# Patient Record
Sex: FEMALE | Race: WHITE | HISPANIC OR LATINO | Employment: FULL TIME | URBAN - METROPOLITAN AREA
[De-identification: names, ages, dates, MRNs, and addresses within clinical notes are randomized per-mention and may not be internally consistent; named-entity substitution may affect disease eponyms.]

---

## 2017-01-02 DIAGNOSIS — R10.13 EPIGASTRIC PAIN: ICD-10-CM

## 2017-01-11 ENCOUNTER — APPOINTMENT (OUTPATIENT)
Dept: NEPHROLOGY | Facility: MEDICAL CENTER | Age: 42
End: 2017-01-11
Payer: COMMERCIAL

## 2017-01-11 ENCOUNTER — OFFICE VISIT (OUTPATIENT)
Dept: NEPHROLOGY | Facility: MEDICAL CENTER | Age: 42
End: 2017-01-11
Payer: COMMERCIAL

## 2017-01-11 VITALS
HEIGHT: 63 IN | HEART RATE: 82 BPM | WEIGHT: 144 LBS | RESPIRATION RATE: 12 BRPM | DIASTOLIC BLOOD PRESSURE: 74 MMHG | TEMPERATURE: 97.6 F | SYSTOLIC BLOOD PRESSURE: 116 MMHG | BODY MASS INDEX: 25.52 KG/M2

## 2017-01-11 DIAGNOSIS — R31.9 HEMATURIA: ICD-10-CM

## 2017-01-11 PROCEDURE — 99204 OFFICE O/P NEW MOD 45 MIN: CPT | Performed by: INTERNAL MEDICINE

## 2017-01-11 ASSESSMENT — ENCOUNTER SYMPTOMS
CHILLS: 0
FEVER: 0
ABDOMINAL PAIN: 1
COUGH: 0
NAUSEA: 0
VOMITING: 0

## 2017-01-11 NOTE — MR AVS SNAPSHOT
"        Joycelyn Byers   2017 9:45 AM   Office Visit   MRN: [id number]    Department:  [facility]   Dept Phone:  [phone]    Description:  Female : 1975   Provider:  Fadi Najjar, M.D.           Reason for Visit     New Patient           Allergies as of 2017     Allergen Noted Reactions    Savella [Kdc:Milnacipran+Ci Pigment Blue 63] 2015   Myalgia    Ciprofloxacin 10/10/2014       Reglan [Kdc:Yellow Dye+Ci Pigment Blue 63+Metoclopramide] 10/10/2014       Sulfa Drugs 04/15/2014       Tetanus Antitoxin 10/04/2016       Mild spasms and pain      Tramadol 10/10/2014         You were diagnosed with     Hematuria   [[id number]]         Vital Signs     Blood Pressure Pulse Temperature Respirations Height Weight    116/74 mmHg 82 36.4 °C (97.6 °F) (Temporal) 12 1.6 m (5' 3\") 65.318 kg (144 lb)    Body Mass Index Smoking Status                25.51 kg/m2 Never Smoker           Basic Information     Date Of Birth Sex Race Ethnicity Preferred Language    1975 Female  or   Origin (Syriac,Venezuelan,Australian,Jeremie, etc) English      Your appointments     2017  9:30 AM   H Pylori Breath Test with [facility]   [facility] (--)    [address] [facility] [address]   [phone]            2017  1:20 PM   MA [id number] with [facility] MG 3   [facility] ([address])    [address] [facility] [address]  [place] [id number] [phone]           No deodorant, powder, perfume or lotion under the arm or breast area.            2017 10:00 AM   Follow Up Visit with Fadi Najjar, M.D.   [facility] ([address] [facility])    [address]  [place] [id number] [phone]           You will be receiving a confirmation call a few days before your appointment from our automated call confirmation system.              Problem List              ICD-10-CM Priority Class Noted - Resolved    Asthma in " adult J45.909   1/14/2015 - Present    Overactive bladder N32.81   4/17/2015 - Present    Ankylosing spondylitis of lumbosacral region (HCC) M45.7   7/6/2015 - Present    Anxiety F41.9   11/24/2015 - Present    Intractable migraine without aura and without status migrainosus G43.019   11/24/2015 - Present    Multiple allergies Z88.9   12/21/2015 - Present    Autonomic nervous system disorder G90.9   4/19/2016 - Present    Vocal cord dysfunction J38.3   4/19/2016 - Present    Reactive hypoglycemia E16.1   9/28/2016 - Present    Prediabetes R73.03   12/8/2016 - Present    Chronic constipation K59.09   12/15/2016 - Present      Health Maintenance        Date Due Completion Dates    IMM DTaP/Tdap/Td Vaccine (1 - Tdap) 5/6/1995 ---    IMM PNEUMOCOCCAL 19-64 (ADULT) MEDIUM RISK SERIES (1 of 1 - PPSV23) 5/6/1995 ---    MAMMOGRAM 5/6/2016 ---            Current Immunizations     Influenza Vaccine Quad Inj (Pf) 10/23/2014    Influenza Vaccine Quad Inj (Preserved) 11/8/2016      Below and/or attached are the medications your provider expects you to take. Review all of your home medications and newly ordered medications with your provider and/or pharmacist. Follow medication instructions as directed by your provider and/or pharmacist. Please keep your medication list with you and share with your provider. Update the information when medications are discontinued, doses are changed, or new medications (including over-the-counter products) are added; and carry medication information at all times in the event of emergency situations     Allergies:  SAVELLA - Myalgia     CIPROFLOXACIN - (reactions not documented)     REGLAN - (reactions not documented)     SULFA DRUGS - (reactions not documented)     TETANUS ANTITOXIN - (reactions not documented)     TRAMADOL - (reactions not documented)               Medications  Valid as of: January 11, 2017 - 10:49 AM    Generic Name Brand Name Tablet Size Instructions for use    Adalimumab  (Kit) HUMIRA 40 MG/0.8ML Inject  as instructed.        Albuterol Sulfate (Aero Soln) albuterol 108 (90 BASE) MCG/ACT Inhale 2 Puffs by mouth every 6 hours as needed for Shortness of Breath.        Beclomethasone Dipropionate (Aero Soln) QVAR 80 MCG/ACT Inhale 1 Puff by mouth 2 times a day.        Blood Glucose Monitoring Suppl (Misc) Blood Glucose Monitoring Suppl SUPPLIES Test strips for One TOUCH Ultra blue test strips Mini Meter Sig: Use 3x a day        Diclofenac Sodium (Gel) SOLARAZE 3 % Apply  to affected area(s) 2 times a day.        Digestive Enzymes   Take  by mouth.        EPINEPHrine (Solution Auto-injector) EPIPEN 2-NOLAN 0.3 MG/0.3ML         Fexofenadine HCl (Tab) ALLEGRA 180 MG Take 180 mg by mouth every day.        Fluticasone Propionate (Suspension) FLONASE 50 MCG/ACT Spray 1 Spray in nose every day.        Lancets (Misc) Lancets  Lancets order: Lancets for One Touch UltaMini Sig: Use 3x a day        MetroNIDAZOLE (Tab) FLAGYL 500 MG 1 TAB TWICE A DAY X 7 DAYS. NO ALCOHOL USE WITH THIS.        Multiple Vitamins-Minerals   Take  by mouth.        Multiple Vitamins-Minerals (Chew Tab) AIRBORNE  Take  by mouth.        Nabumetone (Tab) RELAFEN 500 MG Take 500 mg by mouth 2 times a day.        Ondansetron (TABLET DISPERSIBLE) ZOFRAN ODT 4 MG Take 1 Tab by mouth every 8 hours as needed for Nausea/Vomiting.        Oxybutynin Chloride (TABLET SR 24 HR) DITROPAN-XL 10 MG Take 1 Tab by mouth every day.        Pantoprazole Sodium (Tablet Delayed Response) PROTONIX 40 MG Take 40 mg by mouth every day.        Rizatriptan Benzoate (Tab) MAXALT 10 MG Take 1 Tab by mouth Once PRN. Indications: Migraine Headache        .                 Medicines prescribed today were sent to:     [id number] - [place] - [address] [facility] [address]    [address] [facility] [place] [address]    Phone: [phone] Fax: [phone]    Open 24 Hours?: No    [facility] - [facility] [address]  [facility]    [address] [facility] [address]    Phone: [phone] Fax: [phone]    Open 24 Hours?: No      Medication refill instructions:       If your prescription bottle indicates you have medication refills left, it is not necessary to call your provider’s office. Please contact your pharmacy and they will refill your medication.    If your prescription bottle indicates you do not have any refills left, you may request refills at any time through one of the following ways: The online [employer] system (except Urgent Care), by calling your provider’s office, or by asking your pharmacy to contact your provider’s office with a refill request. Medication refills are processed only during regular business hours and may not be available until the next business day. Your provider may request additional information or to have a follow-up visit with you prior to refilling your medication.   *Please Note: Medication refills are assigned a new Rx number when refilled electronically. Your pharmacy may indicate that no refills were authorized even though a new prescription for the same medication is available at the pharmacy. Please request the medicine by name with the pharmacy before contacting your provider for a refill.        Your To Do List     Future Labs/Procedures Complete By Expires    BASIC METABOLIC PANEL  As directed 1/11/2018    URINALYSIS,CULTURE IF INDICATED  As directed 1/11/2018         [employer] Access Code: Activation code not generated  Current [employer] Status: Active

## 2017-01-11 NOTE — PROGRESS NOTES
"Subjective:      Joycelyn Byers is a 41 y.o. female who presents with Chronic Kidney Disease   the patient was referred by her primary care provider Archana Miller for microscopic hematuria     The patient has a history of ankylosing spondylitis.  Also has a history of regular bowel movement/chronic constipation    Recently his been complaining of left-sided abdominal pain, had a urinalysis was positive for microscopic hematuria, her kidney function were normal        Chronic Kidney Disease  This is a new problem. The current episode started more than 1 month ago. The problem occurs intermittently. The problem has been unchanged. Associated symptoms include abdominal pain. Pertinent negatives include no chest pain, chills, coughing, fever, nausea, urinary symptoms or vomiting.       Review of Systems   Constitutional: Negative for fever and chills.   Respiratory: Negative for cough.    Cardiovascular: Negative for chest pain and leg swelling.   Gastrointestinal: Positive for abdominal pain. Negative for nausea and vomiting.   Genitourinary: Negative for dysuria, urgency and frequency.          Objective:     /74 mmHg  Pulse 82  Temp(Src) 36.4 °C (97.6 °F) (Temporal)  Resp 12  Ht 1.6 m (5' 3\")  Wt 65.318 kg (144 lb)  BMI 25.51 kg/m2     Physical Exam   Constitutional: She is oriented to person, place, and time. She appears well-developed and well-nourished. No distress.   HENT:   Nose: Nose normal.   Cardiovascular: Normal rate and regular rhythm.    Pulmonary/Chest: Effort normal.   Musculoskeletal: She exhibits no edema.   Neurological: She is alert and oriented to person, place, and time.   Skin: Skin is warm. She is not diaphoretic.   Psychiatric: She has a normal mood and affect. Her behavior is normal.   Nursing note and vitals reviewed.    PMH,SH,FH,Medication list and medication allergy were reviewed and updated today  I have reviewed the abdominal CT scan images myself with the patient "  , there was no hydronephrosis or nephrolithiasis.          Assessment/Plan:     1. Hematuria  No clear etiology, possible cystitis  Her kidney function has been normal  I doubt any nephrolithiasis  Abdominal pain can be related to her GI problem  Recommend to repeat urinalysis, basic metabolic panel, and urine protein to creatinine ratio  If the hematuria persists we might consider repeating the abdominal CT scan ordered referring her to urology for possible cystoscopy  Avoid nephrotoxins like NSAIDs      Over 50% of this 45 minute visit was spent on counseling and coordination of care regarding diet, side effects, and complication.

## 2017-01-25 ENCOUNTER — HOSPITAL ENCOUNTER (OUTPATIENT)
Dept: LAB | Facility: MEDICAL CENTER | Age: 42
End: 2017-01-25
Attending: FAMILY MEDICINE
Payer: COMMERCIAL

## 2017-01-25 DIAGNOSIS — R10.13 EPIGASTRIC PAIN: ICD-10-CM

## 2017-01-25 PROCEDURE — 83013 H PYLORI (C-13) BREATH: CPT

## 2017-01-27 LAB — UREA BREATH TEST QL: NEGATIVE

## 2017-01-30 ENCOUNTER — HOSPITAL ENCOUNTER (OUTPATIENT)
Dept: RADIOLOGY | Facility: MEDICAL CENTER | Age: 42
End: 2017-01-30
Attending: NURSE PRACTITIONER
Payer: COMMERCIAL

## 2017-01-30 DIAGNOSIS — Z12.39 SCREENING FOR BREAST CANCER: ICD-10-CM

## 2017-01-30 DIAGNOSIS — G89.29 FLANK PAIN, CHRONIC: ICD-10-CM

## 2017-01-30 DIAGNOSIS — R10.9 FLANK PAIN, CHRONIC: ICD-10-CM

## 2017-01-30 PROCEDURE — 77063 BREAST TOMOSYNTHESIS BI: CPT

## 2017-03-27 ENCOUNTER — OFFICE VISIT (OUTPATIENT)
Dept: URGENT CARE | Facility: PHYSICIAN GROUP | Age: 42
End: 2017-03-27
Payer: COMMERCIAL

## 2017-03-27 VITALS
WEIGHT: 137 LBS | TEMPERATURE: 98.7 F | OXYGEN SATURATION: 99 % | HEART RATE: 80 BPM | HEIGHT: 63 IN | RESPIRATION RATE: 14 BRPM | SYSTOLIC BLOOD PRESSURE: 118 MMHG | BODY MASS INDEX: 24.27 KG/M2 | DIASTOLIC BLOOD PRESSURE: 76 MMHG

## 2017-03-27 DIAGNOSIS — G43.809 OTHER MIGRAINE WITHOUT STATUS MIGRAINOSUS, NOT INTRACTABLE: ICD-10-CM

## 2017-03-27 DIAGNOSIS — J01.00 ACUTE NON-RECURRENT MAXILLARY SINUSITIS: ICD-10-CM

## 2017-03-27 PROCEDURE — 99214 OFFICE O/P EST MOD 30 MIN: CPT | Performed by: PHYSICIAN ASSISTANT

## 2017-03-27 PROCEDURE — 1036F TOBACCO NON-USER: CPT | Performed by: PHYSICIAN ASSISTANT

## 2017-03-27 PROCEDURE — G8432 DEP SCR NOT DOC, RNG: HCPCS | Performed by: PHYSICIAN ASSISTANT

## 2017-03-27 PROCEDURE — G8482 FLU IMMUNIZE ORDER/ADMIN: HCPCS | Performed by: PHYSICIAN ASSISTANT

## 2017-03-27 PROCEDURE — G8420 CALC BMI NORM PARAMETERS: HCPCS | Performed by: PHYSICIAN ASSISTANT

## 2017-03-27 RX ORDER — HYDROXYCHLOROQUINE SULFATE 200 MG/1
200 TABLET, FILM COATED ORAL EVERY MORNING
COMMUNITY
Start: 2017-01-07

## 2017-03-27 RX ORDER — KETOROLAC TROMETHAMINE 30 MG/ML
60 INJECTION, SOLUTION INTRAMUSCULAR; INTRAVENOUS ONCE
Status: COMPLETED | OUTPATIENT
Start: 2017-03-27 | End: 2017-03-27

## 2017-03-27 RX ORDER — AMOXICILLIN AND CLAVULANATE POTASSIUM 875; 125 MG/1; MG/1
1 TABLET, FILM COATED ORAL 2 TIMES DAILY
Qty: 14 TAB | Refills: 0 | Status: SHIPPED | OUTPATIENT
Start: 2017-03-27 | End: 2017-04-03

## 2017-03-27 RX ADMIN — KETOROLAC TROMETHAMINE 60 MG: 30 INJECTION, SOLUTION INTRAMUSCULAR; INTRAVENOUS at 15:45

## 2017-03-27 ASSESSMENT — ENCOUNTER SYMPTOMS
VOMITING: 0
MIGRAINE HEADACHES: 1
NAUSEA: 0
ABDOMINAL PAIN: 0
SINUS PRESSURE: 1
CHILLS: 0
SEIZURES: 0
SHORTNESS OF BREATH: 0
FEVER: 0
HEADACHES: 1
DIARRHEA: 0
BLURRED VISION: 0
DIZZINESS: 0
MUSCULOSKELETAL NEGATIVE: 1
PHOTOPHOBIA: 0

## 2017-03-27 NOTE — MR AVS SNAPSHOT
"        Joycelyn Kwan Pebbles   3/27/2017 2:30 PM   Office Visit   MRN: [id number]    Department:  [facility]   Dept Phone:  [phone]    Description:  Female : 1975   Provider:  Teodora Corrigan PA-C           Reason for Visit     Headache headache & nausea, mostly on the left side, onset yesterday      Allergies as of 3/27/2017     Allergen Noted Reactions    Savella [Kdc:Milnacipran+Ci Pigment Blue 63] 2015   Myalgia    Ciprofloxacin 10/10/2014       Reglan [Kdc:Yellow Dye+Ci Pigment Blue 63+Metoclopramide] 10/10/2014       Sulfa Drugs 04/15/2014       Tetanus Antitoxin 10/04/2016       Mild spasms and pain      Tramadol 10/10/2014         You were diagnosed with     Other migraine without status migrainosus, not intractable   [[id number]]       Acute non-recurrent maxillary sinusitis   [[id number]]         Vital Signs     Blood Pressure Pulse Temperature Respirations Height Weight    118/76 mmHg 80 37.1 °C (98.7 °F) 14 1.6 m (5' 3\") 62.143 kg (137 lb)    Body Mass Index Oxygen Saturation Smoking Status             24.27 kg/m2 99% Never Smoker          Basic Information     Date Of Birth Sex Race Ethnicity Preferred Language    1975 Female  or   Origin (Botswanan,Beninese,Costa Rican,Jeremie, etc) English      Your appointments     Mar 29, 2017  8:40 AM   Established Patient with [facility] Kaye   [address] [id number] [phone]           You will be receiving a confirmation call a few days before your appointment from our automated call confirmation system.   Please bring to your appointment; a photo ID, copies of your insurance card, all medication bottles and any co-pay that you are responsible for.  Please allow 45-60 minutes to complete your scheduled appointment.            2017 10:00 AM   Follow Up Visit with Fadi Najjar, M.D.   [facility] ([facility])    [address] [place] [address] [address]  [place] [address]   [phone]           You will be receiving a confirmation call a few days before your appointment from our automated call confirmation system.              Problem List              ICD-10-CM Priority Class Noted - Resolved    Asthma in adult J45.909   1/14/2015 - Present    Overactive bladder N32.81   4/17/2015 - Present    Ankylosing spondylitis of lumbosacral region (CMS-HCC) M45.7   7/6/2015 - Present    Anxiety F41.9   11/24/2015 - Present    Intractable migraine without aura and without status migrainosus G43.019   11/24/2015 - Present    Multiple allergies Z88.9   12/21/2015 - Present    Autonomic nervous system disorder G90.9   4/19/2016 - Present    Vocal cord dysfunction J38.3   4/19/2016 - Present    Reactive hypoglycemia E16.1   9/28/2016 - Present    Prediabetes R73.03   12/8/2016 - Present    Chronic constipation K59.09   12/15/2016 - Present      Health Maintenance        Date Due Completion Dates    IMM DTaP/Tdap/Td Vaccine (1 - Tdap) 5/6/1995 ---    IMM PNEUMOCOCCAL 19-64 (ADULT) MEDIUM RISK SERIES (1 of 1 - PPSV23) 5/6/1995 ---    MAMMOGRAM 1/30/2018 1/30/2017, 1/30/2017 (Done)    Override on 1/30/2017: Done            Current Immunizations     Influenza Vaccine Quad Inj (Pf) 10/23/2014    Influenza Vaccine Quad Inj (Preserved) 11/8/2016      Below and/or attached are the medications your provider expects you to take. Review all of your home medications and newly ordered medications with your provider and/or pharmacist. Follow medication instructions as directed by your provider and/or pharmacist. Please keep your medication list with you and share with your provider. Update the information when medications are discontinued, doses are changed, or new medications (including over-the-counter products) are added; and carry medication information at all times in the event of emergency situations     Allergies:  SAVELLA - Myalgia      CIPROFLOXACIN - (reactions not documented)     REGLAN - (reactions not documented)     SULFA DRUGS - (reactions not documented)     TETANUS ANTITOXIN - (reactions not documented)     TRAMADOL - (reactions not documented)               Medications  Valid as of: March 27, 2017 -  4:08 PM    Generic Name Brand Name Tablet Size Instructions for use    Adalimumab (Kit) HUMIRA 40 MG/0.8ML Inject  as instructed.        Albuterol Sulfate (Aero Soln) albuterol 108 (90 BASE) MCG/ACT Inhale 2 Puffs by mouth every 6 hours as needed for Shortness of Breath.        Amoxicillin-Pot Clavulanate (Tab) AUGMENTIN 875-125 MG Take 1 Tab by mouth 2 times a day for 7 days.        Beclomethasone Dipropionate (Aero Soln) QVAR 80 MCG/ACT Inhale 1 Puff by mouth 2 times a day.        Blood Glucose Monitoring Suppl (Misc) Blood Glucose Monitoring Suppl SUPPLIES Test strips for One TOUCH Ultra blue test strips Mini Meter Sig: Use 3x a day        Diclofenac Sodium (Gel) SOLARAZE 3 % Apply  to affected area(s) 2 times a day.        Digestive Enzymes   Take  by mouth.        EPINEPHrine (Solution Auto-injector) EPIPEN 2-NOLAN 0.3 MG/0.3ML         Fexofenadine HCl (Tab) ALLEGRA 180 MG Take 180 mg by mouth every day.        Fluticasone Propionate (Suspension) FLONASE 50 MCG/ACT Spray 1 Spray in nose every day.        Hydroxychloroquine Sulfate (Tab) PLAQUENIL 200 MG         Lancets (Misc) Lancets  Lancets order: Lancets for One Touch UltaMini Sig: Use 3x a day        Multiple Vitamins-Minerals   Take  by mouth.        Multiple Vitamins-Minerals (Chew Tab) AIRBORNE  Take  by mouth.        Nabumetone (Tab) RELAFEN 500 MG Take 500 mg by mouth 2 times a day.        Ondansetron (TABLET DISPERSIBLE) ZOFRAN ODT 4 MG Take 1 Tab by mouth every 8 hours as needed for Nausea/Vomiting.        Oxybutynin Chloride (TABLET SR 24 HR) DITROPAN-XL 10 MG Take 1 Tab by mouth every day.        Pantoprazole Sodium (Tablet Delayed Response) PROTONIX 40 MG Take 40 mg by mouth  every day.        Rizatriptan Benzoate (Tab) MAXALT 10 MG Take 1 Tab by mouth Once PRN. Indications: Migraine Headache        .                 Medicines prescribed today were sent to:     [id number] - [place] - [address] [facility] [address]    [address] [place] [address]    Phone: [phone] Fax: [phone]    Open 24 Hours?: No    [facility] - [address] [address]    Phone: [phone] Fax: [phone]    Open 24 Hours?: No      Medication refill instructions:       If your prescription bottle indicates you have medication refills left, it is not necessary to call your provider’s office. Please contact your pharmacy and they will refill your medication.    If your prescription bottle indicates you do not have any refills left, you may request refills at any time through one of the following ways: The online [employer] system (except Urgent Care), by calling your provider’s office, or by asking your pharmacy to contact your provider’s office with a refill request. Medication refills are processed only during regular business hours and may not be available until the next business day. Your provider may request additional information or to have a follow-up visit with you prior to refilling your medication.   *Please Note: Medication refills are assigned a new Rx number when refilled electronically. Your pharmacy may indicate that no refills were authorized even though a new prescription for the same medication is available at the pharmacy. Please request the medicine by name with the pharmacy before contacting your provider for a refill.           [employer] Access Code: Activation code not generated  Current [employer] Status: Active

## 2017-03-28 NOTE — PROGRESS NOTES
Subjective:      Joycelyn Byers is a 41 y.o. female who presents with Headache            Headache   This is a new problem. The current episode started today. The problem occurs constantly. The problem has been waxing and waning. The pain is located in the left unilateral region. The pain does not radiate. The pain quality is similar to prior headaches. The quality of the pain is described as throbbing and aching. The pain is at a severity of 6/10. The pain is moderate. Associated symptoms include sinus pressure. Pertinent negatives include no abdominal pain, blurred vision, dizziness, fever, nausea, phonophobia, photophobia, seizures or vomiting. She has tried NSAIDs for the symptoms. The treatment provided mild relief. Her past medical history is significant for migraine headaches and sinus disease.   Patient presents to urgent care with a migraine headache that started this morning. She states she has a history of migraines. This headache started woke her up this morning around 2 am. She took some ibuprofen, which has helped, but not completely resolved her symptoms. She does have a prescription at home for Imitrex, which she states usually does work well, but she has not taken it today. She also believes she may have a sinus infection. She has had worsening clear nasal drainage for a couple weeks.  Denies fevers, cough, or SOB.     Review of Systems   Constitutional: Negative for fever and chills.   HENT: Positive for congestion and sinus pressure.    Eyes: Negative for blurred vision and photophobia.   Respiratory: Negative for shortness of breath.    Cardiovascular: Negative for chest pain.   Gastrointestinal: Negative for nausea, vomiting, abdominal pain and diarrhea.   Genitourinary: Negative.    Musculoskeletal: Negative.    Neurological: Positive for headaches. Negative for dizziness and seizures.          Objective:     /76 mmHg  Pulse 80  Temp(Src) 37.1 °C (98.7 °F)  Resp 14  Ht 1.6 m  "(5' 3\")  Wt 62.143 kg (137 lb)  BMI 24.27 kg/m2  SpO2 99%     Physical Exam   Constitutional: She is oriented to person, place, and time. She appears well-developed and well-nourished. No distress.   HENT:   Head: Normocephalic and atraumatic.   Right Ear: Hearing, tympanic membrane, external ear and ear canal normal.   Left Ear: Hearing, tympanic membrane, external ear and ear canal normal.   Nose: Mucosal edema and rhinorrhea present. Right sinus exhibits maxillary sinus tenderness and frontal sinus tenderness. Left sinus exhibits maxillary sinus tenderness and frontal sinus tenderness.   Mouth/Throat: Oropharynx is clear and moist. No oropharyngeal exudate.   Eyes: Conjunctivae are normal. Pupils are equal, round, and reactive to light.   Neck: Normal range of motion.   Cardiovascular: Normal rate, regular rhythm and normal heart sounds.    No murmur heard.  Pulmonary/Chest: Effort normal and breath sounds normal. No respiratory distress. She has no wheezes.   Musculoskeletal: Normal range of motion.   Neurological: She is alert and oriented to person, place, and time.   Skin: Skin is warm and dry. She is not diaphoretic.   Psychiatric: She has a normal mood and affect. Her behavior is normal.   Nursing note and vitals reviewed.         PMH:  has a past medical history of Lupus (CMS-HCC); Headache, classical migraine; Allergy, unspecified not elsewhere classified; Ankylosing spondylitis of lumbosacral region (CMS-HCC) (7/6/2015); Asthma exacerbation (11/24/2015); Anxiety (11/24/2015); Intractable migraine without aura and without status migrainosus (11/24/2015); Overactive bladder; GERD (gastroesophageal reflux disease); Vocal cord dysfunction; Hiatal hernia; Peptic ulcer; Oropharyngeal dysphagia; and Prediabetes (12/8/2016).  MEDS:   Current outpatient prescriptions:   •  amoxicillin-clavulanate (AUGMENTIN) 875-125 MG Tab, Take 1 Tab by mouth 2 times a day for 7 days., Disp: 14 Tab, Rfl: 0  •  " hydroxychloroquine (PLAQUENIL) 200 MG Tab, , Disp: , Rfl:   •  pantoprazole (PROTONIX) 40 MG Tablet Delayed Response, Take 40 mg by mouth every day., Disp: , Rfl:   •  Blood Glucose Monitoring Suppl SUPPLIES Misc, Test strips for One TOUCH Ultra blue test strips Mini Meter Sig: Use 3x a day, Disp: 300 Each, Rfl: 3  •  Lancets Misc, Lancets order: Lancets for One Touch UltaMini Sig: Use 3x a day, Disp: 300 Each, Rfl: 3  •  beclomethasone (QVAR) 80 MCG/ACT inhaler, Inhale 1 Puff by mouth 2 times a day., Disp: , Rfl:   •  fluticasone (FLONASE) 50 MCG/ACT nasal spray, Spray 1 Spray in nose every day., Disp: , Rfl:   •  fexofenadine (ALLEGRA ALLERGY) 180 MG tablet, Take 180 mg by mouth every day., Disp: , Rfl:   •  Multiple Vitamins-Minerals (CENTRUM SILVER ULTRA WOMENS PO), Take  by mouth., Disp: , Rfl:   •  diclofenac (SOLARAZE) 3 % gel, Apply  to affected area(s) 2 times a day., Disp: , Rfl:   •  nabumetone (RELAFEN) 500 MG Tab, Take 500 mg by mouth 2 times a day., Disp: , Rfl:   •  Multiple Vitamins-Minerals (AIRBORNE) Chew Tab, Take  by mouth., Disp: , Rfl:   •  Digestive Enzymes (PAPAYA ENZYME PO), Take  by mouth., Disp: , Rfl:   •  EPIPEN 2-NOLAN 0.3 MG/0.3ML Solution Auto-injector solution for injection, , Disp: , Rfl:   •  rizatriptan (MAXALT) 10 MG tablet, Take 1 Tab by mouth Once PRN. Indications: Migraine Headache, Disp: 10 Tab, Rfl: 1  •  ondansetron (ZOFRAN ODT) 4 MG TABLET DISPERSIBLE, Take 1 Tab by mouth every 8 hours as needed for Nausea/Vomiting., Disp: 15 Tab, Rfl: 0  •  oxybutynin SR (DITROPAN-XL) 10 MG CR tablet, Take 1 Tab by mouth every day., Disp: 90 Tab, Rfl: 3  •  adalimumab (HUMIRA) 40 MG/0.8ML injection, Inject  as instructed., Disp: , Rfl:   •  albuterol (VENTOLIN HFA) 108 (90 BASE) MCG/ACT AERS inhalation aerosol, Inhale 2 Puffs by mouth every 6 hours as needed for Shortness of Breath., Disp: 8.5 g, Rfl: 3  ALLERGIES:   Allergies   Allergen Reactions   • Savella [Kdc:Milnacipran+Ci Pigment  Blue 63] Myalgia   • Ciprofloxacin    • Reglan [Kdc:Yellow Dye+Ci Pigment Blue 63+Metoclopramide]    • Sulfa Drugs    • Tetanus Antitoxin      Mild spasms and pain     • Tramadol      SURGHX:   Past Surgical History   Procedure Laterality Date   • Hysterectomy laparoscopy  2013     utrine and right ovary removed   • Hernia repair  2007     umblical hernia   • Cholecystectomy  2007   • Salpingo-oophorectomy, unilateral Right      SOCHX:  reports that she has never smoked. She has never used smokeless tobacco. She reports that she does not drink alcohol or use illicit drugs.  FH: family history includes Alcohol/Drug in her paternal grandfather; Arthritis in her brother and maternal grandmother; Cancer in her paternal aunt; Diabetes in her father, paternal grandmother, and paternal uncle; GI in her daughter, daughter, daughter, and son; Heart Disease in her father; Hypertension in her father; Psychiatry in her father and mother; Stroke in her maternal grandfather.       Assessment/Plan:     1. Other migraine without status migrainosus, not intractable  - ketorolac (TORADOL) injection 60 mg; 2 mL by Intramuscular route Once.   - Given in clinic with moderate relief of symptoms after 10 minutes, she reports pain went down to 3/10.   - Advised to go home, drink plenty of fluids, and take a nap  - She has imitrex at home if she still has residual headache that she will take    2. Acute non-recurrent maxillary sinusitis  - Advised patient her symptoms are most likely viral in etiology, recommend supportive care  - Increased fluids and rest  - Discussed use of nedi-pot, humidifier, and Flonase nasal spray for symptomatic relief  - Contingent rx for antibiotics given if symptoms do not resolve in 3-4 days  - amoxicillin-clavulanate (AUGMENTIN) 875-125 MG Tab; Take 1 Tab by mouth 2 times a day for 7 days.  Dispense: 14 Tab; Refill: 0   - Complete full course of antibiotics as prescribed     Call or return to office if symptoms  persist or worsen

## 2017-03-29 ENCOUNTER — PATIENT MESSAGE (OUTPATIENT)
Dept: MEDICAL GROUP | Facility: PHYSICIAN GROUP | Age: 42
End: 2017-03-29

## 2017-03-29 ENCOUNTER — HOSPITAL ENCOUNTER (OUTPATIENT)
Dept: LAB | Facility: MEDICAL CENTER | Age: 42
End: 2017-03-29
Attending: INTERNAL MEDICINE
Payer: COMMERCIAL

## 2017-03-29 ENCOUNTER — OFFICE VISIT (OUTPATIENT)
Dept: MEDICAL GROUP | Facility: PHYSICIAN GROUP | Age: 42
End: 2017-03-29
Payer: COMMERCIAL

## 2017-03-29 ENCOUNTER — HOSPITAL ENCOUNTER (OUTPATIENT)
Dept: LAB | Facility: MEDICAL CENTER | Age: 42
End: 2017-03-29
Attending: NURSE PRACTITIONER
Payer: COMMERCIAL

## 2017-03-29 VITALS
DIASTOLIC BLOOD PRESSURE: 78 MMHG | OXYGEN SATURATION: 98 % | HEIGHT: 63 IN | WEIGHT: 137 LBS | TEMPERATURE: 99 F | RESPIRATION RATE: 12 BRPM | BODY MASS INDEX: 24.27 KG/M2 | SYSTOLIC BLOOD PRESSURE: 118 MMHG | HEART RATE: 95 BPM

## 2017-03-29 DIAGNOSIS — R53.83 FATIGUE, UNSPECIFIED TYPE: ICD-10-CM

## 2017-03-29 DIAGNOSIS — R73.03 PREDIABETES: ICD-10-CM

## 2017-03-29 DIAGNOSIS — E55.9 VITAMIN D DEFICIENCY: ICD-10-CM

## 2017-03-29 DIAGNOSIS — R53.83 FATIGUE, UNSPECIFIED TYPE: Primary | ICD-10-CM

## 2017-03-29 LAB
25(OH)D3 SERPL-MCNC: 14 NG/ML (ref 30–100)
ALBUMIN SERPL BCP-MCNC: 4.1 G/DL (ref 3.2–4.9)
ALBUMIN/GLOB SERPL: 1.1 G/DL
ALP SERPL-CCNC: 49 U/L (ref 30–99)
ALT SERPL-CCNC: 35 U/L (ref 2–50)
ANION GAP SERPL CALC-SCNC: 7 MMOL/L (ref 0–11.9)
AST SERPL-CCNC: 24 U/L (ref 12–45)
BASOPHILS # BLD AUTO: 0.1 K/UL (ref 0–0.12)
BASOPHILS NFR BLD AUTO: 1.6 % (ref 0–1.8)
BILIRUB SERPL-MCNC: 0.6 MG/DL (ref 0.1–1.5)
BUN SERPL-MCNC: 10 MG/DL (ref 8–22)
CALCIUM SERPL-MCNC: 9.7 MG/DL (ref 8.5–10.5)
CHLORIDE SERPL-SCNC: 103 MMOL/L (ref 96–112)
CO2 SERPL-SCNC: 27 MMOL/L (ref 20–33)
CREAT SERPL-MCNC: 0.65 MG/DL (ref 0.5–1.4)
CREAT UR-MCNC: 174.3 MG/DL
EOSINOPHIL # BLD: 0.11 K/UL (ref 0–0.51)
EOSINOPHIL NFR BLD AUTO: 1.8 % (ref 0–6.9)
ERYTHROCYTE [DISTWIDTH] IN BLOOD BY AUTOMATED COUNT: 39.9 FL (ref 35.9–50)
EST. AVERAGE GLUCOSE BLD GHB EST-MCNC: 111 MG/DL
GLOBULIN SER CALC-MCNC: 3.6 G/DL (ref 1.9–3.5)
GLUCOSE SERPL-MCNC: 98 MG/DL (ref 65–99)
HBA1C MFR BLD: 5.5 % (ref 0–5.6)
HCT VFR BLD AUTO: 43.3 % (ref 37–47)
HGB BLD-MCNC: 13.6 G/DL (ref 12–16)
IMM GRANULOCYTES # BLD AUTO: 0.01 K/UL (ref 0–0.11)
IMM GRANULOCYTES NFR BLD AUTO: 0.2 % (ref 0–0.9)
LYMPHOCYTES # BLD: 1.58 K/UL (ref 1–4.8)
LYMPHOCYTES NFR BLD AUTO: 25.5 % (ref 22–41)
MCH RBC QN AUTO: 25.8 PG (ref 27–33)
MCHC RBC AUTO-ENTMCNC: 31.4 G/DL (ref 33.6–35)
MCV RBC AUTO: 82.2 FL (ref 81.4–97.8)
MICROALBUMIN UR-MCNC: <0.7 MG/DL
MICROALBUMIN/CREAT UR: NORMAL MG/G (ref 0–30)
MONOCYTES # BLD: 0.49 K/UL (ref 0–0.85)
MONOCYTES NFR BLD AUTO: 7.9 % (ref 0–13.4)
NEUTROPHILS # BLD: 3.9 K/UL (ref 2–7.15)
NEUTROPHILS NFR BLD AUTO: 63 % (ref 44–72)
NRBC # BLD AUTO: 0 K/UL
NRBC BLD-RTO: 0 /100 WBC
PLATELET # BLD AUTO: 307 K/UL (ref 164–446)
PMV BLD AUTO: 10.6 FL (ref 9–12.9)
POTASSIUM SERPL-SCNC: 4.1 MMOL/L (ref 3.6–5.5)
PROT SERPL-MCNC: 7.7 G/DL (ref 6–8.2)
RBC # BLD AUTO: 5.27 M/UL (ref 4.2–5.4)
SODIUM SERPL-SCNC: 137 MMOL/L (ref 135–145)
TSH SERPL DL<=0.005 MIU/L-ACNC: 1.56 UIU/ML (ref 0.3–3.7)
VIT B12 SERPL-MCNC: 437 PG/ML (ref 211–911)
WBC # BLD AUTO: 6.2 K/UL (ref 4.8–10.8)

## 2017-03-29 PROCEDURE — 1036F TOBACCO NON-USER: CPT | Performed by: NURSE PRACTITIONER

## 2017-03-29 PROCEDURE — G8482 FLU IMMUNIZE ORDER/ADMIN: HCPCS | Performed by: NURSE PRACTITIONER

## 2017-03-29 PROCEDURE — G8432 DEP SCR NOT DOC, RNG: HCPCS | Performed by: NURSE PRACTITIONER

## 2017-03-29 PROCEDURE — 85025 COMPLETE CBC W/AUTO DIFF WBC: CPT

## 2017-03-29 PROCEDURE — 84443 ASSAY THYROID STIM HORMONE: CPT

## 2017-03-29 PROCEDURE — 83036 HEMOGLOBIN GLYCOSYLATED A1C: CPT | Mod: GA

## 2017-03-29 PROCEDURE — 80053 COMPREHEN METABOLIC PANEL: CPT

## 2017-03-29 PROCEDURE — 99213 OFFICE O/P EST LOW 20 MIN: CPT | Performed by: NURSE PRACTITIONER

## 2017-03-29 PROCEDURE — G8420 CALC BMI NORM PARAMETERS: HCPCS | Performed by: NURSE PRACTITIONER

## 2017-03-29 PROCEDURE — 82306 VITAMIN D 25 HYDROXY: CPT | Mod: GA

## 2017-03-29 PROCEDURE — 82607 VITAMIN B-12: CPT

## 2017-03-29 PROCEDURE — 36415 COLL VENOUS BLD VENIPUNCTURE: CPT

## 2017-03-29 RX ORDER — ERGOCALCIFEROL 1.25 MG/1
50000 CAPSULE ORAL
Qty: 12 CAP | Refills: 3 | Status: SHIPPED | OUTPATIENT
Start: 2017-03-29 | End: 2017-10-10

## 2017-03-29 NOTE — PROGRESS NOTES
Chief Complaint   Patient presents with   • Fatigue     increased fatigue x 1 month       HISTORY OF PRESENT ILLNESS: Patient is a 41 y.o. female established patient who presents today to discuss the followin. Fatigue, unspecified type  Patient states that over the past month or so she has not been feeling well.  She has not been able to pinpoint any specific ailments.  Denies any changes in her medications although she is followed closely by her rheumatologist, Dr. Lynch.  She continues to take Plaquenil and Humira.  She is scheduled for follow-up with both the nephrologist and rheumatologist.  She continues to exercise on a regular basis, but has not seen any significant changes in her energy levels.  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  She denies any changes in her sleep patterns.    2. Prediabetes    Allergies:Savella; Ciprofloxacin; Reglan; Sulfa drugs; Tetanus antitoxin; and Tramadol    Current Outpatient Prescriptions Ordered in [facility]   Medication Sig Dispense Refill   • hydroxychloroquine (PLAQUENIL) 200 MG Tab      • amoxicillin-clavulanate (AUGMENTIN) 875-125 MG Tab Take 1 Tab by mouth 2 times a day for 7 days. 14 Tab 0   • pantoprazole (PROTONIX) 40 MG Tablet Delayed Response Take 40 mg by mouth every day.     • Blood Glucose Monitoring Suppl SUPPLIES Misc Test strips for One TOUCH Ultra blue test strips Mini Meter Sig: Use 3x a day 300 Each 3   • Lancets Misc Lancets order: Lancets for One Touch UltaMini Sig: Use 3x a day 300 Each 3   • beclomethasone (QVAR) 80 MCG/ACT inhaler Inhale 1 Puff by mouth 2 times a day.     • fluticasone (FLONASE) 50 MCG/ACT nasal spray Spray 1 Spray in nose every day.     • fexofenadine (ALLEGRA ALLERGY) 180 MG tablet Take 180 mg by mouth every day.     • Multiple Vitamins-Minerals (CENTRUM SILVER ULTRA WOMENS PO) Take  by mouth.     • diclofenac (SOLARAZE) 3 % gel Apply  to  affected area(s) 2 times a day.     • nabumetone (RELAFEN) 500 MG Tab Take 500 mg by mouth 2 times a day.     • Multiple Vitamins-Minerals (AIRBORNE) Chew Tab Take  by mouth.     • Digestive Enzymes (PAPAYA ENZYME PO) Take  by mouth.     • EPIPEN 2-NOLAN 0.3 MG/0.3ML Solution Auto-injector solution for injection      • rizatriptan (MAXALT) 10 MG tablet Take 1 Tab by mouth Once PRN. Indications: Migraine Headache 10 Tab 1   • ondansetron (ZOFRAN ODT) 4 MG TABLET DISPERSIBLE Take 1 Tab by mouth every 8 hours as needed for Nausea/Vomiting. 15 Tab 0   • oxybutynin SR (DITROPAN-XL) 10 MG CR tablet Take 1 Tab by mouth every day. 90 Tab 3   • adalimumab (HUMIRA) 40 MG/0.8ML injection Inject  as instructed.     • albuterol (VENTOLIN HFA) 108 (90 BASE) MCG/ACT AERS inhalation aerosol Inhale 2 Puffs by mouth every 6 hours as needed for Shortness of Breath. 8.5 g 3     No current [facility]-ordered facility-administered medications on file.       Past Medical History   Diagnosis Date   • Lupus (CMS-HCC)    • Headache, classical migraine    • Allergy, unspecified not elsewhere classified    • Ankylosing spondylitis of lumbosacral region (CMS-HCC) 2015   • Asthma exacerbation 2015   • Anxiety 2015   • Intractable migraine without aura and without status migrainosus 2015   • Overactive bladder    • GERD (gastroesophageal reflux disease)    • Vocal cord dysfunction    • Hiatal hernia    • Peptic ulcer    • Oropharyngeal dysphagia    • Prediabetes 2016       Social History   Substance Use Topics   • Smoking status: Never Smoker    • Smokeless tobacco: Never Used   • Alcohol Use: No       Family Status   Relation Status Death Age   • Brother Alive    • Maternal Grandmother     • Mother Alive      brain anurysym    • Father Alive    • Paternal Uncle Alive    • Maternal Grandfather     • Paternal Grandmother     • Paternal Grandfather     • Paternal Aunt Alive      Family History  "  Problem Relation Age of Onset   • Arthritis Brother    • Arthritis Maternal Grandmother    • Psychiatry Mother    • Psychiatry Father    • Heart Disease Father    • Hypertension Father    • Diabetes Father    • Diabetes Paternal Uncle    • Stroke Maternal Grandfather    • Diabetes Paternal Grandmother    • Alcohol/Drug Paternal Grandfather    • GI Son    • GI Daughter    • GI Daughter    • GI Daughter    • Cancer Paternal Aunt      gyn cancer       ROS:  Review of Systems   Constitutional: Negative for fever, chills, weight loss and malaise/fatigue.   HENT: Negative for ear pain, nosebleeds, congestion, sore throat and neck pain.    Eyes: Negative for blurred vision.   Respiratory: Negative for cough, sputum production, shortness of breath and wheezing.    Cardiovascular: Negative for chest pain, palpitations, orthopnea and leg swelling.   Gastrointestinal: Negative for heartburn, nausea, vomiting and abdominal pain.   Genitourinary: Negative for dysuria, urgency and frequency.   Musculoskeletal: Negative for myalgias, back pain and joint pain.   Skin: Negative for rash and itching.   Neurological: Negative for dizziness, tingling, tremors, sensory change, focal weakness and headaches.   Endo/Heme/Allergies: Does not bruise/bleed easily.   Psychiatric/Behavioral: Negative for depression, suicidal ideas and memory loss.  The patient is not nervous/anxious and does not have insomnia.        Exam:  Blood pressure 118/78, pulse 95, temperature 37.2 °C (99 °F), resp. rate 12, height 1.6 m (5' 2.99\"), weight 62.143 kg (137 lb), last menstrual period 03/29/2016, SpO2 98 %, not currently breastfeeding.  General:  Well nourished, well developed female in NAD.  Head is grossly normal.  Neck: Thyroid is not enlarged.  Pulmonary: Normal effort. No rales, rhonci or crackles.  Mild tenderness with palpation of the left flank/CVA.  Cardiovascular: Regular rate and rhythm without murmur.   Extremities: no clubbing, cyanosis, or " edema.  Psych:  Mood and affect are normal.  Answering questions appropriately with good eye contact.      Please note that this dictation was created using voice recognition software. I have made every reasonable attempt to correct obvious errors, but I expect that there are errors of grammar and possibly content that I did not discover before finalizing the note.    Assessment/Plan:    1. Fatigue, unspecified type  CBC WITH DIFFERENTIAL    COMP METABOLIC PANEL    TSH WITH REFLEX TO FT4    VITAMIN D,25 HYDROXY    VITAMIN B12   2. Prediabetes  HEMOGLOBIN A1C        1.  Labs today, fasting  2.  Encouraged patient to f/u with Dr. Lynch if pain continues or labs fail to provide direction for treatment.

## 2017-03-29 NOTE — MR AVS SNAPSHOT
"        Joycelyn Garnettles   3/29/2017 8:40 AM   Office Visit   MRN: [id number]    Department:  [facility]   Dept Phone:  [phone]    Description:  Female : 1975   Provider:  JABARI Kaye           Reason for Visit     Fatigue increased fatigue x 1 month      Allergies as of 3/29/2017     Allergen Noted Reactions    Savella [Kdc:Milnacipran+Ci Pigment Blue 63] 2015   Myalgia    Ciprofloxacin 10/10/2014       Reglan [Kdc:Yellow Dye+Ci Pigment Blue 63+Metoclopramide] 10/10/2014       Sulfa Drugs 04/15/2014       Tetanus Antitoxin 10/04/2016       Mild spasms and pain      Tramadol 10/10/2014         You were diagnosed with     Fatigue, unspecified type   [7155706]  -  Primary     Prediabetes   [233060]       Ankylosing spondylitis of lumbosacral region (CMS-[facility])   [431448]         Vital Signs     Blood Pressure Pulse Temperature Respirations Height Weight    118/78 mmHg 95 37.2 °C (99 °F) 12 1.6 m (5' 2.99\") 62.143 kg (137 lb)    Body Mass Index Oxygen Saturation Last Menstrual Period Breastfeeding? Smoking Status       24.27 kg/m2 98% 2016 No Never Smoker        Basic Information     Date Of Birth Sex Race Ethnicity Preferred Language    1975 Female  or   Origin (Zambian,Niuean,Nauruan,Jeremie, etc) English      Your appointments     2017 10:00 AM   Follow Up Visit with Fadi Najjar, M.D.   [facility] ([address] [place] [address])    [address] [place] [address], [address]  [facility] [id number] [phone]           You will be receiving a confirmation call a few days before your appointment from our automated call confirmation system.              Problem List              ICD-10-CM Priority Class Noted - Resolved    Asthma in adult J45.909   2015 - Present    Overactive bladder N32.81   2015 - Present    Ankylosing spondylitis of lumbosacral region (CMS-HCC) M45.7   2015 - Present    Anxiety F41.9   " 11/24/2015 - Present    Intractable migraine without aura and without status migrainosus G43.019   11/24/2015 - Present    Multiple allergies Z88.9   12/21/2015 - Present    Autonomic nervous system disorder G90.9   4/19/2016 - Present    Vocal cord dysfunction J38.3   4/19/2016 - Present    Reactive hypoglycemia E16.1   9/28/2016 - Present    Prediabetes R73.03   12/8/2016 - Present    Chronic constipation K59.09   12/15/2016 - Present      Health Maintenance        Date Due Completion Dates    IMM DTaP/Tdap/Td Vaccine (1 - Tdap) 5/6/1995 ---    IMM PNEUMOCOCCAL 19-64 (ADULT) MEDIUM RISK SERIES (1 of 1 - PPSV23) 5/6/1995 ---    MAMMOGRAM 1/30/2018 1/30/2017, 1/30/2017 (Done)    Override on 1/30/2017: Done            Current Immunizations     Influenza Vaccine Quad Inj (Pf) 10/23/2014    Influenza Vaccine Quad Inj (Preserved) 11/8/2016      Below and/or attached are the medications your provider expects you to take. Review all of your home medications and newly ordered medications with your provider and/or pharmacist. Follow medication instructions as directed by your provider and/or pharmacist. Please keep your medication list with you and share with your provider. Update the information when medications are discontinued, doses are changed, or new medications (including over-the-counter products) are added; and carry medication information at all times in the event of emergency situations     Allergies:  SAVELLA - Myalgia     CIPROFLOXACIN - (reactions not documented)     REGLAN - (reactions not documented)     SULFA DRUGS - (reactions not documented)     TETANUS ANTITOXIN - (reactions not documented)     TRAMADOL - (reactions not documented)               Medications  Valid as of: March 29, 2017 -  9:05 AM    Generic Name Brand Name Tablet Size Instructions for use    Adalimumab (Kit) HUMIRA 40 MG/0.8ML Inject  as instructed.        Albuterol Sulfate (Aero Soln) albuterol 108 (90 BASE) MCG/ACT Inhale 2 Puffs by  mouth every 6 hours as needed for Shortness of Breath.        Amoxicillin-Pot Clavulanate (Tab) AUGMENTIN 875-125 MG Take 1 Tab by mouth 2 times a day for 7 days.        Beclomethasone Dipropionate (Aero Soln) QVAR 80 MCG/ACT Inhale 1 Puff by mouth 2 times a day.        Blood Glucose Monitoring Suppl (Misc) Blood Glucose Monitoring Suppl SUPPLIES Test strips for One TOUCH Ultra blue test strips Mini Meter Sig: Use 3x a day        Diclofenac Sodium (Gel) SOLARAZE 3 % Apply  to affected area(s) 2 times a day.        Digestive Enzymes   Take  by mouth.        EPINEPHrine (Solution Auto-injector) EPIPEN 2-NOALN 0.3 MG/0.3ML         Fexofenadine HCl (Tab) ALLEGRA 180 MG Take 180 mg by mouth every day.        Fluticasone Propionate (Suspension) FLONASE 50 MCG/ACT Spray 1 Spray in nose every day.        Hydroxychloroquine Sulfate (Tab) PLAQUENIL 200 MG         Lancets (Misc) Lancets  Lancets order: Lancets for One Touch UltaMini Sig: Use 3x a day        Multiple Vitamins-Minerals   Take  by mouth.        Multiple Vitamins-Minerals (Chew Tab) AIRBORNE  Take  by mouth.        Nabumetone (Tab) RELAFEN 500 MG Take 500 mg by mouth 2 times a day.        Ondansetron (TABLET DISPERSIBLE) ZOFRAN ODT 4 MG Take 1 Tab by mouth every 8 hours as needed for Nausea/Vomiting.        Oxybutynin Chloride (TABLET SR 24 HR) DITROPAN-XL 10 MG Take 1 Tab by mouth every day.        Pantoprazole Sodium (Tablet Delayed Response) PROTONIX 40 MG Take 40 mg by mouth every day.        Rizatriptan Benzoate (Tab) MAXALT 10 MG Take 1 Tab by mouth Once PRN. Indications: Migraine Headache        .                 Medicines prescribed today were sent to:     [id number] - [place] - [address] [facility] [address]    [address] [place] [address]    Phone: [phone] Fax: [phone]    Open 24 Hours?: No    [facility] - [place], [place] - [address]    [address] [facility] [address]     Phone: [phone] Fax: [phone]    Open 24 Hours?: No      Medication refill instructions:       If your prescription bottle indicates you have medication refills left, it is not necessary to call your provider’s office. Please contact your pharmacy and they will refill your medication.    If your prescription bottle indicates you do not have any refills left, you may request refills at any time through one of the following ways: The online [employer] system (except Urgent Care), by calling your provider’s office, or by asking your pharmacy to contact your provider’s office with a refill request. Medication refills are processed only during regular business hours and may not be available until the next business day. Your provider may request additional information or to have a follow-up visit with you prior to refilling your medication.   *Please Note: Medication refills are assigned a new Rx number when refilled electronically. Your pharmacy may indicate that no refills were authorized even though a new prescription for the same medication is available at the pharmacy. Please request the medicine by name with the pharmacy before contacting your provider for a refill.        Your To Do List     Future Labs/Procedures Complete By Expires    CBC WITH DIFFERENTIAL  As directed 3/29/2018    COMP METABOLIC PANEL  As directed 3/29/2018    HEMOGLOBIN A1C  As directed 3/29/2018    TSH WITH REFLEX TO FT4  As directed 3/29/2018    VITAMIN B12  As directed 3/29/2018    VITAMIN D,25 HYDROXY  As directed 3/29/2018         [employer] Access Code: Activation code not generated  Current [employer] Status: Active

## 2017-04-12 ENCOUNTER — OFFICE VISIT (OUTPATIENT)
Dept: NEPHROLOGY | Facility: MEDICAL CENTER | Age: 42
End: 2017-04-12
Payer: COMMERCIAL

## 2017-04-12 VITALS
DIASTOLIC BLOOD PRESSURE: 60 MMHG | HEART RATE: 69 BPM | TEMPERATURE: 99 F | HEIGHT: 63 IN | WEIGHT: 145 LBS | BODY MASS INDEX: 25.69 KG/M2 | RESPIRATION RATE: 16 BRPM | OXYGEN SATURATION: 98 % | SYSTOLIC BLOOD PRESSURE: 104 MMHG

## 2017-04-12 DIAGNOSIS — R31.9 HEMATURIA: ICD-10-CM

## 2017-04-12 PROCEDURE — 1036F TOBACCO NON-USER: CPT | Performed by: INTERNAL MEDICINE

## 2017-04-12 PROCEDURE — 99214 OFFICE O/P EST MOD 30 MIN: CPT | Performed by: INTERNAL MEDICINE

## 2017-04-12 PROCEDURE — G8432 DEP SCR NOT DOC, RNG: HCPCS | Performed by: INTERNAL MEDICINE

## 2017-04-12 PROCEDURE — G8419 CALC BMI OUT NRM PARAM NOF/U: HCPCS | Performed by: INTERNAL MEDICINE

## 2017-04-12 RX ORDER — LUBIPROSTONE 24 UG/1
24 CAPSULE ORAL 2 TIMES DAILY WITH MEALS
COMMUNITY
End: 2018-10-06

## 2017-04-12 ASSESSMENT — ENCOUNTER SYMPTOMS
COUGH: 0
FEVER: 0
VOMITING: 0
CHILLS: 0
NUMBNESS: 0
NAUSEA: 0

## 2017-04-12 NOTE — PROGRESS NOTES
"Subjective:      Joycelyn Byers is a 41 y.o. female who presents with Chronic Kidney Disease    Patient has history of microscopic hematuria, no recent use of NSAIDs        Chronic Kidney Disease  This is a chronic problem. The current episode started more than 1 year ago. The problem occurs constantly. The problem has been unchanged. Pertinent negatives include no chest pain, chills, coughing, fever, nausea, numbness, urinary symptoms or vomiting. Nothing aggravates the symptoms. She has tried nothing for the symptoms.       Review of Systems   Constitutional: Negative for fever and chills.   Respiratory: Negative for cough.    Cardiovascular: Negative for chest pain and leg swelling.   Gastrointestinal: Negative for nausea and vomiting.   Genitourinary: Negative for dysuria, urgency, frequency and hematuria.   Neurological: Negative for numbness.          Objective:     /60 mmHg  Pulse 69  Temp(Src) 37.2 °C (99 °F)  Resp 16  Ht 1.6 m (5' 3\")  Wt 65.772 kg (145 lb)  BMI 25.69 kg/m2  SpO2 98%  LMP 03/29/2016     Physical Exam   Constitutional: She is oriented to person, place, and time. She appears well-developed and well-nourished. No distress.   HENT:   Head: Normocephalic and atraumatic.   Nose: Nose normal.   Eyes: Right eye exhibits no discharge. Left eye exhibits no discharge. No scleral icterus.   Cardiovascular: Normal rate and regular rhythm.    Pulmonary/Chest: Effort normal and breath sounds normal. No respiratory distress. She has no wheezes. She has no rales.   Musculoskeletal: She exhibits no edema.   Neurological: She is alert and oriented to person, place, and time.   Skin: Skin is warm and dry. She is not diaphoretic.   Psychiatric: She has a normal mood and affect. Her behavior is normal.   Nursing note and vitals reviewed.              Assessment/Plan:     1. Hematuria  No clear etiology, her kidney function has been normal, patient has no significant proteinuria.  Consider " urology evaluation, however we might want to just watch and repeat labs once a year  Renal dose all medication  Avoid nephrotoxins

## 2017-04-12 NOTE — MR AVS SNAPSHOT
"        Joycelyn Kwan Pebbles   2017 10:00 AM   Office Visit   MRN: [id number]    Department:  [facility]   Dept Phone:  [phone]    Description:  Female : 1975   Provider:  Fadi Najjar, M.D.           Reason for Visit     Follow-Up           Allergies as of 2017     Allergen Noted Reactions    Savella [Kdc:Milnacipran+Ci Pigment Blue 63] 2015   Myalgia    Ciprofloxacin 10/10/2014       Reglan [Kdc:Yellow Dye+Ci Pigment Blue 63+Metoclopramide] 10/10/2014       Sulfa Drugs 04/15/2014       Tetanus Antitoxin 10/04/2016       Mild spasms and pain      Tramadol 10/10/2014         You were diagnosed with     Hematuria   [[id number]]         Vital Signs     Blood Pressure Pulse Temperature Respirations Height Weight    104/60 mmHg 69 37.2 °C (99 °F) 16 1.6 m (5' 3\") 65.772 kg (145 lb)    Body Mass Index Oxygen Saturation Last Menstrual Period Smoking Status          25.69 kg/m2 98% 2016 Never Smoker         Basic Information     Date Of Birth Sex Race Ethnicity Preferred Language    1975 Female  or   Origin (Tristanian,Puerto Rican,Kosovan,Taiwanese, etc) English      Problem List              ICD-10-CM Priority Class Noted - Resolved    Asthma in adult J45.909   2015 - Present    Overactive bladder N32.81   2015 - Present    Ankylosing spondylitis of lumbosacral region (CMS-[facility]) M45.7   2015 - Present    Anxiety F41.9   2015 - Present    Intractable migraine without aura and without status migrainosus G43.019   2015 - Present    Multiple allergies Z88.9   2015 - Present    Autonomic nervous system disorder G90.9   2016 - Present    Vocal cord dysfunction J38.3   2016 - Present    Reactive hypoglycemia E16.1   2016 - Present    Prediabetes R73.03   2016 - Present    Chronic constipation K59.09   12/15/2016 - Present      Health Maintenance        Date Due Completion Dates    IMM DTaP/Tdap/Td Vaccine (1 - Tdap) " 5/6/1995 ---    IMM PNEUMOCOCCAL 19-64 (ADULT) MEDIUM RISK SERIES (1 of 1 - PPSV23) 5/6/1995 ---    MAMMOGRAM 1/30/2018 1/30/2017, 1/30/2017 (Done)    Override on 1/30/2017: Done            Current Immunizations     Influenza Vaccine Quad Inj (Pf) 10/23/2014    Influenza Vaccine Quad Inj (Preserved) 11/8/2016      Below and/or attached are the medications your provider expects you to take. Review all of your home medications and newly ordered medications with your provider and/or pharmacist. Follow medication instructions as directed by your provider and/or pharmacist. Please keep your medication list with you and share with your provider. Update the information when medications are discontinued, doses are changed, or new medications (including over-the-counter products) are added; and carry medication information at all times in the event of emergency situations     Allergies:  SAVELLA - Myalgia     CIPROFLOXACIN - (reactions not documented)     REGLAN - (reactions not documented)     SULFA DRUGS - (reactions not documented)     TETANUS ANTITOXIN - (reactions not documented)     TRAMADOL - (reactions not documented)               Medications  Valid as of: April 12, 2017 - 10:35 AM    Generic Name Brand Name Tablet Size Instructions for use    Adalimumab (Kit) HUMIRA 40 MG/0.8ML Inject  as instructed.        Albuterol Sulfate (Aero Soln) albuterol 108 (90 BASE) MCG/ACT Inhale 2 Puffs by mouth every 6 hours as needed for Shortness of Breath.        Beclomethasone Dipropionate (Aero Soln) QVAR 80 MCG/ACT Inhale 1 Puff by mouth 2 times a day.        Blood Glucose Monitoring Suppl (Misc) Blood Glucose Monitoring Suppl SUPPLIES Test strips for One TOUCH Ultra blue test strips Mini Meter Sig: Use 3x a day        Diclofenac Sodium (Gel) SOLARAZE 3 % Apply  to affected area(s) 2 times a day.        Digestive Enzymes   Take  by mouth.        EPINEPHrine (Solution Auto-injector) EPIPEN 2-NOLAN 0.3 MG/0.3ML          Ergocalciferol (Cap) DRISDOL 79063 UNIT Take 1 Cap by mouth every 7 days.        Fexofenadine HCl (Tab) ALLEGRA 180 MG Take 180 mg by mouth every day.        Fluticasone Propionate (Suspension) FLONASE 50 MCG/ACT Spray 1 Spray in nose every day.        Hydroxychloroquine Sulfate (Tab) PLAQUENIL 200 MG         Lancets (Misc) Lancets  Lancets order: Lancets for One Touch UltaMini Sig: Use 3x a day        Lubiprostone (Cap) AMITIZA 24 MCG Take 24 mcg by mouth 2 times a day, with meals.        Multiple Vitamins-Minerals   Take  by mouth.        Multiple Vitamins-Minerals (Chew Tab) AIRBORNE  Take  by mouth.        Nabumetone (Tab) RELAFEN 500 MG Take 500 mg by mouth 2 times a day.        Ondansetron (TABLET DISPERSIBLE) ZOFRAN ODT 4 MG Take 1 Tab by mouth every 8 hours as needed for Nausea/Vomiting.        Oxybutynin Chloride (TABLET SR 24 HR) DITROPAN-XL 10 MG Take 1 Tab by mouth every day.        Pantoprazole Sodium (Tablet Delayed Response) PROTONIX 40 MG Take 40 mg by mouth every day.        Rizatriptan Benzoate (Tab) MAXALT 10 MG Take 1 Tab by mouth Once PRN. Indications: Migraine Headache        .                 Medicines prescribed today were sent to:     [id number] - [address] AT [facility] [address]    [address] [facility] [address]    Phone: [phone] Fax: [phone]    Open 24 Hours?: No    [facility] - [address] [address]    Phone: [phone] Fax: [phone]    Open 24 Hours?: No      Medication refill instructions:       If your prescription bottle indicates you have medication refills left, it is not necessary to call your provider’s office. Please contact your pharmacy and they will refill your medication.    If your prescription bottle indicates you do not have any refills left, you may request refills at any time through one of the following ways: The online [employer]  system (except Urgent Care), by calling your provider’s office, or by asking your pharmacy to contact your provider’s office with a refill request. Medication refills are processed only during regular business hours and may not be available until the next business day. Your provider may request additional information or to have a follow-up visit with you prior to refilling your medication.   *Please Note: Medication refills are assigned a new Rx number when refilled electronically. Your pharmacy may indicate that no refills were authorized even though a new prescription for the same medication is available at the pharmacy. Please request the medicine by name with the pharmacy before contacting your provider for a refill.        Your To Do List     Future Labs/Procedures Complete By Expires    BASIC METABOLIC PANEL  As directed 4/12/2018         DriveHQ Access Code: Activation code not generated  Current DriveHQ Status: Active

## 2017-04-21 ENCOUNTER — PATIENT MESSAGE (OUTPATIENT)
Dept: MEDICAL GROUP | Facility: PHYSICIAN GROUP | Age: 42
End: 2017-04-21

## 2017-04-26 ENCOUNTER — OFFICE VISIT (OUTPATIENT)
Dept: MEDICAL GROUP | Facility: PHYSICIAN GROUP | Age: 42
End: 2017-04-26
Payer: COMMERCIAL

## 2017-04-26 VITALS
OXYGEN SATURATION: 100 % | HEART RATE: 90 BPM | SYSTOLIC BLOOD PRESSURE: 118 MMHG | BODY MASS INDEX: 25.69 KG/M2 | RESPIRATION RATE: 12 BRPM | HEIGHT: 63 IN | TEMPERATURE: 98.4 F | DIASTOLIC BLOOD PRESSURE: 62 MMHG | WEIGHT: 145 LBS

## 2017-04-26 DIAGNOSIS — M32.9 LUPUS (HCC): ICD-10-CM

## 2017-04-26 DIAGNOSIS — M45.7 ANKYLOSING SPONDYLITIS OF LUMBOSACRAL REGION (HCC): ICD-10-CM

## 2017-04-26 DIAGNOSIS — R92.30 DENSE BREAST TISSUE ON MAMMOGRAM: ICD-10-CM

## 2017-04-26 DIAGNOSIS — Z00.00 WELL ADULT HEALTH CHECK: Primary | ICD-10-CM

## 2017-04-26 PROCEDURE — G8419 CALC BMI OUT NRM PARAM NOF/U: HCPCS | Performed by: NURSE PRACTITIONER

## 2017-04-26 PROCEDURE — 99396 PREV VISIT EST AGE 40-64: CPT | Performed by: NURSE PRACTITIONER

## 2017-04-26 NOTE — MR AVS SNAPSHOT
"        Joycelyn Kwan Pebbles   2017 11:20 AM   Office Visit   MRN: [id number]    Department:  [facility]   Dept Phone:  [phone]    Description:  Female : 1975   Provider:  JABARI Kaye           Reason for Visit     Annual Exam           Allergies as of 2017     Allergen Noted Reactions    Savella [Kdc:Milnacipran+Ci Pigment Blue 63] 2015   Myalgia    Ciprofloxacin 10/10/2014       Reglan [Kdc:Yellow Dye+Ci Pigment Blue 63+Metoclopramide] 10/10/2014       Sulfa Drugs 04/15/2014       Tetanus Antitoxin 10/04/2016       Mild spasms and pain      Tramadol 10/10/2014         You were diagnosed with     Well adult health check   [[id number]]  -  Primary     Lupus (CMS-HCC)   [[id number]]       Ankylosing spondylitis of lumbosacral region (CMS-HCC)   [348724]       Dense breast tissue on mammogram   [[id number]]         Vital Signs     Blood Pressure Pulse Temperature Respirations Height Weight    118/62 mmHg 90 36.9 °C (98.4 °F) 12 1.6 m (5' 3\") 65.772 kg (145 lb)    Body Mass Index Oxygen Saturation Last Menstrual Period Smoking Status          25.69 kg/m2 100% 2016 Never Smoker         Basic Information     Date Of Birth Sex Race Ethnicity Preferred Language    1975 Female  or   Origin (Namibian,Finnish,Montenegrin,Jeremie, etc) English      Problem List              ICD-10-CM Priority Class Noted - Resolved    Asthma in adult J45.909   2015 - Present    Overactive bladder N32.81   2015 - Present    Ankylosing spondylitis of lumbosacral region (CMS-HCC) M45.7   2015 - Present    Anxiety F41.9   2015 - Present    Intractable migraine without aura and without status migrainosus G43.019   2015 - Present    Multiple allergies Z88.9   2015 - Present    Autonomic nervous system disorder G90.9   2016 - Present    Vocal cord dysfunction J38.3   2016 - Present    Reactive hypoglycemia E16.1   2016 - Present   " Chronic constipation K59.09   12/15/2016 - Present      Health Maintenance        Date Due Completion Dates    IMM DTaP/Tdap/Td Vaccine (1 - Tdap) 5/6/1995 ---    IMM PNEUMOCOCCAL 19-64 (ADULT) MEDIUM RISK SERIES (1 of 1 - PPSV23) 5/6/1995 ---    MAMMOGRAM 1/30/2018 1/30/2017, 1/30/2017 (Done)    Override on 1/30/2017: Done            Current Immunizations     Influenza Vaccine Quad Inj (Pf) 10/23/2014    Influenza Vaccine Quad Inj (Preserved) 11/8/2016      Below and/or attached are the medications your provider expects you to take. Review all of your home medications and newly ordered medications with your provider and/or pharmacist. Follow medication instructions as directed by your provider and/or pharmacist. Please keep your medication list with you and share with your provider. Update the information when medications are discontinued, doses are changed, or new medications (including over-the-counter products) are added; and carry medication information at all times in the event of emergency situations     Allergies:  SAVELLA - Myalgia     CIPROFLOXACIN - (reactions not documented)     REGLAN - (reactions not documented)     SULFA DRUGS - (reactions not documented)     TETANUS ANTITOXIN - (reactions not documented)     TRAMADOL - (reactions not documented)               Medications  Valid as of: April 26, 2017 -  1:19 PM    Generic Name Brand Name Tablet Size Instructions for use    Adalimumab (Kit) HUMIRA 40 MG/0.8ML Inject  as instructed.        Albuterol Sulfate (Aero Soln) albuterol 108 (90 BASE) MCG/ACT Inhale 2 Puffs by mouth every 6 hours as needed for Shortness of Breath.        Beclomethasone Dipropionate (Aero Soln) QVAR 80 MCG/ACT Inhale 1 Puff by mouth 2 times a day.        Blood Glucose Monitoring Suppl (Misc) Blood Glucose Monitoring Suppl SUPPLIES Test strips for One TOUCH Ultra blue test strips Mini Meter Sig: Use 3x a day        Diclofenac Sodium (Gel) SOLARAZE 3 % Apply  to affected area(s) 2  times a day.        Digestive Enzymes   Take  by mouth.        EPINEPHrine (Solution Auto-injector) EPIPEN 2-NOLAN 0.3 MG/0.3ML         Ergocalciferol (Cap) DRISDOL 77385 UNIT Take 1 Cap by mouth every 7 days.        Fexofenadine HCl (Tab) ALLEGRA 180 MG Take 180 mg by mouth every day.        Fluticasone Propionate (Suspension) FLONASE 50 MCG/ACT Spray 1 Spray in nose every day.        Hydroxychloroquine Sulfate (Tab) PLAQUENIL 200 MG         Lancets (Misc) Lancets  Lancets order: Lancets for One Touch UltaMini Sig: Use 3x a day        Lubiprostone (Cap) AMITIZA 24 MCG Take 24 mcg by mouth 2 times a day, with meals.        Multiple Vitamins-Minerals   Take  by mouth.        Multiple Vitamins-Minerals (Chew Tab) AIRBORNE  Take  by mouth.        Nabumetone (Tab) RELAFEN 500 MG Take 500 mg by mouth 2 times a day.        Ondansetron (TABLET DISPERSIBLE) ZOFRAN ODT 4 MG Take 1 Tab by mouth every 8 hours as needed for Nausea/Vomiting.        Oxybutynin Chloride (TABLET SR 24 HR) DITROPAN-XL 10 MG Take 1 Tab by mouth every day.        Pantoprazole Sodium (Tablet Delayed Response) PROTONIX 40 MG Take 40 mg by mouth every day.        Rizatriptan Benzoate (Tab) MAXALT 10 MG Take 1 Tab by mouth Once PRN. Indications: Migraine Headache        .                 Medicines prescribed today were sent to:     [id number] - [place], [place] - [facility] [address]    [address] [facility] [address]    Phone: [phone] Fax: [phone]    Open 24 Hours?: No    [facility] - [place], [place] - [address] [facility]    [address] [facility] [address]    Phone: [phone] Fax: [phone]    Open 24 Hours?: No      Medication refill instructions:       If your prescription bottle indicates you have medication refills left, it is not necessary to call your provider’s office. Please contact your pharmacy and they will refill your medication.    If your prescription bottle  indicates you do not have any refills left, you may request refills at any time through one of the following ways: The online [employer] system (except Urgent Care), by calling your provider’s office, or by asking your pharmacy to contact your provider’s office with a refill request. Medication refills are processed only during regular business hours and may not be available until the next business day. Your provider may request additional information or to have a follow-up visit with you prior to refilling your medication.   *Please Note: Medication refills are assigned a new Rx number when refilled electronically. Your pharmacy may indicate that no refills were authorized even though a new prescription for the same medication is available at the pharmacy. Please request the medicine by name with the pharmacy before contacting your provider for a refill.        Your To Do List     Future Labs/Procedures Complete By [facility]-SCREENING BREAST (SONAMEEE)  As directed 4/26/2018         [employer]Al[employer] Access Code: Activation code not generated  Current [employer]ley Status: Active

## 2017-04-26 NOTE — Clinical Note
April 26, 2017       Patient: Joycelyn Byers   YOB: 1975   Date of Visit: 4/26/2017         To Whom It May Concern:    It is my medical opinion that Joycelyn Byers is fit for duty.  Her chronic medical conditions are under control and should not interfere with her ability to perform.    If you have any questions or concerns, please don't hesitate to call [phone]          Sincerely,          FRANSISCA Kaye.  Electronically Signed

## 2017-04-26 NOTE — PROGRESS NOTES
Chief Complaint   Patient presents with   • Annual Exam       HISTORY OF PRESENT ILLNESS: Patient is a 41 y.o. female established patient who presents today to discuss the followin. Well adult health check  Patient denies any significant change in her interval history.  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    2. Lupus (CMS-HCC)  Denies any changes to treatment plan.  Her symptoms are stable.  She remains under the care of rheumatology.    3. Ankylosing spondylitis of lumbosacral region (CMS-HCC)  Patient denies any new or changing back symptoms.  Feels as though her symptoms are stable at this time.    4. Dense breast tissue on mammogram  Patient would like to proceed with additional imaging for dense breasts.  Denies any personal history of abnormal breast imaging, but reports an aunt with breast cancer.    Allergies:Savella; Ciprofloxacin; Reglan; Sulfa drugs; Tetanus antitoxin; and Tramadol    Current Outpatient Prescriptions Ordered in [facility]   Medication Sig Dispense Refill   • lubiprostone (AMITIZA) 24 MCG capsule Take 24 mcg by mouth 2 times a day, with meals.     • ergocalciferol (DRISDOL) 24180 UNIT capsule Take 1 Cap by mouth every 7 days. 12 Cap 3   • hydroxychloroquine (PLAQUENIL) 200 MG Tab      • pantoprazole (PROTONIX) 40 MG Tablet Delayed Response Take 40 mg by mouth every day.     • beclomethasone (QVAR) 80 MCG/ACT inhaler Inhale 1 Puff by mouth 2 times a day.     • diclofenac (SOLARAZE) 3 % gel Apply  to affected area(s) 2 times a day.     • adalimumab (HUMIRA) 40 MG/0.8ML injection Inject  as instructed.     • Blood Glucose Monitoring Suppl SUPPLIES Misc Test strips for One TOUCH Ultra blue test strips Mini Meter Sig: Use 3x a day 300 Each 3   • Lancets Misc Lancets order: Lancets for One Touch UltaMini Sig: Use 3x a day 300 Each 3   • fluticasone (FLONASE) 50 MCG/ACT  nasal spray Spray 1 Spray in nose every day.     • fexofenadine (ALLEGRA ALLERGY) 180 MG tablet Take 180 mg by mouth every day.     • Multiple Vitamins-Minerals (CENTRUM SILVER ULTRA WOMENS PO) Take  by mouth.     • nabumetone (RELAFEN) 500 MG Tab Take 500 mg by mouth 2 times a day.     • Multiple Vitamins-Minerals (AIRBORNE) Chew Tab Take  by mouth.     • Digestive Enzymes (PAPAYA ENZYME PO) Take  by mouth.     • EPIPEN 2-NOLAN 0.3 MG/0.3ML Solution Auto-injector solution for injection      • rizatriptan (MAXALT) 10 MG tablet Take 1 Tab by mouth Once PRN. Indications: Migraine Headache 10 Tab 1   • ondansetron (ZOFRAN ODT) 4 MG TABLET DISPERSIBLE Take 1 Tab by mouth every 8 hours as needed for Nausea/Vomiting. 15 Tab 0   • oxybutynin SR (DITROPAN-XL) 10 MG CR tablet Take 1 Tab by mouth every day. 90 Tab 3   • albuterol (VENTOLIN HFA) 108 (90 BASE) MCG/ACT AERS inhalation aerosol Inhale 2 Puffs by mouth every 6 hours as needed for Shortness of Breath. 8.5 g 3     No current [facility]-ordered facility-administered medications on file.       Past Medical History   Diagnosis Date   • Lupus (CMS-HCC)    • Headache, classical migraine    • Allergy, unspecified not elsewhere classified    • Ankylosing spondylitis of lumbosacral region (CMS-HCC) 2015   • Anxiety 2015   • Intractable migraine without aura and without status migrainosus 2015   • Overactive bladder    • GERD (gastroesophageal reflux disease)    • Vocal cord dysfunction    • Hiatal hernia    • Peptic ulcer    • Oropharyngeal dysphagia    • Prediabetes 2016       Social History   Substance Use Topics   • Smoking status: Never Smoker    • Smokeless tobacco: Never Used   • Alcohol Use: No       Family Status   Relation Status Death Age   • Brother Alive    • Maternal Grandmother     • Mother Alive      brain anurysym    • Father Alive    • Paternal Uncle Alive    • Maternal Grandfather     • Paternal Grandmother     •  "Paternal Grandfather     • Paternal Aunt Alive      Family History   Problem Relation Age of Onset   • Arthritis Brother    • Arthritis Maternal Grandmother    • Psychiatry Mother    • Psychiatry Father    • Heart Disease Father    • Hypertension Father    • Diabetes Father    • Diabetes Paternal Uncle    • Stroke Maternal Grandfather    • Diabetes Paternal Grandmother    • Alcohol/Drug Paternal Grandfather    • GI Son    • GI Daughter    • GI Daughter    • GI Daughter    • Cancer Paternal Aunt      gyn cancer       ROS: see above    Review of Systems   Constitutional: Negative for fever, chills, weight loss and malaise/fatigue.   HENT: Negative for ear pain, nosebleeds, congestion, sore throat and neck pain.    Eyes: Negative for blurred vision.   Respiratory: Negative for cough, sputum production, shortness of breath and wheezing.    Cardiovascular: Negative for chest pain, palpitations, orthopnea and leg swelling.   Gastrointestinal: Negative for heartburn, nausea, vomiting and abdominal pain.   Genitourinary: Negative for dysuria, urgency and frequency.   Musculoskeletal: Negative for myalgias, back pain and joint pain.   Skin: Negative for rash and itching.   Neurological: Negative for dizziness, tingling, tremors, sensory change, focal weakness and headaches.   Endo/Heme/Allergies: Does not bruise/bleed easily.   Psychiatric/Behavioral: Negative for depression, suicidal ideas and memory loss.  The patient is not nervous/anxious and does not have insomnia.        Exam:  Blood pressure 118/62, pulse 90, temperature 36.9 °C (98.4 °F), resp. rate 12, height 1.6 m (5' 3\"), weight 65.772 kg (145 lb), last menstrual period 2016, SpO2 100 %.  General:  Well nourished, well developed female in NAD  Head is grossly normal.  Neck: Thyroid is not enlarged.  Pulmonary: Normal effort. No rales, ronchi, or wheezing.  Cardiovascular: Regular rate and rhythm without murmur.   Extremities: no clubbing, cyanosis, " or edema.  Psych:  Mood and affect are normal.  Answering questions appropriately with good eye contact.      Please note that this dictation was created using voice recognition software. I have made every reasonable attempt to correct obvious errors, but I expect that there are errors of grammar and possibly content that I did not discover before finalizing the note.    Assessment/Plan:    1. Well adult health check     2. Lupus (CMS-HCC)     3. Ankylosing spondylitis of lumbosacral region (CMS-[facility])     4. Dense breast tissue on mammogram  US-SCREENING BREAST (SONOCINE)        1.  No changes to plan.  2.  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  Sonocine order given  4.  RTC in one year and prn.

## 2017-05-13 ENCOUNTER — PATIENT OUTREACH (OUTPATIENT)
Dept: HEALTH INFORMATION MANAGEMENT | Facility: OTHER | Age: 42
End: 2017-05-13

## 2017-05-13 NOTE — PROGRESS NOTES
Attempt #:1    WebIZ Checked & Epic Updated: yes  HealthConnect Verified: no  Verify PCP: yes    Communication Preference Obtained: yes     Review Care Team: yes    Annual Wellness Visit Scheduling  1. Scheduling Status:Scheduled    Care Gap Scheduling      Health Maintenance Due   Topic Date Due   • IMM DTaP/Tdap/Td Vaccine (1 - Tdap) PATIENT WILL BRING IN SHOT RECORD   • IMM PNEUMOCOCCAL 19-64 (ADULT) MEDIUM RISK SERIES (1 of 1 - PPSV23) SCHEDULED         MyChart Activation: already active  [employer] Oscar: yes  Virtual Visits: yes  Opt In to Text Messages: yes

## 2017-08-30 ENCOUNTER — OFFICE VISIT (OUTPATIENT)
Dept: MEDICAL GROUP | Facility: PHYSICIAN GROUP | Age: 42
End: 2017-08-30
Payer: COMMERCIAL

## 2017-08-30 ENCOUNTER — HOSPITAL ENCOUNTER (OUTPATIENT)
Dept: LAB | Facility: MEDICAL CENTER | Age: 42
End: 2017-08-30
Attending: FAMILY MEDICINE
Payer: COMMERCIAL

## 2017-08-30 VITALS
DIASTOLIC BLOOD PRESSURE: 60 MMHG | SYSTOLIC BLOOD PRESSURE: 110 MMHG | TEMPERATURE: 97.9 F | BODY MASS INDEX: 25.34 KG/M2 | OXYGEN SATURATION: 96 % | HEART RATE: 88 BPM | RESPIRATION RATE: 14 BRPM | HEIGHT: 63 IN | WEIGHT: 143 LBS

## 2017-08-30 DIAGNOSIS — Z87.11 HISTORY OF GASTRIC ULCER: ICD-10-CM

## 2017-08-30 DIAGNOSIS — F41.9 ANXIETY: ICD-10-CM

## 2017-08-30 DIAGNOSIS — R10.13 EPIGASTRIC PAIN: ICD-10-CM

## 2017-08-30 LAB
BASOPHILS # BLD AUTO: 1.2 % (ref 0–1.8)
BASOPHILS # BLD: 0.08 K/UL (ref 0–0.12)
EOSINOPHIL # BLD AUTO: 0.18 K/UL (ref 0–0.51)
EOSINOPHIL NFR BLD: 2.7 % (ref 0–6.9)
ERYTHROCYTE [DISTWIDTH] IN BLOOD BY AUTOMATED COUNT: 40.4 FL (ref 35.9–50)
HCT VFR BLD AUTO: 39.3 % (ref 37–47)
HGB BLD-MCNC: 12.6 G/DL (ref 12–16)
IMM GRANULOCYTES # BLD AUTO: 0.02 K/UL (ref 0–0.11)
IMM GRANULOCYTES NFR BLD AUTO: 0.3 % (ref 0–0.9)
LIPASE SERPL-CCNC: 30 U/L (ref 11–82)
LYMPHOCYTES # BLD AUTO: 2.28 K/UL (ref 1–4.8)
LYMPHOCYTES NFR BLD: 34 % (ref 22–41)
MCH RBC QN AUTO: 26.4 PG (ref 27–33)
MCHC RBC AUTO-ENTMCNC: 32.1 G/DL (ref 33.6–35)
MCV RBC AUTO: 82.2 FL (ref 81.4–97.8)
MONOCYTES # BLD AUTO: 0.57 K/UL (ref 0–0.85)
MONOCYTES NFR BLD AUTO: 8.5 % (ref 0–13.4)
NEUTROPHILS # BLD AUTO: 3.57 K/UL (ref 2–7.15)
NEUTROPHILS NFR BLD: 53.3 % (ref 44–72)
NRBC # BLD AUTO: 0 K/UL
NRBC BLD AUTO-RTO: 0 /100 WBC
PLATELET # BLD AUTO: 320 K/UL (ref 164–446)
PMV BLD AUTO: 11.1 FL (ref 9–12.9)
RBC # BLD AUTO: 4.78 M/UL (ref 4.2–5.4)
WBC # BLD AUTO: 6.7 K/UL (ref 4.8–10.8)

## 2017-08-30 PROCEDURE — 83690 ASSAY OF LIPASE: CPT

## 2017-08-30 PROCEDURE — 36415 COLL VENOUS BLD VENIPUNCTURE: CPT

## 2017-08-30 PROCEDURE — 99213 OFFICE O/P EST LOW 20 MIN: CPT | Performed by: FAMILY MEDICINE

## 2017-08-30 PROCEDURE — 85025 COMPLETE CBC W/AUTO DIFF WBC: CPT

## 2017-08-30 RX ORDER — PANTOPRAZOLE SODIUM 40 MG/1
40 TABLET, DELAYED RELEASE ORAL 2 TIMES DAILY
Qty: 60 TAB | Refills: 3 | Status: SHIPPED | OUTPATIENT
Start: 2017-08-30 | End: 2017-10-10

## 2017-08-30 NOTE — PATIENT INSTRUCTIONS
Patient given written instructions regarding labs,  medications, referrals, dietary and lifestyle management, and return visit.    Favio Wade MD

## 2017-09-05 ENCOUNTER — HOSPITAL ENCOUNTER (OUTPATIENT)
Dept: LAB | Facility: MEDICAL CENTER | Age: 42
End: 2017-09-05
Attending: INTERNAL MEDICINE
Payer: COMMERCIAL

## 2017-09-05 LAB
FASTING STATUS PATIENT QL REPORTED: NORMAL
FERRITIN SERPL-MCNC: 38.1 NG/ML (ref 10–291)

## 2017-09-05 PROCEDURE — 82728 ASSAY OF FERRITIN: CPT | Mod: GZ

## 2017-09-05 PROCEDURE — 36415 COLL VENOUS BLD VENIPUNCTURE: CPT | Mod: GZ

## 2017-10-06 ENCOUNTER — OFFICE VISIT (OUTPATIENT)
Dept: URGENT CARE | Facility: PHYSICIAN GROUP | Age: 42
End: 2017-10-06
Payer: COMMERCIAL

## 2017-10-06 ENCOUNTER — HOSPITAL ENCOUNTER (OUTPATIENT)
Dept: RADIOLOGY | Facility: MEDICAL CENTER | Age: 42
End: 2017-10-06
Attending: NURSE PRACTITIONER
Payer: COMMERCIAL

## 2017-10-06 VITALS
OXYGEN SATURATION: 97 % | WEIGHT: 143 LBS | HEART RATE: 93 BPM | HEIGHT: 63 IN | RESPIRATION RATE: 16 BRPM | DIASTOLIC BLOOD PRESSURE: 80 MMHG | BODY MASS INDEX: 25.34 KG/M2 | SYSTOLIC BLOOD PRESSURE: 116 MMHG | TEMPERATURE: 99.1 F

## 2017-10-06 DIAGNOSIS — R68.89 FLU-LIKE SYMPTOMS: ICD-10-CM

## 2017-10-06 DIAGNOSIS — D84.9 IMMUNOSUPPRESSED STATUS (HCC): ICD-10-CM

## 2017-10-06 DIAGNOSIS — J01.41 ACUTE RECURRENT PANSINUSITIS: ICD-10-CM

## 2017-10-06 DIAGNOSIS — R68.89 FLU-LIKE SYMPTOMS: Primary | ICD-10-CM

## 2017-10-06 LAB
FLUAV+FLUBV AG SPEC QL IA: NEGATIVE
INT CON NEG: NEGATIVE
INT CON POS: POSITIVE

## 2017-10-06 PROCEDURE — 87804 INFLUENZA ASSAY W/OPTIC: CPT | Performed by: NURSE PRACTITIONER

## 2017-10-06 PROCEDURE — 71020 DX-CHEST-2 VIEWS: CPT

## 2017-10-06 PROCEDURE — 99214 OFFICE O/P EST MOD 30 MIN: CPT | Performed by: NURSE PRACTITIONER

## 2017-10-06 RX ORDER — DOXYCYCLINE HYCLATE 100 MG/1
100 CAPSULE ORAL 2 TIMES DAILY
Qty: 14 CAP | Refills: 0 | Status: SHIPPED | OUTPATIENT
Start: 2017-10-06 | End: 2017-12-08

## 2017-10-06 RX ORDER — BUDESONIDE AND FORMOTEROL FUMARATE DIHYDRATE 160; 4.5 UG/1; UG/1
AEROSOL RESPIRATORY (INHALATION)
COMMUNITY
Start: 2017-09-06 | End: 2018-10-06

## 2017-10-06 RX ORDER — ESOMEPRAZOLE MAGNESIUM 40 MG/1
CAPSULE, DELAYED RELEASE ORAL
COMMUNITY
Start: 2017-09-29 | End: 2017-10-10

## 2017-10-06 ASSESSMENT — ENCOUNTER SYMPTOMS
SINUS PRESSURE: 1
HEADACHES: 1
NAUSEA: 1
MYALGIAS: 1
VOMITING: 0
CHILLS: 1
SORE THROAT: 1
SPUTUM PRODUCTION: 1
COUGH: 1
WEAKNESS: 1
DIARRHEA: 0
SHORTNESS OF BREATH: 0
FEVER: 1

## 2017-10-06 NOTE — LETTER
October 6, 2017       Patient: Joycelyn Byers   YOB: 1975   Date of Visit: 10/6/2017         To Whom It May Concern:    It is my medical opinion that Joycelyn Byers should be off work for 2-3 days due to illness.     +  If you have any questions or concerns, please don't hesitate to call [phone]          Sincerely,          SG Ro.P.N.  Electronically Signed

## 2017-10-06 NOTE — PROGRESS NOTES
"Subjective:      Joycelyn Byers is a 42 y.o. female who presents with Sinus Problem (C/o nasal congestion, headache, nausea, post nasal, body aches, chills, fatigue, poss fever x3 days)            Medications, Allergies and Prior Medical Hx reviewed and updated in [facility].with patient/family today       Pt is on immunosuppressent medications. Pt states she has sinus sx for 2 wks then 2 days ago she began to have fever, chills, bodyaches, cough and sore throat.       Sinus Problem   This is a new problem. The current episode started in the past 7 days (2 days). The problem has been rapidly worsening since onset. Maximum temperature: subjective fever. The pain is moderate. Associated symptoms include chills, congestion, coughing, headaches, sinus pressure and a sore throat. Pertinent negatives include no ear pain or shortness of breath. Past treatments include nothing. The treatment provided no relief.       Review of Systems   Constitutional: Positive for chills, fever and malaise/fatigue.   HENT: Positive for congestion, sinus pressure and sore throat. Negative for ear discharge and ear pain.    Respiratory: Positive for cough and sputum production. Negative for shortness of breath.    Gastrointestinal: Positive for nausea. Negative for diarrhea and vomiting.   Musculoskeletal: Positive for myalgias.   Neurological: Positive for weakness and headaches.          Objective:     /80   Pulse 93   Temp 37.3 °C (99.1 °F)   Resp 16   Ht 1.6 m (5' 3\")   Wt 64.9 kg (143 lb)   SpO2 97%   BMI 25.33 kg/m²      Physical Exam   Constitutional: She appears well-developed and well-nourished. No distress.   HENT:   Head: Normocephalic and atraumatic.   Right Ear: Tympanic membrane and ear canal normal.   Left Ear: Tympanic membrane and ear canal normal.   Nose: Rhinorrhea present.   Mouth/Throat: Uvula is midline and mucous membranes are normal. Posterior oropharyngeal edema and posterior oropharyngeal erythema " present. No oropharyngeal exudate.   Eyes: Conjunctivae are normal. Pupils are equal, round, and reactive to light.   Neck: Neck supple.   Cardiovascular: Normal rate, regular rhythm and normal heart sounds.    Pulmonary/Chest: Effort normal and breath sounds normal. No respiratory distress. She has no wheezes.   Lymphadenopathy:     She has cervical adenopathy.   Neurological: She is alert.   Skin: Skin is warm and dry.   Psychiatric: She has a normal mood and affect. Her behavior is normal.   Vitals reviewed.              Assessment/Plan:     1. Flu-like symptoms  DX-CHEST-2 VIEWS    POCT Influenza A/B    doxycycline (VIBRAMYCIN) 100 MG Cap   2. Acute recurrent pansinusitis  doxycycline (VIBRAMYCIN) 100 MG Cap   3. Immunosuppressed status (CMS-HCC)           poct flu - neg    Xray of chest -  Reviewed film and radiology interpretation:   Negative two views of the chest.    Letter written for 2-3 days off of school/work    Rest, Fluids, tylenol, ibuprofen, sinus irrigation,  humidified air, and otc decongestants  Pt will go to the ER for worsening or changing symptoms as discussed,   Follow-up with your primary care provider or return here if not improving in 5 - 7 days   Discharge instructions discussed with pt/family who verbalize understanding and agreement with poc

## 2017-10-10 ENCOUNTER — HOSPITAL ENCOUNTER (OUTPATIENT)
Dept: RADIOLOGY | Facility: REHABILITATION | Age: 42
End: 2017-10-10
Attending: SPECIALIST
Payer: COMMERCIAL

## 2017-10-10 ENCOUNTER — HOSPITAL ENCOUNTER (OUTPATIENT)
Dept: PAIN MANAGEMENT | Facility: REHABILITATION | Age: 42
End: 2017-10-10
Attending: SPECIALIST
Payer: COMMERCIAL

## 2017-10-10 VITALS
SYSTOLIC BLOOD PRESSURE: 109 MMHG | WEIGHT: 145.94 LBS | BODY MASS INDEX: 25.86 KG/M2 | HEIGHT: 63 IN | DIASTOLIC BLOOD PRESSURE: 78 MMHG | HEART RATE: 72 BPM | TEMPERATURE: 98.8 F | RESPIRATION RATE: 17 BRPM | OXYGEN SATURATION: 99 %

## 2017-10-10 PROCEDURE — 700117 HCHG RX CONTRAST REV CODE 255

## 2017-10-10 PROCEDURE — 62323 NJX INTERLAMINAR LMBR/SAC: CPT

## 2017-10-10 PROCEDURE — 700111 HCHG RX REV CODE 636 W/ 250 OVERRIDE (IP)

## 2017-10-10 RX ORDER — ESOMEPRAZOLE MAGNESIUM 40 MG/1
40 GRANULE, DELAYED RELEASE ORAL
COMMUNITY
End: 2017-12-08

## 2017-10-10 RX ORDER — METHYLPREDNISOLONE ACETATE 80 MG/ML
INJECTION, SUSPENSION INTRA-ARTICULAR; INTRALESIONAL; INTRAMUSCULAR; SOFT TISSUE
Status: COMPLETED
Start: 2017-10-10 | End: 2017-10-10

## 2017-10-10 RX ORDER — MIDAZOLAM HYDROCHLORIDE 1 MG/ML
INJECTION INTRAMUSCULAR; INTRAVENOUS
Status: COMPLETED
Start: 2017-10-10 | End: 2017-10-10

## 2017-10-10 RX ADMIN — METHYLPREDNISOLONE ACETATE 80 MG: 80 INJECTION, SUSPENSION INTRA-ARTICULAR; INTRALESIONAL; INTRAMUSCULAR; SOFT TISSUE at 10:02

## 2017-10-10 RX ADMIN — FENTANYL CITRATE 50 MCG: 50 INJECTION, SOLUTION INTRAMUSCULAR; INTRAVENOUS at 09:58

## 2017-10-10 RX ADMIN — IOHEXOL 1 ML: 240 INJECTION, SOLUTION INTRATHECAL; INTRAVASCULAR; INTRAVENOUS; ORAL at 10:02

## 2017-10-10 RX ADMIN — MIDAZOLAM HYDROCHLORIDE 1 MG: 1 INJECTION, SOLUTION INTRAMUSCULAR; INTRAVENOUS at 09:58

## 2017-10-10 ASSESSMENT — PAIN SCALES - GENERAL
PAINLEVEL_OUTOF10: 0
PAINLEVEL_OUTOF10: 7
PAINLEVEL_OUTOF10: 0

## 2017-10-10 NOTE — PROGRESS NOTES
Medication reconciliation reviewed with patient denied taking any blood thinners  and any anti- inflammatories medication . Discharge instruction given to patient and verbalized understanding. Patient  has ride home ( [place]/  /  ). Patient on Doxycycline for sinus infection, she started last 10/06/2017 Dr. Jaramillo notified.

## 2017-10-11 ENCOUNTER — OFFICE VISIT (OUTPATIENT)
Dept: MEDICAL GROUP | Facility: CLINIC | Age: 42
End: 2017-10-11
Payer: COMMERCIAL

## 2017-10-11 ENCOUNTER — HOSPITAL ENCOUNTER (OUTPATIENT)
Dept: LAB | Facility: MEDICAL CENTER | Age: 42
End: 2017-10-11
Attending: NURSE PRACTITIONER
Payer: COMMERCIAL

## 2017-10-11 VITALS
TEMPERATURE: 98 F | DIASTOLIC BLOOD PRESSURE: 60 MMHG | BODY MASS INDEX: 25.16 KG/M2 | HEIGHT: 63 IN | HEART RATE: 105 BPM | WEIGHT: 142 LBS | RESPIRATION RATE: 16 BRPM | SYSTOLIC BLOOD PRESSURE: 114 MMHG | OXYGEN SATURATION: 98 %

## 2017-10-11 DIAGNOSIS — I47.9 PAROXYSMAL TACHYCARDIA (HCC): ICD-10-CM

## 2017-10-11 LAB
CRP SERPL HS-MCNC: 0.03 MG/DL (ref 0–0.75)
ERYTHROCYTE [SEDIMENTATION RATE] IN BLOOD BY WESTERGREN METHOD: 9 MM/HOUR (ref 0–20)
FOLATE SERPL-MCNC: 10.8 NG/ML
HCYS SERPL-SCNC: 8.52 UMOL/L
RHEUMATOID FACT SER IA-ACNC: <10 IU/ML (ref 0–14)
VIT B12 SERPL-MCNC: 297 PG/ML (ref 211–911)

## 2017-10-11 PROCEDURE — 99214 OFFICE O/P EST MOD 30 MIN: CPT | Performed by: FAMILY MEDICINE

## 2017-10-11 PROCEDURE — 82595 ASSAY OF CRYOGLOBULIN: CPT

## 2017-10-11 PROCEDURE — 36415 COLL VENOUS BLD VENIPUNCTURE: CPT

## 2017-10-11 PROCEDURE — 85652 RBC SED RATE AUTOMATED: CPT

## 2017-10-11 PROCEDURE — 86431 RHEUMATOID FACTOR QUANT: CPT

## 2017-10-11 PROCEDURE — 86225 DNA ANTIBODY NATIVE: CPT

## 2017-10-11 PROCEDURE — 82746 ASSAY OF FOLIC ACID SERUM: CPT

## 2017-10-11 PROCEDURE — 86038 ANTINUCLEAR ANTIBODIES: CPT

## 2017-10-11 PROCEDURE — 83516 IMMUNOASSAY NONANTIBODY: CPT | Mod: 91

## 2017-10-11 PROCEDURE — 86334 IMMUNOFIX E-PHORESIS SERUM: CPT

## 2017-10-11 PROCEDURE — 86140 C-REACTIVE PROTEIN: CPT

## 2017-10-11 PROCEDURE — 82784 ASSAY IGA/IGD/IGG/IGM EACH: CPT

## 2017-10-11 PROCEDURE — 86255 FLUORESCENT ANTIBODY SCREEN: CPT

## 2017-10-11 PROCEDURE — 82607 VITAMIN B-12: CPT

## 2017-10-11 PROCEDURE — 84160 ASSAY OF PROTEIN ANY SOURCE: CPT | Mod: 91

## 2017-10-11 PROCEDURE — 83090 ASSAY OF HOMOCYSTEINE: CPT | Mod: GA

## 2017-10-11 PROCEDURE — 86235 NUCLEAR ANTIGEN ANTIBODY: CPT | Mod: 91

## 2017-10-11 PROCEDURE — 86256 FLUORESCENT ANTIBODY TITER: CPT

## 2017-10-11 PROCEDURE — 84165 PROTEIN E-PHORESIS SERUM: CPT | Mod: 91

## 2017-10-11 NOTE — PROGRESS NOTES
CC: Increased heart rate with palpitations    HPI:    The patient is a 42-year-old female with multiple medical problems including asthma, Sjogren's syndrome, ankylosing spondylitis, and asthma. 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 She complains of approximately 6 day history of episodes of tachycardia mainly when she is ambulating and especially when she walks up stairs. She feels strong palpitations and the symptoms resolve when she rests. She has checked her heart rate and it gets up to between 135 and 140. She reports that this is not accompanyied by wheezing or shortness of breath. She denies dizziness. She denies chest pain. She denies lower extremity edema. She was seen in urgent care a few days ago and started on doxycycline for a sinus infection. She says the symptoms started prior to starting the doxycycline. She is on multiple medications but none of them are brand-new. She denies chest pain, dizziness, lower extremity edema. Denies change in bowel or bladder habits.      Patient Active Problem List    Diagnosis Date Noted   • Chronic constipation 12/15/2016   • Reactive hypoglycemia 09/28/2016   • Autonomic nervous system disorder 04/19/2016   • Vocal cord dysfunction 04/19/2016   • Multiple allergies 12/21/2015   • Anxiety 11/24/2015   • Intractable migraine without aura and without status migrainosus 11/24/2015   • Ankylosing spondylitis of lumbosacral region (CMS-HCC) 07/06/2015   • Overactive bladder 04/17/2015   • Asthma in adult 01/14/2015         Current Outpatient Prescriptions:   •  esomeprazole magnesium (NEXIUM) 40 MG Pack, Take 40 mg by mouth every morning before breakfast., Disp: , Rfl:   •  SYMBICORT 160-4.5 MCG/ACT Aerosol, , Disp: , Rfl:   •  doxycycline (VIBRAMYCIN) 100 MG Cap, Take 1 Cap by mouth 2 times a day., Disp: 14 Cap, Rfl: 0  •  lubiprostone (AMITIZA) 24 MCG capsule, Take 24 mcg by mouth 2 times a day, with meals., Disp: , Rfl:   •  hydroxychloroquine  (PLAQUENIL) 200 MG Tab, , Disp: , Rfl:   •  fluticasone (FLONASE) 50 MCG/ACT nasal spray, Spray 1 Spray in nose every day., Disp: , Rfl:   •  fexofenadine (ALLEGRA ALLERGY) 180 MG tablet, Take 180 mg by mouth every day., Disp: , Rfl:   •  Multiple Vitamins-Minerals (CENTRUM SILVER ULTRA WOMENS PO), Take  by mouth., Disp: , Rfl:   •  diclofenac (SOLARAZE) 3 % gel, Apply  to affected area(s) 2 times a day., Disp: , Rfl:   •  albuterol (VENTOLIN HFA) 108 (90 BASE) MCG/ACT AERS inhalation aerosol, Inhale 2 Puffs by mouth every 6 hours as needed for Shortness of Breath., Disp: 8.5 g, Rfl: 3  •  Blood Glucose Monitoring Suppl SUPPLIES Misc, Test strips for One TOUCH Ultra blue test strips Mini Meter Sig: Use 3x a day, Disp: 300 Each, Rfl: 3  •  Lancets Misc, Lancets order: Lancets for One Touch UltaMini Sig: Use 3x a day, Disp: 300 Each, Rfl: 3  •  EPIPEN 2-NOLAN 0.3 MG/0.3ML Solution Auto-injector solution for injection, , Disp: , Rfl:   •  rizatriptan (MAXALT) 10 MG tablet, Take 1 Tab by mouth Once PRN. Indications: Migraine Headache, Disp: 10 Tab, Rfl: 1  •  ondansetron (ZOFRAN ODT) 4 MG TABLET DISPERSIBLE, Take 1 Tab by mouth every 8 hours as needed for Nausea/Vomiting., Disp: 15 Tab, Rfl: 0  •  oxybutynin SR (DITROPAN-XL) 10 MG CR tablet, Take 1 Tab by mouth every day., Disp: 90 Tab, Rfl: 3  •  adalimumab (HUMIRA) 40 MG/0.8ML injection, Inject  as instructed., Disp: , Rfl:     Allergies as of 10/11/2017 - Reviewed 10/11/2017   Allergen Reaction Noted   • Savella [kdc:milnacipran+ci pigment blue 63] Myalgia 12/21/2015   • Ciprofloxacin  10/10/2014   • Reglan [kdc:yellow dye+ci pigment blue 63+metoclopramide]  10/10/2014   • Sulfa drugs  04/15/2014   • Tetanus antitoxin  10/04/2016   • Tramadol  10/10/2014       Social History     Social History   • Marital status:      Spouse name: N/A   • Number of children: N/A   • Years of education: N/A     Occupational History   • Not on file.     Social History Main Topics  "  • Smoking status: Never Smoker   • Smokeless tobacco: Never Used   • Alcohol use No   • Drug use: No   • Sexual activity: Yes     Partners: Male     Birth control/ protection: Surgical     Other Topics Concern   • Not on file     Social History Narrative    Patient is currently disabled, 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 She is  with four children.        Family History   Problem Relation Age of Onset   • Arthritis Brother    • Arthritis Maternal Grandmother    • Psychiatry Mother    • Psychiatry Father    • Heart Disease Father    • Hypertension Father    • Diabetes Father    • Diabetes Paternal Uncle    • Stroke Maternal Grandfather    • Diabetes Paternal Grandmother    • Alcohol/Drug Paternal Grandfather    • Cancer Paternal Aunt      gyn cancer   • GI Son    • GI Daughter    • GI Daughter    • GI Daughter        Past Medical History:   Diagnosis Date   • Prediabetes 12/8/2016   • Anxiety 11/24/2015   • Intractable migraine without aura and without status migrainosus 11/24/2015   • Ankylosing spondylitis of lumbosacral region (CMS-[facility]) 7/6/2015   • Allergy, unspecified not elsewhere classified    • GERD (gastroesophageal reflux disease)    • Headache, classical migraine    • Hiatal hernia    • Lupus    • Oropharyngeal dysphagia    • Overactive bladder    • Peptic ulcer    • Vocal cord dysfunction        Past Surgical History:   Procedure Laterality Date   • HYSTERECTOMY LAPAROSCOPY  2013    utrine and right ovary removed   • HERNIA REPAIR  2007    umblical hernia   • CHOLECYSTECTOMY  2007   • SALPINGO-OOPHORECTOMY, UNILATERAL Right        ROS:  As documented above in the history of present illness.    /60   Pulse (!) 105   Temp 36.7 °C (98 °F)   Resp 16   Ht 1.6 m (5' 3\")   Wt 64.4 kg (142 lb)   SpO2 98%   Breastfeeding? No   BMI 25.15 kg/m²     Physical Exam:      GEN:  Alert, oriented, in no distress  HEENT;  PERRLA, EOMI  NECK:  Supple without adenopathy or thyromegaly.  No JVD or " bruits.  LUNGS:  Clear to auscultation  CV:  Heart RRR without murmurs or S3 or S4; peripheral pulses intact.  EXT:  Without cyanosis, clubbing, or edema.  NEURO:  Cranial nerves II through XII intact.  Motor function and sensation intact.    EKG is done which shows normal sinus rhythm with ventricular weight of 94. There is a lot of artifact but I see no concerning changes, per my interpretation.      Assessment and Plan:     1. Paroxysmal tachycardia (CMS-HCC)  I discussed with her the option of going to the ER to facilitate a much quicker workup to rule out a serious cause. At this point she is pretty adamant that she doesn't want to go to the ER because she has had so many visits in the past and says that she hates the ER. She is medically knowledgeable and assures me that she will go if her symptoms worsen.    - ECHOCARDIOGRAM-COMP W/ CONT; Future  - HOLTER MONITOR / EVENT RECORDER; Future  - CBC WITH DIFFERENTIAL; Future  - COMP METABOLIC PANEL; Future  - TSH; Future      Although she has an assigned PCP, she has rarely seen her and would like to establish care with an M.D. Appointment is scheduled for her to see Dr. Monique Bower.        Please note that this dictation was created using voice recognition software. I have worked with consultants from the vendor as well as technical experts from [facility] [employer] to optimize the interface. I have made every reasonable attempt to correct obvious errors, but I expect there are errors of zuleyma and possibly content that I did not discover before finalizing the note.

## 2017-10-11 NOTE — PROCEDURES
DATE OF SERVICE:  10/10/2017    PROCEDURES:  1.  Lumbar epidural steroid injection.  2.  Fluoroscopy for needle guidance, confirmation of placement, epidurogram.  3.  IV sedation per patient request, due to painful experience with previous   procedure.    DIAGNOSES:  1.  Chronic recurrent lumbar radiculopathy.  2.  Anxiety over painful procedure.    DESCRIPTION:  After informed consent, the patient was placed prone, monitored,   and sedated with Versed and fentanyl.  Fluoroscopy was utilized to identify   the L5-S1 interspace.  The back is prepped with Betadine, sterile draped and   anesthetized.  Under intermittent fluoroscopic guidance and local anesthesia,   a 20-gauge epidural needle was passed just left of midline to the epidural   space using loss of resistance technique with saline.  Contrast was injected   confirming bilateral epidurogram.  I slowly injected 80 mg Depo-Medrol with 3   mL of 1% lidocaine and 4 mL normal saline, needle was removed.  She tolerated   this very well with minimal discomfort.    PLAN:  Follow up for ongoing pain management needs.       ____________________________________     MD KATIE MEDINA / URBANO    DD:  10/10/2017 10:06:16  DT:  10/11/2017 00:02:56    D#:  [id number]  Job#:  [id number]    cc: . . ., . [facility] .

## 2017-10-13 LAB
ALBUMIN SERPL-MCNC: 4.41 G/DL (ref 3.75–5.01)
ALPHA1 GLOB SERPL ELPH-MCNC: 0.23 G/DL (ref 0.19–0.46)
ALPHA2 GLOB SERPL ELPH-MCNC: 0.69 G/DL (ref 0.48–1.05)
B-GLOBULIN SERPL ELPH-MCNC: 0.9 G/DL (ref 0.48–1.1)
ENDOMYSIUM IGA TITR SER IF: NORMAL {TITER}
GAMMA GLOB SERPL ELPH-MCNC: 1.57 G/DL (ref 0.62–1.51)
GLIADIN IGA SER IA-ACNC: 5 UNITS (ref 0–19)
GLIADIN IGG SER IA-ACNC: 2 UNITS (ref 0–19)
IGA SERPL-MCNC: 363 MG/DL (ref 68–408)
IGG SERPL-MCNC: 1590 MG/DL (ref 768–1632)
IGM SERPL-MCNC: 210 MG/DL (ref 35–263)
INTERPRETATION SERPL IFE-IMP: ABNORMAL
INTERPRETATION SERPL IFE-IMP: NORMAL
NUCLEAR IGG SER QL IA: NORMAL
PATHOLOGY STUDY: NORMAL
PROT SERPL-MCNC: 7.8 G/DL (ref 6–8.3)

## 2017-10-16 LAB
AQP4 H2O CHANNEL AB SERPL IA-ACNC: 0 U/ML
CRYOGLOB SER QL 3D COLD INC: NORMAL

## 2017-10-17 ENCOUNTER — HOSPITAL ENCOUNTER (OUTPATIENT)
Dept: LAB | Facility: MEDICAL CENTER | Age: 42
End: 2017-10-17
Attending: FAMILY MEDICINE
Payer: COMMERCIAL

## 2017-10-17 DIAGNOSIS — I47.9 PAROXYSMAL TACHYCARDIA (HCC): ICD-10-CM

## 2017-10-17 LAB
ALBUMIN SERPL BCP-MCNC: 4.3 G/DL (ref 3.2–4.9)
ALBUMIN/GLOB SERPL: 1.2 G/DL
ALP SERPL-CCNC: 47 U/L (ref 30–99)
ALT SERPL-CCNC: 23 U/L (ref 2–50)
ANION GAP SERPL CALC-SCNC: 9 MMOL/L (ref 0–11.9)
AST SERPL-CCNC: 21 U/L (ref 12–45)
BASOPHILS # BLD AUTO: 0.9 % (ref 0–1.8)
BASOPHILS # BLD: 0.07 K/UL (ref 0–0.12)
BILIRUB SERPL-MCNC: 0.4 MG/DL (ref 0.1–1.5)
BUN SERPL-MCNC: 16 MG/DL (ref 8–22)
CALCIUM SERPL-MCNC: 9.8 MG/DL (ref 8.5–10.5)
CHLORIDE SERPL-SCNC: 101 MMOL/L (ref 96–112)
CO2 SERPL-SCNC: 24 MMOL/L (ref 20–33)
CREAT SERPL-MCNC: 0.71 MG/DL (ref 0.5–1.4)
EOSINOPHIL # BLD AUTO: 0.11 K/UL (ref 0–0.51)
EOSINOPHIL NFR BLD: 1.4 % (ref 0–6.9)
ERYTHROCYTE [DISTWIDTH] IN BLOOD BY AUTOMATED COUNT: 39.8 FL (ref 35.9–50)
GFR SERPL CREATININE-BSD FRML MDRD: >60 ML/MIN/1.73 M 2
GLOBULIN SER CALC-MCNC: 3.7 G/DL (ref 1.9–3.5)
GLUCOSE SERPL-MCNC: 82 MG/DL (ref 65–99)
HCT VFR BLD AUTO: 46 % (ref 37–47)
HGB BLD-MCNC: 14.5 G/DL (ref 12–16)
IMM GRANULOCYTES # BLD AUTO: 0.03 K/UL (ref 0–0.11)
IMM GRANULOCYTES NFR BLD AUTO: 0.4 % (ref 0–0.9)
LYMPHOCYTES # BLD AUTO: 2.23 K/UL (ref 1–4.8)
LYMPHOCYTES NFR BLD: 28.1 % (ref 22–41)
MCH RBC QN AUTO: 26.3 PG (ref 27–33)
MCHC RBC AUTO-ENTMCNC: 31.5 G/DL (ref 33.6–35)
MCV RBC AUTO: 83.5 FL (ref 81.4–97.8)
MONOCYTES # BLD AUTO: 0.54 K/UL (ref 0–0.85)
MONOCYTES NFR BLD AUTO: 6.8 % (ref 0–13.4)
NEUTROPHILS # BLD AUTO: 4.96 K/UL (ref 2–7.15)
NEUTROPHILS NFR BLD: 62.4 % (ref 44–72)
NRBC # BLD AUTO: 0 K/UL
NRBC BLD AUTO-RTO: 0 /100 WBC
PLATELET # BLD AUTO: 360 K/UL (ref 164–446)
PMV BLD AUTO: 10.7 FL (ref 9–12.9)
POTASSIUM SERPL-SCNC: 3.8 MMOL/L (ref 3.6–5.5)
PROT SERPL-MCNC: 8 G/DL (ref 6–8.2)
RBC # BLD AUTO: 5.51 M/UL (ref 4.2–5.4)
SODIUM SERPL-SCNC: 134 MMOL/L (ref 135–145)
TSH SERPL DL<=0.005 MIU/L-ACNC: 2.05 UIU/ML (ref 0.3–3.7)
WBC # BLD AUTO: 7.9 K/UL (ref 4.8–10.8)

## 2017-10-17 PROCEDURE — 85025 COMPLETE CBC W/AUTO DIFF WBC: CPT

## 2017-10-17 PROCEDURE — 84443 ASSAY THYROID STIM HORMONE: CPT

## 2017-10-17 PROCEDURE — 80053 COMPREHEN METABOLIC PANEL: CPT

## 2017-10-17 PROCEDURE — 36415 COLL VENOUS BLD VENIPUNCTURE: CPT

## 2017-10-18 LAB — PARANEOPLASTIC AB SER QL IF: NORMAL

## 2017-10-20 ENCOUNTER — OFFICE VISIT (OUTPATIENT)
Dept: MEDICAL GROUP | Facility: MEDICAL CENTER | Age: 42
End: 2017-10-20
Payer: COMMERCIAL

## 2017-10-20 VITALS
HEART RATE: 76 BPM | TEMPERATURE: 98.5 F | BODY MASS INDEX: 26.21 KG/M2 | DIASTOLIC BLOOD PRESSURE: 62 MMHG | RESPIRATION RATE: 16 BRPM | WEIGHT: 147.93 LBS | SYSTOLIC BLOOD PRESSURE: 110 MMHG | HEIGHT: 63 IN | OXYGEN SATURATION: 98 %

## 2017-10-20 DIAGNOSIS — M45.7 ANKYLOSING SPONDYLITIS OF LUMBOSACRAL REGION (HCC): ICD-10-CM

## 2017-10-20 DIAGNOSIS — F41.9 ANXIETY: ICD-10-CM

## 2017-10-20 DIAGNOSIS — E16.1 REACTIVE HYPOGLYCEMIA: ICD-10-CM

## 2017-10-20 DIAGNOSIS — J45.40 MODERATE PERSISTENT ASTHMA IN ADULT WITHOUT COMPLICATION: ICD-10-CM

## 2017-10-20 DIAGNOSIS — M32.8 OTHER FORMS OF SYSTEMIC LUPUS ERYTHEMATOSUS, UNSPECIFIED ORGAN INVOLVEMENT STATUS (HCC): ICD-10-CM

## 2017-10-20 PROCEDURE — 99214 OFFICE O/P EST MOD 30 MIN: CPT | Performed by: FAMILY MEDICINE

## 2017-10-20 NOTE — ASSESSMENT & PLAN NOTE
This is a chronic problem that has now spread to her cervical region as well. She sees Dr. Hardwick to help with pain. He typically does injections to try and help with her pain. She has not had any interventions yet for the cervical spine. She is going to get a repeat evaluation.

## 2017-10-20 NOTE — ASSESSMENT & PLAN NOTE
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 Patient sees Dr. Favio Lynch to help her with treatment of this. She's had this for 21 years.

## 2017-10-20 NOTE — ASSESSMENT & PLAN NOTE
This is a chronic health problem for this patient for the most part is well controlled. She does have a dog that she is allergic to and she tries to be very careful about her contact with his dander. She is doing fairly well. She is very knowledgeable about her asthmato check her peak flow and determine whether or not she needs additional medications. She does take a steroid inhaler daily.

## 2017-10-21 NOTE — PROGRESS NOTES
CC:establish care and discuss chronic health problems    HISTORY OF PRESENT ILLNESS: Patient is a 42 y.o. female established patient who presents today to establish care and discuss her recent visit with Dr. Teresa for supraventricular tachycardia. 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 She had noted over the last 3-4 weeks that she was having problems with a very rapid heart rate sometimes as high as 135 with just turning over in bed. She was seen by Dr. Teresa who has ordered appropriate follow-up testing. Unfortunately patient has not had time to get that testing done. We will see her back once the testing is completed. We did go ahead and establish that I am going to be her primary care.    Health Maintenance: Completed    Reactive hypoglycemia  This is a chronic problem that appears to be adequately controlled through dietary choices.    Other forms of systemic lupus erythematosus (CMS-HCC)  This is a chronic problem for this patient that has mostly nervous system involvement and discoid lupus. Patient sees Dr. Favio Lynch to help her with treatment of this. She's had this for 21 years.    Ankylosing spondylitis of lumbosacral region (CMS-HCC)  This is a chronic problem that has now spread to her cervical region as well. She sees Dr. Hardwick to help with pain. He typically does injections to try and help with her pain. She has not had any interventions yet for the cervical spine. She is going to get a repeat evaluation.    Anxiety  This is directly related to her vocal cord dysfunction.  She doesn't typically have ARIES/    Asthma in adult  This is a chronic health problem for this patient for the most part is well controlled. She does have a dog that she is allergic to and she tries to be very careful about her contact with his dander. She is doing fairly well. She is very knowledgeable about her asthmato check her peak flow and determine whether or not she needs additional  medications. She does take a steroid inhaler daily.      Patient Active Problem List    Diagnosis Date Noted   • Other forms of systemic lupus erythematosus (CMS-HCC) 10/20/2017   • Chronic constipation 12/15/2016   • Reactive hypoglycemia 09/28/2016   • Autonomic nervous system disorder 04/19/2016   • Vocal cord dysfunction 04/19/2016   • Multiple allergies 12/21/2015   • Anxiety 11/24/2015   • Intractable migraine without aura and without status migrainosus 11/24/2015   • Ankylosing spondylitis of lumbosacral region (CMS-HCC) 07/06/2015   • Overactive bladder 04/17/2015   • Asthma in adult 01/14/2015      Allergies:Savella [kdc:milnacipran+ci pigment blue 63]; Ciprofloxacin; Reglan [kdc:yellow dye+ci pigment blue 63+metoclopramide]; Sulfa drugs; Tetanus antitoxin; and Tramadol    Current Outpatient Prescriptions   Medication Sig Dispense Refill   • esomeprazole magnesium (NEXIUM) 40 MG Pack Take 40 mg by mouth every morning before breakfast.     • SYMBICORT 160-4.5 MCG/ACT Aerosol      • doxycycline (VIBRAMYCIN) 100 MG Cap Take 1 Cap by mouth 2 times a day. 14 Cap 0   • lubiprostone (AMITIZA) 24 MCG capsule Take 24 mcg by mouth 2 times a day, with meals.     • hydroxychloroquine (PLAQUENIL) 200 MG Tab      • Blood Glucose Monitoring Suppl SUPPLIES Misc Test strips for One TOUCH Ultra blue test strips Mini Meter Sig: Use 3x a day 300 Each 3   • Lancets Misc Lancets order: Lancets for One Touch UltaMini Sig: Use 3x a day 300 Each 3   • fluticasone (FLONASE) 50 MCG/ACT nasal spray Spray 1 Spray in nose every day.     • fexofenadine (ALLEGRA ALLERGY) 180 MG tablet Take 180 mg by mouth every day.     • Multiple Vitamins-Minerals (CENTRUM SILVER ULTRA WOMENS PO) Take  by mouth.     • diclofenac (SOLARAZE) 3 % gel Apply  to affected area(s) 2 times a day.     • EPIPEN 2-NOLAN 0.3 MG/0.3ML Solution Auto-injector solution for injection      • rizatriptan (MAXALT) 10 MG tablet Take 1 Tab by mouth Once PRN. Indications:  Migraine Headache 10 Tab 1   • ondansetron (ZOFRAN ODT) 4 MG TABLET DISPERSIBLE Take 1 Tab by mouth every 8 hours as needed for Nausea/Vomiting. 15 Tab 0   • oxybutynin SR (DITROPAN-XL) 10 MG CR tablet Take 1 Tab by mouth every day. 90 Tab 3   • adalimumab (HUMIRA) 40 MG/0.8ML injection Inject  as instructed.     • albuterol (VENTOLIN HFA) 108 (90 BASE) MCG/ACT AERS inhalation aerosol Inhale 2 Puffs by mouth every 6 hours as needed for Shortness of Breath. 8.5 g 3     No current facility-administered medications for this visit.        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 She is  with four children.        Family History   Problem Relation Age of Onset   • Arthritis Brother      psoriatic arthritis   • Arthritis Maternal Grandmother    • Psychiatry Mother      Major depression   • Other Mother      discoid lupus   • Psychiatry Father      Bipolar depression   • Heart Disease Father      CHF   • Hypertension Father    • Diabetes Father    • Diabetes Paternal Uncle    • Stroke Maternal Grandfather    • Diabetes Paternal Grandmother    • Alcohol/Drug Paternal Grandfather    • Cancer Paternal Aunt      gyn cancer   • GI Son    • GI Daughter    • GI Daughter    • GI Daughter        Review of Systems:      - Constitutional: Negative for fever, chills, unexpected weight change, and fatigue/generalized weakness.     - HEENT: Negative for headaches, vision changes, hearing changes, ear pain, ear discharge, rhinorrhea, sinus congestion, sore throat, and neck pain.      - Respiratory: Negative for cough, sputum production, chest congestion, dyspnea, wheezing, and crackles.      - Cardiovascular: Rapid heart rate as in history of present illness    - Gastrointestinal: Negative for heartburn, nausea, vomiting,  "abdominal pain, hematochezia, melena, diarrhea, constipation, and greasy/foul-smelling stools.     - Genitourinary: Negative for dysuria, polyuria, hematuria, pyuria, urinary urgency, and urinary incontinence.    - Musculoskeletal: Chronic back pain from cervical spine through lumbar secondary to ankylosing spondylitis    - Skin: Negative for rash, itching, cyanotic skin color change.     - Neurological: Negative for dizziness, tingling, tremors, focal sensory deficit, focal weakness and headaches.     - Endo/Heme/Allergies: Does not bruise/bleed easily.     - Psychiatric/Behavioral: Negative for depression, suicidal/homicidal ideation and memory loss.        - NOTE: All other systems reviewed and are negative, except as in HPI.      Exam:    Blood pressure 110/62, pulse 76, temperature 36.9 °C (98.5 °F), resp. rate 16, height 1.6 m (5' 3\"), weight 67.1 kg (147 lb 14.9 oz), SpO2 98 %, not currently breastfeeding. Body mass index is 26.2 kg/m².    General:  Well nourished, well developed female in NAD  Patient was seen for 25 minutes face to face of which, 20 minutes was spent counseling regarding the above mentioned problems.      Please note that this dictation was created using voice recognition software. I have made every reasonable attempt to correct obvious errors, but I expect that there are errors of grammar and possibly content that I did not discover before finalizing the note.    Assessment/Plan:  1. Reactive hypoglycemia  This now appears to have resolved. Patient understands how she has to eat to keep this from happening.    2. Other forms of systemic lupus erythematosus, unspecified organ involvement status (CMS-HCC)  Controlled, continue with current meds and lifestyle.      3. Ankylosing spondylitis of lumbosacral region (CMS-[facility])  Uncontrolled, patient in the process of a workup with Dr. Hardwick for pain control    4. Anxiety  Stable, patient only has problems with anxiety when her vocal cord " dysfunction occurs    5. Moderate persistent asthma in adult without complication  Controlled, continue with current meds and lifestyle.

## 2017-11-14 ENCOUNTER — APPOINTMENT (OUTPATIENT)
Dept: RADIOLOGY | Facility: MEDICAL CENTER | Age: 42
End: 2017-11-14
Attending: SPECIALIST
Payer: COMMERCIAL

## 2017-11-14 ENCOUNTER — HOSPITAL ENCOUNTER (OUTPATIENT)
Dept: CARDIOLOGY | Facility: MEDICAL CENTER | Age: 42
End: 2017-11-14
Attending: FAMILY MEDICINE
Payer: COMMERCIAL

## 2017-11-14 DIAGNOSIS — I47.9 PAROXYSMAL TACHYCARDIA (HCC): ICD-10-CM

## 2017-11-14 LAB
LV EJECT FRACT  99904: 60
LV EJECT FRACT MOD 2C 99903: 66
LV EJECT FRACT MOD 4C 99902: 59.12
LV EJECT FRACT MOD BP 99901: 61.52

## 2017-11-14 PROCEDURE — 93306 TTE W/DOPPLER COMPLETE: CPT

## 2017-11-21 ENCOUNTER — HOSPITAL ENCOUNTER (OUTPATIENT)
Dept: RADIOLOGY | Facility: MEDICAL CENTER | Age: 42
End: 2017-11-21
Attending: NURSE PRACTITIONER
Payer: COMMERCIAL

## 2017-11-21 ENCOUNTER — HOSPITAL ENCOUNTER (OUTPATIENT)
Dept: RADIOLOGY | Facility: MEDICAL CENTER | Age: 42
End: 2017-11-21
Attending: SPECIALIST
Payer: COMMERCIAL

## 2017-11-21 DIAGNOSIS — I67.2 CEREBRAL ATHEROSCLEROSIS: ICD-10-CM

## 2017-11-21 DIAGNOSIS — L74.519 PRIMARY FOCAL HYPERHIDROSIS: ICD-10-CM

## 2017-11-21 DIAGNOSIS — G37.9 DEMYELINATING DISEASE OF CENTRAL NERVOUS SYSTEM (HCC): ICD-10-CM

## 2017-11-21 DIAGNOSIS — M46.1 SACROILIITIS, NOT ELSEWHERE CLASSIFIED (HCC): ICD-10-CM

## 2017-11-21 DIAGNOSIS — M45.8 ANKYLOSING SPONDYLITIS SACRAL AND SACROCOCCYGEAL REGION (HCC): ICD-10-CM

## 2017-11-21 DIAGNOSIS — G95.9 DISEASE OF SPINAL CORD (HCC): ICD-10-CM

## 2017-11-21 PROCEDURE — 72158 MRI LUMBAR SPINE W/O & W/DYE: CPT

## 2017-11-21 PROCEDURE — 72156 MRI NECK SPINE W/O & W/DYE: CPT

## 2017-11-21 PROCEDURE — A9579 GAD-BASE MR CONTRAST NOS,1ML: HCPCS | Performed by: SPECIALIST

## 2017-11-21 PROCEDURE — 700117 HCHG RX CONTRAST REV CODE 255: Performed by: SPECIALIST

## 2017-11-21 PROCEDURE — 72195 MRI PELVIS W/O DYE: CPT

## 2017-11-21 RX ADMIN — GADODIAMIDE 15 ML: 287 INJECTION INTRAVENOUS at 09:13

## 2017-12-08 ENCOUNTER — HOSPITAL ENCOUNTER (OUTPATIENT)
Dept: RADIOLOGY | Facility: REHABILITATION | Age: 42
End: 2017-12-08
Attending: PHYSICAL MEDICINE & REHABILITATION
Payer: COMMERCIAL

## 2017-12-08 ENCOUNTER — HOSPITAL ENCOUNTER (OUTPATIENT)
Dept: PAIN MANAGEMENT | Facility: REHABILITATION | Age: 42
End: 2017-12-08
Attending: PHYSICAL MEDICINE & REHABILITATION
Payer: COMMERCIAL

## 2017-12-08 VITALS
OXYGEN SATURATION: 94 % | HEART RATE: 64 BPM | SYSTOLIC BLOOD PRESSURE: 100 MMHG | WEIGHT: 147.27 LBS | DIASTOLIC BLOOD PRESSURE: 63 MMHG | RESPIRATION RATE: 18 BRPM | HEIGHT: 63 IN | BODY MASS INDEX: 26.09 KG/M2 | TEMPERATURE: 99.6 F

## 2017-12-08 PROCEDURE — 64510 N BLOCK STELLATE GANGLION: CPT

## 2017-12-08 PROCEDURE — 700101 HCHG RX REV CODE 250

## 2017-12-08 PROCEDURE — 700117 HCHG RX CONTRAST REV CODE 255

## 2017-12-08 PROCEDURE — 700111 HCHG RX REV CODE 636 W/ 250 OVERRIDE (IP)

## 2017-12-08 PROCEDURE — 99152 MOD SED SAME PHYS/QHP 5/>YRS: CPT

## 2017-12-08 RX ORDER — BUPIVACAINE HYDROCHLORIDE 5 MG/ML
INJECTION, SOLUTION EPIDURAL; INTRACAUDAL
Status: COMPLETED
Start: 2017-12-08 | End: 2017-12-08

## 2017-12-08 RX ORDER — ONDANSETRON 2 MG/ML
INJECTION INTRAMUSCULAR; INTRAVENOUS
Status: COMPLETED
Start: 2017-12-08 | End: 2017-12-08

## 2017-12-08 RX ORDER — LIDOCAINE HYDROCHLORIDE 10 MG/ML
INJECTION, SOLUTION EPIDURAL; INFILTRATION; INTRACAUDAL; PERINEURAL
Status: COMPLETED
Start: 2017-12-08 | End: 2017-12-08

## 2017-12-08 RX ORDER — DEXAMETHASONE SODIUM PHOSPHATE 10 MG/ML
INJECTION, SOLUTION INTRAMUSCULAR; INTRAVENOUS
Status: COMPLETED
Start: 2017-12-08 | End: 2017-12-08

## 2017-12-08 RX ORDER — MIDAZOLAM HYDROCHLORIDE 1 MG/ML
INJECTION INTRAMUSCULAR; INTRAVENOUS
Status: COMPLETED
Start: 2017-12-08 | End: 2017-12-08

## 2017-12-08 RX ADMIN — ONDANSETRON HYDROCHLORIDE 4 MG: 2 INJECTION, SOLUTION INTRAMUSCULAR; INTRAVENOUS at 14:35

## 2017-12-08 RX ADMIN — LIDOCAINE HYDROCHLORIDE 4 ML: 10 INJECTION, SOLUTION EPIDURAL; INFILTRATION; INTRACAUDAL; PERINEURAL at 14:13

## 2017-12-08 RX ADMIN — FENTANYL CITRATE 50 MCG: 50 INJECTION, SOLUTION INTRAMUSCULAR; INTRAVENOUS at 14:06

## 2017-12-08 RX ADMIN — IOHEXOL 0.5 ML: 240 INJECTION, SOLUTION INTRATHECAL; INTRAVASCULAR; INTRAVENOUS; ORAL at 14:13

## 2017-12-08 RX ADMIN — BUPIVACAINE HYDROCHLORIDE 4 ML: 5 INJECTION, SOLUTION EPIDURAL; INTRACAUDAL; PERINEURAL at 14:13

## 2017-12-08 RX ADMIN — DEXAMETHASONE SODIUM PHOSPHATE 10 MG: 10 INJECTION, SOLUTION INTRAMUSCULAR; INTRAVENOUS at 14:13

## 2017-12-08 RX ADMIN — MIDAZOLAM HYDROCHLORIDE 1 MG: 1 INJECTION, SOLUTION INTRAMUSCULAR; INTRAVENOUS at 14:06

## 2017-12-08 ASSESSMENT — PAIN SCALES - GENERAL
PAINLEVEL_OUTOF10: 0
PAINLEVEL_OUTOF10: 0
PAINLEVEL_OUTOF10: 4
PAINLEVEL_OUTOF10: 0

## 2017-12-08 NOTE — PROCEDURES
STELLATE GANGLION BLOCK    Procedure: Fluoroscopic Guided Left Stellate Ganglion Block  Diagnosis: CRPS of the left upper limb.    Anesthesia: 1 mg Versed, 50 mcg Fentanyl IV     Description of procedure:  After discussing the risks, benefits, and alternatives to the procedure, the patient expressed understanding and wished to proceed. Following full written informed consent, the patient was escorted to the procedural suite and placed in the supine with the head slightly extended. A preprocedural time out was performed confirming the patient's identification, procedure, site and side, as well as allergy verification.  Appropriate equipment was noted to be in place.     In the procedural suite the patient was placed in a supine position and the neck region was prepped and draped in the usual sterile fashion.  Using fluoroscopic visualization the C6 transverse process and vertebral body was identified. The skin overlying this point were anesthetized using 1% lidocaine without epinephrine. Under intermittent fluoroscopic guidance a 25-gauge 2 inch needle was atraumatically introduced and advanced to the base of the left C6 transverse process.  Following negative aspiration, 3 cc of Omnipaque was injected confirming cephalocaudad spread with no vascular or intrathecal uptake.  Next, following negative aspiration, 4 ml of 0.5% bupivicaine, 4 ml of 1% lidocaine and 10 mg of dexamethasone was slowly injected.  The skin was cleaned and a band-aid dressing was applied.  The patient tolerated the procedure well and was observed for an appropriate amount of time in the recovery room prior to being released. The patient was discharged in good condition.

## 2017-12-08 NOTE — PROGRESS NOTES
Pt given verbal and written d/c instructions and verbalizes understanding. denies nsaid use in last 3 days, denies blood thinners use in last 5 days, denies current infection or abx use. Med rec complete. Pt has post procedural ride home with son Feng.

## 2017-12-08 NOTE — PROGRESS NOTES
Hand off report received from Parviz ESPITIA at 1355.Pt position supine by CST.  Arms resting on chest.  Pillow placed under  lower legs by [id number].Hand off report given to Parviz ESPITIA at 1427

## 2017-12-15 ENCOUNTER — PATIENT MESSAGE (OUTPATIENT)
Dept: MEDICAL GROUP | Facility: MEDICAL CENTER | Age: 42
End: 2017-12-15

## 2017-12-15 NOTE — TELEPHONE ENCOUNTER
From: Joycelyn Byers  To: Monique Bower M.D.  Sent: 12/15/2017 7:46 AM PST  Subject: Test Result Question    I have a question about MR-THORACIC SPINE-WITH & W/O resulted on 12/7/17.    Can the official radiology report be released and posted to [facility] so I can review it please? My appointment with my neurologist is Tuesday and I wanted to view it before then.     They were looking for hyperintensities seen previously consistent with MS or NMO.     Thank you so much,    Joycelyn Byers

## 2017-12-20 ENCOUNTER — HOSPITAL ENCOUNTER (OUTPATIENT)
Dept: LAB | Facility: MEDICAL CENTER | Age: 42
End: 2017-12-20
Attending: NURSE PRACTITIONER
Payer: COMMERCIAL

## 2017-12-20 LAB
CORTIS SERPL-MCNC: 8.4 UG/DL (ref 0–23)
PROGEST SERPL-MCNC: 10.09 NG/ML

## 2017-12-20 PROCEDURE — 82533 TOTAL CORTISOL: CPT

## 2017-12-20 PROCEDURE — 36415 COLL VENOUS BLD VENIPUNCTURE: CPT

## 2017-12-20 PROCEDURE — 83519 RIA NONANTIBODY: CPT

## 2017-12-20 PROCEDURE — 84144 ASSAY OF PROGESTERONE: CPT

## 2017-12-20 PROCEDURE — 82671 ASSAY OF ESTROGENS: CPT

## 2017-12-20 PROCEDURE — 83516 IMMUNOASSAY NONANTIBODY: CPT | Mod: 91

## 2017-12-22 LAB
GD1A GANGL AB SER IA-ACNC: 5 IV (ref 0–50)
GD1B GANGL AB SER IA-ACNC: 15 IV (ref 0–50)
GM1 ASIALO AB SER IA-ACNC: 7 IV (ref 0–50)
GM1 GANGL IGG+IGM SER QL IA: 4 IV (ref 0–50)
GM2 GANGL IGG+IGM SER QL IA: 5 IV (ref 0–50)
GQ1B GANGL AB SER-ACNC: 3 IV (ref 0–50)

## 2017-12-25 LAB
ESTRADIOL SERPL HS-MCNC: 398 PG/ML
ESTROGEN SERPL CALC-MCNC: 574 PG/ML
ESTRONE SERPL-MCNC: 176 PG/ML

## 2017-12-26 LAB
ACHR BIND AB SER-SCNC: 0.2 NMOL/L (ref 0–0.4)
ACHR BLOCK AB/ACHR TOTAL SFR SER: 10 % (ref 0–26)

## 2018-01-03 ENCOUNTER — PATIENT MESSAGE (OUTPATIENT)
Dept: MEDICAL GROUP | Facility: MEDICAL CENTER | Age: 43
End: 2018-01-03

## 2018-01-03 DIAGNOSIS — R25.3 FASCICULATIONS OF MUSCLE: ICD-10-CM

## 2018-01-04 ENCOUNTER — HOSPITAL ENCOUNTER (OUTPATIENT)
Dept: LAB | Facility: MEDICAL CENTER | Age: 43
End: 2018-01-04
Attending: FAMILY MEDICINE
Payer: COMMERCIAL

## 2018-01-04 DIAGNOSIS — R25.3 FASCICULATIONS OF MUSCLE: ICD-10-CM

## 2018-01-04 LAB
ALBUMIN SERPL BCP-MCNC: 4 G/DL (ref 3.2–4.9)
ALBUMIN/GLOB SERPL: 1.2 G/DL
ALP SERPL-CCNC: 40 U/L (ref 30–99)
ALT SERPL-CCNC: 20 U/L (ref 2–50)
ANION GAP SERPL CALC-SCNC: 7 MMOL/L (ref 0–11.9)
AST SERPL-CCNC: 19 U/L (ref 12–45)
BILIRUB SERPL-MCNC: 0.4 MG/DL (ref 0.1–1.5)
BUN SERPL-MCNC: 16 MG/DL (ref 8–22)
CALCIUM SERPL-MCNC: 9.5 MG/DL (ref 8.5–10.5)
CHLORIDE SERPL-SCNC: 105 MMOL/L (ref 96–112)
CO2 SERPL-SCNC: 24 MMOL/L (ref 20–33)
CREAT SERPL-MCNC: 0.7 MG/DL (ref 0.5–1.4)
GFR SERPL CREATININE-BSD FRML MDRD: >60 ML/MIN/1.73 M 2
GLOBULIN SER CALC-MCNC: 3.4 G/DL (ref 1.9–3.5)
GLUCOSE SERPL-MCNC: 89 MG/DL (ref 65–99)
POTASSIUM SERPL-SCNC: 4.1 MMOL/L (ref 3.6–5.5)
PROT SERPL-MCNC: 7.4 G/DL (ref 6–8.2)
SODIUM SERPL-SCNC: 136 MMOL/L (ref 135–145)

## 2018-01-04 PROCEDURE — 36415 COLL VENOUS BLD VENIPUNCTURE: CPT

## 2018-01-04 PROCEDURE — 80053 COMPREHEN METABOLIC PANEL: CPT

## 2018-01-11 ENCOUNTER — OFFICE VISIT (OUTPATIENT)
Dept: MEDICAL GROUP | Facility: MEDICAL CENTER | Age: 43
End: 2018-01-11
Payer: COMMERCIAL

## 2018-01-11 VITALS
HEART RATE: 70 BPM | WEIGHT: 153.44 LBS | DIASTOLIC BLOOD PRESSURE: 64 MMHG | SYSTOLIC BLOOD PRESSURE: 100 MMHG | HEIGHT: 63 IN | TEMPERATURE: 98.2 F | OXYGEN SATURATION: 96 % | RESPIRATION RATE: 14 BRPM | BODY MASS INDEX: 27.19 KG/M2

## 2018-01-11 DIAGNOSIS — G43.019 INTRACTABLE MIGRAINE WITHOUT AURA AND WITHOUT STATUS MIGRAINOSUS: ICD-10-CM

## 2018-01-11 DIAGNOSIS — G37.9 CNS DEMYELINATING DISEASE (HCC): ICD-10-CM

## 2018-01-11 DIAGNOSIS — M32.8 OTHER FORMS OF SYSTEMIC LUPUS ERYTHEMATOSUS, UNSPECIFIED ORGAN INVOLVEMENT STATUS (HCC): ICD-10-CM

## 2018-01-11 DIAGNOSIS — R42 VERTIGO: ICD-10-CM

## 2018-01-11 PROCEDURE — 99214 OFFICE O/P EST MOD 30 MIN: CPT | Performed by: FAMILY MEDICINE

## 2018-01-11 RX ORDER — RIZATRIPTAN BENZOATE 10 MG/1
10 TABLET ORAL
Qty: 10 TAB | Refills: 3 | Status: SHIPPED | OUTPATIENT
Start: 2018-01-11 | End: 2020-08-25

## 2018-01-11 RX ORDER — ONDANSETRON 4 MG/1
4 TABLET, ORALLY DISINTEGRATING ORAL EVERY 8 HOURS PRN
Qty: 15 TAB | Refills: 3 | Status: SHIPPED | OUTPATIENT
Start: 2018-01-11 | End: 2018-05-11

## 2018-01-11 ASSESSMENT — PATIENT HEALTH QUESTIONNAIRE - PHQ9: CLINICAL INTERPRETATION OF PHQ2 SCORE: 0

## 2018-01-11 NOTE — PROGRESS NOTES
CC: Follow-up after visit at [facility] with MS specialist as well as current chronic health problems to be discussed below    HISTORY OF PRESENT ILLNESS: Patient is a 42 y.o. female established patient who presents today to after seeing the MS specialist at [facility] yesterday. She has new information as outlined below. They may be getting closer to actually diagnosing her demyelinating disease. Patient continues to have a great attitude about her multiple chronic illnesses and how to cope with them. 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 Patient was able to be seen by the MS at [facility] Dr. Layton. She pointed out that she would like her to get a new MRI with a more sensitive machine that may be able to  some subtle lesions that are causing her problems. She continues to have neuropathic pain in her calf on the left leg left lateral thigh and left elbow area. She states it feels as if somebody is pinching her twisting and then it gets a burning sensation at the end. She states that when it happens in her elbow she can actually see muscle fasciculations. She has several autoimmune diseases along with this neurologic component. It's difficult to know exactly what to do for her. The MS specialist recommended that she go off Humira because that actually can make MS worse.    Patient does add that she is the first born child of a [place] vet who was excessively exposed to agent orange. He is 125% disabled [facility] because of his amount of exposure with agent orange. She was born within a year of his return from [place]. Her MS specialist is also going to run multiple other tests because of now new evidence that  children of agent orange parents can have autoimmune diseases as well as muscle still low skeletal and neurologic diseases.    Intractable migraine without aura and without status migrainosus  This is a chronic health problem that is well controlled with current medications and lifestyle measures.   Patient needs a refill of her Maxalt.    Other forms of systemic lupus erythematosus (CMS-HCC)  This is a chronic health problem for this patient is followed by rheumatology. They just decided to stop her Humira because it may be making her demyelinating disease worse. She had her last treatment on 12/11/17. She does need a refill of her Zofran. That was provided. She will continue to follow with her rheumatologist and her neurologist      Patient Active Problem List    Diagnosis Date Noted   • Other forms of systemic lupus erythematosus (CMS-HCC) 10/20/2017   • Chronic constipation 12/15/2016   • Reactive hypoglycemia 09/28/2016   • CNS demyelinating disease (CMS-HCC) 04/19/2016   • Vocal cord dysfunction 04/19/2016   • Multiple allergies 12/21/2015   • Anxiety 11/24/2015   • Intractable migraine without aura and without status migrainosus 11/24/2015   • Ankylosing spondylitis of lumbosacral region (CMS-HCC) 07/06/2015   • Overactive bladder 04/17/2015   • Asthma in adult 01/14/2015      Allergies:Savella [kdc:milnacipran+ci pigment blue 63]; Ciprofloxacin; Reglan [kdc:yellow dye+ci pigment blue 63+metoclopramide]; Sulfa drugs; Tetanus antitoxin; and Tramadol    Current Outpatient Prescriptions   Medication Sig Dispense Refill   • rizatriptan (MAXALT) 10 MG tablet Take 1 Tab by mouth Once PRN. 10 Tab 3   • ondansetron (ZOFRAN ODT) 4 MG TABLET DISPERSIBLE Take 1 Tab by mouth every 8 hours as needed. 15 Tab 3   • SYMBICORT 160-4.5 MCG/ACT Aerosol      • lubiprostone (AMITIZA) 24 MCG capsule Take 24 mcg by mouth 2 times a day, with meals.     • hydroxychloroquine (PLAQUENIL) 200 MG Tab      • fluticasone (FLONASE) 50  MCG/ACT nasal spray Spray 1 Spray in nose every day.     • diclofenac (SOLARAZE) 3 % gel Apply  to affected area(s) 2 times a day.     • EPIPEN 2-NOLAN 0.3 MG/0.3ML Solution Auto-injector solution for injection      • albuterol (VENTOLIN HFA) 108 (90 BASE) MCG/ACT AERS inhalation aerosol Inhale 2 Puffs by mouth every 6 hours as needed for Shortness of Breath. 8.5 g 3     No current facility-administered medications for this visit.        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 She is  with four children.        Family History   Problem Relation Age of Onset   • Arthritis Brother      psoriatic arthritis   • Arthritis Maternal Grandmother    • Psychiatry Mother      Major depression   • Other Mother      discoid lupus   • Psychiatry Father      Bipolar depression   • Heart Disease Father      CHF   • Hypertension Father    • Diabetes Father    • Other Father      Agent orange exposure 125% disabled   • Diabetes Paternal Uncle    • Stroke Maternal Grandfather    • Diabetes Paternal Grandmother    • Alcohol/Drug Paternal Grandfather    • Cancer Paternal Aunt      gyn cancer   • GI Son    • GI Daughter    • GI Daughter    • GI Daughter        Review of Systems:      - Constitutional: Negative for fever, chills, unexpected weight change, and fatigue/generalized weakness.     - HEENT: Negative for headaches, vision changes, hearing changes, ear pain, ear discharge, rhinorrhea, sinus congestion, sore throat, and neck pain.      - Respiratory: Negative for cough, sputum production, chest congestion, dyspnea, wheezing, and crackles.      - Cardiovascular: Negative for chest pain, palpitations, orthopnea, and bilateral lower extremity edema.     - Gastrointestinal: Negative for heartburn, nausea, vomiting, abdominal  "pain, hematochezia, melena, diarrhea, constipation, and greasy/foul-smelling stools.     - Genitourinary: Negative for dysuria, polyuria, hematuria, pyuria, urinary urgency, and urinary incontinence.    - Musculoskeletal: Negative for myalgias, back pain, and joint pain.     - Skin: Negative for rash, itching, cyanotic skin color change.     - Neurological: As in history of present illness    - Endo/Heme/Allergies: Does not bruise/bleed easily.     - Psychiatric/Behavioral: Negative for depression, suicidal/homicidal ideation and memory loss.        - NOTE: All other systems reviewed and are negative, except as in HPI.    Exam:    Blood pressure 100/64, pulse 70, temperature 36.8 °C (98.2 °F), resp. rate 14, height 1.6 m (5' 3\"), weight 69.6 kg (153 lb 7 oz), SpO2 96 %, not currently breastfeeding. Body mass index is 27.18 kg/m².    General:  Well nourished, well developed female in NAD  LABS: 1/4/18: Results reviewed and discussed with the patient, questions answered.    Patient was seen for 25 minutes face to face of which, 20 minutes was spent counseling regarding the above mentioned problems.  Assessment/Plan:  1. Intractable migraine without aura and without status migrainosus  Controlled, continue with current meds and lifestyle.    - rizatriptan (MAXALT) 10 MG tablet; Take 1 Tab by mouth Once PRN.  Dispense: 10 Tab; Refill: 3    2. Vertigo  Controlled, continue with current meds and lifestyle.    - ondansetron (ZOFRAN ODT) 4 MG TABLET DISPERSIBLE; Take 1 Tab by mouth every 8 hours as needed.  Dispense: 15 Tab; Refill: 3    3. CNS demyelinating disease (CMS-HCC)  Uncontrolled, patient continues in the workup for this with a specialist at [facility]    4. Other forms of systemic lupus erythematosus, unspecified organ involvement status (CMS-HCC)  Controlled, continue with current meds and lifestyle.  The recommendation made by the MS specialist that she stopped Humira which she is planning on stopping. We will " need to follow-up with her rheumatologist for further treatment of her lupus.

## 2018-01-11 NOTE — ASSESSMENT & PLAN NOTE
This is a chronic health problem that is well controlled with current medications and lifestyle measures.   Patient needs a refill of her Maxalt.

## 2018-01-11 NOTE — ASSESSMENT & PLAN NOTE
This is a chronic health problem for this patient is followed by rheumatology. They just decided to stop her Humira because it may be making her demyelinating disease worse. She had her last treatment on 12/11/17. She does need a refill of her Zofran. That was provided. She will continue to follow with her rheumatologist and her neurologist

## 2018-02-23 ENCOUNTER — OFFICE VISIT (OUTPATIENT)
Dept: URGENT CARE | Facility: PHYSICIAN GROUP | Age: 43
End: 2018-02-23
Payer: COMMERCIAL

## 2018-02-23 VITALS
TEMPERATURE: 97.6 F | WEIGHT: 148 LBS | RESPIRATION RATE: 14 BRPM | HEIGHT: 63 IN | DIASTOLIC BLOOD PRESSURE: 64 MMHG | OXYGEN SATURATION: 99 % | HEART RATE: 96 BPM | BODY MASS INDEX: 26.22 KG/M2 | SYSTOLIC BLOOD PRESSURE: 104 MMHG

## 2018-02-23 DIAGNOSIS — J01.90 ACUTE BACTERIAL SINUSITIS: ICD-10-CM

## 2018-02-23 DIAGNOSIS — B96.89 ACUTE BACTERIAL SINUSITIS: ICD-10-CM

## 2018-02-23 PROCEDURE — 99214 OFFICE O/P EST MOD 30 MIN: CPT | Performed by: NURSE PRACTITIONER

## 2018-02-23 RX ORDER — AMOXICILLIN AND CLAVULANATE POTASSIUM 875; 125 MG/1; MG/1
1 TABLET, FILM COATED ORAL 2 TIMES DAILY
Qty: 14 TAB | Refills: 0 | Status: SHIPPED | OUTPATIENT
Start: 2018-02-23 | End: 2018-04-23

## 2018-02-23 RX ORDER — OMEPRAZOLE 20 MG/1
20 CAPSULE, DELAYED RELEASE ORAL DAILY
COMMUNITY
End: 2018-05-11

## 2018-02-23 ASSESSMENT — ENCOUNTER SYMPTOMS
COUGH: 1
CHILLS: 0
WHEEZING: 0
SPUTUM PRODUCTION: 0
ORTHOPNEA: 0
SORE THROAT: 1
MYALGIAS: 1
EYE DISCHARGE: 0
NAUSEA: 0
FEVER: 0
HEADACHES: 1
DIARRHEA: 0
SHORTNESS OF BREATH: 0

## 2018-02-23 NOTE — PROGRESS NOTES
Subjective:      Joycelyn Byers is a 42 y.o. female who presents with Cough (flems chills aches  x3 weeks )            HPI New problem. 42 year old female with cough and congestion for 3 weeks. Denies fever, chills, myalgia. She does have some beginning ear pain and sore throat. No nausea or vomiting, no diarrhea.  Savella [kdc:milnacipran+ci pigment blue 63]; Ciprofloxacin; Reglan [kdc:yellow dye+ci pigment blue 63+metoclopramide]; Sulfa drugs; Tetanus antitoxin; and Tramadol  Current Outpatient Prescriptions on File Prior to Visit   Medication Sig Dispense Refill   • lubiprostone (AMITIZA) 24 MCG capsule Take 24 mcg by mouth 2 times a day, with meals.     • rizatriptan (MAXALT) 10 MG tablet Take 1 Tab by mouth Once PRN. 10 Tab 3   • ondansetron (ZOFRAN ODT) 4 MG TABLET DISPERSIBLE Take 1 Tab by mouth every 8 hours as needed. 15 Tab 3   • SYMBICORT 160-4.5 MCG/ACT Aerosol      • hydroxychloroquine (PLAQUENIL) 200 MG Tab      • fluticasone (FLONASE) 50 MCG/ACT nasal spray Spray 1 Spray in nose every day.     • diclofenac (SOLARAZE) 3 % gel Apply  to affected area(s) 2 times a day.     • EPIPEN 2-NOLAN 0.3 MG/0.3ML Solution Auto-injector solution for injection      • albuterol (VENTOLIN HFA) 108 (90 BASE) MCG/ACT AERS inhalation aerosol Inhale 2 Puffs by mouth every 6 hours as needed for Shortness of Breath. 8.5 g 3     No current facility-administered medications on file prior to visit.      Social History     Social History   • Marital status:      Spouse name: N/A   • Number of children: N/A   • Years of education: N/A     Occupational History   • Not on file.     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 She is  with four children.      family history includes Alcohol/Drug in her paternal grandfather; Arthritis in her brother and maternal grandmother; Cancer in her paternal aunt; Diabetes in her father, paternal grandmother, and paternal uncle; GI in her daughter, daughter, daughter, and son; Heart Disease in her father; Hypertension in her father; Other in her father and mother; Psychiatry in her father and mother; Stroke in her maternal grandfather.      Review of Systems   Constitutional: Positive for malaise/fatigue. Negative for chills and fever.   HENT: Positive for congestion and sore throat.    Eyes: Negative for discharge.   Respiratory: Positive for cough. Negative for sputum production, shortness of breath and wheezing.    Cardiovascular: Negative for chest pain and orthopnea.   Gastrointestinal: Negative for diarrhea and nausea.   Musculoskeletal: Positive for myalgias.   Neurological: Positive for headaches.   Endo/Heme/Allergies: Negative for environmental allergies.          Objective:     /64   Pulse 96   Temp 36.4 °C (97.6 °F)   Resp 14   Ht 1.6 m (5' 3\")   Wt 67.1 kg (148 lb)   SpO2 99%   BMI 26.22 kg/m²      Physical Exam   Constitutional: She is oriented to person, place, and time. She appears well-developed and well-nourished. No distress.   HENT:   Head: Normocephalic and atraumatic.   Right Ear: External ear and ear canal normal. Tympanic membrane is not injected and not perforated. No middle ear effusion.   Left Ear: External ear and ear canal normal. Tympanic membrane is not injected and not perforated.  No middle ear effusion.   Nose: Mucosal edema present.   Mouth/Throat: Posterior oropharyngeal erythema present. No oropharyngeal exudate.   Eyes: Conjunctivae are normal. Right eye exhibits no discharge. Left eye exhibits no discharge.   Neck: Normal range " of motion. Neck supple.   Cardiovascular: Normal rate, regular rhythm and normal heart sounds.    No murmur heard.  Pulmonary/Chest: Effort normal and breath sounds normal. No respiratory distress.   Musculoskeletal: Normal range of motion.   Normal movement of all 4 extremities.   Lymphadenopathy:     She has no cervical adenopathy.        Right: No supraclavicular adenopathy present.        Left: No supraclavicular adenopathy present.   Neurological: She is alert and oriented to person, place, and time. Gait normal.   Skin: Skin is warm and dry.   Psychiatric: She has a normal mood and affect. Her behavior is normal. Thought content normal.   Nursing note and vitals reviewed.              Assessment/Plan:     1. Acute bacterial sinusitis  amoxicillin-clavulanate (AUGMENTIN) 875-125 MG Tab     Differential diagnosis, natural history, supportive care, and indications for immediate follow-up discussed at length.

## 2018-03-03 ENCOUNTER — OFFICE VISIT (OUTPATIENT)
Dept: URGENT CARE | Facility: PHYSICIAN GROUP | Age: 43
End: 2018-03-03
Payer: COMMERCIAL

## 2018-03-03 VITALS
DIASTOLIC BLOOD PRESSURE: 78 MMHG | SYSTOLIC BLOOD PRESSURE: 124 MMHG | HEIGHT: 63 IN | TEMPERATURE: 98.1 F | RESPIRATION RATE: 13 BRPM | HEART RATE: 84 BPM | OXYGEN SATURATION: 97 % | WEIGHT: 145 LBS | BODY MASS INDEX: 25.69 KG/M2

## 2018-03-03 DIAGNOSIS — J06.9 UPPER RESPIRATORY TRACT INFECTION, UNSPECIFIED TYPE: ICD-10-CM

## 2018-03-03 DIAGNOSIS — J02.9 SORE THROAT: ICD-10-CM

## 2018-03-03 DIAGNOSIS — Z20.818 STREPTOCOCCUS EXPOSURE: ICD-10-CM

## 2018-03-03 LAB
INT CON NEG: NEGATIVE
INT CON POS: POSITIVE
S PYO AG THROAT QL: NORMAL

## 2018-03-03 PROCEDURE — 87880 STREP A ASSAY W/OPTIC: CPT | Performed by: PHYSICIAN ASSISTANT

## 2018-03-03 PROCEDURE — 99214 OFFICE O/P EST MOD 30 MIN: CPT | Performed by: PHYSICIAN ASSISTANT

## 2018-03-03 RX ORDER — CODEINE PHOSPHATE AND GUAIFENESIN 10; 100 MG/5ML; MG/5ML
5 SOLUTION ORAL
Qty: 50 ML | Refills: 0 | Status: SHIPPED | OUTPATIENT
Start: 2018-03-03 | End: 2018-03-13

## 2018-03-03 ASSESSMENT — ENCOUNTER SYMPTOMS
SHORTNESS OF BREATH: 0
NECK PAIN: 0
CHILLS: 0
WHEEZING: 0
FEVER: 0
ABDOMINAL PAIN: 0
DIZZINESS: 0
TROUBLE SWALLOWING: 0
DIARRHEA: 1
COUGH: 1
EYE DISCHARGE: 0
HEADACHES: 0
SWOLLEN GLANDS: 1
SPUTUM PRODUCTION: 1
MYALGIAS: 0
SORE THROAT: 1
VOMITING: 0
EYE REDNESS: 0
TINGLING: 0

## 2018-03-03 NOTE — PROGRESS NOTES
Subjective:      Joycelyn Byers is a 42 y.o. female who presents with Pharyngitis (X 1 week +)            The patient is a 42-year-old female who presents with sore throat, drainage, congestion and cough for more than a week. Patient was evaluated on February 23 of which she was diagnosed with a sinus infection-she was given Augmentin which she completed yesterday. She reports that the majority of her symptoms are improved however she continues to have a sore throat and a cough that keeps her up at night. Finally of note patient reports her daughter was recently diagnosed with strep pharyngitis.      Pharyngitis    This is a new problem. The current episode started in the past 7 days. The problem has been unchanged. There has been no fever. The pain is at a severity of 4/10. The pain is moderate. Associated symptoms include congestion, coughing, diarrhea and swollen glands. Pertinent negatives include no abdominal pain, drooling, ear discharge, ear pain, headaches, neck pain, shortness of breath, trouble swallowing or vomiting. She has had exposure to strep. Treatments tried: Augmentin, Delsym. The treatment provided mild relief.       Review of Systems   Constitutional: Positive for malaise/fatigue. Negative for chills and fever.   HENT: Positive for congestion and sore throat. Negative for drooling, ear discharge, ear pain and trouble swallowing.    Eyes: Negative for discharge and redness.   Respiratory: Positive for cough and sputum production. Negative for shortness of breath and wheezing.    Cardiovascular: Negative for chest pain and leg swelling.   Gastrointestinal: Positive for diarrhea. Negative for abdominal pain and vomiting.   Genitourinary: Negative for dysuria and urgency.   Musculoskeletal: Negative for myalgias and neck pain.   Skin: Negative for itching and rash.   Neurological: Negative for dizziness, tingling and headaches.          Objective:     /78   Pulse 84   Temp 36.7 °C  "(98.1 °F)   Resp 13   Ht 1.6 m (5' 3\")   Wt 65.8 kg (145 lb)   SpO2 97%   BMI 25.69 kg/m²    PMH:  has a past medical history of Allergy, unspecified not elsewhere classified; Ankylosing spondylitis of lumbosacral region (CMS-HCC) (7/6/2015); Anxiety (11/24/2015); GERD (gastroesophageal reflux disease); Headache, classical migraine; Hiatal hernia; Intractable migraine without aura and without status migrainosus (11/24/2015); Lupus; Oropharyngeal dysphagia; Other forms of systemic lupus erythematosus (CMS-HCC) (10/20/2017); Overactive bladder; Peptic ulcer; Prediabetes (12/8/2016); and Vocal cord dysfunction.  MEDS:   Current Outpatient Prescriptions:   •  Fluticasone Furoate-Vilanterol (BREO ELLIPTA) 100-25 MCG/INH AEROSOL POWDER, BREATH ACTIVATED, Inhale 1 Puff by mouth., Disp: , Rfl:   •  omeprazole (PRILOSEC) 20 MG delayed-release capsule, Take 20 mg by mouth every day., Disp: , Rfl:   •  lubiprostone (AMITIZA) 24 MCG capsule, Take 24 mcg by mouth 2 times a day, with meals., Disp: , Rfl:   •  albuterol (VENTOLIN HFA) 108 (90 BASE) MCG/ACT AERS inhalation aerosol, Inhale 2 Puffs by mouth every 6 hours as needed for Shortness of Breath., Disp: 8.5 g, Rfl: 3  •  amoxicillin-clavulanate (AUGMENTIN) 875-125 MG Tab, Take 1 Tab by mouth 2 times a day., Disp: 14 Tab, Rfl: 0  •  rizatriptan (MAXALT) 10 MG tablet, Take 1 Tab by mouth Once PRN., Disp: 10 Tab, Rfl: 3  •  ondansetron (ZOFRAN ODT) 4 MG TABLET DISPERSIBLE, Take 1 Tab by mouth every 8 hours as needed., Disp: 15 Tab, Rfl: 3  •  SYMBICORT 160-4.5 MCG/ACT Aerosol, , Disp: , Rfl:   •  hydroxychloroquine (PLAQUENIL) 200 MG Tab, , Disp: , Rfl:   •  fluticasone (FLONASE) 50 MCG/ACT nasal spray, Spray 1 Spray in nose every day., Disp: , Rfl:   •  diclofenac (SOLARAZE) 3 % gel, Apply  to affected area(s) 2 times a day., Disp: , Rfl:   •  EPIPEN 2-NOLAN 0.3 MG/0.3ML Solution Auto-injector solution for injection, , Disp: , Rfl:   ALLERGIES:   Allergies   Allergen " Reactions   • Savella [Kdc:Milnacipran+Ci Pigment Blue 63] Myalgia   • Ciprofloxacin    • Reglan [Kdc:Yellow Dye+Ci Pigment Blue 63+Metoclopramide]    • Sulfa Drugs    • Tetanus Antitoxin      Mild spasms and pain     • Tramadol      SURGHX:   Past Surgical History:   Procedure Laterality Date   • HYSTERECTOMY LAPAROSCOPY  2013    utrine and right ovary removed   • HERNIA REPAIR  2007    umblical hernia   • CHOLECYSTECTOMY  2007   • SALPINGO-OOPHORECTOMY, UNILATERAL Right      SOCHX:  reports that she has never smoked. She has never used smokeless tobacco. She reports that she does not drink alcohol or use drugs.  FH: Family history was reviewed, no pertinent findings to report    Physical Exam   Constitutional: She is oriented to person, place, and time. She appears well-developed and well-nourished.   HENT:   Head: Normocephalic and atraumatic.   Mouth/Throat: No oropharyngeal exudate.   Ears- Canals clear- TM- with clear fluid effusions bilaterally.   Pos. PND, with slight erythema- without tonsillar edema or exudate.   Mild discharge noted bilaterally- to nares.      Eyes: EOM are normal. Pupils are equal, round, and reactive to light.   Neck: Normal range of motion. Neck supple.   Cardiovascular: Normal rate and regular rhythm.    No murmur heard.  Pulmonary/Chest: Effort normal and breath sounds normal. No respiratory distress.   Musculoskeletal: Normal range of motion. She exhibits no tenderness.   Lymphadenopathy:     She has no cervical adenopathy.   Neurological: She is alert and oriented to person, place, and time.   Skin: Skin is warm. No rash noted.   Psychiatric: She has a normal mood and affect. Her behavior is normal.   Vitals reviewed.            POC strep- negative.     Assessment/Plan:     1. Sore throat  - POCT Rapid Strep A  - guaifenesin-codeine (ROBITUSSIN AC) Solution oral solution; Take 5 mL by mouth at bedtime as needed for Cough (May cause sedation) for up to 10 days.  Dispense: 50 mL;  Refill: 0    2. Upper respiratory tract infection, unspecified type  - guaifenesin-codeine (ROBITUSSIN AC) Solution oral solution; Take 5 mL by mouth at bedtime as needed for Cough (May cause sedation) for up to 10 days.  Dispense: 50 mL; Refill: 0    3. Streptococcus exposure      It was explained to the pt. Today that due to the viral nature of the pt's illness, we will treat symptomatically today. Encouraged OTC supportive meds PRN. Humidification, increase fluids, avoid night time dairy.   NARXCHECK was reviewed by myself-  Document does not reveal any concerning patterns. Pt. was advised to avoid the operation of heavy machine along with driving while on such medications. Finally pt. was advised to use medication only as prescribed.     Patient given precautionary s/sx that mandate immediate follow up and evaluation in the ED. Advised of risks of not doing so.    DDX, Supportive care, and indications for immediate follow-up discussed with patient.    Instructed to return to clinic or nearest emergency department if we are not available for any change in condition, further concerns, or worsening of symptoms.    The patient demonstrated a good understanding and agreed with the treatment plan.

## 2018-03-20 ENCOUNTER — TELEPHONE (OUTPATIENT)
Dept: MEDICAL GROUP | Facility: CLINIC | Age: 43
End: 2018-03-20

## 2018-03-20 NOTE — TELEPHONE ENCOUNTER
This patient has a history of paroxysmal tachycardia for unknown causes. I believe she should go ahead and have a Holter monitor but should probably do it over a 30 day time period.  Thank you,  Dr. Bower

## 2018-03-20 NOTE — TELEPHONE ENCOUNTER
Fair enough, Dr Teresa. I will forward to Dr Bower. Thank you!    Dr. Bower, do you want patient to proceed with the holter monitor? And if so, what type of holter monitor?   Thank you in advance.

## 2018-03-20 NOTE — TELEPHONE ENCOUNTER
I saw this patient for a one time visit on 10/11/2017. She has since established care with Dr. Monique Bower. Since it has been 5 months since I ordered the test (and before she established with a PCP), I think at this point it is most appropriate for Dr. Bower to reevaluate and reorder the test that she deems most appropriate.

## 2018-03-22 ENCOUNTER — PATIENT MESSAGE (OUTPATIENT)
Dept: MEDICAL GROUP | Facility: MEDICAL CENTER | Age: 43
End: 2018-03-22

## 2018-03-22 ENCOUNTER — TELEPHONE (OUTPATIENT)
Dept: MEDICAL GROUP | Facility: CLINIC | Age: 43
End: 2018-03-22

## 2018-03-22 DIAGNOSIS — I47.10 SVT (SUPRAVENTRICULAR TACHYCARDIA): ICD-10-CM

## 2018-03-22 NOTE — TELEPHONE ENCOUNTER
1. Caller Name: Ciara at [facility]                                         Call Back Number: [phone]      Patient approves a detailed voicemail message: yes    A cardiac monitor was ordered for this pt back in October.  Ciara at [facility] is needing to know what type of monitor you're wanting.  There is a 24 hour, 48 hour, 14 days and 30 days holter monitors.

## 2018-03-22 NOTE — TELEPHONE ENCOUNTER
"Please see my response to this message on 3/20/18:  \"I saw this patient for a one time visit on 10/11/2017. She has since established care with Dr. Monique Bower. Because it has been 5 months since I ordered the test (and before she established with a PCP), I think at this point it is best for Dr. Bower to reevaluate and reorder the test that she deems most appropriate.\"  Please relay this message to the [facility]. Also, please notify the patient that since it has been 5 months since I saw her, I cannot give new orders for the Holter monitor test and she should be reevaluated by Dr. Bower who can determine the most appropriate type of Holter monitor to order.  "

## 2018-03-23 ENCOUNTER — PATIENT MESSAGE (OUTPATIENT)
Dept: MEDICAL GROUP | Facility: MEDICAL CENTER | Age: 43
End: 2018-03-23

## 2018-03-23 PROBLEM — I47.10 SVT (SUPRAVENTRICULAR TACHYCARDIA) (HCC): Status: ACTIVE | Noted: 2018-03-23

## 2018-03-23 NOTE — PROGRESS NOTES
This patient has a history of SVT with an average resting heart rate between 95 and 110, with any activity she will jump up to 130-140 and with exercise can go over 160. I need her to have an extended recording so that we can capture her SVT and then set her up with cardiology.

## 2018-03-23 NOTE — TELEPHONE ENCOUNTER
From: Joycelyn Byers  To: Monique Bower M.D.  Sent: 3/23/2018 9:17 AM PDT  Subject: Non-Urgent Medical Question    Thank you so very much Dr. Bower. I am usually diligent about writing down who I speak to, but I didn't write it down the name of the very nice woman yesterday. She did give me the following direct line to call once I had a new order with the holter monitor type, which was [phone]. I can call them today and she said she will put it right in.    Thank you very much again,  Joycelyn Byers  ----- Message -----  From: Monique Bower M.D.  Sent: 3/23/2018 9:06 AM PDT  To: Joycelyn Byers  Subject: RE: Non-Urgent Medical Question  Teto Hirsch   I have sent an urgent request for a Holter monitor to cardiology. If you can send me the number of who you spoke with I can then call them to try and get this all set up for you.  Thank you,  Dr. FINLEY      ----- Message -----   From: Joycelyn Byesr   Sent: 3/22/2018 4:21 PM PDT   To: Monique Bower M.D.  Subject: Non-Urgent Medical Question    I was just told that  will not clarify her previous order for a holter monitor since it was 5 months ago and since I established myself as a patient with you.     I have been having SVT with an average resting HR of , walking of 130-140, and with exertion over 160. I tried to make an appt with you and nothing was available until 4/23. Would you like me to consult my Cardiologist or can you place the holter monitor order?    Thank you  Joycelyn Byers

## 2018-04-12 ENCOUNTER — OFFICE VISIT (OUTPATIENT)
Dept: URGENT CARE | Facility: PHYSICIAN GROUP | Age: 43
End: 2018-04-12
Payer: COMMERCIAL

## 2018-04-12 VITALS
HEART RATE: 74 BPM | SYSTOLIC BLOOD PRESSURE: 122 MMHG | DIASTOLIC BLOOD PRESSURE: 70 MMHG | WEIGHT: 150 LBS | BODY MASS INDEX: 26.58 KG/M2 | OXYGEN SATURATION: 98 % | HEIGHT: 63 IN | TEMPERATURE: 97.9 F

## 2018-04-12 DIAGNOSIS — S61.411A LACERATION OF RIGHT HAND WITHOUT FOREIGN BODY, INITIAL ENCOUNTER: ICD-10-CM

## 2018-04-12 PROCEDURE — 12002 RPR S/N/AX/GEN/TRNK2.6-7.5CM: CPT | Performed by: FAMILY MEDICINE

## 2018-04-17 ENCOUNTER — PATIENT MESSAGE (OUTPATIENT)
Dept: MEDICAL GROUP | Facility: MEDICAL CENTER | Age: 43
End: 2018-04-17

## 2018-04-18 ENCOUNTER — PATIENT MESSAGE (OUTPATIENT)
Dept: MEDICAL GROUP | Facility: MEDICAL CENTER | Age: 43
End: 2018-04-18

## 2018-04-19 ENCOUNTER — PATIENT MESSAGE (OUTPATIENT)
Dept: MEDICAL GROUP | Facility: MEDICAL CENTER | Age: 43
End: 2018-04-19

## 2018-04-19 NOTE — TELEPHONE ENCOUNTER
From: Joycelyn Byers  To: Monique Bower M.D.  Sent: 4/19/2018 1:04 PM PDT  Subject: Non-Urgent Medical Question    Yes please     ----- Message -----  From: Monique Bower M.D.  Sent: 4/19/18, 12:56 PM  To: Joycelyn Byers  Subject: RE: Non-Urgent Medical Question    Teto Hirsch,  Are you saying you want to keep the appointment you have even if we do not get the Holter prior? I am good with that.  Thank you,  Dr. FINLEY      ----- Message -----   From: Joycelyn Byers   Sent: 4/18/2018 9:31 PM PDT   To: Monique Bower M.D.  Subject: Non-Urgent Medical Question    I wanted to speak to you regarding my current state if that's okay. I will follow up with them again.     ----- Message -----  From: Monique Bower M.D.  Sent: 4/18/18, 5:36 PM  To: Joycelyn Byers  Subject: RE: Non-Urgent Medical Question    Hi Joycelyn   May be we should move your follow-up appointment out 3–4 weeks so that we would have time to get this done and then see you back to go over results. Would that be okay with you?  Thank you,  Dr. FINLEY      ----- Message -----   From: Joycelyn Byers   Sent: 4/17/2018 9:05 PM PDT   To: Monique Bower M.D.  Subject: Non-Urgent Medical Question    I wanted to update you on the holter monitor order you submitted last month on my behalf. I have made many calls to the [facility] and they have tried hard to expedite the request, but mentioned that the company responsible for the 21 day monitors is backlogged. So nothing yet. Perhaps a shorter duration would help? I am set to see you next week.       ----- Message -----  From: Monique Bower M.D.  Sent: 3/23/18, 10:56 AM  To: Joycelyn Byers  Subject: RE: Non-Urgent Medical Question    Hi Joycelyn   I tried to call that number and it is a patient access number where you have to sit for some extended period of time which I do not have. I did put in the order for you this morning when I sent the my chart  message. If you could get on their and have them schedule you for the monitor then we can get this moving. Sorry this is been so confusing.  Take care, Dr. Monique rolle    ----- Message -----   From: Joycelyn Byers   Sent: 3/23/2018 9:17 AM PDT   To: Monique Bower M.D.  Subject: Non-Urgent Medical Question    Thank you so very much Dr. Bower. I am usually diligent about writing down who I speak to, but I didn't write it down the name of the very nice woman yesterday. She did give me the following direct line to call once I had a new order with the holter monitor type, which was [phone]. I can call them today and she said she will put it right in.    Thank you very much again,  Joycelyn Byers  ----- Message -----  From: Monique Bower M.D.  Sent: 3/23/2018 9:06 AM PDT  To: Joycelyn Byers  Subject: RE: Non-Urgent Medical Question  Teto Hirsch   I have sent an urgent request for a Holter monitor to cardiology. If you can send me the number of who you spoke with I can then call them to try and get this all set up for you.  Thank you,  Dr. FINLEY      ----- Message -----   From: Joycelyn Byers   Sent: 3/22/2018 4:21 PM PDT   To: Monique Bower M.D.  Subject: Non-Urgent Medical Question    I was just told that  will not clarify her previous order for a holter monitor since it was 5 months ago and since I established myself as a patient with you.     I have been having SVT with an average resting HR of , walking of 130-140, and with exertion over 160. I tried to make an appt with you and nothing was available until 4/23. Would you like me to consult my Cardiologist or can you place the holter monitor order?    Thank you  oJycelyn Byers

## 2018-04-19 NOTE — TELEPHONE ENCOUNTER
From: Joycelyn Byers  To: Monique Bower M.D.  Sent: 4/18/2018 9:31 PM PDT  Subject: Non-Urgent Medical Question    I wanted to speak to you regarding my current state if that's okay. I will follow up with them again.     ----- Message -----  From: Monique Bower M.D.  Sent: 4/18/18, 5:36 PM  To: Joycelyn Byers  Subject: RE: Non-Urgent Medical Question    Teto Hirsch   May be we should move your follow-up appointment out 3–4 weeks so that we would have time to get this done and then see you back to go over results. Would that be okay with you?  Thank you,  Dr. FINLEY      ----- Message -----   From: Joycelyn Byers   Sent: 4/17/2018 9:05 PM PDT   To: Monique Bower M.D.  Subject: Non-Urgent Medical Question    I wanted to update you on the holter monitor order you submitted last month on my behalf. I have made many calls to the heart and vascular center and they have tried hard to expedite the request, but mentioned that the company responsible for the 21 day monitors is backlogged. So nothing yet. Perhaps a shorter duration would help? I am set to see you next week.       ----- Message -----  From: Monique Bower M.D.  Sent: 3/23/18, 10:56 AM  To: Joycelyn Byers  Subject: RE: Non-Urgent Medical Question    Teto Hirsch   I tried to call that number and it is a patient access number where you have to sit for some extended period of time which I do not have. I did put in the order for you this morning when I sent the my chart message. If you could get on their and have them schedule you for the monitor then we can get this moving. Sorry this is been so confusing.  Take care, Dr. Monique rolle    ----- Message -----   From: Joycelyn Byers   Sent: 3/23/2018 9:17 AM PDT   To: Monique Bower M.D.  Subject: Non-Urgent Medical Question    Thank you so very much Dr. Bower. I am usually diligent about writing down who I speak to, but I didn't write it down the name of the very  nice woman yesterday. She did give me the following direct line to call once I had a new order with the holter monitor type, which was [phone]. I can call them today and she said she will put it right in.    Thank you very much again,  Joycelyn Byers  ----- Message -----  From: Monique Bower M.D.  Sent: 3/23/2018 9:06 AM PDT  To: Joycelyn Byers  Subject: RE: Non-Urgent Medical Question  Teto Hirsch   I have sent an urgent request for a Holter monitor to cardiology. If you can send me the number of who you spoke with I can then call them to try and get this all set up for you.  Thank you,  Dr. FINLEY      ----- Message -----   From: Joycelyn Byers   Sent: 3/22/2018 4:21 PM PDT   To: Monique Bower M.D.  Subject: Non-Urgent Medical Question    I was just told that  will not clarify her previous order for a holter monitor since it was 5 months ago and since I established myself as a patient with you.     I have been having SVT with an average resting HR of , walking of 130-140, and with exertion over 160. I tried to make an appt with you and nothing was available until 4/23. Would you like me to consult my Cardiologist or can you place the holter monitor order?    Thank you  Joycelyn Byers

## 2018-04-19 NOTE — TELEPHONE ENCOUNTER
From: Joycelyn Byers  To: Monique Bower M.D.  Sent: 4/17/2018 9:05 PM PDT  Subject: Non-Urgent Medical Question    I wanted to update you on the holter monitor order you submitted last month on my behalf. I have made many calls to the heart and vascular center and they have tried hard to expedite the request, but mentioned that the company responsible for the 21 day monitors is backlogged. So nothing yet. Perhaps a shorter duration would help? I am set to see you next week.       ----- Message -----  From: Monique Bower M.D.  Sent: 3/23/18, 10:56 AM  To: Joycelyn Byers  Subject: RE: Non-Urgent Medical Question    Teto Hirsch   I tried to call that number and it is a patient access number where you have to sit for some extended period of time which I do not have. I did put in the order for you this morning when I sent the my chart message. If you could get on their and have them schedule you for the monitor then we can get this moving. Sorry this is been so confusing.  Take care, Dr. Monique rolle    ----- Message -----   From: Joycelyn Byers   Sent: 3/23/2018 9:17 AM PDT   To: Monique Bower M.D.  Subject: Non-Urgent Medical Question    Thank you so very much Dr. Bower. I am usually diligent about writing down who I speak to, but I didn't write it down the name of the very nice woman yesterday. She did give me the following direct line to call once I had a new order with the holter monitor type, which was [phone]. I can call them today and she said she will put it right in.    Thank you very much again,  Joycelyn Byers  ----- Message -----  From: Monique Bower M.D.  Sent: 3/23/2018 9:06 AM PDT  To: Joycelyn Byers  Subject: RE: Non-Urgent Medical Question  Teto Hirsch   I have sent an urgent request for a Holter monitor to cardiology. If you can send me the number of who you spoke with I can then call them to try and get this all set up for you.  Thank you,    MALIA      ----- Message -----   From: Joycelyn Byers   Sent: 3/22/2018 4:21 PM PDT   To: Monique Bower M.D.  Subject: Non-Urgent Medical Question    I was just told that  will not clarify her previous order for a holter monitor since it was 5 months ago and since I established myself as a patient with you.     I have been having SVT with an average resting HR of , walking of 130-140, and with exertion over 160. I tried to make an appt with you and nothing was available until 4/23. Would you like me to consult my Cardiologist or can you place the holter monitor order?    Thank you  Joycelyn Byers

## 2018-04-23 ENCOUNTER — OFFICE VISIT (OUTPATIENT)
Dept: MEDICAL GROUP | Facility: MEDICAL CENTER | Age: 43
End: 2018-04-23
Payer: COMMERCIAL

## 2018-04-23 VITALS
SYSTOLIC BLOOD PRESSURE: 110 MMHG | OXYGEN SATURATION: 98 % | RESPIRATION RATE: 14 BRPM | BODY MASS INDEX: 27.3 KG/M2 | HEART RATE: 74 BPM | TEMPERATURE: 98.2 F | HEIGHT: 63 IN | WEIGHT: 154.1 LBS | DIASTOLIC BLOOD PRESSURE: 70 MMHG

## 2018-04-23 DIAGNOSIS — G37.9 CNS DEMYELINATING DISEASE (HCC): ICD-10-CM

## 2018-04-23 DIAGNOSIS — I47.10 SVT (SUPRAVENTRICULAR TACHYCARDIA): ICD-10-CM

## 2018-04-23 DIAGNOSIS — M45.7 ANKYLOSING SPONDYLITIS OF LUMBOSACRAL REGION (HCC): ICD-10-CM

## 2018-04-23 DIAGNOSIS — Z12.31 ENCOUNTER FOR SCREENING MAMMOGRAM FOR BREAST CANCER: ICD-10-CM

## 2018-04-23 PROCEDURE — 99214 OFFICE O/P EST MOD 30 MIN: CPT | Performed by: FAMILY MEDICINE

## 2018-04-23 NOTE — ASSESSMENT & PLAN NOTE
This is a chronic health issue for this patient that she continues to work on through diet and exercise. She tries to maintain physical fitness but it is difficult because of her other health issues. She is getting ready to try going back to work for 8 hour shifts. 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 They can work around her schedule if she can do the 8 hour shifts. 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

## 2018-04-23 NOTE — ASSESSMENT & PLAN NOTE
This is a chronic health problem that is well controlled with current medications and lifestyle measures. This actually was a side effects from Humira. Patient states that her symptoms have gotten much much better. She continues to follow with ROXIE Powell but only on an as-needed basis.

## 2018-04-23 NOTE — PROGRESS NOTES
"Subjective:   Joycelyn Byers is a 42 y.o. female who presents for Hand Laceration (R thumb x45 minutes ago)     Laceration    The incident occurred less than 1 hour ago. The laceration is located on the right hand. The laceration is 3 cm in size. The laceration mechanism was a clean knife. The pain is moderate. The pain has been constant since onset. She reports no foreign bodies present. Her tetanus status is UTD.     ROS   Objective:   /70   Pulse 74   Temp 36.6 °C (97.9 °F)   Ht 1.6 m (5' 3\")   Wt 68 kg (150 lb)   SpO2 98%   BMI 26.57 kg/m²   Physical Exam   Constitutional: She is oriented to person, place, and time. She appears well-developed and well-nourished. No distress.   Eyes: EOM are normal. Pupils are equal, round, and reactive to light.   Cardiovascular: Normal rate, regular rhythm, normal heart sounds and intact distal pulses.    Pulmonary/Chest: Effort normal and breath sounds normal. No respiratory distress.   Abdominal: Soft. Bowel sounds are normal. She exhibits no distension.   Musculoskeletal: Normal range of motion.   Neurological: She is alert and oriented to person, place, and time. She has normal reflexes.   Skin: Skin is warm and dry. Laceration (r thumb 3cm) noted.   Psychiatric: She has a normal mood and affect. Her behavior is normal.        Assessment/Plan:     1. Laceration of right hand without foreign body, initial encounter  Differential diagnosis, natural history, supportive care, and indications for immediate follow-up discussed.  PROCEDURE:     The wound area was irrigated with sterile saline and draped in a sterile   fashion.  The wound area was anesthetized with Lidocaine 1% with epinephrine. Once good anaesthesia is achieved, betadine is applied to area .     The wound was repaired with 4-0 Prolene   using simple interrupted stitches and a sterile dressing applied.     Patient to return for suture removal  5-7 days facial  7-10 days all other " wounds.    Patient discharged without any immediate complications.  Wound care instructions provided.  OTC analgesics prn  Keep elevated/ice as needed  Worsening/infection precautions given.  Patient states understands agrees with treatment plan and follow up.

## 2018-04-24 NOTE — PROGRESS NOTES
CC:Diagnoses of Encounter for screening mammogram for breast cancer, Ankylosing spondylitis of lumbosacral region (CMS-[facility]), SVT (supraventricular tachycardia) (CMS-HCC), and CNS demyelinating disease (CMS-HCC) were pertinent to this visit.      HISTORY OF PRESENT ILLNESS: Patient is a 42 y.o. female established patient who presents today to to follow-up on her chronic health problems as outlined below. Patient also has some paperwork that needs to be completed for her employer which is lower than [employer]. She works as a  there. Because of her health conditions there is concern as to whether or not she would be able to do 8 hour days versus 12 hour days. They're going to let her start with 8 hour days and I have completed her paperwork stating that I believe she could go back to work full days. She only needs 3 days per month.    Health Maintenance: Patient is overdue for mammogram and we will get that ordered    Ankylosing spondylitis of lumbosacral region (CMS-HCC)  This is a chronic health issue for this patient that she continues to work on through diet and exercise. She tries to maintain physical fitness but it is difficult because of her other health issues. She is getting ready to try going back to work for 8 hour shifts. She would work on the [employer] unit at [employer]. They can work around her schedule if she can do the 8 hour shifts. 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    SVT (supraventricular tachycardia) (CMS-HCC)  This is a chronic health problem for this patient that she is actually seeing improvement in. She stated that previously when she goes upstairs her heart rate would go up to the 130s now tends to go up to about the 1:15 range. She does continue to challenge herself with regular cardiovascular  activity. She will continue to work on this.    CNS demyelinating disease (CMS-HCC)  This is a chronic health problem that is well controlled with current medications and lifestyle measures. This actually was a side effects from Humira. Patient states that her symptoms have gotten much much better. She continues to follow with  Andre but only on an as-needed basis.      Patient Active Problem List    Diagnosis Date Noted   • SVT (supraventricular tachycardia) (CMS-HCC) 03/23/2018   • Other forms of systemic lupus erythematosus (CMS-HCC) 10/20/2017   • Chronic constipation 12/15/2016   • Reactive hypoglycemia 09/28/2016   • CNS demyelinating disease (CMS-HCC) 04/19/2016   • Vocal cord dysfunction 04/19/2016   • Multiple allergies 12/21/2015   • Anxiety 11/24/2015   • Intractable migraine without aura and without status migrainosus 11/24/2015   • Ankylosing spondylitis of lumbosacral region (CMS-HCC) 07/06/2015   • Overactive bladder 04/17/2015   • Asthma in adult 01/14/2015      Allergies:Savella [kdc:milnacipran+ci pigment blue 63]; Ciprofloxacin; Reglan [kdc:yellow dye+ci pigment blue 63+metoclopramide]; Sulfa drugs; Tetanus antitoxin; and Tramadol    Current Outpatient Prescriptions   Medication Sig Dispense Refill   • Secukinumab (COSENTYX) 150 MG/ML Solution Prefilled Syringe Inject  as instructed.     • omeprazole (PRILOSEC) 20 MG delayed-release capsule Take 20 mg by mouth every day.     • rizatriptan (MAXALT) 10 MG tablet Take 1 Tab by mouth Once PRN. 10 Tab 3   • ondansetron (ZOFRAN ODT) 4 MG TABLET DISPERSIBLE Take 1 Tab by mouth every 8 hours as needed. 15 Tab 3   • SYMBICORT 160-4.5 MCG/ACT Aerosol      • lubiprostone (AMITIZA) 24 MCG capsule Take 24 mcg by mouth 2 times a day, with meals.     • hydroxychloroquine (PLAQUENIL) 200 MG Tab      • fluticasone (FLONASE) 50 MCG/ACT nasal spray Spray 1 Spray in nose every day.     • diclofenac (SOLARAZE) 3 % gel Apply  to affected area(s) 2 times a day.     •  EPIPEN 2-NOLAN 0.3 MG/0.3ML Solution Auto-injector solution for injection      • albuterol (VENTOLIN HFA) 108 (90 BASE) MCG/ACT AERS inhalation aerosol Inhale 2 Puffs by mouth every 6 hours as needed for Shortness of Breath. 8.5 g 3     No current facility-administered medications for this visit.        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 She is  with four children.        Family History   Problem Relation Age of Onset   • Arthritis Brother      psoriatic arthritis   • Arthritis Maternal Grandmother    • Psychiatry Mother      Major depression   • Other Mother      discoid lupus   • Psychiatry Father      Bipolar depression   • Heart Disease Father      CHF   • Hypertension Father    • Diabetes Father    • Other Father      Agent orange exposure 125% disabled   • Diabetes Paternal Uncle    • Stroke Maternal Grandfather    • Diabetes Paternal Grandmother    • Alcohol/Drug Paternal Grandfather    • Cancer Paternal Aunt      gyn cancer   • GI Son    • GI Daughter    • GI Daughter    • GI Daughter        Review of Systems:      - Constitutional: Patient lives with chronic fatigue and malaise secondary to multiple health conditions.      - HEENT: Negative for headaches, vision changes, hearing changes, ear pain, ear discharge, rhinorrhea, sinus congestion, sore throat, and neck pain.      - Respiratory: Negative for cough, sputum production, chest congestion, dyspnea, wheezing, and crackles.      - Cardiovascular: Negative for chest pain, palpitations, orthopnea, and bilateral lower extremity edema.     - Gastrointestinal: Negative for heartburn, nausea, vomiting, abdominal pain, hematochezia, melena, diarrhea, constipation, and greasy/foul-smelling stools.     - Genitourinary: Negative for dysuria, polyuria,  "hematuria, pyuria, urinary urgency, and urinary incontinence.    - Musculoskeletal: Positive for chronic back pain from ankylosing spondylitis otherwise negative for myalgias, and joint pain.     - Skin: Negative for rash, itching, cyanotic skin color change.     - Neurological: Positive for a history of demyelinating disease that now seems to be stable secondary to allergic reaction to Humira. Denies  dizziness, tremors, focal sensory deficit, focal weakness and headaches.     - Endo/Heme/Allergies: Does not bruise/bleed easily.     - Psychiatric/Behavioral: Negative for depression, suicidal/homicidal ideation and memory loss.          - NOTE: All other systems reviewed and are negative, except as in HPI.    Exam:    Blood pressure 110/70, pulse 74, temperature 36.8 °C (98.2 °F), resp. rate 14, height 1.6 m (5' 3\"), weight 69.9 kg (154 lb 1.6 oz), SpO2 98 %, not currently breastfeeding. Body mass index is 27.3 kg/m².    General:  Well nourished, well developed female in NAD      Patient was seen for 25 minutes face to face of which, 20 minutes was spent counseling regarding the above mentioned problems.  Assessment/Plan:  1. Encounter for screening mammogram for breast cancer  Patient will get her mammogram scheduled order given to her directly  - MA-MAMMO SCREENING BILAT W/JOCELYNE W/CAD; Future    2. Ankylosing spondylitis of lumbosacral region (CMS-[facility])  Patient updated me on her recent visits to [place] and [facility]. We continued to discuss options for employment in the future. Paperwork completed    3. SVT (supraventricular tachycardia) (CMS-HCC)  Currently stable and possibly even showing improvement    4. CNS demyelinating disease (CMS-HCC)  Stable        "

## 2018-04-25 ENCOUNTER — HOSPITAL ENCOUNTER (OUTPATIENT)
Dept: LAB | Facility: MEDICAL CENTER | Age: 43
End: 2018-04-25
Attending: INTERNAL MEDICINE
Payer: COMMERCIAL

## 2018-04-25 LAB
T3 SERPL-MCNC: 103.1 NG/DL (ref 60–181)
T4 SERPL-MCNC: 7.3 UG/DL (ref 4–12)
TSH SERPL DL<=0.005 MIU/L-ACNC: 1.29 UIU/ML (ref 0.38–5.33)

## 2018-04-25 PROCEDURE — 84443 ASSAY THYROID STIM HORMONE: CPT

## 2018-04-25 PROCEDURE — 36415 COLL VENOUS BLD VENIPUNCTURE: CPT

## 2018-04-25 PROCEDURE — 84480 ASSAY TRIIODOTHYRONINE (T3): CPT

## 2018-04-25 PROCEDURE — 84436 ASSAY OF TOTAL THYROXINE: CPT

## 2018-04-27 ENCOUNTER — TELEPHONE (OUTPATIENT)
Dept: CARDIOLOGY | Facility: MEDICAL CENTER | Age: 43
End: 2018-04-27

## 2018-04-30 ENCOUNTER — PATIENT MESSAGE (OUTPATIENT)
Dept: MEDICAL GROUP | Facility: MEDICAL CENTER | Age: 43
End: 2018-04-30

## 2018-05-02 ENCOUNTER — HOSPITAL ENCOUNTER (OUTPATIENT)
Dept: RADIOLOGY | Facility: MEDICAL CENTER | Age: 43
End: 2018-05-02
Attending: FAMILY MEDICINE
Payer: COMMERCIAL

## 2018-05-02 DIAGNOSIS — Z12.31 ENCOUNTER FOR SCREENING MAMMOGRAM FOR BREAST CANCER: ICD-10-CM

## 2018-05-02 PROCEDURE — 77067 SCR MAMMO BI INCL CAD: CPT

## 2018-05-11 ENCOUNTER — OFFICE VISIT (OUTPATIENT)
Dept: URGENT CARE | Facility: PHYSICIAN GROUP | Age: 43
End: 2018-05-11
Payer: COMMERCIAL

## 2018-05-11 VITALS
OXYGEN SATURATION: 97 % | HEART RATE: 95 BPM | WEIGHT: 147 LBS | DIASTOLIC BLOOD PRESSURE: 62 MMHG | SYSTOLIC BLOOD PRESSURE: 90 MMHG | BODY MASS INDEX: 26.05 KG/M2 | HEIGHT: 63 IN | RESPIRATION RATE: 14 BRPM | TEMPERATURE: 98.5 F

## 2018-05-11 DIAGNOSIS — Z48.02 VISIT FOR SUTURE REMOVAL: ICD-10-CM

## 2018-05-11 PROCEDURE — 99212 OFFICE O/P EST SF 10 MIN: CPT | Performed by: NURSE PRACTITIONER

## 2018-05-11 RX ORDER — SECUKINUMAB 150 MG/ML
INJECTION SUBCUTANEOUS
COMMUNITY
Start: 2018-05-05 | End: 2018-10-06

## 2018-05-11 RX ORDER — CYANOCOBALAMIN 1000 UG/ML
1000 INJECTION, SOLUTION INTRAMUSCULAR; SUBCUTANEOUS
Refills: 2 | COMMUNITY
Start: 2018-02-17

## 2018-05-11 NOTE — PROGRESS NOTES
No change in med hx, surg hx, allergy hx, or current meds since previous visit    HPI: suture removal    ROS: sutured wound to right thumb. Denies fever, chills, pain, erythema, or drainage    Assessment: four sutures present. Wound clean, healing well, no s/s of infection    Office visit for suture encounter reviewed : 4/12/18    Plan: sutures removed without diff, steri strips applied, discussed wound care and sunscreen to avoid scars. F/u prn        CURTIS Jernigan.

## 2018-05-22 ENCOUNTER — OFFICE VISIT (OUTPATIENT)
Dept: ENDOCRINOLOGY | Facility: MEDICAL CENTER | Age: 43
End: 2018-05-22
Payer: COMMERCIAL

## 2018-05-22 VITALS
OXYGEN SATURATION: 97 % | WEIGHT: 156.4 LBS | HEIGHT: 63 IN | BODY MASS INDEX: 27.71 KG/M2 | DIASTOLIC BLOOD PRESSURE: 68 MMHG | SYSTOLIC BLOOD PRESSURE: 104 MMHG | HEART RATE: 100 BPM

## 2018-05-22 DIAGNOSIS — E55.9 VITAMIN D DEFICIENCY: Primary | ICD-10-CM

## 2018-05-22 DIAGNOSIS — O24.419 GESTATIONAL DIABETES MELLITUS (GDM), ANTEPARTUM, GESTATIONAL DIABETES METHOD OF CONTROL UNSPECIFIED: ICD-10-CM

## 2018-05-22 DIAGNOSIS — E16.1 REACTIVE HYPOGLYCEMIA: ICD-10-CM

## 2018-05-22 DIAGNOSIS — E78.5 HYPERLIPIDEMIA, UNSPECIFIED HYPERLIPIDEMIA TYPE: ICD-10-CM

## 2018-05-22 PROCEDURE — 99214 OFFICE O/P EST MOD 30 MIN: CPT | Performed by: INTERNAL MEDICINE

## 2018-05-22 NOTE — PROGRESS NOTES
"Chief Complaint   Patient presents with   • Gestational Diabetes     follow-up        HPI:    Prediabetes evaluation          The patient had gestational diabetes during her last pregnancy. Baby weighed 6 lbs. 12 oz. She was not actively treated for diabetes during pregnancy apart from dietary adjustments.           She has 4 children. His birth weight was the first one 8 lbs. 15 oz. The others were under 8 pounds.           Over the last 6 months her weight is gone up from 145 pounds up to 156 pounds despite her best efforts at control.             Both parents are type 2 diabetics. Her grandmother type I diabetic and one uncle type I diabetic.            She has not been treated medically for hyperlipidemia or hypertension. She takes diltiazem because of a sinus tachycardia        ROS:  Working as a  works 3  12 hour shifts a month.  Her children are still all at home.    SVT has been terminated by ablation without recurrence      Allergies:   Allergies   Allergen Reactions   • Savella [Kdc:Milnacipran+Ci Pigment Blue 63] Myalgia   • Ciprofloxacin    • Reglan [Kdc:Yellow Dye+Ci Pigment Blue 63+Metoclopramide]    • Sulfa Drugs Nausea     GI upset   • Tetanus Antitoxin      Mild spasms and pain     • Tramadol Rash and Shortness of Breath       Current medicines including changes today:  Current Outpatient Prescriptions   Medication Sig Dispense Refill   • DILTIAZem (CARDIZEM) 30 MG Tab      • COSENTYX SENSOREADY  MG/ML Solution Auto-injector      • B-D TB SYRINGE 1CC/25GX5/8\" 25G X 5/8\" 1 ML Misc USE TO INJECT B 12 SHOTS  2   • HYDROcodone-acetaminophen 2.5-108 mg/5mL (HYCET) 7.5-325 MG/15ML solution      • cyanocobalamin (VITAMIN B-12) 1000 MCG/ML Solution ADM 1 ML SC WEEKLY  2   • Secukinumab (COSENTYX) 150 MG/ML Solution Prefilled Syringe Inject  as instructed.     • rizatriptan (MAXALT) 10 MG tablet Take 1 Tab by mouth Once PRN. 10 Tab 3   • SYMBICORT 160-4.5 MCG/ACT Aerosol      • lubiprostone " "(AMITIZA) 24 MCG capsule Take 24 mcg by mouth 2 times a day, with meals.     • hydroxychloroquine (PLAQUENIL) 200 MG Tab      • fluticasone (FLONASE) 50 MCG/ACT nasal spray Spray 1 Spray in nose every day.     • EPIPEN 2-NOLNA 0.3 MG/0.3ML Solution Auto-injector solution for injection      • albuterol (VENTOLIN HFA) 108 (90 BASE) MCG/ACT AERS inhalation aerosol Inhale 2 Puffs by mouth every 6 hours as needed for Shortness of Breath. 8.5 g 3     No current facility-administered medications for this visit.         Past Medical History:   Diagnosis Date   • Allergy, unspecified not elsewhere classified    • Ankylosing spondylitis of lumbosacral region (HCC) 7/6/2015   • Anxiety 11/24/2015   • GERD (gastroesophageal reflux disease)    • Headache, classical migraine    • Hiatal hernia    • Intractable migraine without aura and without status migrainosus 11/24/2015   • Lupus    • Oropharyngeal dysphagia    • Other forms of systemic lupus erythematosus (HCC) 10/20/2017   • Overactive bladder    • Peptic ulcer    • Prediabetes 12/8/2016   • Vocal cord dysfunction        PHYSICAL EXAM:    /68   Pulse 100   Ht 1.6 m (5' 3\")   Wt 70.9 kg (156 lb 6.4 oz)   SpO2 97%   BMI 27.71 kg/m²       Gen.   appears healthy     Skin   appropriate for sex and age    HEENT  unremarkable    Neck   no adenopathy, thyroid gland is small but palpable    Heart  regular    Extremities  no edema, good peripheral pulses. Small amount of hair on her toes    Neuro  gait and station normal             DTRs are brisk and present at knees and ankles.             Light touch intact in the feet    Psych  appropriate calm, pleasant    ASSESSMENT AND RECOMMENDATIONS    1. Reactive hypoglycemia           May be a prediabetic development which is managed primarily by diet changes    - C-PEPTIDE; Future  - INSULIN FASTING; Future    2. Hyperlipidemia, unspecified hyperlipidemia type    - LIPID PROFILE; Future    3. Vitamin D deficiency    - VITAMIN D,25 " HYDROXY; Future    4. Gestational diabetes mellitus (GDM), antepartum, gestational diabetes method of control unspecified    - HEMOGLOBIN A1C; Future  - COMP METABOLIC PANEL; Future      DISPOSITION: Follow up screening test results by my chart                            If all is stable and satisfactory repeat in one year       Justin Delaney M.D.    Copies to: Monique Bower M.D. [phone]

## 2018-05-24 ENCOUNTER — HOSPITAL ENCOUNTER (OUTPATIENT)
Dept: LAB | Facility: MEDICAL CENTER | Age: 43
End: 2018-05-24
Attending: INTERNAL MEDICINE
Payer: COMMERCIAL

## 2018-05-24 DIAGNOSIS — O24.419 GESTATIONAL DIABETES MELLITUS (GDM), ANTEPARTUM, GESTATIONAL DIABETES METHOD OF CONTROL UNSPECIFIED: ICD-10-CM

## 2018-05-24 DIAGNOSIS — E16.1 REACTIVE HYPOGLYCEMIA: ICD-10-CM

## 2018-05-24 DIAGNOSIS — E78.5 HYPERLIPIDEMIA, UNSPECIFIED HYPERLIPIDEMIA TYPE: ICD-10-CM

## 2018-05-24 DIAGNOSIS — E55.9 VITAMIN D DEFICIENCY: ICD-10-CM

## 2018-05-24 LAB
25(OH)D3 SERPL-MCNC: 38 NG/ML (ref 30–100)
ALBUMIN SERPL BCP-MCNC: 4 G/DL (ref 3.2–4.9)
ALBUMIN/GLOB SERPL: 1.1 G/DL
ALP SERPL-CCNC: 51 U/L (ref 30–99)
ALT SERPL-CCNC: 15 U/L (ref 2–50)
ANION GAP SERPL CALC-SCNC: 8 MMOL/L (ref 0–11.9)
AST SERPL-CCNC: 18 U/L (ref 12–45)
BILIRUB SERPL-MCNC: 0.5 MG/DL (ref 0.1–1.5)
BUN SERPL-MCNC: 13 MG/DL (ref 8–22)
CALCIUM SERPL-MCNC: 9.1 MG/DL (ref 8.5–10.5)
CHLORIDE SERPL-SCNC: 106 MMOL/L (ref 96–112)
CHOLEST SERPL-MCNC: 186 MG/DL (ref 100–199)
CO2 SERPL-SCNC: 24 MMOL/L (ref 20–33)
CREAT SERPL-MCNC: 0.72 MG/DL (ref 0.5–1.4)
EST. AVERAGE GLUCOSE BLD GHB EST-MCNC: 117 MG/DL
GLOBULIN SER CALC-MCNC: 3.8 G/DL (ref 1.9–3.5)
GLUCOSE SERPL-MCNC: 89 MG/DL (ref 65–99)
HBA1C MFR BLD: 5.7 % (ref 0–5.6)
HDLC SERPL-MCNC: 63 MG/DL
LDLC SERPL CALC-MCNC: 109 MG/DL
POTASSIUM SERPL-SCNC: 3.8 MMOL/L (ref 3.6–5.5)
PROT SERPL-MCNC: 7.8 G/DL (ref 6–8.2)
SODIUM SERPL-SCNC: 138 MMOL/L (ref 135–145)
TRIGL SERPL-MCNC: 69 MG/DL (ref 0–149)

## 2018-05-24 PROCEDURE — 84681 ASSAY OF C-PEPTIDE: CPT

## 2018-05-24 PROCEDURE — 36415 COLL VENOUS BLD VENIPUNCTURE: CPT

## 2018-05-24 PROCEDURE — 83525 ASSAY OF INSULIN: CPT

## 2018-05-24 PROCEDURE — 83036 HEMOGLOBIN GLYCOSYLATED A1C: CPT

## 2018-05-24 PROCEDURE — 80061 LIPID PANEL: CPT

## 2018-05-24 PROCEDURE — 82306 VITAMIN D 25 HYDROXY: CPT

## 2018-05-24 PROCEDURE — 80053 COMPREHEN METABOLIC PANEL: CPT

## 2018-05-25 LAB
C PEPTIDE SERPL-MCNC: 1.6 NG/ML (ref 0.8–3.5)
INSULIN P FAST SERPL-ACNC: 10 UIU/ML (ref 3–19)

## 2018-06-06 ENCOUNTER — APPOINTMENT (OUTPATIENT)
Dept: MEDICAL GROUP | Facility: MEDICAL CENTER | Age: 43
End: 2018-06-06
Payer: COMMERCIAL

## 2018-06-18 ENCOUNTER — PATIENT MESSAGE (OUTPATIENT)
Dept: MEDICAL GROUP | Facility: MEDICAL CENTER | Age: 43
End: 2018-06-18

## 2018-06-18 DIAGNOSIS — R63.5 WEIGHT GAIN: Primary | ICD-10-CM

## 2018-06-18 DIAGNOSIS — R29.2 GENERALIZED HYPERREFLEXIA: ICD-10-CM

## 2018-06-18 DIAGNOSIS — M45.7 ANKYLOSING SPONDYLITIS OF LUMBOSACRAL REGION (HCC): ICD-10-CM

## 2018-06-21 ENCOUNTER — PHYSICAL THERAPY (OUTPATIENT)
Dept: PHYSICAL THERAPY | Facility: REHABILITATION | Age: 43
End: 2018-06-21
Payer: COMMERCIAL

## 2018-06-21 DIAGNOSIS — G89.29 CHRONIC MIDLINE LOW BACK PAIN, WITH SCIATICA PRESENCE UNSPECIFIED: ICD-10-CM

## 2018-06-21 DIAGNOSIS — L93.0 LUPUS ERYTHEMATOSUS, UNSPECIFIED FORM: ICD-10-CM

## 2018-06-21 DIAGNOSIS — M54.5 CHRONIC MIDLINE LOW BACK PAIN, WITH SCIATICA PRESENCE UNSPECIFIED: ICD-10-CM

## 2018-06-21 DIAGNOSIS — M54.2 NECK PAIN: ICD-10-CM

## 2018-06-21 PROCEDURE — 97110 THERAPEUTIC EXERCISES: CPT

## 2018-06-21 PROCEDURE — 97161 PT EVAL LOW COMPLEX 20 MIN: CPT

## 2018-06-21 PROCEDURE — G8978 MOBILITY CURRENT STATUS: HCPCS | Mod: CK

## 2018-06-21 PROCEDURE — G8979 MOBILITY GOAL STATUS: HCPCS | Mod: CJ

## 2018-06-21 SDOH — ECONOMIC STABILITY: GENERAL: QUALITY OF LIFE: FAIR

## 2018-06-21 ASSESSMENT — ENCOUNTER SYMPTOMS
PAIN SCALE AT HIGHEST: 9
PAIN SCALE: 5
PAIN SCALE AT LOWEST: 5

## 2018-06-21 NOTE — OP THERAPY EVALUATION
Outpatient Physical Therapy  INITIAL EVALUATION    [facility] [place]  [address], [address]  [place] [address]  Phone:  [phone]  Fax:  [phone]    Date of Evaluation: 2018    Patient: Joycelyn Byers  YOB: 1975  MRN: [id number]     Referring Provider: [address]  [place] [address]   Referring Diagnosis Lupus arthritis ;Lumbar strain;Lumbar radiculopathy;Mixed connective tissue disease;Myofascial pain syndrome;Chronic pain;Chronic low back pain;Sacroilitis;Sciatica of left side     Time Calculation  Start time: 0900  Stop time: 1000 Time Calculation (min): 60 minutes     Physical Therapy Occurrence Codes    Date of onset of impairment:  17   Date physical therapy care plan established or reviewed:  18   Date physical therapy treatment started:  18          Chief Complaint: Back Problem and Neck Problem    Visit Diagnoses     ICD-10-CM   1. Neck pain M54.2   2. Chronic midline low back pain, with sciatica presence unspecified M54.5    G89.29   3. Lupus erythematosus, unspecified form L93.0         Subjective:   History of Present Illness:     Date of onset:  2017  Quality of life:  Fair  Prior level of function:  Chronic bouts of increasing pain  Pain:     Current pain ratin    At best pain ratin    At worst pain ratin  Activities of Daily Living:     Patient reported ADL status: Limited with flexion  Limited sitting /standing; 30min  Limited with donning footwear    Patient Goals:     Patient goals for therapy:  Increased strength, decreased pain and increased motion    Patient is a 43 y.o. female that presents to therapy with neck and back pain. States that symptoms were  insidious in onset, autoimmune disorder. Reports the pain quality to be dull, constant and are primarily Low back: from midline down to L leg below knee lateral;  Neck: left sided pain from shoulder to base of  skull with numbness and tingling in the last two digits of L hand. Reports that symptoms now not changing. States that aggravating factors are Lower back: bending, sitting/standing; Neck: flexion, position.  States that easng factors are Back: supine, med, heat; Neck: meds, trigger points. Notes saddle numbness and tingling during romantic activities with her partner.    Past Medical History:   Diagnosis Date   • Allergy, unspecified not elsewhere classified    • Ankylosing spondylitis of lumbosacral region (HCC) 7/6/2015   • Anxiety 11/24/2015   • GERD (gastroesophageal reflux disease)    • Headache, classical migraine    • Hiatal hernia    • Intractable migraine without aura and without status migrainosus 11/24/2015   • Lupus    • Oropharyngeal dysphagia    • Other forms of systemic lupus erythematosus (HCC) 10/20/2017   • Overactive bladder    • Peptic ulcer    • Prediabetes 12/8/2016   • Vocal cord dysfunction      Past Surgical History:   Procedure Laterality Date   • HYSTERECTOMY LAPAROSCOPY  2013    utrine and right ovary removed   • HERNIA REPAIR  2007    umblical hernia   • CHOLECYSTECTOMY  2007   • SALPINGO-OOPHORECTOMY, UNILATERAL Right      Social History   Substance Use Topics   • Smoking status: Never Smoker   • Smokeless tobacco: Never Used   • Alcohol use No     Family and Occupational History     Social History   • Marital status:      Spouse name: N/A   • Number of children: N/A   • Years of education: N/A       Objective     Postural Observations  Seated posture: poor  Standing posture: poor        Neurological Testing     Reflexes   Left   Biceps (C5/C6): normal (2+)  Triceps (C7): normal (2+)  Patellar (L4): brisk (3+)  Achilles (S1): normal (2+)  Ankle clonus reflex: negative  Babinski sign: negative  Salguero's reflex: negative    Right   Biceps (C5/C6): normal (2+)  Triceps (C7): normal (2+)  Patellar (L4): brisk (3+)  Achilles (S1): normal (2+)  Ankle clonus reflex: negative  Babinski  sign: negative  Salguero's reflex: negative    Myotome testing   Cervical (left)   C4 (shoulder shrug): 5  C5 (deltoid): 5  C6 (biceps): 5  C7 (triceps): 5  C8 (thumb extension): 5  T1 (intrinsics): 5    Cervical (right)   C4 (shoulder shrug): 5  C5 (deltoid): 5  C6 (biceps): 5  C7 (triceps): 5  C8 (thumb extension): 5  T1 (intrinsics): 5  Lumbar (left)   L1 (hip flexors): 4  L2 (hip flexors): 4  L3 (knee extensors): 5  L4 (ankle dorsiflexors): 5  L5 (great toe extension): 5  S1 (ankle plantar flexors): 5    Lumbar (right)   L1 (hip flexors): 4  L2 (hip flexors): 4  L3 (knee extensors): 5  L4 (ankle dorsiflexors): 5  L5 (great toe extension): 5  S1 (ankle plantar flexors): 5    Dermatome testing   Cervical (left)   All left cervical dermatomes intact    Cervical (right)   All right cervical dermatomes intact  Lumbar (right)   All right lumbar dermatomes intact    Additional Neurological Details  L glove like decrease in sensation     Palpation   Left   Hypertonic in the cervical paraspinals, levator scapulae, lumbar paraspinals, quadratus lumborum and upper trapezius.   Hypotonic in the thoracic paraspinals.   Tenderness of the cervical paraspinals, levator scapulae, thoracic paraspinals and upper trapezius.     Right   Hypertonic in the cervical paraspinals, levator scapulae, lumbar paraspinals, quadratus lumborum, thoracic paraspinals and upper trapezius.   Tenderness of the cervical paraspinals, levator scapulae, thoracic paraspinals and upper trapezius.     Active Range of Motion     Cervical Spine   Flexion: Active cervical flexion: 40deg.  Extension: Active cervical extension: 35deg.  Left lateral flexion: Active left cervical lateral flexion: 32deg.  Right lateral flexion: Active right cervical lateral flexion: 32deg.  Left rotation: Active left cervical rotation: 56deg.  Right rotation: Active right cervical rotation: 59deg.    Lumbar   Flexion: Lumbar active flexion: 85deg.  Extension: Lumbar active  extension: 12deg.  Left lateral flexion: Left lateral lumbar spine flexion: 30deg.  Right lateral flexion: Right lateral lumbar spine flexion: 30deg.    Joint Play   Spine     Central PA [place]        C2: hypomobile       C3: hypomobile       C4: hypomobile       C5: hypomobile       C6: hypomobile       C7: hypomobile       T11: hypomobile       T12: hypomobile       L1: hypomobile       L2: hypomobile       L3: hypomobile       L4: hypomobile       L5: hypomobile        Strength:      Left Hip   Planes of Motion   Abduction: 4-  Adduction: 3+    Right Hip   Planes of Motion   Abduction: 4-  Adduction: 3+    Tests   Cervical spine   Negative cervical spine compression and cervical spine distraction.     Left Spine   Negative alar ligament integrity, Sharp-Antoine test and vertebral artery test.     Right spine   Negative alar ligament integrity, Sharp-Antoine test and vertebral artery test.   Additional testing details: Numbness  Not reproduced with testing    Left Shoulder   Negative ULTT2, ULTT3 and ULTT4.     Right Shoulder   Negative ULTT2, ULTT3 and ULTT4.     Left Hip   SLR: Negative.     Right Hip   SLR: Negative.     Additional Tests Details  Numbness was not recreated with testing    Shabana LumbarTest   Static tests   Lying prone: NC:NE    General Comments     Spine Comments   Noted anterior L pelvic rotation        Therapeutic Exercises (CPT 02811):     1. Basic TrA handout, x3min    2. Postural edu, x10min      Time-based treatments/modalities:  Therapeutic exercise minutes (CPT 03593): 10 minutes       Assessment, Response and Plan:   Assessment details:  Patient presents with signs and symptoms consistent with with lumbar spondylosis / functional instability. Patient's cervical spine presents with signs and symptoms consistent with cervical spondylosis vs derangement syndrome. Patient limitations include weakness, decreased ROM, and pain. Patient will benefit from therapy to improve the aforementioned  deficits and decrease further functional decline. Patient did note saddle paresthesias but only related them to romantic activity with her partner. Patient was educated to follow up with MD re: this and if she notes any advancing of symptoms to follow up with ER.     Prognosis: fair    Goals:   Short Term Goals:   1) Patient's hip abd strength will improve by edwardo muscle grade to facilitate improved mobility.  2) Patient's lumbar flexion will improve by 10deg to facilitate donning footwear.  Short term goal time span:  2-4 weeks      Long Term Goals:    1) Patient's symptoms will improve by to allow for standing >30min for ADL.  2) Patient's LBDI will improve by 10 to demonstrate functional improvement  Long term goal time span:  6-8 weeks    Plan:   Therapy options:  Physical therapy treatment to continue  Planned therapy interventions:  E Stim Unattended (CPT 83946), Hot or Cold Pack Therapy (CPT 41406), Manual Therapy (CPT 76580), Neuromuscular Re-education (CPT 36170), Therapeutic Exercise (CPT 82898) and Therapeutic Activities (CPT 04841)  Frequency:  2x week  Duration in weeks:  8  Discussed with:  Patient  Plan details:  Patient will progress with a spinal stability program.       Functional Limitation G-Codes and Severity Modifiers  PT Functional Assessment Tool Used: LBDI  PT Functional Assessment Score: 42   Functional Limitations: Current  Mobility     Mobility: Current ()CK At Least 40 Percent but Less Than 60 Percent Impaired, Limited or Restricted     Functional Limitations: Goal  Mobility     Mobility: Goal () CJ At Least 20 Percent but Less Than 40 Percent Impaired, Limited or Restricted       Referring provider co-signature:  I have reviewed this plan of care and my co-signature certifies the need for services.  Certification Dates:   From 6/21/18     To 8/16/18    Physician Signature: ________________________________ Date: ______________

## 2018-06-26 ENCOUNTER — PHYSICAL THERAPY (OUTPATIENT)
Dept: PHYSICAL THERAPY | Facility: REHABILITATION | Age: 43
End: 2018-06-26
Payer: COMMERCIAL

## 2018-06-26 DIAGNOSIS — G89.29 CHRONIC MIDLINE LOW BACK PAIN, WITH SCIATICA PRESENCE UNSPECIFIED: ICD-10-CM

## 2018-06-26 DIAGNOSIS — L93.0 LUPUS ERYTHEMATOSUS, UNSPECIFIED FORM: ICD-10-CM

## 2018-06-26 DIAGNOSIS — M54.2 NECK PAIN: ICD-10-CM

## 2018-06-26 DIAGNOSIS — M54.5 CHRONIC MIDLINE LOW BACK PAIN, WITH SCIATICA PRESENCE UNSPECIFIED: ICD-10-CM

## 2018-06-26 PROCEDURE — 97110 THERAPEUTIC EXERCISES: CPT

## 2018-06-26 NOTE — OP THERAPY DAILY TREATMENT
Outpatient Physical Therapy  DAILY TREATMENT     [facility] [place]  [address] [place] [address], [address]  [facility] [address]  Phone:  [phone]  Fax:  [phone]    Date: 06/26/2018    Patient: Joycelyn Byers  YOB: 1975  MRN: [id number]     Time Calculation  Start time: 0900  Stop time: 0938 Time Calculation (min): 38 minutes     Chief Complaint: Back Problem    Visit #: 2    SUBJECTIVE:  Notes that her back is hurting and states that symptoms have been worse since yoga this a.m.     OBJECTIVE:  Current objective measures: Lumbar flexion 40deg          Therapeutic Exercises (CPT 13137):     1. Basic TrA, x10min    2. Basic TrA with LE lift, x30    3. Basic TrA with BKFO, x20    4. Basic TrA with ball roll, x20    5. Seated on DD with LE lifits, x20    6. Contralateral ext on ball with edu re: progression, x20    7. Sit to stand training, x5min    8. Bike, x5min lvl 0      Time-based treatments/modalities:  Therapeutic exercise minutes (CPT 38665): 38 minutes       Pain rating before treatment: 6  Pain rating after treatment: 3    ASSESSMENT:   Response to treatment: Patient demonstrated a fair ability to control her spine with basic mobility. Patient did require visual feedback and manual/verbal cues.     PLAN/RECOMMENDATIONS:   Plan for treatment: therapy treatment to continue next visit.  Planned interventions for next visit: E-stim unattended (CPT 11197), manual therapy (CPT 88677), neuromuscular re-education (CPT 83190) and therapeutic exercise (CPT 46351).

## 2018-06-28 ENCOUNTER — APPOINTMENT (OUTPATIENT)
Dept: PHYSICAL THERAPY | Facility: REHABILITATION | Age: 43
End: 2018-06-28
Payer: COMMERCIAL

## 2018-07-03 ENCOUNTER — APPOINTMENT (OUTPATIENT)
Dept: PHYSICAL THERAPY | Facility: REHABILITATION | Age: 43
End: 2018-07-03
Payer: COMMERCIAL

## 2018-07-05 ENCOUNTER — PHYSICAL THERAPY (OUTPATIENT)
Dept: PHYSICAL THERAPY | Facility: REHABILITATION | Age: 43
End: 2018-07-05
Payer: COMMERCIAL

## 2018-07-05 ENCOUNTER — PATIENT MESSAGE (OUTPATIENT)
Dept: MEDICAL GROUP | Facility: MEDICAL CENTER | Age: 43
End: 2018-07-05

## 2018-07-05 DIAGNOSIS — G89.29 CHRONIC MIDLINE LOW BACK PAIN, WITH SCIATICA PRESENCE UNSPECIFIED: ICD-10-CM

## 2018-07-05 DIAGNOSIS — L93.0 LUPUS ERYTHEMATOSUS, UNSPECIFIED FORM: ICD-10-CM

## 2018-07-05 DIAGNOSIS — M54.5 CHRONIC MIDLINE LOW BACK PAIN, WITH SCIATICA PRESENCE UNSPECIFIED: ICD-10-CM

## 2018-07-05 DIAGNOSIS — M54.2 NECK PAIN: ICD-10-CM

## 2018-07-05 PROCEDURE — 97112 NEUROMUSCULAR REEDUCATION: CPT

## 2018-07-05 PROCEDURE — 97110 THERAPEUTIC EXERCISES: CPT

## 2018-07-05 NOTE — TELEPHONE ENCOUNTER
From: Joycelyn Byers  To: Monique Bower M.D.  Sent: 7/5/2018 9:59 AM PDT  Subject: Non-Urgent Medical Question    I just scheduled an appointment with the  on September 24th at [facility].     Thank you so much again,  Joycelyn     ----- Message -----  From: Monique Bower M.D.  Sent: 6/28/18, 4:48 PM  To: Joycelyn Byers  Subject: RE: Non-Urgent Medical Question    Let me know if you get an appointment through the referral process please.  Thank you,  Dr. FINLEY      ----- Message -----   From: Joycelyn Byers   Sent: 6/18/2018 8:16 AM PDT   To: Monique Bower M.D.  Subject: Non-Urgent Medical Question    My daughter was recently seen by a ped neurologist at [facility] for similar S/S that mimic my own and was referred for an electromyogram and genetic testing to rule out Sandra-Danlos vs. other connective tissue diseases. She also has hyperreflexia, hypermobility, fatigue, rashes, calcium deposits on elbows, decreased sensation, weakness, etc. We were told by that doctor and my rheumatologist that I should ask you for a referral to a  ([facility] if none here). I had an electromyogram in 2012 and the doctor felt I should be r/o for Sandra and scleroderma according to S/S noted then. 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 My dad was exposed to toxins at [employer]=scleroderma in offspring.

## 2018-07-05 NOTE — OP THERAPY DAILY TREATMENT
"  Outpatient Physical Therapy  DAILY TREATMENT     [facility] [place]  [address], [address]  [facility] [address]  Phone:  [phone]  Fax:  [phone]    Date: 07/05/2018    Patient: Joycelyn Byers  YOB: 1975  MRN: [id number]     Time Calculation  Start time: 1200  Stop time: 1238 Time Calculation (min): 38 minutes     Chief Complaint: Back Problem and Neck Problem    Visit #: 3    SUBJECTIVE:  Patient reports that she is doing \"ok\". She states that with exercise and yoga her lumbar motion is getting better.     OBJECTIVE:  Current objective measures:   Lumbar   Flexion: Lumbar active flexion: 110deg.  Extension: Lumbar active extension: 28deg.            Therapeutic Exercises (CPT 72594):     1. Basic TrA, x5min    2. Basic TrA with LE lift, x30    3. Supine bridge, x15, unable to fully stabilize with ball exercise decreased to no ball    4. Wall posture check, x3min    5. Seated on DD with LE lifits, x20    6. Contralateral ext on ball with edu re: progression, x20    7. Sit to stand, x10    8. Bike, x5min level 6 hill profile    9. Prone elbows tucked, x5 reps    Therapeutic Treatments and Modalities:     1. Neuromuscular Re-education (CPT 88164), balance, foam pad standing; corner balance tandem; 1/2 foam roll balance;     Time-based treatments/modalities:  Therapeutic exercise minutes (CPT 64953): 30 minutes  Neuromusc re-ed, balance, coor, post minutes (CPT 97533): 8 minutes       Pain rating before treatment: 3  Pain rating after treatment: 3    ASSESSMENT:   Response to treatment: Patient responded well with an overall increase in lumbar ROM and increase in fatigue without an increase in pain. Patient continues to note pain at end range of extension.     PLAN/RECOMMENDATIONS:   Plan for treatment: therapy treatment to continue next visit.  Planned interventions for next visit: E-stim unattended (CPT 88235), neuromuscular re-education (CPT 56649) and " therapeutic exercise (CPT 70876).

## 2018-07-09 ENCOUNTER — HOSPITAL ENCOUNTER (OUTPATIENT)
Facility: MEDICAL CENTER | Age: 43
End: 2018-07-09
Attending: PHYSICIAN ASSISTANT
Payer: COMMERCIAL

## 2018-07-09 ENCOUNTER — OFFICE VISIT (OUTPATIENT)
Dept: URGENT CARE | Facility: PHYSICIAN GROUP | Age: 43
End: 2018-07-09
Payer: COMMERCIAL

## 2018-07-09 VITALS
SYSTOLIC BLOOD PRESSURE: 110 MMHG | HEART RATE: 71 BPM | DIASTOLIC BLOOD PRESSURE: 64 MMHG | TEMPERATURE: 98.8 F | WEIGHT: 155 LBS | HEIGHT: 63 IN | RESPIRATION RATE: 14 BRPM | OXYGEN SATURATION: 100 % | BODY MASS INDEX: 27.46 KG/M2

## 2018-07-09 DIAGNOSIS — N89.8 VAGINAL IRRITATION: ICD-10-CM

## 2018-07-09 LAB
APPEARANCE UR: NORMAL
BILIRUB UR STRIP-MCNC: NORMAL MG/DL
COLOR UR AUTO: YELLOW
GLUCOSE UR STRIP.AUTO-MCNC: NORMAL MG/DL
KETONES UR STRIP.AUTO-MCNC: NORMAL MG/DL
LEUKOCYTE ESTERASE UR QL STRIP.AUTO: NORMAL
NITRITE UR QL STRIP.AUTO: NORMAL
PH UR STRIP.AUTO: 5 [PH] (ref 5–8)
PROT UR QL STRIP: NORMAL MG/DL
RBC UR QL AUTO: NORMAL
SP GR UR STRIP.AUTO: 1.02
UROBILINOGEN UR STRIP-MCNC: NORMAL MG/DL

## 2018-07-09 PROCEDURE — 87660 TRICHOMONAS VAGIN DIR PROBE: CPT

## 2018-07-09 PROCEDURE — 87480 CANDIDA DNA DIR PROBE: CPT

## 2018-07-09 PROCEDURE — 99213 OFFICE O/P EST LOW 20 MIN: CPT | Performed by: PHYSICIAN ASSISTANT

## 2018-07-09 PROCEDURE — 87510 GARDNER VAG DNA DIR PROBE: CPT

## 2018-07-09 PROCEDURE — 81002 URINALYSIS NONAUTO W/O SCOPE: CPT | Performed by: PHYSICIAN ASSISTANT

## 2018-07-09 NOTE — PROGRESS NOTES
"Chief Complaint   Patient presents with   • Dysuria     lump in genital area       HISTORY OF PRESENT ILLNESS: Patient is a 43 y.o. female who presents today for 1 week of waxing and waning urethral, possible vulvar irritation.  She felt there was some blood tinged discharge the last few days.   She states she has about a pea sized lump in her labia minora as well she noticed yesterday.   No fevers.   Some nausea.  No flank pain.   She has been doing baths with apple cider vinegar without help.     She does not have any concerns about STD at this time.  She states that she has been using new panty liners but they are \"free and clear\" and feels that if anything should not be causing irritation.     Patient Active Problem List    Diagnosis Date Noted   • Vitamin D deficiency 05/22/2018   • SVT (supraventricular tachycardia) ([facility]) 03/23/2018   • Other forms of systemic lupus erythematosus ([facility]) 10/20/2017   • Chronic constipation 12/15/2016   • Reactive hypoglycemia 09/28/2016   • CNS demyelinating disease ([facility]) 04/19/2016   • Vocal cord dysfunction 04/19/2016   • Multiple allergies 12/21/2015   • Anxiety 11/24/2015   • Intractable migraine without aura and without status migrainosus 11/24/2015   • Ankylosing spondylitis of lumbosacral region ([facility]) 07/06/2015   • Overactive bladder 04/17/2015   • Asthma in adult 01/14/2015       Allergies:Savella [kdc:milnacipran+ci pigment blue 63]; Ciprofloxacin; Reglan [kdc:yellow dye+ci pigment blue 63+metoclopramide]; Sulfa drugs; Tetanus antitoxin; and Tramadol    Current Outpatient Prescriptions Ordered in [facility]   Medication Sig Dispense Refill   • Secukinumab (COSENTYX) 150 MG/ML Solution Prefilled Syringe Inject  as instructed.     • lubiprostone (AMITIZA) 24 MCG capsule Take 24 mcg by mouth 2 times a day, with meals.     • hydroxychloroquine (PLAQUENIL) 200 MG Tab      • DILTIAZem (CARDIZEM) 30 MG Tab      • COSENTYX SENSOREADY  MG/ML Solution Auto-injector      • B-D " "TB SYRINGE 1CC/25GX5/8\" 25G X 5/8\" 1 ML Misc USE TO INJECT B 12 SHOTS  2   • HYDROcodone-acetaminophen 2.5-108 mg/5mL (HYCET) 7.5-325 MG/15ML solution      • cyanocobalamin (VITAMIN B-12) 1000 MCG/ML Solution ADM 1 ML SC WEEKLY  2   • rizatriptan (MAXALT) 10 MG tablet Take 1 Tab by mouth Once PRN. 10 Tab 3   • SYMBICORT 160-4.5 MCG/ACT Aerosol      • fluticasone (FLONASE) 50 MCG/ACT nasal spray Spray 1 Spray in nose every day.     • EPIPEN 2-NOLAN 0.3 MG/0.3ML Solution Auto-injector solution for injection      • albuterol (VENTOLIN HFA) 108 (90 BASE) MCG/ACT AERS inhalation aerosol Inhale 2 Puffs by mouth every 6 hours as needed for Shortness of Breath. 8.5 g 3     No current Epic-ordered facility-administered medications on file.        Past Medical History:   Diagnosis Date   • Allergy, unspecified not elsewhere classified    • Ankylosing spondylitis of lumbosacral region (HCC) 2015   • Anxiety 2015   • GERD (gastroesophageal reflux disease)    • Headache, classical migraine    • Hiatal hernia    • Intractable migraine without aura and without status migrainosus 2015   • Lupus    • Oropharyngeal dysphagia    • Other forms of systemic lupus erythematosus (HCC) 10/20/2017   • Overactive bladder    • Peptic ulcer    • Prediabetes 2016   • Vocal cord dysfunction        Social History   Substance Use Topics   • Smoking status: Never Smoker   • Smokeless tobacco: Never Used   • Alcohol use No       Family Status   Relation Status   • Brother Alive, age 31y   • Maternal Grandmother    • Mother Alive, age 66y    brain anurysym    • Father Alive, age 67y   • Paternal Uncle Alive   • Maternal Grandfather    • Paternal Grandmother    • Paternal Grandfather    • Paternal Aunt Alive   • Son    • Daughter    • Daughter    • Daughter      Family History   Problem Relation Age of Onset   • Arthritis Brother      psoriatic arthritis   • Arthritis Maternal Grandmother    • " "Psychiatry Mother      Major depression   • Other Mother      discoid lupus   • Psychiatry Father      Bipolar depression   • Heart Disease Father      CHF   • Hypertension Father    • Diabetes Father    • Other Father      Agent orange exposure 125% disabled   • Diabetes Paternal Uncle    • Stroke Maternal Grandfather    • Diabetes Paternal Grandmother    • Alcohol/Drug Paternal Grandfather    • Cancer Paternal Aunt      gyn cancer   • GI Son    • GI Daughter    • GI Daughter    • GI Daughter        ROS:  Review of Systems   Constitutional: Negative for fever, chills, weight loss and malaise/fatigue.   HENT: Negative for ear pain, nosebleeds, congestion, sore throat and neck pain.    Eyes: Negative for blurred vision.   Respiratory: Negative for cough, sputum production, shortness of breath and wheezing.    Cardiovascular: Negative for chest pain, palpitations, orthopnea and leg swelling.   Gastrointestinal: Negative for heartburn, nausea, vomiting and abdominal pain.   Genitourinary: SEE HPI        Exam:  Blood pressure 110/64, pulse 71, temperature 37.1 °C (98.8 °F), resp. rate 14, height 1.6 m (5' 3\"), weight 70.3 kg (155 lb), SpO2 100 %.  General:  Well nourished, well developed female in NAD  Eyes: PERRLA, EOM within normal limits, no conjunctival injection, no scleral icterus, visual fields and acuity grossly intact.  Neck: no masses, range of motion within normal limits, no tracheal deviation. No lymphadenopathy  Pulmonary: Normal respiratory effort, no wheezes, crackles, or rhonchi.  Cardiovascular: regular rate and rhythm without murmurs, rubs, or gallops.  Abdomen: Soft, nontender, nondistended. Normal bowel sounds. No hepatosplenomegaly or masses, or hernias. No rebound or guarding.  : Vulva on inspection: No swelling, erythema or lesions.  Speculum exam:  There is moderate amounts of thin white discharge.  .  No vaginal lesions. Cervix/uterus surgically absent.   Skin: No visible rashes or lesion. " Warm, pink, dry.   Extremities: no clubbing, cyanosis, or edema.  Neuro: A&O x 3. Speech normal/clear.  Normal gait.       Component Results     Component Value Ref Range & Units Status   POC Color yellow  Negative Final   POC Appearance hazy  Negative Final   POC Leukocyte Esterase trace  Negative Final   POC Nitrites neg  Negative Final   POC Urobiligen neg  Negative (0.2) mg/dL Final   POC Protein neg  Negative mg/dL Final   POC Urine PH 5.0  5.0 - 8.0 Final   POC Blood 2+  Negative Final   POC Specific Gravity 1.020  <1.005 - >1.030 Final   POC Ketones neg  Negative mg/dL Final   POC Bilirubin neg  Negative mg/dL Final   POC Glucose neg  Negative mg/dL Final     Component Results     Component Value Ref Range & Units Status   Candida species DNA Probe Negative  Negative Final   Trichamonas vaginalis DNA Probe Negative  Negative Final   Gardnerella vaginalis DNA Probe Negative  Negative Final       Assessment/Plan:  1. Vaginal irritation  POCT Urinalysis    VAGINAL PATHOGENS DNA PANEL       -benign pelvic exam.   -U/A as above, negative vaginal pathogens.   -suspicious of possible irritation from new panty liners and do recommend discontinue them      07/11/18- discussed normal results with patient who states she has d/c the panty liners and is doing better.  She will continue to monitor and follow up with GYN prn         Supportive care, differential diagnoses, and indications for immediate follow-up discussed with patient.   Pathogenesis of diagnosis discussed including typical length and natural progression.   Instructed to return to clinic or nearest emergency department for any change in condition, further concerns, or worsening of symptoms.  Patient states understanding of the plan of care and discharge instructions.  Instructed to make an appointment, for follow up, with their primary care provider.      Archana Zaragoza P.A.-C.

## 2018-07-10 ENCOUNTER — PHYSICAL THERAPY (OUTPATIENT)
Dept: PHYSICAL THERAPY | Facility: REHABILITATION | Age: 43
End: 2018-07-10
Payer: COMMERCIAL

## 2018-07-10 DIAGNOSIS — L93.0 LUPUS ERYTHEMATOSUS, UNSPECIFIED FORM: ICD-10-CM

## 2018-07-10 DIAGNOSIS — G89.29 CHRONIC MIDLINE LOW BACK PAIN, WITH SCIATICA PRESENCE UNSPECIFIED: ICD-10-CM

## 2018-07-10 DIAGNOSIS — M54.5 CHRONIC MIDLINE LOW BACK PAIN, WITH SCIATICA PRESENCE UNSPECIFIED: ICD-10-CM

## 2018-07-10 DIAGNOSIS — M54.2 NECK PAIN: ICD-10-CM

## 2018-07-10 LAB
AMBIGUOUS DTTM AMBI4: NORMAL
CANDIDA DNA VAG QL PROBE+SIG AMP: NEGATIVE
G VAGINALIS DNA VAG QL PROBE+SIG AMP: NEGATIVE
SIGNIFICANT IND 70042: NORMAL
SITE SITE: NORMAL
SOURCE SOURCE: NORMAL
T VAGINALIS DNA VAG QL PROBE+SIG AMP: NEGATIVE

## 2018-07-10 PROCEDURE — 97110 THERAPEUTIC EXERCISES: CPT

## 2018-07-10 PROCEDURE — 97112 NEUROMUSCULAR REEDUCATION: CPT

## 2018-07-10 NOTE — OP THERAPY DAILY TREATMENT
Outpatient Physical Therapy  DAILY TREATMENT     [facility] Outpatient Physical Therapy [place]  [address] [place] [address], [address]  [facility] [address]  Phone:  [phone]  Fax:  [phone]    Date: 07/10/2018    Patient: Joycelyn Byers  YOB: 1975  MRN: [id number]     Time Calculation  Start time: 0900  Stop time: 0938 Time Calculation (min): 38 minutes     Chief Complaint: Back Problem    Visit #: 4    SUBJECTIVE:  Patient reports no change in her lumbar spine. States that symptoms have not gotten worse.     OBJECTIVE:  Current objective measures: Required visual feedback for correct performance of basic tra exercise          Therapeutic Exercises (CPT 91460):     1. Basic TrA, x15min    2. Basic TrA with LE lift, with exercise above    3. Basic TrA with ball rolls, x20    4. Contralateral ext, x20    5. DD sits with mirror and U LE lift, x5min    6. Postural education with use of lumbar roll, x5min    7. Sit to stand with tra, x20    Therapeutic Treatments and Modalities:     1. Neuromuscular Re-education (CPT 18562), balance, 1/2 foam roll balance with head turns and in corner with head turns    Time-based treatments/modalities:  Therapeutic exercise minutes (CPT 86998): 30 minutes  Neuromusc re-ed, balance, coor, post minutes (CPT 76389): 8 minutes       Pain rating before treatment: 3  Pain rating after treatment: 2    ASSESSMENT:   Response to treatment: Patient is responding fair to exercise with an overall continued reported increase in ROM and no worsening of symptoms.     PLAN/RECOMMENDATIONS:   Plan for treatment: therapy treatment to continue next visit.  Planned interventions for next visit: manual therapy (CPT 34088), neuromuscular re-education (CPT 80338) and therapeutic exercise (CPT 26510).

## 2018-07-12 ENCOUNTER — PHYSICAL THERAPY (OUTPATIENT)
Dept: PHYSICAL THERAPY | Facility: REHABILITATION | Age: 43
End: 2018-07-12
Payer: COMMERCIAL

## 2018-07-12 DIAGNOSIS — M54.5 CHRONIC MIDLINE LOW BACK PAIN, WITH SCIATICA PRESENCE UNSPECIFIED: ICD-10-CM

## 2018-07-12 DIAGNOSIS — G89.29 CHRONIC MIDLINE LOW BACK PAIN, WITH SCIATICA PRESENCE UNSPECIFIED: ICD-10-CM

## 2018-07-12 DIAGNOSIS — M54.2 NECK PAIN: ICD-10-CM

## 2018-07-12 DIAGNOSIS — L93.0 LUPUS ERYTHEMATOSUS, UNSPECIFIED FORM: ICD-10-CM

## 2018-07-12 PROCEDURE — 97110 THERAPEUTIC EXERCISES: CPT

## 2018-07-12 NOTE — OP THERAPY DAILY TREATMENT
Outpatient Physical Therapy  DAILY TREATMENT     [facility] Outpatient Physical Therapy [place]  [address] [place] [address], [address]  [facility] [address]  Phone:  [phone]  Fax:  [phone]    Date: 07/12/2018    Patient: Joycelyn Byers  YOB: 1975  MRN: [id number]     Time Calculation  Start time: 0858  Stop time: 0936 Time Calculation (min): 38 minutes     Chief Complaint: Back Problem    Visit #: 5    SUBJECTIVE:  Patient reports that her back is doing a little better. Notes that she feels ok today.     OBJECTIVE:  Current objective measures: Patient able to demonstrate a proper stand pivot transfer          Therapeutic Exercises (CPT 22091):     1. Basic TrA, x8min    2. Basic TrA with LE lift, x20    3. Basic TrA with ball rolls, x20    4. Contralateral ext, x20 quad on table    5. Supine bridge, x20    6. Movement training :  transfer training, x15min    7. Sit to stand with tra, x20    8. Shuttle, x20, 5c    Therapeutic Treatments and Modalities:     1. Neuromuscular Re-education (CPT 06343), balance, 1/2 foam roll balance with head turns     Time-based treatments/modalities:  Therapeutic exercise minutes (CPT 78332): 33 minutes  Neuromusc re-ed, balance, coor, post minutes (CPT 80732): 5 minutes       Pain rating before treatment: 2  Pain rating after treatment: 2    ASSESSMENT:   Response to treatment: Patient responded well to therapy with an overall improvement in exercise tolerance. Patient demonstrated a better ability to perform a stand pivot transfer.     PLAN/RECOMMENDATIONS:   Plan for treatment: therapy treatment to continue next visit.  Planned interventions for next visit: neuromuscular re-education (CPT 96703) and therapeutic exercise (CPT 79203).

## 2018-07-17 ENCOUNTER — PHYSICAL THERAPY (OUTPATIENT)
Dept: PHYSICAL THERAPY | Facility: REHABILITATION | Age: 43
End: 2018-07-17
Payer: COMMERCIAL

## 2018-07-17 DIAGNOSIS — G89.29 CHRONIC MIDLINE LOW BACK PAIN, WITH SCIATICA PRESENCE UNSPECIFIED: ICD-10-CM

## 2018-07-17 DIAGNOSIS — M54.2 NECK PAIN: ICD-10-CM

## 2018-07-17 DIAGNOSIS — L93.0 LUPUS ERYTHEMATOSUS, UNSPECIFIED FORM: ICD-10-CM

## 2018-07-17 DIAGNOSIS — M54.5 CHRONIC MIDLINE LOW BACK PAIN, WITH SCIATICA PRESENCE UNSPECIFIED: ICD-10-CM

## 2018-07-17 PROCEDURE — 97110 THERAPEUTIC EXERCISES: CPT

## 2018-07-17 PROCEDURE — 97112 NEUROMUSCULAR REEDUCATION: CPT

## 2018-07-17 NOTE — OP THERAPY DAILY TREATMENT
Outpatient Physical Therapy  DAILY TREATMENT     [facility] Outpatient Physical Therapy [place]  [address], [address]  [facility] [address]  Phone:  [phone]  Fax:  [phone]    Date: 07/17/2018    Patient: Joycelyn Byers  YOB: 1975  MRN: [id number]     Time Calculation  Start time: 1402  Stop time: 1440 Time Calculation (min): 38 minutes     Chief Complaint: Back Problem and Neck Pain    Visit #: 6    SUBJECTIVE:  Patient reports that she has not gotten any worse with exercise. She continues to note normal LBP and improved ROM.    OBJECTIVE:  Current objective measures:   Lumbar   Flexion: Lumbar active flexion: 110deg.          Therapeutic Exercises (CPT 62411):     1. Basic TrA, x5min    2. Basic TrA with LE lift, x20    3. Basic TrA with ball rolls, x20    4. Contralateral ext, x20 quad on table    5. Supine bridge, x20    7. Sit to stand with tra, x20    8. TRX training and demonstration, x10min    9. TRX rows, xfatigue    Therapeutic Treatments and Modalities:     1. Neuromuscular Re-education (CPT 75518), balance, 1/2 foam roll balance with head turns     Time-based treatments/modalities:  Therapeutic exercise minutes (CPT 40302): 30 minutes  Neuromusc re-ed, balance, coor, post minutes (CPT 75664): 8 minutes       Pain rating before treatment: 2  Pain rating after treatment: 2    ASSESSMENT:   Response to treatment: Patient is demonstrating increased ROM and decreased complaints of pain limiting function. Patient is now actively participating in yoga.     PLAN/RECOMMENDATIONS:   Plan for treatment: therapy treatment to continue next visit.  Planned interventions for next visit: progress TRX exercise, E-stim unattended (CPT 32642), neuromuscular re-education (CPT 97453) and therapeutic exercise (CPT 83145).

## 2018-07-20 ENCOUNTER — PHYSICAL THERAPY (OUTPATIENT)
Dept: PHYSICAL THERAPY | Facility: REHABILITATION | Age: 43
End: 2018-07-20
Payer: COMMERCIAL

## 2018-07-20 DIAGNOSIS — M54.2 NECK PAIN: ICD-10-CM

## 2018-07-20 DIAGNOSIS — L93.0 LUPUS ERYTHEMATOSUS, UNSPECIFIED FORM: ICD-10-CM

## 2018-07-20 DIAGNOSIS — M54.5 CHRONIC MIDLINE LOW BACK PAIN, WITH SCIATICA PRESENCE UNSPECIFIED: ICD-10-CM

## 2018-07-20 DIAGNOSIS — G89.29 CHRONIC MIDLINE LOW BACK PAIN, WITH SCIATICA PRESENCE UNSPECIFIED: ICD-10-CM

## 2018-07-20 PROCEDURE — 97110 THERAPEUTIC EXERCISES: CPT

## 2018-07-20 NOTE — OP THERAPY DAILY TREATMENT
Outpatient Physical Therapy  DAILY TREATMENT     [facility] Outpatient Physical Therapy [place]  [address], [address]  [facility] [address]  Phone:  [phone]  Fax:  [phone]    Date: 07/20/2018    Patient: Joycelyn Byers  YOB: 1975  MRN: [id number]     Time Calculation  Start time: 1108  Stop time: 1148 Time Calculation (min): 40 minutes     Chief Complaint: Back Problem and Neck Problem    Visit #: 7    SUBJECTIVE:  Patient reports that she had a very hard day at work but the pain she would normally have was not as bad. States that she feels that she is improving. 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     OBJECTIVE:  Current objective measures: Unable to control pelvic motion with bridge; continues to demonstrate post pelvic tilt on decent.           Therapeutic Exercises (CPT 08195):     1. Basic TrA, x5min    3. Bridge with manual feedback, x20    4. Contralateral ext on ball, x20     5. Supine bridge, x20    7. Sit to stand with tra, x20    8. TRX training and demonstration, x5min    9. TRX rows, xfatigue    10. TRX wide rows, xfatigue    11. TRX curls, xfatigue    12. TRX tricep press two hands, xfatigue      Time-based treatments/modalities:  Therapeutic exercise minutes (CPT 61435): 40 minutes        Pain rating before treatment: 3  Pain rating after treatment: 3    ASSESSMENT:   Response to treatment: Patient is responding well to exercise and education with decreased pain at work and improved transfer techniques. Patient to trial 1.5 weeks of HEP.     PLAN/RECOMMENDATIONS:   Plan for treatment: therapy treatment to continue next visit.  Planned interventions for next visit: neuromuscular re-education (CPT 86096) and therapeutic exercise (CPT 13446).

## 2018-08-06 ENCOUNTER — PHYSICAL THERAPY (OUTPATIENT)
Dept: PHYSICAL THERAPY | Facility: REHABILITATION | Age: 43
End: 2018-08-06
Payer: COMMERCIAL

## 2018-08-06 DIAGNOSIS — M54.5 CHRONIC MIDLINE LOW BACK PAIN, WITH SCIATICA PRESENCE UNSPECIFIED: ICD-10-CM

## 2018-08-06 DIAGNOSIS — G89.29 CHRONIC MIDLINE LOW BACK PAIN, WITH SCIATICA PRESENCE UNSPECIFIED: ICD-10-CM

## 2018-08-06 DIAGNOSIS — M54.2 NECK PAIN: ICD-10-CM

## 2018-08-06 DIAGNOSIS — L93.0 LUPUS ERYTHEMATOSUS, UNSPECIFIED FORM: ICD-10-CM

## 2018-08-06 PROCEDURE — 97110 THERAPEUTIC EXERCISES: CPT

## 2018-08-06 NOTE — OP THERAPY DAILY TREATMENT
Outpatient Physical Therapy  DAILY TREATMENT     [facility] Out[facility] Physical Therapy [place]  [address], [address]  [facility] [address]  Phone:  [phone]  Fax:  [phone]    Date: 08/06/2018    Patient: Joycelyn Byers  YOB: 1975  MRN: [id number]     Time Calculation  Start time: 1108  Stop time: 1134 Time Calculation (min): 26 minutes     Chief Complaint: Back Problem; Neck Problem; and Head Ache    Visit #: 8    SUBJECTIVE:  Patient reports overall decreased mobility notes that she has had a migraine that has limited her participation with all activities.     OBJECTIVE:  Current objective measures: Patient unable to tolerate full program.           Therapeutic Exercises (CPT 96967):     1. Supine with neck roll, x3min, decreased pain    2. Basic TrA , x5min    3. Basic TrA with LE lift, x20    4. Basic Bridge, x20, need for cues for controling pelvic rotation    5. Bike trial, x5min      Time-based treatments/modalities:  Therapeutic exercise minutes (CPT 28345): 26 minutes       Pain rating before treatment: 4  Pain rating after treatment: 4    ASSESSMENT:   Response to treatment: Patient's activity tolerance today was hindered due to HA symptoms and general fatigue. Patient's program will be addressed next visit.     PLAN/RECOMMENDATIONS:   Plan for treatment: therapy treatment to continue next visit.  Planned interventions for next visit: manual therapy (CPT 77931), neuromuscular re-education (CPT 64789) and therapeutic exercise (CPT 30034).

## 2018-08-08 NOTE — PROGRESS NOTES
Pt c/o nausea, order obtained for zofran 4 mg IV,  administered per MAR. Cool wet wash cloth given, pt reclined in recliner chair.   normal...

## 2018-08-13 ENCOUNTER — APPOINTMENT (OUTPATIENT)
Dept: PHYSICAL THERAPY | Facility: REHABILITATION | Age: 43
End: 2018-08-13
Payer: COMMERCIAL

## 2018-08-15 ENCOUNTER — PHYSICAL THERAPY (OUTPATIENT)
Dept: PHYSICAL THERAPY | Facility: REHABILITATION | Age: 43
End: 2018-08-15
Payer: COMMERCIAL

## 2018-08-15 DIAGNOSIS — G89.29 CHRONIC MIDLINE LOW BACK PAIN, WITH SCIATICA PRESENCE UNSPECIFIED: ICD-10-CM

## 2018-08-15 DIAGNOSIS — L93.0 LUPUS ERYTHEMATOSUS, UNSPECIFIED FORM: ICD-10-CM

## 2018-08-15 DIAGNOSIS — M54.5 CHRONIC MIDLINE LOW BACK PAIN, WITH SCIATICA PRESENCE UNSPECIFIED: ICD-10-CM

## 2018-08-15 DIAGNOSIS — M54.2 NECK PAIN: ICD-10-CM

## 2018-08-15 PROCEDURE — 97110 THERAPEUTIC EXERCISES: CPT

## 2018-08-15 PROCEDURE — G8979 MOBILITY GOAL STATUS: HCPCS | Mod: CJ

## 2018-08-15 PROCEDURE — G8978 MOBILITY CURRENT STATUS: HCPCS | Mod: CJ

## 2018-08-15 NOTE — OP THERAPY DAILY TREATMENT
Outpatient Physical Therapy  DAILY TREATMENT     [facility] Outpatient Physical Therapy [place]  [address], [address]  [facility] [address]  Phone:  [phone]  Fax:  [phone]    Date: 08/15/2018    Patient: Joycelyn Byers  YOB: 1975  MRN: [id number]     Time Calculation  Start time: 1109  Stop time: 1149 Time Calculation (min): 40 minutes     Chief Complaint: Back Problem and Neck Problem    Visit #: 9    SUBJECTIVE:  Patient reports that her HAs have gotten better. Notes that she obtained injections. Notes overall she feels as if she is losing spinal ROM.     OBJECTIVE:  Current objective measures: 105 deg flexion 20deg ext  MMT: Hip abd  R: 4+/5  L: 4+/5  PT Functional Assessment Tool Used: LBDI  PT Functional Assessment Score: 36       Therapeutic Exercises (CPT 76033):     1. Basic TrA, x5min    2. Bike, x7min    3. Bridge with manual feedback, x20    4. Contralateral table, x20     5. Supine bridge, x20    6. Supine bridge on ball, x10    7. Sit to stand with tra, x20    8. TRX chest stretch, 1p16ifh    9. TRX rows, xfatigue    10. TRX wide rows, xfatigue    11. TRX curls, xfatigue    12. TRX tricep press two hands, xfatigue      Time-based treatments/modalities:  Therapeutic exercise minutes (CPT 80910): 40 minutes       Pain rating before treatment: 3  Pain rating after treatment: 3    ASSESSMENT:   Response to treatment: Patient was able to tolerate exercise with no increase in pain. Patient was able to to perform HEP correctly with no need for manual correction.     PLAN/RECOMMENDATIONS:   Plan for treatment: therapy treatment to continue next visit.  Planned interventions for next visit: E-stim unattended (CPT 45196), manual therapy (CPT 70226), neuromuscular re-education (CPT 85203) and therapeutic exercise (CPT 64758).

## 2018-08-15 NOTE — OP THERAPY PROGRESS SUMMARY
Outpatient Physical Therapy  PROGRESS SUMMARY NOTE      [facility] [place]  [address]  [place] [address]  Phone:  [phone]  Fax:  [phone]    Date of Visit: 08/15/2018    Patient: Joycelyn Byers  YOB: 1975  MRN: [id number]     Referring Provider: [address]  [place] [address]   Referring Diagnosis Lupus arthritis ;Lumbar strain;Lumbar radiculopathy;Mixed connective tissue disease;Myofascial pain syndrome;Chronic pain;Chronic low back pain;Sacroilitis;Sciatica of left side     Visit Diagnoses     ICD-10-CM   1. Neck pain M54.2   2. Lupus erythematosus, unspecified form L93.0   3. Chronic midline low back pain, with sciatica presence unspecified M54.5    G89.29       Rehab Potential: fair    Physical Therapy Occurrence Codes    Date of onset of impairment:  12/26/17   Date physical therapy care plan established or reviewed:  6/21/18   Date physical therapy treatment started:  6/21/18          Cert Period: 8/15/18 to 8/10/18  Progress Report Period: 6/21/18 to 8/15/18    Functional Limitation G-Codes and Severity Modifiers  PT Functional Assessment Tool Used: LBDI  PT Functional Assessment Score: 36   Current status: Mobility: Current (): CJ   Goal status: Mobility: Goal (): CJ         Objective Findings and Assessment:   Patient progression towards goals: Patient has been seen for 9 visits. Patient demonstrates an increase in spinal ROM and an increase in strength. Patient's functional scores have improved. Recommend to continue therapy at a 1 x per week frequency to enhance gains and promote more independence with home program advancement.     Objective findings and assessment details: 105 deg flexion 20deg ext  MMT: Hip abd  R: 4+/5  L: 4+/5    Goals:   Short Term Goals:   1) Patient's hip abd strength will improve by edwardo muscle grade to facilitate improved mobility.  2) Patient's lumbar flexion will  improve by 10deg to facilitate donning footwear.  Short term goal time span:  2-4 weeks      Long Term Goals:    1) Patient's symptoms will improve by to allow for standing >30min for ADL. MET  2) Patient's LBDI will improve by 10 to demonstrate functional improvement Not Met  Long term goal time span:  6-8 weeks    Plan:   Planned therapy interventions:  E Stim Unattended (CPT 79587), Manual Therapy (CPT 53944), Neuromuscular Re-education (CPT 53269), Therapeutic Exercise (CPT 83853) and Hot or Cold Pack Therapy (CPT 14066)  Frequency:  1x week  Duration in weeks:  8  Plan details:  Continue with spinal stability program.         Referring provider co-signature:  I have reviewed this plan of care and my co-signature certifies the need for services.    Physician Signature: ________________________________ Date: ______________

## 2018-08-20 ENCOUNTER — PHYSICAL THERAPY (OUTPATIENT)
Dept: PHYSICAL THERAPY | Facility: REHABILITATION | Age: 43
End: 2018-08-20
Payer: COMMERCIAL

## 2018-08-20 DIAGNOSIS — M54.5 CHRONIC MIDLINE LOW BACK PAIN, WITH SCIATICA PRESENCE UNSPECIFIED: ICD-10-CM

## 2018-08-20 DIAGNOSIS — L93.0 LUPUS ERYTHEMATOSUS, UNSPECIFIED FORM: ICD-10-CM

## 2018-08-20 DIAGNOSIS — M54.2 NECK PAIN: ICD-10-CM

## 2018-08-20 DIAGNOSIS — G89.29 CHRONIC MIDLINE LOW BACK PAIN, WITH SCIATICA PRESENCE UNSPECIFIED: ICD-10-CM

## 2018-08-20 PROCEDURE — 97110 THERAPEUTIC EXERCISES: CPT

## 2018-08-20 NOTE — OP THERAPY DAILY TREATMENT
Outpatient Physical Therapy  DAILY TREATMENT     [facility] Outpatient Physical Therapy [place]  [address] [place] [address], [address]  [facility] [address]  Phone:  [phone]  Fax:  [phone]    Date: 08/20/2018    Patient: Joycelyn Byers  YOB: 1975  MRN: [id number]     Time Calculation  Start time: 0904  Stop time: 0942 Time Calculation (min): 38 minutes     Chief Complaint: Neck Problem and Back Problem    Visit #: 10    SUBJECTIVE:  Patient reports that she started taking her Humera once more. States that she feels fair today with a slight head ache.     OBJECTIVE:  Current objective measures:   MMT: Hip abd  R: 4+/5  L: 4+/5          Therapeutic Exercises (CPT 03838):     1. Basic TrA, x5min    2. Basic TrA with LE lift, x20    3. Basic TrA with ball rolls, x20    4. Contralateral ext, x20    5. Supine bridge, x20    6. Rows, x20 L4    7. Sit to stand with tra, x20    8. Pull backs, x20 L4    9. Wide rows, x20 L4    10. Education re : postural training, x5min      Time-based treatments/modalities:  Therapeutic exercise minutes (CPT 28525): 38 minutes        Pain rating before treatment: 4  Pain rating after treatment: 4    ASSESSMENT:   Response to treatment: Patient is demonstrating a near medical plateau. Patient has been unable to advance exercise due to an increase in symptoms after an attempted to advance. TRX was held today. Patient to follow up with Dr. Jaramillo prior to continuing therapy.     PLAN/RECOMMENDATIONS:   Plan for treatment: therapy treatment to continue next visit.  Planned interventions for next visit: manual therapy (CPT 76830), neuromuscular re-education (CPT 73909) and therapeutic exercise (CPT 52396).

## 2018-10-05 ENCOUNTER — OFFICE VISIT (OUTPATIENT)
Dept: URGENT CARE | Facility: PHYSICIAN GROUP | Age: 43
End: 2018-10-05
Payer: COMMERCIAL

## 2018-10-05 VITALS
OXYGEN SATURATION: 97 % | TEMPERATURE: 98.6 F | HEART RATE: 72 BPM | WEIGHT: 166 LBS | SYSTOLIC BLOOD PRESSURE: 108 MMHG | RESPIRATION RATE: 18 BRPM | DIASTOLIC BLOOD PRESSURE: 72 MMHG | BODY MASS INDEX: 29.41 KG/M2

## 2018-10-05 DIAGNOSIS — J34.89 NOSTRIL INFECTION: ICD-10-CM

## 2018-10-05 PROCEDURE — 99214 OFFICE O/P EST MOD 30 MIN: CPT | Performed by: NURSE PRACTITIONER

## 2018-10-05 ASSESSMENT — ENCOUNTER SYMPTOMS
FEVER: 0
EYE REDNESS: 0
SORE THROAT: 0
CHILLS: 0
HEADACHES: 0
SINUS PAIN: 0
COUGH: 0
EYE DISCHARGE: 0
WEAKNESS: 0
MYALGIAS: 0

## 2018-10-05 NOTE — PROGRESS NOTES
"Subjective:      Joycelyn Byers is a 43 y.o. female who presents with Other (x3 weeks Ulcers in both nostrils )            HPI  Joycelyn is here for ulcerations in her nostrils x 3 weeks. States nose will bleed, very painful, using saline rinse to nose. States has had this before and would go away but she has been under a lot of stress with grad school, home and work life. H/o seasonal allergies. Has been using flonase as well.    PMH:  has a past medical history of Allergy, unspecified not elsewhere classified; Ankylosing spondylitis of lumbosacral region (HCC) (7/6/2015); Anxiety (11/24/2015); GERD (gastroesophageal reflux disease); Headache, classical migraine; Hiatal hernia; Intractable migraine without aura and without status migrainosus (11/24/2015); Lupus; Oropharyngeal dysphagia; Other forms of systemic lupus erythematosus (HCC) (10/20/2017); Overactive bladder; Peptic ulcer; Prediabetes (12/8/2016); and Vocal cord dysfunction.  MEDS:   Current Outpatient Prescriptions:   •  mupirocin (BACTROBAN) 2 % nasal ointment, Apply to affected nostril twice daily x 5 days., Disp: 1 Tube, Rfl: 0  •  B-D TB SYRINGE 1CC/25GX5/8\" 25G X 5/8\" 1 ML Misc, USE TO INJECT B 12 SHOTS, Disp: , Rfl: 2  •  cyanocobalamin (VITAMIN B-12) 1000 MCG/ML Solution, ADM 1 ML SC WEEKLY, Disp: , Rfl: 2  •  hydroxychloroquine (PLAQUENIL) 200 MG Tab, , Disp: , Rfl:   •  DILTIAZem (CARDIZEM) 30 MG Tab, , Disp: , Rfl:   •  COSENTYX SENSOREADY  MG/ML Solution Auto-injector, , Disp: , Rfl:   •  HYDROcodone-acetaminophen 2.5-108 mg/5mL (HYCET) 7.5-325 MG/15ML solution, , Disp: , Rfl:   •  Secukinumab (COSENTYX) 150 MG/ML Solution Prefilled Syringe, Inject  as instructed., Disp: , Rfl:   •  rizatriptan (MAXALT) 10 MG tablet, Take 1 Tab by mouth Once PRN., Disp: 10 Tab, Rfl: 3  •  SYMBICORT 160-4.5 MCG/ACT Aerosol, , Disp: , Rfl:   •  lubiprostone (AMITIZA) 24 MCG capsule, Take 24 mcg by mouth 2 times a day, with meals., Disp: , Rfl: "   •  fluticasone (FLONASE) 50 MCG/ACT nasal spray, Spray 1 Spray in nose every day., Disp: , Rfl:   •  EPIPEN 2-NOLAN 0.3 MG/0.3ML Solution Auto-injector solution for injection, , Disp: , Rfl:   •  albuterol (VENTOLIN HFA) 108 (90 BASE) MCG/ACT AERS inhalation aerosol, Inhale 2 Puffs by mouth every 6 hours as needed for Shortness of Breath., Disp: 8.5 g, Rfl: 3  ALLERGIES:   Allergies   Allergen Reactions   • Savella [Kdc:Milnacipran+Ci Pigment Blue 63] Myalgia   • Ciprofloxacin    • Reglan [Kdc:Yellow Dye+Ci Pigment Blue 63+Metoclopramide]    • Sulfa Drugs Nausea     GI upset   • Tetanus Antitoxin      Mild spasms and pain     • Tramadol Rash and Shortness of Breath     SURGHX:   Past Surgical History:   Procedure Laterality Date   • HYSTERECTOMY LAPAROSCOPY  2013    utrine and right ovary removed   • HERNIA REPAIR  2007    umblical hernia   • CHOLECYSTECTOMY  2007   • SALPINGO-OOPHORECTOMY, UNILATERAL Right      SOCHX:  reports that she has never smoked. She has never used smokeless tobacco. She reports that she does not drink alcohol or use drugs.  FH: Family history was reviewed, no pertinent findings to report    Review of Systems   Constitutional: Negative for chills, fever and malaise/fatigue.   HENT: Positive for congestion. Negative for ear pain, sinus pain and sore throat.    Eyes: Negative for discharge and redness.   Respiratory: Negative for cough.    Musculoskeletal: Negative for myalgias.   Skin: Negative for itching and rash.   Neurological: Negative for weakness and headaches.   Endo/Heme/Allergies: Positive for environmental allergies.   All other systems reviewed and are negative.         Objective:     /72 (BP Location: Left arm, Patient Position: Sitting, BP Cuff Size: Adult)   Pulse 72   Temp 37 °C (98.6 °F) (Temporal)   Resp 18   Wt 75.3 kg (166 lb)   SpO2 97%   BMI 29.41 kg/m²      Physical Exam   Constitutional: She is oriented to person, place, and time. Vital signs are normal.  She appears well-developed and well-nourished. She is active.  Non-toxic appearance. She does not have a sickly appearance. She does not appear ill. No distress.   HENT:   Head: Normocephalic.   Nose: Mucosal edema and sinus tenderness present. No nasal deformity or nasal septal hematoma. Epistaxis is observed.   Mouth/Throat: Uvula is midline, oropharynx is clear and moist and mucous membranes are normal.   Has dried mucus/blood to opening of nostrils, able to visualize nasal passages to be mildly swollen. No active bleeding or nasal d/c.   Eyes: Pupils are equal, round, and reactive to light. Conjunctivae and EOM are normal.   Neck: Normal range of motion. Neck supple.   Cardiovascular: Normal rate.    Pulmonary/Chest: Effort normal.   Musculoskeletal: Normal range of motion.   Neurological: She is alert and oriented to person, place, and time.   Skin: Skin is warm and dry. She is not diaphoretic.   Vitals reviewed.              Assessment/Plan:     1. Nostril infection    - mupirocin (BACTROBAN) 2 % nasal ointment; Apply to affected nostril twice daily x 5 days.  Dispense: 1 Tube; Refill: 0  D/c Flonase or any steroid nasal  sprays  Increase water intake  Get rest  May use Ibuprofen/Tylenol prn for any fever, nose pain  May take oral longer acting antihistamine for seasonal allergy symptoms prn  May use saline nasal spray for nasal congestion  May gargle with salt water prn for throat discomfort from any PND  May drink smoothies for nutrition if too painful to swallow solid foods  Monitor for nasal d/c, increased swelling in nose, epistaxis- need re-evaluation

## 2018-10-06 ENCOUNTER — HOSPITAL ENCOUNTER (OUTPATIENT)
Dept: RADIOLOGY | Facility: MEDICAL CENTER | Age: 43
End: 2018-10-06
Attending: NURSE PRACTITIONER
Payer: COMMERCIAL

## 2018-10-06 ENCOUNTER — OFFICE VISIT (OUTPATIENT)
Dept: URGENT CARE | Facility: PHYSICIAN GROUP | Age: 43
End: 2018-10-06
Payer: COMMERCIAL

## 2018-10-06 VITALS
TEMPERATURE: 98.2 F | HEIGHT: 63 IN | WEIGHT: 155 LBS | OXYGEN SATURATION: 98 % | DIASTOLIC BLOOD PRESSURE: 70 MMHG | RESPIRATION RATE: 14 BRPM | BODY MASS INDEX: 27.46 KG/M2 | HEART RATE: 72 BPM | SYSTOLIC BLOOD PRESSURE: 110 MMHG

## 2018-10-06 DIAGNOSIS — R10.9 LEFT SIDED ABDOMINAL PAIN: ICD-10-CM

## 2018-10-06 DIAGNOSIS — R50.9 FEVER AND CHILLS: ICD-10-CM

## 2018-10-06 DIAGNOSIS — J30.2 SEASONAL ALLERGIC RHINITIS, UNSPECIFIED TRIGGER: ICD-10-CM

## 2018-10-06 LAB
APPEARANCE UR: CLEAR
BILIRUB UR STRIP-MCNC: NORMAL MG/DL
COLOR UR AUTO: NORMAL
GLUCOSE UR STRIP.AUTO-MCNC: NORMAL MG/DL
HETEROPH AB SER QL LA: NEGATIVE
INT CON NEG: NEGATIVE
INT CON NEG: NEGATIVE
INT CON POS: POSITIVE
INT CON POS: POSITIVE
KETONES UR STRIP.AUTO-MCNC: NORMAL MG/DL
LEUKOCYTE ESTERASE UR QL STRIP.AUTO: NORMAL
NITRITE UR QL STRIP.AUTO: NORMAL
PH UR STRIP.AUTO: 5.5 [PH] (ref 5–8)
PROT UR QL STRIP: NORMAL MG/DL
RBC UR QL AUTO: NORMAL
S PYO AG THROAT QL: NORMAL
SP GR UR STRIP.AUTO: 1.03
UROBILINOGEN UR STRIP-MCNC: NORMAL MG/DL

## 2018-10-06 PROCEDURE — 71046 X-RAY EXAM CHEST 2 VIEWS: CPT

## 2018-10-06 PROCEDURE — 87880 STREP A ASSAY W/OPTIC: CPT | Performed by: NURSE PRACTITIONER

## 2018-10-06 PROCEDURE — 86308 HETEROPHILE ANTIBODY SCREEN: CPT | Performed by: NURSE PRACTITIONER

## 2018-10-06 PROCEDURE — 81002 URINALYSIS NONAUTO W/O SCOPE: CPT | Performed by: NURSE PRACTITIONER

## 2018-10-06 PROCEDURE — 99214 OFFICE O/P EST MOD 30 MIN: CPT | Performed by: NURSE PRACTITIONER

## 2018-10-06 RX ORDER — DOXYCYCLINE HYCLATE 100 MG
100 TABLET ORAL 2 TIMES DAILY
Qty: 20 TAB | Refills: 0 | Status: SHIPPED | OUTPATIENT
Start: 2018-10-06 | End: 2018-10-16

## 2018-10-06 RX ORDER — ONDANSETRON 4 MG/1
4 TABLET, ORALLY DISINTEGRATING ORAL EVERY 8 HOURS PRN
Refills: 2 | COMMUNITY
Start: 2018-08-10 | End: 2020-08-18

## 2018-10-06 RX ORDER — AMOXICILLIN AND CLAVULANATE POTASSIUM 875; 125 MG/1; MG/1
1 TABLET, FILM COATED ORAL 2 TIMES DAILY
Qty: 14 TAB | Refills: 0 | Status: SHIPPED | OUTPATIENT
Start: 2018-10-06 | End: 2018-10-06

## 2018-10-06 RX ORDER — HYDROXYCHLOROQUINE SULFATE 200 MG/1
400 TABLET, FILM COATED ORAL
COMMUNITY
End: 2018-10-06

## 2018-10-06 RX ORDER — FLUTICASONE FUROATE 200 UG/1
1 POWDER RESPIRATORY (INHALATION) EVERY MORNING
COMMUNITY
Start: 2018-08-22 | End: 2020-08-25 | Stop reason: SDUPTHER

## 2018-10-06 RX ORDER — METHYLPREDNISOLONE 4 MG/1
TABLET ORAL
Refills: 0 | COMMUNITY
Start: 2018-08-30 | End: 2018-10-06

## 2018-10-06 NOTE — PROGRESS NOTES
Chief Complaint   Patient presents with   • Chills     weakness bodyaches on and off for 2 weeks       HISTORY OF PRESENT ILLNESS: Patient is a 43 y.o. female who presents to urgent care today with complaints of illness for the past three weeks. States that for the past three weeks she has had intermittent fever, chills, malaise, headache, and a sore throat. Admits to ulcers to internal nares for the past three weeks as well. She has a history of lupus and during her typical flares she develops these ulcers. She was seen in  yesterday for the same and place on Bactroban for treatment with improvement in the ulcerations. She admits to chronic rhinitis due to seasonal allergies, denies significant sinus pressure. She is concerned in that she is feeling more ill today. She also admits to left sided pain for the past two days. The pain is intermittent, sharp. Denies cough, abdominal pain, urinary symptoms, chest pain, shortness of breath, ear pain. Denies known ill contacts.       Patient Active Problem List    Diagnosis Date Noted   • Vitamin D deficiency 05/22/2018   • SVT (supraventricular tachycardia) ([facility]) 03/23/2018   • Other forms of systemic lupus erythematosus ([facility]) 10/20/2017   • Chronic constipation 12/15/2016   • Reactive hypoglycemia 09/28/2016   • CNS demyelinating disease ([facility]) 04/19/2016   • Vocal cord dysfunction 04/19/2016   • Multiple allergies 12/21/2015   • Anxiety 11/24/2015   • Intractable migraine without aura and without status migrainosus 11/24/2015   • Ankylosing spondylitis of lumbosacral region ([facility]) 07/06/2015   • Overactive bladder 04/17/2015   • Asthma in adult 01/14/2015       Allergies:Savella [kdc:milnacipran+ci pigment blue 63]; Ciprofloxacin; Reglan [kdc:yellow dye+ci pigment blue 63+metoclopramide]; Sulfa drugs; Tetanus antitoxin; and Tramadol    Current Outpatient Prescriptions Ordered in [facility]   Medication Sig Dispense Refill   • ADALIMUMAB SC      • ARNUITY ELLIPTA 200 MCG/ACT  AEROSOL POWDER, BREATH ACTIVATED      • ondansetron (ZOFRAN ODT) 4 MG TABLET DISPERSIBLE DIS 1 T ON THE TONGUE Q 8 H PRN  2   • cyanocobalamin (VITAMIN B-12) 1000 MCG/ML Solution ADM 1 ML SC WEEKLY  2   • Secukinumab (COSENTYX) 150 MG/ML Solution Prefilled Syringe Inject  as instructed.     • hydroxychloroquine (PLAQUENIL) 200 MG Tab      • albuterol (VENTOLIN HFA) 108 (90 BASE) MCG/ACT AERS inhalation aerosol Inhale 2 Puffs by mouth every 6 hours as needed for Shortness of Breath. 8.5 g 3   • mupirocin (BACTROBAN) 2 % nasal ointment Apply to affected nostril twice daily x 5 days. 1 Tube 0   • rizatriptan (MAXALT) 10 MG tablet Take 1 Tab by mouth Once PRN. 10 Tab 3   • fluticasone (FLONASE) 50 MCG/ACT nasal spray Spray 1 Spray in nose every day.     • EPIPEN 2-NOLAN 0.3 MG/0.3ML Solution Auto-injector solution for injection        No current Epic-ordered facility-administered medications on file.        Past Medical History:   Diagnosis Date   • Allergy, unspecified not elsewhere classified    • Ankylosing spondylitis of lumbosacral region (HCC) 2015   • Anxiety 2015   • GERD (gastroesophageal reflux disease)    • Headache, classical migraine    • Hiatal hernia    • Intractable migraine without aura and without status migrainosus 2015   • Lupus    • Oropharyngeal dysphagia    • Other forms of systemic lupus erythematosus (HCC) 10/20/2017   • Overactive bladder    • Peptic ulcer    • Prediabetes 2016   • Vocal cord dysfunction        Social History   Substance Use Topics   • Smoking status: Never Smoker   • Smokeless tobacco: Never Used   • Alcohol use No       Family Status   Relation Status   • Bro Alive, age 31y   • MGMo    • Mo Alive, age 66y        brain anurysym    • Fa Alive, age 67y   • PUnc Alive   • MGFa    • PGMo    • PGFa    • PAunt Alive   • Son (Not Specified)   • Bridget (Not Specified)   • Bridget (Not Specified)   • Bridget (Not Specified)     Family History  "  Problem Relation Age of Onset   • Arthritis Brother         psoriatic arthritis   • Arthritis Maternal Grandmother    • Psychiatry Mother         Major depression   • Other Mother         discoid lupus   • Psychiatry Father         Bipolar depression   • Heart Disease Father         CHF   • Hypertension Father    • Diabetes Father    • Other Father         Agent orange exposure 125% disabled   • Diabetes Paternal Uncle    • Stroke Maternal Grandfather    • Diabetes Paternal Grandmother    • Alcohol/Drug Paternal Grandfather    • Cancer Paternal Aunt         gyn cancer   • GI Son    • GI Daughter    • GI Daughter    • GI Daughter        ROS:  Review of Systems   Constitutional: Positive for fever, chills, malaise, and fatigue.   HENT: Positive for sore throat, congestion. Negative for ear pain, nosebleeds, and neck pain.    Eyes: Negative for vision changes.   Neuro: Positive for headache. Negative for sensory changes, weakness, seizure, LOC.   Cardiovascular: Negative for chest pain, palpitations, orthopnea and leg swelling.   Respiratory: Negative for cough, sputum production, shortness of breath and wheezing.   Gastrointestinal: Positive for left sided abdominal pain. Negative for nausea, vomiting or diarrhea.   Genitourinary: Negative for dysuria, urgency and frequency.  Musculoskeletal: Negative for falls, neck pain, back pain, joint pain.  Skin: Negative for rash, diaphoresis.     Exam:  Blood pressure 110/70, pulse 72, temperature 36.8 °C (98.2 °F), temperature source Temporal, resp. rate 14, height 1.6 m (5' 3\"), weight 70.3 kg (155 lb), SpO2 98 %, not currently breastfeeding.  General: well-nourished, well-developed female in NAD  Head: normocephalic, atraumatic  Eyes: PERRLA, no conjunctival injection, acuity grossly intact, lids normal.  Ears: normal shape and symmetry, no tenderness, no discharge. External canals are without any significant edema or erythema. Tympanic membranes are without any " inflammation, no effusion. Gross auditory acuity is intact.  Nose: symmetrical without tenderness, clear discharge. Mild generalized sinus tenderness.   Mouth/Throat: reasonable hygiene, minimal erythema, without exudates or tonsillar enlargement.  Neck: no masses, range of motion within normal limits, no tracheal deviation. No obvious thyroid enlargement.   Lymph: no cervical adenopathy. No supraclavicular adenopathy.   Neuro: alert and oriented. Cranial nerves 1-12 grossly intact. No sensory deficit.   Cardiovascular: regular rate and rhythm. No edema.  Pulmonary: no distress. Chest is symmetrical with respiration, no wheezes, crackles, or rhonchi.   Abdomen: soft, mild left sided tenderness, no guarding, no hepatosplenomegaly. No CVA tenderness.   Musculoskeletal: no clubbing, appropriate muscle tone, gait is stable.  Skin: warm, dry, intact, no clubbing, no cyanosis, no rashes.   Psych: appropriate mood, affect, judgement.         Assessment/Plan:  1. Seasonal allergic rhinitis, unspecified trigger  doxycycline (VIBRAMYCIN) 100 MG Tab    DISCONTINUED: amoxicillin-clavulanate (AUGMENTIN) 875-125 MG Tab   2. Fever and chills  POCT Rapid Strep A    POCT Urinalysis    DX-CHEST-2 VIEWS    POCT Mononucleosis (mono)    doxycycline (VIBRAMYCIN) 100 MG Tab    DISCONTINUED: amoxicillin-clavulanate (AUGMENTIN) 875-125 MG Tab   3. Left sided abdominal pain  POCT Urinalysis    DX-CHEST-2 VIEWS         Urine in clinic negative for infectious process or blood, do no suspect infection and/or stone. Strep and mono are both negative. Chest x-ray is negative as well.  Discussed symptoms most likely viral, self limiting illness. Contingent antibiotic prescription given to patient to fill upon meeting criteria of guidelines discussed for potential sinus etiology, she is in agreement. She has been advised to monitor left side pain and return with any worsening and further studies will be performed.   Supportive care, differential  diagnoses, and indications for immediate follow-up discussed with patient.   Pathogenesis of diagnosis discussed including typical length and natural progression.   Instructed to return to clinic or nearest emergency department for any change in condition, further concerns, or worsening of symptoms.  Patient states understanding of the plan of care and discharge instructions.  Instructed to make an appointment, for follow up, with her primary care provider.        Please note that this dictation was created using voice recognition software. I have made every reasonable attempt to correct obvious errors, but I expect that there are errors of grammar and possibly content that I did not discover before finalizing the note.      CURTIS Jernigan.       Addendum 10/12/18: The patient was called for re-evaluation, a message was left, encouraged to call back to the clinic or return to clinic with any questions or concerns.       CURTIS Jernigan.

## 2018-11-08 ENCOUNTER — TELEPHONE (OUTPATIENT)
Dept: PHYSICAL THERAPY | Facility: REHABILITATION | Age: 43
End: 2018-11-08

## 2018-11-08 NOTE — OP THERAPY DISCHARGE SUMMARY
Outpatient Physical Therapy  DISCHARGE SUMMARY NOTE      [facility]  [address], [address]  [place] [address]  Phone:  [phone]  Fax:  [phone]    Date of Visit: 11/08/2018    Patient: Joycelyn Byers  YOB: 1975  MRN: [id number]     Referring Provider: JABARI Shay   Referring Diagnosis Lupus arthritis ;Lumbar strain;Lumbar radiculopathy;Mixed connective tissue disease;Myofascial pain syndrome;Chronic pain;Chronic low back pain;Sacroilitis;Sciatica of left side     Physical Therapy Occurrence Codes    Date of onset of impairment:  12/26/17   Date physical therapy care plan established or reviewed:  6/21/18   Date physical therapy treatment started:  6/21/18            Your patient is being discharged from Physical Therapy with the following comments:   · Progress plateau    Comments:  Admin discharge due to lapse in POC to close case.     Limitations Remaining:  Admin discharge due to lapse in POC to close case.     Recommendations:  Admin discharge due to lapse in POC to close case.     Ventura Astudillo, PT, DPT    Date: 11/8/2018

## 2018-11-27 ENCOUNTER — HOSPITAL ENCOUNTER (OUTPATIENT)
Dept: LAB | Facility: MEDICAL CENTER | Age: 43
End: 2018-11-27
Attending: PSYCHIATRY & NEUROLOGY
Payer: COMMERCIAL

## 2018-11-27 PROCEDURE — 36415 COLL VENOUS BLD VENIPUNCTURE: CPT

## 2018-11-27 PROCEDURE — 82746 ASSAY OF FOLIC ACID SERUM: CPT

## 2018-11-27 PROCEDURE — 82607 VITAMIN B-12: CPT

## 2018-11-28 LAB
FOLATE SERPL-MCNC: 13 NG/ML
VIT B12 SERPL-MCNC: 593 PG/ML (ref 211–911)

## 2019-01-09 ENCOUNTER — OFFICE VISIT (OUTPATIENT)
Dept: MEDICAL GROUP | Facility: MEDICAL CENTER | Age: 44
End: 2019-01-09
Payer: COMMERCIAL

## 2019-01-09 VITALS
SYSTOLIC BLOOD PRESSURE: 100 MMHG | DIASTOLIC BLOOD PRESSURE: 74 MMHG | BODY MASS INDEX: 26.8 KG/M2 | RESPIRATION RATE: 14 BRPM | HEART RATE: 94 BPM | OXYGEN SATURATION: 97 % | HEIGHT: 63 IN | WEIGHT: 151.24 LBS | TEMPERATURE: 98.2 F

## 2019-01-09 DIAGNOSIS — L94.0 MORPHEA: ICD-10-CM

## 2019-01-09 PROCEDURE — 99213 OFFICE O/P EST LOW 20 MIN: CPT | Performed by: FAMILY MEDICINE

## 2019-01-09 ASSESSMENT — PATIENT HEALTH QUESTIONNAIRE - PHQ9: CLINICAL INTERPRETATION OF PHQ2 SCORE: 0

## 2019-01-09 NOTE — ASSESSMENT & PLAN NOTE
This is a new problem for this patient that is been worsening over approximately 8 months.  Patient initially noted just a brown flat area in the fold in the groin on the right.  That now has spread onto her upper thigh.  It is not raised, does not have any associated pruritus.  Patient has a brother who has severe psoriasis and this does not look like anything that he has.  She does have mild Raynaud's and there is the question and is this possibly morphea associated with scleroderma.  I would like for her to be seen by dermatology.  I am not going to biopsy this but I would like for them to see her and possibly did send a biopsy.  At this point she is not having any discomfort from it we are going to just set her up for the consultation.

## 2019-01-11 NOTE — PROGRESS NOTES
CC:The encounter diagnosis was Morphea.    HISTORY OF PRESENT ILLNESS: Patient is a 43 y.o. female established patient who presents today to discuss an area of discoloration that she is noted over the last 8 months.  Seems to be growing in size, is not pruritic and has no scaliness associated with it.    Health Maintenance: Completed    Morphea  This is a new problem for this patient that is been worsening over approximately 8 months.  Patient initially noted just a brown flat area in the fold in the groin on the right.  That now has spread onto her upper thigh.  It is not raised, does not have any associated pruritus.  Patient has a brother who has severe psoriasis and this does not look like anything that he has.  She does have mild Raynaud's and there is the question and is this possibly morphea associated with scleroderma.  I would like for her to be seen by dermatology.  I am not going to biopsy this but I would like for them to see her and possibly did send a biopsy.  At this point she is not having any discomfort from it we are going to just set her up for the consultation.      Patient Active Problem List    Diagnosis Date Noted   • Morphea 01/09/2019   • Vitamin D deficiency 05/22/2018   • SVT (supraventricular tachycardia) ([facility]) 03/23/2018   • Other forms of systemic lupus erythematosus ([facility]) 10/20/2017   • Chronic constipation 12/15/2016   • Reactive hypoglycemia 09/28/2016   • CNS demyelinating disease ([facility]) 04/19/2016   • Vocal cord dysfunction 04/19/2016   • Multiple allergies 12/21/2015   • Anxiety 11/24/2015   • Intractable migraine without aura and without status migrainosus 11/24/2015   • Ankylosing spondylitis of lumbosacral region ([facility]) 07/06/2015   • Overactive bladder 04/17/2015   • Asthma in adult 01/14/2015      Allergies:Savella [kdc:milnacipran+ci pigment blue 63]; Ciprofloxacin; Reglan [kdc:yellow dye+ci pigment blue 63+metoclopramide]; Sulfa drugs; Tetanus antitoxin; and  Tramadol    Current Outpatient Prescriptions   Medication Sig Dispense Refill   • ADALIMUMAB SC      • ARNUITY ELLIPTA 200 MCG/ACT AEROSOL POWDER, BREATH ACTIVATED      • ondansetron (ZOFRAN ODT) 4 MG TABLET DISPERSIBLE DIS 1 T ON THE TONGUE Q 8 H PRN  2   • mupirocin (BACTROBAN) 2 % nasal ointment Apply to affected nostril twice daily x 5 days. 1 Tube 0   • cyanocobalamin (VITAMIN B-12) 1000 MCG/ML Solution ADM 1 ML SC WEEKLY  2   • Secukinumab (COSENTYX) 150 MG/ML Solution Prefilled Syringe Inject  as instructed.     • rizatriptan (MAXALT) 10 MG tablet Take 1 Tab by mouth Once PRN. 10 Tab 3   • hydroxychloroquine (PLAQUENIL) 200 MG Tab      • fluticasone (FLONASE) 50 MCG/ACT nasal spray Spray 1 Spray in nose every day.     • EPIPEN 2-NOLAN 0.3 MG/0.3ML Solution Auto-injector solution for injection      • albuterol (VENTOLIN HFA) 108 (90 BASE) MCG/ACT AERS inhalation aerosol Inhale 2 Puffs by mouth every 6 hours as needed for Shortness of Breath. 8.5 g 3     No current facility-administered medications for this visit.        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 She is  with four children.        Family History   Problem Relation Age of Onset   • Arthritis Brother         psoriatic arthritis   • Arthritis Maternal Grandmother    • Psychiatry Mother         Major depression   • Other Mother         discoid lupus   • Psychiatry Father         Bipolar depression   • Heart Disease Father         CHF   • Hypertension Father    • Diabetes Father    • Other Father         Agent orange exposure 125% disabled   • Diabetes Paternal Uncle    • Stroke Maternal Grandfather    • Diabetes Paternal Grandmother    • Alcohol/Drug Paternal Grandfather    • Cancer Paternal Aunt         gyn cancer   • GI Son    • GI Daughter   "  • GI Daughter    • GI Daughter         ROS:     - Constitutional:  Negative for fever, chills, unexpected weight change, and fatigue/generalized weakness.    - HEENT:  Negative for headaches, vision changes, hearing changes, ear pain, ear discharge, rhinorrhea, sinus congestion, sore throat, and neck pain.      - Respiratory: Negative for cough, sputum production, chest congestion, dyspnea, wheezing, and crackles.      - Cardiovascular: Negative for chest pain, palpitations, orthopnea, and bilateral lower extremity edema.     - Gastrointestinal: Negative for heartburn, nausea, vomiting, abdominal pain, hematochezia, melena, diarrhea, constipation, and greasy/foul-smelling stools.     - Genitourinary: Negative for dysuria, polyuria, hematuria, pyuria, urinary urgency, and urinary incontinence.     - Musculoskeletal: Negative for myalgias, back pain, and joint pain.     - Skin: As in HPI       Exam:    Blood pressure 100/74, pulse 94, temperature 36.8 °C (98.2 °F), temperature source Temporal, resp. rate 14, height 1.6 m (5' 3\"), weight 68.6 kg (151 lb 3.8 oz), SpO2 97 %, not currently breastfeeding. Body mass index is 26.79 kg/m².    General:  Well nourished, well developed female in NAD  Skin: Patient has an area of morphea that extends from the right groin up into the right lower abdomen.  Please note that this dictation was created using voice recognition software. I have made every reasonable attempt to correct obvious errors, but I expect that there are errors of grammar and possibly content that I did not discover before finalizing the note.    Assessment/Plan:  1. Morphea  Uncontrolled, I am going to send this patient to dermatology.  I am not certain that this is morphia.  It may need a biopsy  - REFERRAL TO DERMATOLOGY         "

## 2019-02-11 ENCOUNTER — APPOINTMENT (RX ONLY)
Dept: URBAN - METROPOLITAN AREA CLINIC 22 | Facility: CLINIC | Age: 44
Setting detail: DERMATOLOGY
End: 2019-02-11

## 2019-02-11 DIAGNOSIS — B36.0 PITYRIASIS VERSICOLOR: ICD-10-CM

## 2019-02-11 PROCEDURE — 87220 TISSUE EXAM FOR FUNGI: CPT | Mod: 91

## 2019-02-11 PROCEDURE — 99202 OFFICE O/P NEW SF 15 MIN: CPT

## 2019-02-11 PROCEDURE — ? PRESCRIPTION

## 2019-02-11 PROCEDURE — ? KOH PREP

## 2019-02-11 RX ORDER — ECONAZOLE NITRATE CREAM 10 MG/G
CREAM TOPICAL
Qty: 1 | Refills: 1 | Status: ERX | COMMUNITY
Start: 2019-02-11

## 2019-02-11 RX ORDER — KETOCONAZOLE 20.5 MG/ML
SHAMPOO, SUSPENSION TOPICAL
Qty: 1 | Refills: 3 | Status: ERX | COMMUNITY
Start: 2019-02-11

## 2019-02-11 RX ADMIN — KETOCONAZOLE: 20.5 SHAMPOO, SUSPENSION TOPICAL at 18:10

## 2019-02-11 RX ADMIN — ECONAZOLE NITRATE CREAM: 10 CREAM TOPICAL at 18:10

## 2019-02-11 ASSESSMENT — LOCATION ZONE DERM
LOCATION ZONE: ARM
LOCATION ZONE: LEG
LOCATION ZONE: AXILLAE

## 2019-02-11 ASSESSMENT — LOCATION DETAILED DESCRIPTION DERM
LOCATION DETAILED: RIGHT AXILLARY VAULT
LOCATION DETAILED: LEFT ANTERIOR PROXIMAL UPPER ARM
LOCATION DETAILED: RIGHT ANTERIOR PROXIMAL THIGH

## 2019-02-11 ASSESSMENT — LOCATION SIMPLE DESCRIPTION DERM
LOCATION SIMPLE: RIGHT AXILLARY VAULT
LOCATION SIMPLE: LEFT UPPER ARM
LOCATION SIMPLE: RIGHT THIGH

## 2019-02-11 NOTE — PROCEDURE: KOH PREP
Detail Level: Detailed
Koh Procedure Text (Tissue Harvesting Technique): A 15-blade scalpel was used to scrape the skin. The skin scrapings were placed on a glass slide, covered with a coverslip and a KOH solution was applied.
Koh Intro Text (From The.....): A KOH prep was ordered and evaluated from the
Showing: positive findings within normal realm
Showing: clustered spores and short wide hyphae

## 2019-02-20 ENCOUNTER — OFFICE VISIT (OUTPATIENT)
Dept: URGENT CARE | Facility: PHYSICIAN GROUP | Age: 44
End: 2019-02-20
Payer: COMMERCIAL

## 2019-02-20 VITALS
TEMPERATURE: 98.6 F | WEIGHT: 146 LBS | HEIGHT: 63 IN | HEART RATE: 85 BPM | OXYGEN SATURATION: 97 % | RESPIRATION RATE: 16 BRPM | SYSTOLIC BLOOD PRESSURE: 128 MMHG | DIASTOLIC BLOOD PRESSURE: 80 MMHG | BODY MASS INDEX: 25.87 KG/M2

## 2019-02-20 DIAGNOSIS — J06.9 UPPER RESPIRATORY TRACT INFECTION, UNSPECIFIED TYPE: ICD-10-CM

## 2019-02-20 DIAGNOSIS — J40 BRONCHITIS: ICD-10-CM

## 2019-02-20 PROCEDURE — 99214 OFFICE O/P EST MOD 30 MIN: CPT | Performed by: PHYSICIAN ASSISTANT

## 2019-02-20 RX ORDER — METHYLPREDNISOLONE 4 MG/1
4 TABLET ORAL DAILY
Qty: 1 KIT | Refills: 0 | Status: SHIPPED | OUTPATIENT
Start: 2019-02-20 | End: 2019-07-17

## 2019-02-20 ASSESSMENT — ENCOUNTER SYMPTOMS
RHINORRHEA: 1
MYALGIAS: 1
HEADACHES: 1
COUGH: 1

## 2019-02-21 NOTE — PROGRESS NOTES
Subjective:   Joycelyn Byers is a 43 y.o. female who presents today with   Chief Complaint   Patient presents with   • Flu Like Symptoms     x 1 day       Cough   This is a new problem. The current episode started 1 to 4 weeks ago. The problem has been unchanged. The cough is productive of sputum. Associated symptoms include chest pain, headaches, myalgias, nasal congestion and rhinorrhea. She has tried a beta-agonist inhaler and OTC cough suppressant for the symptoms. The treatment provided mild relief. Her past medical history is significant for asthma.       PMH:  has a past medical history of Allergy, unspecified not elsewhere classified; Ankylosing spondylitis of lumbosacral region (HCC) (7/6/2015); Anxiety (11/24/2015); GERD (gastroesophageal reflux disease); Headache, classical migraine; Hiatal hernia; Intractable migraine without aura and without status migrainosus (11/24/2015); Lupus; Morphea (1/9/2019); Oropharyngeal dysphagia; Other forms of systemic lupus erythematosus (HCC) (10/20/2017); Overactive bladder; Peptic ulcer; Prediabetes (12/8/2016); and Vocal cord dysfunction.  MEDS:   Current Outpatient Prescriptions:   •  MethylPREDNISolone (MEDROL DOSEPAK) 4 MG Tablet Therapy Pack, Take 1 Tab by mouth every day., Disp: 1 Kit, Rfl: 0  •  ADALIMUMAB SC, , Disp: , Rfl:   •  ARNUITY ELLIPTA 200 MCG/ACT AEROSOL POWDER, BREATH ACTIVATED, , Disp: , Rfl:   •  cyanocobalamin (VITAMIN B-12) 1000 MCG/ML Solution, ADM 1 ML SC WEEKLY, Disp: , Rfl: 2  •  hydroxychloroquine (PLAQUENIL) 200 MG Tab, , Disp: , Rfl:   •  fluticasone (FLONASE) 50 MCG/ACT nasal spray, Spray 1 Spray in nose every day., Disp: , Rfl:   •  ondansetron (ZOFRAN ODT) 4 MG TABLET DISPERSIBLE, DIS 1 T ON THE TONGUE Q 8 H PRN, Disp: , Rfl: 2  •  mupirocin (BACTROBAN) 2 % nasal ointment, Apply to affected nostril twice daily x 5 days., Disp: 1 Tube, Rfl: 0  •  Secukinumab (COSENTYX) 150 MG/ML Solution Prefilled Syringe, Inject  as  "instructed., Disp: , Rfl:   •  rizatriptan (MAXALT) 10 MG tablet, Take 1 Tab by mouth Once PRN., Disp: 10 Tab, Rfl: 3  •  EPIPEN 2-NOLAN 0.3 MG/0.3ML Solution Auto-injector solution for injection, , Disp: , Rfl:   •  albuterol (VENTOLIN HFA) 108 (90 BASE) MCG/ACT AERS inhalation aerosol, Inhale 2 Puffs by mouth every 6 hours as needed for Shortness of Breath., Disp: 8.5 g, Rfl: 3  ALLERGIES:   Allergies   Allergen Reactions   • Savella [Kdc:Milnacipran+Ci Pigment Blue 63] Myalgia   • Ciprofloxacin    • Reglan [Kdc:Yellow Dye+Ci Pigment Blue 63+Metoclopramide]    • Sulfa Drugs Nausea     GI upset   • Tetanus Antitoxin      Mild spasms and pain     • Tramadol Rash and Shortness of Breath     SURGHX:   Past Surgical History:   Procedure Laterality Date   • HYSTERECTOMY LAPAROSCOPY  2013    utrine and right ovary removed   • HERNIA REPAIR  2007    umblical hernia   • CHOLECYSTECTOMY  2007   • SALPINGO-OOPHORECTOMY, UNILATERAL Right      SOCHX:  reports that she has never smoked. She has never used smokeless tobacco. She reports that she does not drink alcohol or use drugs.  FH: Reviewed with patient, not pertinent to this visit.       Review of Systems   Constitutional: Positive for malaise/fatigue.   HENT: Positive for rhinorrhea.    Respiratory: Positive for cough.    Cardiovascular: Positive for chest pain.   Musculoskeletal: Positive for myalgias.   Neurological: Positive for headaches.   All other systems reviewed and are negative.       Objective:   /80   Pulse 85   Temp 37 °C (98.6 °F) (Temporal)   Resp 16   Ht 1.6 m (5' 3\")   Wt 66.2 kg (146 lb)   LMP 03/29/2016   SpO2 97%   BMI 25.86 kg/m²   Physical Exam   Constitutional: She is oriented to person, place, and time. She appears well-developed and well-nourished.   HENT:   Head: Normocephalic and atraumatic.   Eyes: Pupils are equal, round, and reactive to light.   Cardiovascular: Normal rate, regular rhythm and normal heart sounds.  "   Pulmonary/Chest: Effort normal and breath sounds normal.   Musculoskeletal: Normal range of motion.   Neurological: She is alert and oriented to person, place, and time.   Skin: Skin is warm and dry.   Psychiatric: She has a normal mood and affect.   Nursing note and vitals reviewed.    POCT Flu Negative    Assessment/Plan:   Assessment    1. Upper respiratory tract infection, unspecified type    2. Bronchitis  - MethylPREDNISolone (MEDROL DOSEPAK) 4 MG Tablet Therapy Pack; Take 1 Tab by mouth every day.  Dispense: 1 Kit; Refill: 0  Patient encouraged to get plenty of rest, and drink plenty of fluids.  OTC cough suppressant.    Differential diagnosis, natural history, supportive care, and indications for immediate follow-up discussed.      If not improving in 3-5 days, F/U with PCP or return to UC if symptoms worsen.  Strict ER precautions given if shortness of breath occurs or symptoms worsen with consistent fever.  Patient agreeable to plan.      Devon Anderson PA-C

## 2019-02-27 ENCOUNTER — OFFICE VISIT (OUTPATIENT)
Dept: MEDICAL GROUP | Facility: MEDICAL CENTER | Age: 44
End: 2019-02-27
Payer: COMMERCIAL

## 2019-02-27 VITALS
WEIGHT: 149.91 LBS | HEART RATE: 89 BPM | SYSTOLIC BLOOD PRESSURE: 126 MMHG | RESPIRATION RATE: 16 BRPM | DIASTOLIC BLOOD PRESSURE: 80 MMHG | BODY MASS INDEX: 26.56 KG/M2 | HEIGHT: 63 IN | TEMPERATURE: 97.3 F | OXYGEN SATURATION: 95 %

## 2019-02-27 DIAGNOSIS — F41.9 ANXIETY: ICD-10-CM

## 2019-02-27 PROCEDURE — 99214 OFFICE O/P EST MOD 30 MIN: CPT | Performed by: FAMILY MEDICINE

## 2019-02-27 RX ORDER — DULOXETIN HYDROCHLORIDE 20 MG/1
20 CAPSULE, DELAYED RELEASE ORAL DAILY
Qty: 90 CAP | Refills: 3 | Status: SHIPPED | OUTPATIENT
Start: 2019-02-27 | End: 2019-04-24

## 2019-02-27 NOTE — PROGRESS NOTES
CC:The encounter diagnosis was Anxiety.    HISTORY OF PRESENT ILLNESS: Patient is a 43 y.o. female established patient who presents today to discuss the need for anxiety medications.  Patient is living with some significant anxiety as her daughter was just recently hospitalized with severe depression.  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  She would like to try Cymbalta again since it worked well for her in the past.  We will utilize that and she will notify us via my chart if we need to increase the dose.    Health Maintenance: Completed    Anxiety  This is a reactivation of an old problem for this patient where she is trying to finish her clinicals which is about a year and a half away from being completed and then is dealing with a daughter who has major depression is recently been hospitalized.  Patient finds that she is awoken at least 3 times recently with panic attacks and realize she is at the point she has to do something.  She is having difficulty with concentration and finds herself worrying about things that will probably never happen.  She has used Cymbalta 20 mg 1 daily in the past with good results.  I would like to start there since she knows that she is a Cymbalta responder.  We will have her try this and then she can let us know if this dose is adequate.  She can my chart me with that information.      Patient Active Problem List    Diagnosis Date Noted   • Morphea 01/09/2019   • Vitamin D deficiency 05/22/2018   • SVT (supraventricular tachycardia) ([facility]) 03/23/2018   • Other forms of systemic lupus erythematosus ([facility]) 10/20/2017   • Chronic constipation 12/15/2016   • Reactive hypoglycemia 09/28/2016   • CNS demyelinating disease ([facility]) 04/19/2016   • Vocal cord dysfunction 04/19/2016   • Multiple allergies 12/21/2015   • Anxiety 11/24/2015   • Intractable migraine  without aura and without status migrainosus 11/24/2015   • Ankylosing spondylitis of lumbosacral region (HCC) 07/06/2015   • Overactive bladder 04/17/2015   • Asthma in adult 01/14/2015      Allergies:Savella [kdc:milnacipran+ci pigment blue 63]; Ciprofloxacin; Reglan [kdc:yellow dye+ci pigment blue 63+metoclopramide]; Sulfa drugs; Tetanus antitoxin; and Tramadol    Current Outpatient Prescriptions   Medication Sig Dispense Refill   • DULoxetine (CYMBALTA) 20 MG Cap DR Particles Take 1 Cap by mouth every day. 90 Cap 3   • DULoxetine (CYMBALTA) 20 MG Cap DR Particles Take 1 Cap by mouth every day. 90 Cap 3   • MethylPREDNISolone (MEDROL DOSEPAK) 4 MG Tablet Therapy Pack Take 1 Tab by mouth every day. 1 Kit 0   • ADALIMUMAB SC      • ARNUITY ELLIPTA 200 MCG/ACT AEROSOL POWDER, BREATH ACTIVATED      • ondansetron (ZOFRAN ODT) 4 MG TABLET DISPERSIBLE DIS 1 T ON THE TONGUE Q 8 H PRN  2   • mupirocin (BACTROBAN) 2 % nasal ointment Apply to affected nostril twice daily x 5 days. 1 Tube 0   • cyanocobalamin (VITAMIN B-12) 1000 MCG/ML Solution ADM 1 ML SC WEEKLY  2   • Secukinumab (COSENTYX) 150 MG/ML Solution Prefilled Syringe Inject  as instructed.     • rizatriptan (MAXALT) 10 MG tablet Take 1 Tab by mouth Once PRN. 10 Tab 3   • hydroxychloroquine (PLAQUENIL) 200 MG Tab      • fluticasone (FLONASE) 50 MCG/ACT nasal spray Spray 1 Spray in nose every day.     • EPIPEN 2-NOLAN 0.3 MG/0.3ML Solution Auto-injector solution for injection      • albuterol (VENTOLIN HFA) 108 (90 BASE) MCG/ACT AERS inhalation aerosol Inhale 2 Puffs by mouth every 6 hours as needed for Shortness of Breath. 8.5 g 3     No current facility-administered medications for this visit.        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 She is  with four  children.        Family History   Problem Relation Age of Onset   • Arthritis Brother         psoriatic arthritis   • Arthritis Maternal Grandmother    • Psychiatry Mother         Major depression   • Other Mother         discoid lupus   • Psychiatry Father         Bipolar depression   • Heart Disease Father         CHF   • Hypertension Father    • Diabetes Father    • Other Father         Agent orange exposure 125% disabled   • Diabetes Paternal Uncle    • Stroke Maternal Grandfather    • Diabetes Paternal Grandmother    • Alcohol/Drug Paternal Grandfather    • Cancer Paternal Aunt         gyn cancer   • GI Son    • GI Daughter    • GI Daughter    • GI Daughter         ROS:     - Constitutional:  Negative for fever, chills, unexpected weight change, and fatigue/generalized weakness.    - HEENT:  Negative for headaches, vision changes, hearing changes, ear pain, ear discharge, rhinorrhea, sinus congestion, sore throat, and neck pain.      - Respiratory: Negative for cough, sputum production, chest congestion, dyspnea, wheezing, and crackles.      - Cardiovascular: Negative for chest pain, palpitations, orthopnea, and bilateral lower extremity edema.     - Gastrointestinal: Negative for heartburn, nausea, vomiting, abdominal pain, hematochezia, melena, diarrhea, constipation, and greasy/foul-smelling stools.     - Genitourinary: Negative for dysuria, polyuria, hematuria, pyuria, urinary urgency, and urinary incontinence.     - Musculoskeletal: Negative for myalgias, back pain, and joint pain.     - Skin: Negative for rash, itching, cyanotic skin color change.     - Neurological: Negative for dizziness, tingling, tremors, focal sensory deficit, focal weakness and headaches.     - Endo/Heme/Allergies: Does not bruise/bleed easily.     - Psychiatric/Behavioral: Negative for depression, suicidal/homicidal ideation and memory loss.          - NOTE: All other systems reviewed and are negative, except as in HPI.      Exam:   "  Blood pressure 126/80, pulse 89, temperature 36.3 °C (97.3 °F), temperature source Temporal, resp. rate 16, height 1.6 m (5' 3\"), weight 68 kg (149 lb 14.6 oz), last menstrual period 03/29/2016, SpO2 95 %, not currently breastfeeding. Body mass index is 26.56 kg/m².    General:  Well nourished, well developed female in NAD    Patient was seen for 25 minutes face to face of which, 20 minutes was spent counseling regarding medications interactions and side effects as well as need for starting anxiolytics and continuing him for at least 6 months..    Please note that this dictation was created using voice recognition software. I have made every reasonable attempt to correct obvious errors, but I expect that there are errors of grammar and possibly content that I did not discover before finalizing the note.    Assessment/Plan:  1. Anxiety  Uncontrolled, we will start with Cymbalta 20 mg daily.  Patient will notify me via my chart if she needs a dose increase.         "

## 2019-02-27 NOTE — ASSESSMENT & PLAN NOTE
This is a reactivation of an old problem for this patient where she is trying to finish her clinicals which is about a year and a half away from being completed and then is dealing with a daughter who has major depression is recently been hospitalized.  Patient finds that she is awoken at least 3 times recently with panic attacks and realize she is at the point she has to do something.  She is having difficulty with concentration and finds herself worrying about things that will probably never happen.  She has used Cymbalta 20 mg 1 daily in the past with good results.  I would like to start there since she knows that she is a Cymbalta responder.  We will have her try this and then she can let us know if this dose is adequate.  She can my chart me with that information.

## 2019-03-04 ENCOUNTER — HOSPITAL ENCOUNTER (OUTPATIENT)
Dept: LAB | Facility: MEDICAL CENTER | Age: 44
End: 2019-03-04
Attending: INTERNAL MEDICINE
Payer: COMMERCIAL

## 2019-03-04 LAB
BASOPHILS # BLD AUTO: 0.7 % (ref 0–1.8)
BASOPHILS # BLD: 0.05 K/UL (ref 0–0.12)
EOSINOPHIL # BLD AUTO: 0.11 K/UL (ref 0–0.51)
EOSINOPHIL NFR BLD: 1.6 % (ref 0–6.9)
ERYTHROCYTE [DISTWIDTH] IN BLOOD BY AUTOMATED COUNT: 41.5 FL (ref 35.9–50)
HCT VFR BLD AUTO: 44.4 % (ref 37–47)
HGB BLD-MCNC: 14 G/DL (ref 12–16)
IMM GRANULOCYTES # BLD AUTO: 0.03 K/UL (ref 0–0.11)
IMM GRANULOCYTES NFR BLD AUTO: 0.4 % (ref 0–0.9)
LYMPHOCYTES # BLD AUTO: 2.18 K/UL (ref 1–4.8)
LYMPHOCYTES NFR BLD: 32.1 % (ref 22–41)
MCH RBC QN AUTO: 26.8 PG (ref 27–33)
MCHC RBC AUTO-ENTMCNC: 31.5 G/DL (ref 33.6–35)
MCV RBC AUTO: 84.9 FL (ref 81.4–97.8)
MONOCYTES # BLD AUTO: 0.62 K/UL (ref 0–0.85)
MONOCYTES NFR BLD AUTO: 9.1 % (ref 0–13.4)
NEUTROPHILS # BLD AUTO: 3.81 K/UL (ref 2–7.15)
NEUTROPHILS NFR BLD: 56.1 % (ref 44–72)
NRBC # BLD AUTO: 0 K/UL
NRBC BLD-RTO: 0 /100 WBC
PLATELET # BLD AUTO: 305 K/UL (ref 164–446)
PMV BLD AUTO: 10.6 FL (ref 9–12.9)
RBC # BLD AUTO: 5.23 M/UL (ref 4.2–5.4)
WBC # BLD AUTO: 6.8 K/UL (ref 4.8–10.8)

## 2019-03-04 PROCEDURE — 86003 ALLG SPEC IGE CRUDE XTRC EA: CPT | Mod: 91

## 2019-03-04 PROCEDURE — 36415 COLL VENOUS BLD VENIPUNCTURE: CPT

## 2019-03-04 PROCEDURE — 85025 COMPLETE CBC W/AUTO DIFF WBC: CPT

## 2019-03-04 PROCEDURE — 82785 ASSAY OF IGE: CPT

## 2019-03-06 LAB
A ALTERNATA IGE QN: <0.1 KU/L
A FUMIGATUS IGE QN: <0.1 KU/L
BERMUDA GRASS IGE QN: 0.52 KU/L
BOXELDER IGE QN: <0.1 KU/L
C SPHAEROSPERMUM IGE QN: <0.1 KU/L
CAT DANDER IGE QN: <0.1 KU/L
CMN PIGWEED IGE QN: <0.1 KU/L
COMMON RAGWEED IGE QN: 0.58 KU/L
COTTONWOOD IGE QN: <0.1 KU/L
COW MILK IGE QN: <0.1 KU/L
D FARINAE IGE QN: 1.01 KU/L
D PTERONYSS IGE QN: 0.53 KU/L
DEPRECATED MISC ALLERGEN IGE RAST QL: ABNORMAL
DOG DANDER IGE QN: <0.1 KU/L
IGE SERPL-ACNC: 31 KU/L
M RACEMOSUS IGE QN: <0.1 KU/L
MOUSE EPITH IGE QN: <0.1 KU/L
MT JUNIPER IGE QN: <0.1 KU/L
MUGWORT IGE QN: <0.1 KU/L
OLIVE POLN IGE QN: <0.1 KU/L
P NOTATUM IGE QN: <0.1 KU/L
PEANUT IGE QN: <0.1 KU/L
ROACH IGE QN: <0.1 KU/L
SALTWORT IGE QN: <0.1 KU/L
TIMOTHY IGE QN: 2.55 KU/L
WHITE ELM IGE QN: 0.27 KU/L
WHITE MULBERRY IGE QN: <0.1 KU/L
WHITE OAK IGE QN: <0.1 KU/L

## 2019-04-19 ENCOUNTER — PATIENT MESSAGE (OUTPATIENT)
Dept: MEDICAL GROUP | Facility: MEDICAL CENTER | Age: 44
End: 2019-04-19

## 2019-04-19 NOTE — TELEPHONE ENCOUNTER
From: Joycelyn Byers  To: Monique Bower M.D.  Sent: 4/19/2019 10:51 AM PDT  Subject: Prescription Question    Good morning,    The duloxetine 20 mg is partially effective at controlling my anxiety, but I am still having issues with concentration, waking from sleep, and about 2-4 impending panic symptoms each week (I can feel it starting and then it progresses at a muted intensity but it is still distressing).     The normal therapeutic dose is 40 mg, is it okay to try to increase my dose to 40 mg to see if it helps with the residual S/S and increases relief?    Joycelyn

## 2019-04-24 ENCOUNTER — PATIENT MESSAGE (OUTPATIENT)
Dept: MEDICAL GROUP | Facility: MEDICAL CENTER | Age: 44
End: 2019-04-24

## 2019-04-24 RX ORDER — DULOXETIN HYDROCHLORIDE 20 MG/1
40 CAPSULE, DELAYED RELEASE ORAL DAILY
Qty: 180 CAP | Refills: 3 | Status: SHIPPED | OUTPATIENT
Start: 2019-04-24 | End: 2019-07-05 | Stop reason: SINTOL

## 2019-04-24 NOTE — TELEPHONE ENCOUNTER
From: Joycelyn Byers  To: Monique Bower M.D.  Sent: 4/24/2019 9:23 AM PDT  Subject: RE: Prescription Question    The 40 mg/day is working much better and panic attacks have dissipated. Waking from sleep is also much easier. Concentration is getting better also. Can you please send in a new script with new sig to [facility] on [address]?    Thank you!  Joycelyn Byers    ----- Message -----  From: Monique Bower M.D.  Sent: 4/19/19, 12:56 PM  To: Joycelyn Byers  Subject: RE: Prescription Question    Teto Hirsch  It would absolutely be okay to go up to 40 mg dose by taking 2 of your 20s. That dose may be most appropriate for you. If that seems to be helpful, let me know and I will send a prescription for the 40 mg daily.  Thank you,  Dr. FINLYE      ----- Message -----   From: Joycelyn Byers   Sent: 4/19/2019 10:51 AM PDT   To: Monique Bower M.D.  Subject: Prescription Question    Good morning,    The duloxetine 20 mg is partially effective at controlling my anxiety, but I am still having issues with concentration, waking from sleep, and about 2-4 impending panic symptoms each week (I can feel it starting and then it progresses at a muted intensity but it is still distressing).     The normal therapeutic dose is 40 mg, is it okay to try to increase my dose to 40 mg to see if it helps with the residual S/S and increases relief?    Joycelyn

## 2019-06-03 ENCOUNTER — OFFICE VISIT (OUTPATIENT)
Dept: URGENT CARE | Facility: PHYSICIAN GROUP | Age: 44
End: 2019-06-03
Payer: COMMERCIAL

## 2019-06-03 VITALS
DIASTOLIC BLOOD PRESSURE: 84 MMHG | TEMPERATURE: 98.8 F | SYSTOLIC BLOOD PRESSURE: 128 MMHG | HEIGHT: 63 IN | OXYGEN SATURATION: 97 % | WEIGHT: 149 LBS | HEART RATE: 93 BPM | BODY MASS INDEX: 26.4 KG/M2

## 2019-06-03 DIAGNOSIS — K04.7 DENTAL ABSCESS: ICD-10-CM

## 2019-06-03 PROCEDURE — 99214 OFFICE O/P EST MOD 30 MIN: CPT | Performed by: FAMILY MEDICINE

## 2019-06-03 RX ORDER — CHLORHEXIDINE GLUCONATE 2% 2 G/100ML
5 SOLUTION TOPICAL 2 TIMES DAILY
Qty: 1 BOTTLE | Refills: 0 | Status: SHIPPED | OUTPATIENT
Start: 2019-06-03 | End: 2020-03-03

## 2019-06-03 RX ORDER — AMOXICILLIN AND CLAVULANATE POTASSIUM 875; 125 MG/1; MG/1
1 TABLET, FILM COATED ORAL 2 TIMES DAILY
Qty: 14 TAB | Refills: 0 | Status: SHIPPED | OUTPATIENT
Start: 2019-06-03 | End: 2019-06-10

## 2019-06-04 NOTE — PROGRESS NOTES
"Subjective:   oJycelyn Lozada a 44 y.o. female who presents for Pharyngitis (x1day)    Pharyngitis    This is a new problem. The current episode started yesterday. The problem has been gradually worsening. Neither side of throat is experiencing more pain than the other. The pain is severe. Associated symptoms include swollen glands and trouble swallowing. Pertinent negatives include no headaches or hoarse voice.     Review of Systems   HENT: Positive for trouble swallowing. Negative for hoarse voice.    Neurological: Negative for headaches.     Allergies   Allergen Reactions   • Savella [Kdc:Milnacipran+Ci Pigment Blue 63] Myalgia   • Ciprofloxacin    • Reglan [Kdc:Yellow Dye+Ci Pigment Blue 63+Metoclopramide]    • Sulfa Drugs Nausea     GI upset   • Tetanus Antitoxin      Mild spasms and pain     • Tramadol Rash and Shortness of Breath      Objective:   /84   Pulse 93   Temp 37.1 °C (98.8 °F) (Temporal)   Ht 1.6 m (5' 3\")   Wt 67.6 kg (149 lb)   LMP 03/29/2016   SpO2 97%   BMI 26.39 kg/m²   Physical Exam   Constitutional: She is oriented to person, place, and time. She appears well-developed and well-nourished. No distress.   HENT:   Head: Normocephalic and atraumatic.   Mouth/Throat: Uvula is midline. Posterior oropharyngeal edema and posterior oropharyngeal erythema present. No tonsillar abscesses.   Eyes: Pupils are equal, round, and reactive to light. Conjunctivae and EOM are normal.   Cardiovascular: Normal rate and regular rhythm.    No murmur heard.  Pulmonary/Chest: Effort normal and breath sounds normal. No respiratory distress.   Abdominal: Soft. She exhibits no distension. There is no tenderness.   Neurological: She is alert and oriented to person, place, and time. She has normal reflexes. No sensory deficit.   Skin: Skin is warm and dry.   Psychiatric: She has a normal mood and affect.     Assessment/Plan:   Assessment    1. Dental abscess  - amoxicillin-clavulanate (AUGMENTIN) " 875-125 MG Tab; Take 1 Tab by mouth 2 times a day for 7 days.  Dispense: 14 Tab; Refill: 0  - Chlorhexidine Gluconate 2 % Solution; 5 mL by Apply externally route 2 Times a Day.  Dispense: 1 Bottle; Refill: 0    Differential diagnosis, natural history, supportive care, and indications for immediate follow-up discussed.  Return if symptoms worsen or fail to improve.

## 2019-06-05 ASSESSMENT — ENCOUNTER SYMPTOMS
TROUBLE SWALLOWING: 1
SWOLLEN GLANDS: 1
HOARSE VOICE: 0
HEADACHES: 0

## 2019-07-05 ENCOUNTER — PATIENT MESSAGE (OUTPATIENT)
Dept: MEDICAL GROUP | Facility: MEDICAL CENTER | Age: 44
End: 2019-07-05

## 2019-07-05 RX ORDER — ESCITALOPRAM OXALATE 10 MG/1
10 TABLET ORAL DAILY
Qty: 90 TAB | Refills: 1 | Status: SHIPPED | OUTPATIENT
Start: 2019-07-05 | End: 2019-07-17

## 2019-07-05 NOTE — TELEPHONE ENCOUNTER
From: Joycelyn Byers  To: Monique Bower M.D.  Sent: 7/5/2019 9:35 AM PDT  Subject: Prescription Question    I am having severe hot flashes, with the worst being Q5-10 min and they are disrupting sleep. They suck. Is it possible to change my anxiety meds to a non SNRI to decrease the NE reuptake inhibition? The 40 mg was also causing increased BP, so my body seems to be sensitive to it in my older age. Can we try Escitalopram 10 mg PO QD? That is the only SSRI that does not have CV side effects and can treat anxiety and hot flashes. I made an appt with you on Jul 17th to address it further.

## 2019-07-17 ENCOUNTER — OFFICE VISIT (OUTPATIENT)
Dept: MEDICAL GROUP | Facility: MEDICAL CENTER | Age: 44
End: 2019-07-17
Payer: COMMERCIAL

## 2019-07-17 VITALS
SYSTOLIC BLOOD PRESSURE: 112 MMHG | DIASTOLIC BLOOD PRESSURE: 64 MMHG | BODY MASS INDEX: 28.01 KG/M2 | HEART RATE: 81 BPM | OXYGEN SATURATION: 98 % | RESPIRATION RATE: 14 BRPM | TEMPERATURE: 98.2 F | HEIGHT: 63 IN | WEIGHT: 158.07 LBS

## 2019-07-17 DIAGNOSIS — R35.89 POLYURIA: ICD-10-CM

## 2019-07-17 DIAGNOSIS — F41.9 ANXIETY: ICD-10-CM

## 2019-07-17 PROCEDURE — 99213 OFFICE O/P EST LOW 20 MIN: CPT | Performed by: FAMILY MEDICINE

## 2019-07-17 RX ORDER — ESCITALOPRAM OXALATE 20 MG/1
20 TABLET ORAL DAILY
Qty: 90 TAB | Refills: 3 | Status: SHIPPED | OUTPATIENT
Start: 2019-07-17 | End: 2019-11-11

## 2019-07-17 RX ORDER — ESCITALOPRAM OXALATE 20 MG/1
20 TABLET ORAL DAILY
Qty: 30 TAB | Refills: 0 | Status: SHIPPED | OUTPATIENT
Start: 2019-07-17 | End: 2019-07-17 | Stop reason: SDUPTHER

## 2019-07-17 NOTE — ASSESSMENT & PLAN NOTE
Patient notes that she is having problems with polyuria and polyphagia and having a very difficult time with weight loss.  She has a previous A1c of 5.7.  There is a strong family history of diabetes.  Her father who is an agent orange survivor has diabetes but also his mother and brother also have it and they were not exposed to agent orange.  This patient needs to do some blood work and we will get that drawn for her and notify her of results via [employer] [facility] [employer].

## 2019-07-17 NOTE — ASSESSMENT & PLAN NOTE
This is a chronic health problem for this patient for which she is tried the escitalopram for 2 weeks and found that it really did help her hot flashes.  She would like to try going up on the medication to see if she can get better control of her anxiety.  I will write that prescription today.  She can then let me know how that is working for her and if we have an appropriate dose at that time.

## 2019-07-18 NOTE — PROGRESS NOTES
CC:Diagnoses of Anxiety and Polyuria were pertinent to this visit.    HISTORY OF PRESENT ILLNESS: Patient is a 44 y.o. 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    Health Maintenance: Completed    Anxiety  This is a chronic health problem for this patient for which she is tried the escitalopram for 2 weeks and found that it really did help her hot flashes.  She would like to try going up on the medication to see if she can get better control of her anxiety.  I will write that prescription today.  She can then let me know how that is working for her and if we have an appropriate dose at that time.    Polyuria  Patient notes that she is having problems with polyuria and polyphagia and having a very difficult time with weight loss.  She has a previous A1c of 5.7.  There is a strong family history of diabetes.  Her father who is an agent orange survivor has diabetes but also his mother and brother also have it and they were not exposed to agent orange.  This patient needs to do some blood work and we will get that drawn for her and notify her of results via [employer].      Patient Active Problem List    Diagnosis Date Noted   • Polyuria 07/17/2019   • Morphea 01/09/2019   • Vitamin D deficiency 05/22/2018   • SVT (supraventricular tachycardia) ([facility]) 03/23/2018   • Other forms of systemic lupus erythematosus ([facility]) 10/20/2017   • Chronic constipation 12/15/2016   • Reactive hypoglycemia 09/28/2016   • CNS demyelinating disease ([facility]) 04/19/2016   • Vocal cord dysfunction 04/19/2016   • Multiple allergies 12/21/2015   • Anxiety 11/24/2015   • Intractable migraine without aura and without status migrainosus 11/24/2015   • Ankylosing spondylitis of lumbosacral region ([facility]) 07/06/2015   •  Overactive bladder 04/17/2015   • Asthma in adult 01/14/2015      Allergies:Savella [kdc:milnacipran+ci pigment blue 63]; Ciprofloxacin; Reglan [kdc:yellow dye+ci pigment blue 63+metoclopramide]; Sulfa drugs; Tetanus antitoxin; and Tramadol    Current Outpatient Prescriptions   Medication Sig Dispense Refill   • Etanercept (ENBREL) 25 MG Recon Soln Inject 1 Each as instructed every 7 days. Prescribed by Dr. Lynch 12 Each 3   • escitalopram (LEXAPRO) 20 MG tablet Take 1 Tab by mouth every day. 90 Tab 3   • Chlorhexidine Gluconate 2 % Solution 5 mL by Apply externally route 2 Times a Day. 1 Bottle 0   • ADALIMUMAB SC      • ARNUITY ELLIPTA 200 MCG/ACT AEROSOL POWDER, BREATH ACTIVATED      • ondansetron (ZOFRAN ODT) 4 MG TABLET DISPERSIBLE DIS 1 T ON THE TONGUE Q 8 H PRN  2   • mupirocin (BACTROBAN) 2 % nasal ointment Apply to affected nostril twice daily x 5 days. 1 Tube 0   • cyanocobalamin (VITAMIN B-12) 1000 MCG/ML Solution ADM 1 ML SC WEEKLY  2   • rizatriptan (MAXALT) 10 MG tablet Take 1 Tab by mouth Once PRN. 10 Tab 3   • hydroxychloroquine (PLAQUENIL) 200 MG Tab      • fluticasone (FLONASE) 50 MCG/ACT nasal spray Spray 1 Spray in nose every day.     • EPIPEN 2-NOLAN 0.3 MG/0.3ML Solution Auto-injector solution for injection      • albuterol (VENTOLIN HFA) 108 (90 BASE) MCG/ACT AERS inhalation aerosol Inhale 2 Puffs by mouth every 6 hours as needed for Shortness of Breath. 8.5 g 3     No current facility-administered medications for this visit.        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 She is  with four children.        Family History   Problem Relation Age of Onset   • Arthritis Brother         psoriatic arthritis   • Arthritis Maternal Grandmother    • Psychiatry Mother         Major depression  "  • Other Mother         discoid lupus   • Psychiatry Father         Bipolar depression   • Heart Disease Father         CHF   • Hypertension Father    • Diabetes Father    • Other Father         Agent orange exposure 125% disabled   • Diabetes Paternal Uncle    • Stroke Maternal Grandfather    • Diabetes Paternal Grandmother    • Alcohol/Drug Paternal Grandfather    • Cancer Paternal Aunt         gyn cancer   • GI Son    • GI Daughter    • GI Daughter    • GI Daughter         ROS:     - Constitutional:  Negative for fever, chills, unexpected weight change, and fatigue/generalized weakness.    - HEENT:  Negative for headaches, vision changes, hearing changes, ear pain, ear discharge, rhinorrhea, sinus congestion, sore throat, and neck pain.      - Respiratory: Negative for cough, sputum production, chest congestion, dyspnea, wheezing, and crackles.      - Cardiovascular: Negative for chest pain, palpitations, orthopnea, and bilateral lower extremity edema.     - Gastrointestinal: Negative for heartburn, nausea, vomiting, abdominal pain, hematochezia, melena, diarrhea, constipation, and greasy/foul-smelling stools.     - Genitourinary: Positive for polyuria and polyphagia without polydipsia.      - Musculoskeletal: Negative for myalgias, back pain, and joint pain.     - Skin: Negative for rash, itching, cyanotic skin color change.     - Neurological: Negative for dizziness, tingling, tremors, focal sensory deficit, focal weakness and headaches.     - Endo/Heme/Allergies: Does not bruise/bleed easily.     - Psychiatric/Behavioral: Anxiety improved with institution of medication.  Denies depression, suicidal or homicidal ideation.           - NOTE: All other systems reviewed and are negative, except as in HPI.      Exam:    /64 (BP Location: Left arm, Patient Position: Sitting, BP Cuff Size: Adult)   Pulse 81   Temp 36.8 °C (98.2 °F) (Temporal)   Resp 14   Ht 1.6 m (5' 3\")   Wt 71.7 kg (158 lb 1.1 oz)   " SpO2 98%  Body mass index is 28 kg/m².    General:  Well nourished, well developed female in NAD  Head is grossly normal.    Patient was seen for 20 minutes face to face of which, 15 minutes was spent counseling regarding medications interactions and side effects also a discussion of the family history of diabetes..    Please note that this dictation was created using voice recognition software. I have made every reasonable attempt to correct obvious errors, but I expect that there are errors of grammar and possibly content that I did not discover before finalizing the note.    Assessment/Plan:  1. Anxiety  Controlled with use of generic Lexapro at 10 mg.  We are going to try going up to 20 mg to see if this is even better.  It definitely is helping with her hot flashes.    2. Polyuria  Uncontrolled, we will screen her for diabetes and include an A1c.  I will notify the patient of these results via [employer]  - Comp Metabolic Panel; Future  - HEMOGLOBIN A1C; Future

## 2019-07-24 ENCOUNTER — HOSPITAL ENCOUNTER (OUTPATIENT)
Dept: LAB | Facility: MEDICAL CENTER | Age: 44
End: 2019-07-24
Attending: FAMILY MEDICINE
Payer: COMMERCIAL

## 2019-07-24 DIAGNOSIS — R35.89 POLYURIA: ICD-10-CM

## 2019-07-24 LAB
ALBUMIN SERPL BCP-MCNC: 3.6 G/DL (ref 3.2–4.9)
ALBUMIN/GLOB SERPL: 1 G/DL
ALP SERPL-CCNC: 53 U/L (ref 30–99)
ALT SERPL-CCNC: 19 U/L (ref 2–50)
ANION GAP SERPL CALC-SCNC: 5 MMOL/L (ref 0–11.9)
AST SERPL-CCNC: 21 U/L (ref 12–45)
BILIRUB SERPL-MCNC: 0.3 MG/DL (ref 0.1–1.5)
BUN SERPL-MCNC: 19 MG/DL (ref 8–22)
CALCIUM SERPL-MCNC: 8.8 MG/DL (ref 8.5–10.5)
CHLORIDE SERPL-SCNC: 104 MMOL/L (ref 96–112)
CO2 SERPL-SCNC: 27 MMOL/L (ref 20–33)
CREAT SERPL-MCNC: 0.81 MG/DL (ref 0.5–1.4)
EST. AVERAGE GLUCOSE BLD GHB EST-MCNC: 117 MG/DL
GLOBULIN SER CALC-MCNC: 3.5 G/DL (ref 1.9–3.5)
GLUCOSE SERPL-MCNC: 87 MG/DL (ref 65–99)
HBA1C MFR BLD: 5.7 % (ref 0–5.6)
POTASSIUM SERPL-SCNC: 4.2 MMOL/L (ref 3.6–5.5)
PROT SERPL-MCNC: 7.1 G/DL (ref 6–8.2)
SODIUM SERPL-SCNC: 136 MMOL/L (ref 135–145)

## 2019-07-24 PROCEDURE — 83036 HEMOGLOBIN GLYCOSYLATED A1C: CPT | Mod: GA

## 2019-07-24 PROCEDURE — 36415 COLL VENOUS BLD VENIPUNCTURE: CPT

## 2019-07-24 PROCEDURE — 80053 COMPREHEN METABOLIC PANEL: CPT

## 2019-08-11 ENCOUNTER — HOSPITAL ENCOUNTER (OUTPATIENT)
Facility: MEDICAL CENTER | Age: 44
End: 2019-08-11
Attending: FAMILY MEDICINE
Payer: COMMERCIAL

## 2019-08-11 ENCOUNTER — OFFICE VISIT (OUTPATIENT)
Dept: URGENT CARE | Facility: PHYSICIAN GROUP | Age: 44
End: 2019-08-11
Payer: COMMERCIAL

## 2019-08-11 VITALS
RESPIRATION RATE: 16 BRPM | BODY MASS INDEX: 27.29 KG/M2 | HEIGHT: 63 IN | HEART RATE: 116 BPM | TEMPERATURE: 98.7 F | SYSTOLIC BLOOD PRESSURE: 130 MMHG | OXYGEN SATURATION: 97 % | DIASTOLIC BLOOD PRESSURE: 68 MMHG | WEIGHT: 154 LBS

## 2019-08-11 DIAGNOSIS — R23.3 EASY BRUISING: ICD-10-CM

## 2019-08-11 DIAGNOSIS — J02.0 STREP PHARYNGITIS: ICD-10-CM

## 2019-08-11 LAB
BASOPHILS # BLD AUTO: 1.2 % (ref 0–1.8)
BASOPHILS # BLD: 0.08 K/UL (ref 0–0.12)
EOSINOPHIL # BLD AUTO: 0.15 K/UL (ref 0–0.51)
EOSINOPHIL NFR BLD: 2.2 % (ref 0–6.9)
ERYTHROCYTE [DISTWIDTH] IN BLOOD BY AUTOMATED COUNT: 42.5 FL (ref 35.9–50)
HCT VFR BLD AUTO: 45.7 % (ref 37–47)
HGB BLD-MCNC: 14.4 G/DL (ref 12–16)
IMM GRANULOCYTES # BLD AUTO: 0.02 K/UL (ref 0–0.11)
IMM GRANULOCYTES NFR BLD AUTO: 0.3 % (ref 0–0.9)
LYMPHOCYTES # BLD AUTO: 1.47 K/UL (ref 1–4.8)
LYMPHOCYTES NFR BLD: 21.2 % (ref 22–41)
MCH RBC QN AUTO: 26.9 PG (ref 27–33)
MCHC RBC AUTO-ENTMCNC: 31.5 G/DL (ref 33.6–35)
MCV RBC AUTO: 85.4 FL (ref 81.4–97.8)
MONOCYTES # BLD AUTO: 1.06 K/UL (ref 0–0.85)
MONOCYTES NFR BLD AUTO: 15.3 % (ref 0–13.4)
NEUTROPHILS # BLD AUTO: 4.14 K/UL (ref 2–7.15)
NEUTROPHILS NFR BLD: 59.8 % (ref 44–72)
NRBC # BLD AUTO: 0 K/UL
NRBC BLD-RTO: 0 /100 WBC
PLATELET # BLD AUTO: 279 K/UL (ref 164–446)
PMV BLD AUTO: 10.6 FL (ref 9–12.9)
RBC # BLD AUTO: 5.35 M/UL (ref 4.2–5.4)
WBC # BLD AUTO: 6.9 K/UL (ref 4.8–10.8)

## 2019-08-11 PROCEDURE — 85025 COMPLETE CBC W/AUTO DIFF WBC: CPT

## 2019-08-11 PROCEDURE — 99214 OFFICE O/P EST MOD 30 MIN: CPT | Performed by: FAMILY MEDICINE

## 2019-08-11 RX ORDER — AMOXICILLIN 500 MG/1
500 CAPSULE ORAL 3 TIMES DAILY
Qty: 30 CAP | Refills: 0 | Status: SHIPPED | OUTPATIENT
Start: 2019-08-11 | End: 2020-02-11

## 2019-08-11 ASSESSMENT — ENCOUNTER SYMPTOMS
BRUISES/BLEEDS EASILY: 1
SORE THROAT: 1
SHORTNESS OF BREATH: 0
VOMITING: 0
HEADACHES: 0
FEVER: 0

## 2019-08-11 NOTE — PROGRESS NOTES
Subjective:     Joycelyn Byers is a 44 y.o. female who presents for Sore Throat (sore throat, bodyaches, ear pain starting this morning.  dx strep X2 days ago)    HPI  Pt presents for evaluation of a new problem   Patient with sore throat which just started this morning   diagnosed with strep pharyngitis 2 days ago and is currently on medication for it  Patient having sore throat which is constant, worse with swallowing, and worsening  Has associated body aches and ear pain    Pt with easy bruising the past 2 weeks  Has history of ankylosing spondylitis and lupus  She is on Enbrel for this  She has started no new medications lately  Last platelet count was 5 months ago and was 305  Has not had easy bruising before    Review of Systems   Constitutional: Negative for fever.   HENT: Positive for ear pain and sore throat.    Respiratory: Negative for shortness of breath.    Cardiovascular: Negative for chest pain.   Gastrointestinal: Negative for vomiting.   Skin: Negative for rash.   Neurological: Negative for headaches.   Endo/Heme/Allergies: Bruises/bleeds easily.       PMH:  has a past medical history of Allergy, unspecified not elsewhere classified, Ankylosing spondylitis of lumbosacral region (HCC) (7/6/2015), Anxiety (11/24/2015), GERD (gastroesophageal reflux disease), Headache, classical migraine, Hiatal hernia, Intractable migraine without aura and without status migrainosus (11/24/2015), Lupus ([facility]), Morphea (1/9/2019), Oropharyngeal dysphagia, Other forms of systemic lupus erythematosus (HCC) (10/20/2017), Overactive bladder, Peptic ulcer, Polyuria (7/17/2019), Prediabetes (12/8/2016), and Vocal cord dysfunction.  MEDS:   Current Outpatient Medications:   •  Etanercept (ENBREL) 25 MG Recon Soln, Inject 1 Each as instructed every 7 days. Prescribed by Dr. Lynch, Disp: 12 Each, Rfl: 3  •  escitalopram (LEXAPRO) 20 MG tablet, Take 1 Tab by mouth every day., Disp: 90 Tab, Rfl: 3  •   Chlorhexidine Gluconate 2 % Solution, 5 mL by Apply externally route 2 Times a Day., Disp: 1 Bottle, Rfl: 0  •  ADALIMUMAB SC, , Disp: , Rfl:   •  ARNUITY ELLIPTA 200 MCG/ACT AEROSOL POWDER, BREATH ACTIVATED, , Disp: , Rfl:   •  ondansetron (ZOFRAN ODT) 4 MG TABLET DISPERSIBLE, DIS 1 T ON THE TONGUE Q 8 H PRN, Disp: , Rfl: 2  •  mupirocin (BACTROBAN) 2 % nasal ointment, Apply to affected nostril twice daily x 5 days., Disp: 1 Tube, Rfl: 0  •  cyanocobalamin (VITAMIN B-12) 1000 MCG/ML Solution, ADM 1 ML SC WEEKLY, Disp: , Rfl: 2  •  rizatriptan (MAXALT) 10 MG tablet, Take 1 Tab by mouth Once PRN., Disp: 10 Tab, Rfl: 3  •  hydroxychloroquine (PLAQUENIL) 200 MG Tab, , Disp: , Rfl:   •  fluticasone (FLONASE) 50 MCG/ACT nasal spray, Spray 1 Spray in nose every day., Disp: , Rfl:   •  EPIPEN 2-NOLAN 0.3 MG/0.3ML Solution Auto-injector solution for injection, , Disp: , Rfl:   •  albuterol (VENTOLIN HFA) 108 (90 BASE) MCG/ACT AERS inhalation aerosol, Inhale 2 Puffs by mouth every 6 hours as needed for Shortness of Breath., Disp: 8.5 g, Rfl: 3  ALLERGIES:   Allergies   Allergen Reactions   • Savella [Kdc:Milnacipran+Ci Pigment Blue 63] Myalgia   • Ciprofloxacin    • Reglan [Kdc:Yellow Dye+Ci Pigment Blue 63+Metoclopramide]    • Sulfa Drugs Nausea     GI upset   • Tetanus Antitoxin      Mild spasms and pain     • Tramadol Rash and Shortness of Breath     SURGHX:   Past Surgical History:   Procedure Laterality Date   • HYSTERECTOMY LAPAROSCOPY  2013    utrine and right ovary removed   • HERNIA REPAIR  2007    umblical hernia   • CHOLECYSTECTOMY  2007   • SALPINGO-OOPHORECTOMY, UNILATERAL Right      SOCHX:  reports that she has never smoked. She has never used smokeless tobacco. She reports that she does not drink alcohol or use drugs.  FH: Family history was reviewed, not contributing to acute illness     Objective:   /68 (BP Location: Right arm, Patient Position: Sitting, BP Cuff Size: Small adult)   Pulse (!) 116    "Temp 37.1 °C (98.7 °F) (Temporal)   Resp 16   Ht 1.6 m (5' 3\")   Wt 69.9 kg (154 lb)   LMP 03/29/2016   SpO2 97%   BMI 27.28 kg/m²     Physical Exam   Constitutional: She appears well-developed and well-nourished. No distress.   HENT:   Head: Normocephalic and atraumatic.   Right Ear: Tympanic membrane, external ear and ear canal normal.   Left Ear: Tympanic membrane, external ear and ear canal normal.   Nose: Nose normal.   Posterior pharynx moderately erythematous without exudate present   Eyes: Conjunctivae and EOM are normal. Right eye exhibits no discharge. Left eye exhibits no discharge. No scleral icterus.   Neck: Normal range of motion. No tracheal deviation present.   Cardiovascular: Normal rate and regular rhythm.   Pulmonary/Chest: Effort normal and breath sounds normal. No respiratory distress. She has no wheezes. She has no rales.   Neurological: She is alert. Coordination normal.   Skin: Skin is warm and dry. She is not diaphoretic.   Multiple bruises throughout body and worse over bilateral thighs   Psychiatric: She has a normal mood and affect. Her behavior is normal. Judgment and thought content normal.       Assessment/Plan:   Assessment    1. Easy bruising  Patient with new easy bruising the past 2 weeks.  Will check CBC to evaluate platelet level and patient will follow-up with her rheumatologist tomorrow.  - CBC WITH DIFFERENTIAL; Future    2. Strep pharyngitis  Patient with new onset pharyngitis and lives in household with  who has strep pharyngitis.  Will treat with amoxicillin.  Follow-up as needed.  - amoxicillin (AMOXIL) 500 MG Cap; Take 1 Cap by mouth 3 times a day.  Dispense: 30 Cap; Refill: 0    Other orders  - coenzyme Q-10 30 MG capsule; Take 60 mg by mouth every day.        "

## 2019-09-09 ENCOUNTER — OFFICE VISIT (OUTPATIENT)
Dept: MEDICAL GROUP | Facility: MEDICAL CENTER | Age: 44
End: 2019-09-09
Payer: COMMERCIAL

## 2019-09-09 ENCOUNTER — HOSPITAL ENCOUNTER (OUTPATIENT)
Dept: LAB | Facility: MEDICAL CENTER | Age: 44
End: 2019-09-09
Attending: FAMILY MEDICINE
Payer: COMMERCIAL

## 2019-09-09 VITALS
DIASTOLIC BLOOD PRESSURE: 68 MMHG | WEIGHT: 161.4 LBS | OXYGEN SATURATION: 97 % | HEIGHT: 63 IN | BODY MASS INDEX: 28.6 KG/M2 | SYSTOLIC BLOOD PRESSURE: 98 MMHG | TEMPERATURE: 99.2 F | HEART RATE: 77 BPM

## 2019-09-09 DIAGNOSIS — T14.8XXA BRUISING: ICD-10-CM

## 2019-09-09 DIAGNOSIS — E55.9 VITAMIN D DEFICIENCY: ICD-10-CM

## 2019-09-09 DIAGNOSIS — R53.83 OTHER FATIGUE: ICD-10-CM

## 2019-09-09 LAB
25(OH)D3 SERPL-MCNC: 24 NG/ML (ref 30–100)
ALBUMIN SERPL BCP-MCNC: 4.1 G/DL (ref 3.2–4.9)
ALBUMIN/GLOB SERPL: 1.1 G/DL
ALP SERPL-CCNC: 59 U/L (ref 30–99)
ALT SERPL-CCNC: 15 U/L (ref 2–50)
ANION GAP SERPL CALC-SCNC: 7 MMOL/L (ref 0–11.9)
AST SERPL-CCNC: 16 U/L (ref 12–45)
BASOPHILS # BLD AUTO: 1.4 % (ref 0–1.8)
BASOPHILS # BLD: 0.1 K/UL (ref 0–0.12)
BILIRUB SERPL-MCNC: 0.4 MG/DL (ref 0.1–1.5)
BUN SERPL-MCNC: 15 MG/DL (ref 8–22)
CALCIUM SERPL-MCNC: 9.2 MG/DL (ref 8.5–10.5)
CHLORIDE SERPL-SCNC: 102 MMOL/L (ref 96–112)
CO2 SERPL-SCNC: 27 MMOL/L (ref 20–33)
CREAT SERPL-MCNC: 0.73 MG/DL (ref 0.5–1.4)
EOSINOPHIL # BLD AUTO: 0.15 K/UL (ref 0–0.51)
EOSINOPHIL NFR BLD: 2.1 % (ref 0–6.9)
ERYTHROCYTE [DISTWIDTH] IN BLOOD BY AUTOMATED COUNT: 42 FL (ref 35.9–50)
FASTING STATUS PATIENT QL REPORTED: NORMAL
GLOBULIN SER CALC-MCNC: 3.6 G/DL (ref 1.9–3.5)
GLUCOSE SERPL-MCNC: 88 MG/DL (ref 65–99)
HCT VFR BLD AUTO: 44.6 % (ref 37–47)
HETEROPH AB SER QL LA: NEGATIVE
HGB BLD-MCNC: 13.7 G/DL (ref 12–16)
IMM GRANULOCYTES # BLD AUTO: 0.02 K/UL (ref 0–0.11)
IMM GRANULOCYTES NFR BLD AUTO: 0.3 % (ref 0–0.9)
INT CON NEG: NORMAL
INT CON POS: NORMAL
LYMPHOCYTES # BLD AUTO: 1.71 K/UL (ref 1–4.8)
LYMPHOCYTES NFR BLD: 24.5 % (ref 22–41)
MCH RBC QN AUTO: 26 PG (ref 27–33)
MCHC RBC AUTO-ENTMCNC: 30.7 G/DL (ref 33.6–35)
MCV RBC AUTO: 84.8 FL (ref 81.4–97.8)
MONOCYTES # BLD AUTO: 0.61 K/UL (ref 0–0.85)
MONOCYTES NFR BLD AUTO: 8.7 % (ref 0–13.4)
NEUTROPHILS # BLD AUTO: 4.39 K/UL (ref 2–7.15)
NEUTROPHILS NFR BLD: 63 % (ref 44–72)
NRBC # BLD AUTO: 0 K/UL
NRBC BLD-RTO: 0 /100 WBC
PLATELET # BLD AUTO: 307 K/UL (ref 164–446)
PMV BLD AUTO: 10.5 FL (ref 9–12.9)
POTASSIUM SERPL-SCNC: 4.1 MMOL/L (ref 3.6–5.5)
PROT SERPL-MCNC: 7.7 G/DL (ref 6–8.2)
RBC # BLD AUTO: 5.26 M/UL (ref 4.2–5.4)
SODIUM SERPL-SCNC: 136 MMOL/L (ref 135–145)
TSH SERPL DL<=0.005 MIU/L-ACNC: 1.98 UIU/ML (ref 0.38–5.33)
VIT B12 SERPL-MCNC: 1157 PG/ML (ref 211–911)
WBC # BLD AUTO: 7 K/UL (ref 4.8–10.8)

## 2019-09-09 PROCEDURE — 86308 HETEROPHILE ANTIBODY SCREEN: CPT | Performed by: FAMILY MEDICINE

## 2019-09-09 PROCEDURE — 84597 ASSAY OF VITAMIN K: CPT

## 2019-09-09 PROCEDURE — 85025 COMPLETE CBC W/AUTO DIFF WBC: CPT

## 2019-09-09 PROCEDURE — 82306 VITAMIN D 25 HYDROXY: CPT

## 2019-09-09 PROCEDURE — 84443 ASSAY THYROID STIM HORMONE: CPT

## 2019-09-09 PROCEDURE — 99214 OFFICE O/P EST MOD 30 MIN: CPT | Performed by: FAMILY MEDICINE

## 2019-09-09 PROCEDURE — 36415 COLL VENOUS BLD VENIPUNCTURE: CPT

## 2019-09-09 PROCEDURE — 80053 COMPREHEN METABOLIC PANEL: CPT

## 2019-09-09 PROCEDURE — 82607 VITAMIN B-12: CPT

## 2019-09-09 NOTE — PROGRESS NOTES
CC:Diagnoses of Other fatigue, Vitamin D deficiency, and Bruising were pertinent to this visit.    HISTORY OF PRESENT ILLNESS: Patient is a 44 y.o. female established patient who presents today to talk about her chronic health problems and this fatigue that is been overwhelming now for about 4 weeks.  Patient had blood work done that did not really reveal an etiology for the fatigue and bruising.    Health Maintenance: Completed    Vitamin D deficiency  This is a chronic health problem for this patient where we need to check to make sure that she is absorbing her vitamin D.  She does have ankylosing spondylitis and has known increased bowel movements is worried that she is having poor absorption of nutrients.    Bruising  This is a new problem for this patient ongoing for approximately 4 weeks.  She had extensive bruising and now over the weekend cut her finger and had it continued to bleed for over 45 minutes.  At the time the bruising was first noted she had a CBC done at the urgent care and it showed normal platelets and normal hemoglobin and white cells.  I think we also need to add a vitamin K level and we will do that.    Other fatigue  This is a complaint the patient has noted ongoing for at least 4 weeks.  I would like to check some blood tests on her and we will do a Monospot today.  She is just had fatigue and malaise for about 4 weeks of unclear etiology.  Her Monospot here in the office is negative.  We will still repeat a CBC with differential.      Patient Active Problem List    Diagnosis Date Noted   • Other fatigue 09/09/2019   • Bruising 09/09/2019   • Polyuria 07/17/2019   • Morphea 01/09/2019   • Vitamin D deficiency 05/22/2018   • SVT (supraventricular tachycardia) ([facility]) 03/23/2018   • Other forms of systemic lupus erythematosus ([facility]) 10/20/2017   • Chronic constipation 12/15/2016   • Reactive hypoglycemia 09/28/2016   • CNS demyelinating disease ([facility]) 04/19/2016   • Vocal cord dysfunction  04/19/2016   • Multiple allergies 12/21/2015   • Anxiety 11/24/2015   • Intractable migraine without aura and without status migrainosus 11/24/2015   • Ankylosing spondylitis of lumbosacral region (HCC) 07/06/2015   • Overactive bladder 04/17/2015   • Asthma in adult 01/14/2015      Allergies:Savella [kdc:milnacipran+ci pigment blue 63]; Ciprofloxacin; Reglan [kdc:yellow dye+ci pigment blue 63+metoclopramide]; Sulfa drugs; Tetanus antitoxin; and Tramadol    Current Outpatient Medications   Medication Sig Dispense Refill   • coenzyme Q-10 30 MG capsule Take 60 mg by mouth every day.     • amoxicillin (AMOXIL) 500 MG Cap Take 1 Cap by mouth 3 times a day. 30 Cap 0   • Etanercept (ENBREL) 25 MG Recon Soln Inject 1 Each as instructed every 7 days. Prescribed by Dr. Lynch 12 Each 3   • escitalopram (LEXAPRO) 20 MG tablet Take 1 Tab by mouth every day. 90 Tab 3   • Chlorhexidine Gluconate 2 % Solution 5 mL by Apply externally route 2 Times a Day. 1 Bottle 0   • ADALIMUMAB SC      • ARNUITY ELLIPTA 200 MCG/ACT AEROSOL POWDER, BREATH ACTIVATED      • ondansetron (ZOFRAN ODT) 4 MG TABLET DISPERSIBLE DIS 1 T ON THE TONGUE Q 8 H PRN  2   • cyanocobalamin (VITAMIN B-12) 1000 MCG/ML Solution ADM 1 ML SC WEEKLY  2   • rizatriptan (MAXALT) 10 MG tablet Take 1 Tab by mouth Once PRN. 10 Tab 3   • hydroxychloroquine (PLAQUENIL) 200 MG Tab      • fluticasone (FLONASE) 50 MCG/ACT nasal spray Spray 1 Spray in nose every day.     • EPIPEN 2-NOLAN 0.3 MG/0.3ML Solution Auto-injector solution for injection      • albuterol (VENTOLIN HFA) 108 (90 BASE) MCG/ACT AERS inhalation aerosol Inhale 2 Puffs by mouth every 6 hours as needed for Shortness of Breath. 8.5 g 3     No current facility-administered medications for this visit.        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 She is  with four children.        Family History   Problem Relation Age of Onset   • Arthritis Brother         psoriatic arthritis   • Arthritis Maternal Grandmother    • Psychiatric Illness Mother         Major depression   • Other Mother         discoid lupus   • Psychiatric Illness Father         Bipolar depression   • Heart Disease Father         CHF   • Hypertension Father    • Diabetes Father    • Other Father         Agent orange exposure 125% disabled   • Diabetes Paternal Uncle    • Stroke Maternal Grandfather    • Diabetes Paternal Grandmother    • Alcohol/Drug Paternal Grandfather    • Cancer Paternal Aunt         gyn cancer   • GI Disease Son    • GI Disease Daughter    • GI Disease Daughter    • GI Disease Daughter         ROS:     - Constitutional: Positive for fatigue and malaise without obvious cause, denies generalized weakness fevers and chills.     - HEENT:  Negative for headaches, vision changes, hearing changes, ear pain, ear discharge, rhinorrhea, sinus congestion, sore throat, and neck pain.      - Respiratory: Negative for cough, sputum production, chest congestion, dyspnea, wheezing, and crackles.      - Cardiovascular: Negative for chest pain, palpitations, orthopnea, and bilateral lower extremity edema.     - Gastrointestinal: Negative for heartburn, nausea, vomiting, abdominal pain, hematochezia, melena, diarrhea, constipation, and greasy/foul-smelling stools.     - Genitourinary: Negative for dysuria, polyuria, hematuria, pyuria, urinary urgency, and urinary incontinence.     - Musculoskeletal: Negative for myalgias, back pain, and joint pain.     - Skin: Negative for rash, itching, cyanotic skin color change.     - Neurological: Negative for dizziness, tingling, tremors, focal sensory deficit, focal weakness and headaches.     - Endo/Heme/Allergies: Increased bruising of  "unclear etiology.     - Psychiatric/Behavioral: Negative for depression, suicidal/homicidal ideation and memory loss.          - NOTE: All other systems reviewed and are negative, except as in HPI.      Exam:    BP (!) 98/68 (BP Location: Left arm, Patient Position: Sitting, BP Cuff Size: Adult)   Pulse 77   Temp 37.3 °C (99.2 °F) (Temporal)   Ht 1.59 m (5' 2.6\")   Wt 73.2 kg (161 lb 6.4 oz)   SpO2 97%  Body mass index is 28.96 kg/m².    General:  Well nourished, well developed female in NAD  Head is grossly normal.    Monospot: 9/9/2019: Negative  Please note that this dictation was created using voice recognition software. I have made every reasonable attempt to correct obvious errors, but I expect that there are errors of grammar and possibly content that I did not discover before finalizing the note.    Assessment/Plan:  1. Other fatigue  Uncontrolled, unclear etiology to explain all that is going on with this patient.  We need to do some blood work and discussed with her via [employer].  She also needs to get back in to see gastroenterology.  She will make that appointment herself.  - POCT Mononucleosis (mono)  - Comp Metabolic Panel; Future  - CBC WITH DIFFERENTIAL; Future  - VITAMIN B12; Future  - TSH WITH REFLEX TO FT4; Future    2. Vitamin D deficiency  Unknown control, we will check her levels and let her know  - VITAMIN D,25 HYDROXY; Future    3. Bruising  Unknown why this is going on we will check vitamin K  - VITAMIN K; Future         "

## 2019-09-09 NOTE — ASSESSMENT & PLAN NOTE
This is a complaint the patient has noted ongoing for at least 4 weeks.  I would like to check some blood tests on her and we will do a Monospot today.  She is just had fatigue and malaise for about 4 weeks of unclear etiology.  Her Monospot here in the office is negative.  We will still repeat a CBC with differential.

## 2019-09-09 NOTE — ASSESSMENT & PLAN NOTE
This is a chronic health problem for this patient where we need to check to make sure that she is absorbing her vitamin D.  She does have ankylosing spondylitis and has known increased bowel movements is worried that she is having poor absorption of nutrients.

## 2019-09-09 NOTE — ASSESSMENT & PLAN NOTE
This is a new problem for this patient ongoing for approximately 4 weeks.  She had extensive bruising and now over the weekend cut her finger and had it continued to bleed for over 45 minutes.  At the time the bruising was first noted she had a CBC done at the urgent care and it showed normal platelets and normal hemoglobin and white cells.  I think we also need to add a vitamin K level and we will do that.

## 2019-09-12 LAB — PHYTONADIONE SERPL-MCNC: 0.64 NMOL/L (ref 0.22–4.88)

## 2019-09-23 ENCOUNTER — PATIENT MESSAGE (OUTPATIENT)
Dept: MEDICAL GROUP | Facility: MEDICAL CENTER | Age: 44
End: 2019-09-23

## 2019-10-14 ENCOUNTER — OFFICE VISIT (OUTPATIENT)
Dept: MEDICAL GROUP | Facility: MEDICAL CENTER | Age: 44
End: 2019-10-14
Payer: COMMERCIAL

## 2019-10-14 ENCOUNTER — HOSPITAL ENCOUNTER (OUTPATIENT)
Dept: RADIOLOGY | Facility: MEDICAL CENTER | Age: 44
End: 2019-10-14
Attending: FAMILY MEDICINE
Payer: COMMERCIAL

## 2019-10-14 VITALS
RESPIRATION RATE: 14 BRPM | WEIGHT: 165.12 LBS | OXYGEN SATURATION: 96 % | SYSTOLIC BLOOD PRESSURE: 128 MMHG | TEMPERATURE: 97.8 F | BODY MASS INDEX: 29.26 KG/M2 | HEART RATE: 88 BPM | HEIGHT: 63 IN | DIASTOLIC BLOOD PRESSURE: 92 MMHG

## 2019-10-14 DIAGNOSIS — R63.5 WEIGHT GAIN, ABNORMAL: ICD-10-CM

## 2019-10-14 DIAGNOSIS — R06.09 DOE (DYSPNEA ON EXERTION): ICD-10-CM

## 2019-10-14 DIAGNOSIS — R03.0 ELEVATED BP WITHOUT DIAGNOSIS OF HYPERTENSION: ICD-10-CM

## 2019-10-14 PROCEDURE — 99214 OFFICE O/P EST MOD 30 MIN: CPT | Performed by: FAMILY MEDICINE

## 2019-10-14 PROCEDURE — 71046 X-RAY EXAM CHEST 2 VIEWS: CPT

## 2019-10-14 RX ORDER — TRIAMTERENE AND HYDROCHLOROTHIAZIDE 37.5; 25 MG/1; MG/1
1 TABLET ORAL DAILY
Qty: 30 TAB | Refills: 3 | Status: SHIPPED | OUTPATIENT
Start: 2019-10-14 | End: 2020-02-12

## 2019-10-14 NOTE — ASSESSMENT & PLAN NOTE
This is a new problem for this patient that is been ongoing now for approximately 6 months.  Patient has cut her food intake to being less than 1200 dk in over 6 months.  Was only able to lose 5 pounds.  She now is gaining weight without trying and finding herself not feeling well and being short of breath.  In light of how she is feeling a think we are to get a chest x-ray.  She has a history in the past of cardiomyopathy.  I want to make certain that her to heart does not look enlarged.

## 2019-10-14 NOTE — PROGRESS NOTES
CC:Diagnoses of Elevated BP without diagnosis of hypertension, Weight gain, abnormal, and EVANGELISTA (dyspnea on exertion) were pertinent to this visit.    HISTORY OF PRESENT ILLNESS: Patient is a 44 y.o. female established patient who presents today to talk about the problems listed below.  This patient has autoimmune disease and has been off of her Plaquenil so many of the problems she is complaining of may be directly related to an inflammatory flare.  She is concerned because she is so fatigued and cannot feel that she is getting adequate sleep.  She also is gaining weight without changing how she is eating.  She has a history in the past of cardiomegaly with cardiomyopathy.  We will go ahead and check a chest x-ray.  If that is normal I still would like for her to have a work-up with cardiology but it is not imperative that it happened immediately.      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  Over the last 6 months this patient has gained nearly 20 pounds and cannot explain it based on food intake.  She is very concerned.  She does not feel well because of the excessive fatigue.  We could start her on a mild diuretic and have her keep benign her blood pressure.  Her last time here in September she was 98/68 so I do not want to take the chance that were going to give her hypotension.  But if any of this is fluid retention we could certainly help with that.    Weight gain, abnormal  This is a new problem for this patient that is been ongoing now for approximately 6 months.  Patient has cut her food intake to being less than 1200 dk in over 6 months.  Was only able to lose 5 pounds.  She now is gaining weight without trying and finding herself not feeling well and being short of breath.  In light of how she is feeling a think we are to get a chest  x-ray.  She has a history in the past of cardiomyopathy.  I want to make certain that her to heart does not look enlarged.    EVANGELISTA (dyspnea on exertion)  This is a new problem for this patient that seems of gotten worse over the last 6 weeks.  Patient finds that she has dyspnea with any exertion even turning over in bed she will find herself feeling short of breath.  She is put on approximately 18 pounds in a 6-month timeframe.  She cannot really explain that weight gain.  She does have a history in the past of cardiomyopathy.  I think we had a set her up to see cardiology to have a work-up to make certain that she does not have a cardiac condition going on.      Patient Active Problem List    Diagnosis Date Noted   • Elevated BP without diagnosis of hypertension 10/14/2019   • Weight gain, abnormal 10/14/2019   • EVANGELISTA (dyspnea on exertion) 10/14/2019   • Other fatigue 09/09/2019   • Bruising 09/09/2019   • Polyuria 07/17/2019   • Morphea 01/09/2019   • Vitamin D deficiency 05/22/2018   • SVT (supraventricular tachycardia) ([facility]) 03/23/2018   • Other forms of systemic lupus erythematosus ([facility]) 10/20/2017   • Chronic constipation 12/15/2016   • Reactive hypoglycemia 09/28/2016   • CNS demyelinating disease ([facility]) 04/19/2016   • Vocal cord dysfunction 04/19/2016   • Multiple allergies 12/21/2015   • Anxiety 11/24/2015   • Intractable migraine without aura and without status migrainosus 11/24/2015   • Ankylosing spondylitis of lumbosacral region ([facility]) 07/06/2015   • Overactive bladder 04/17/2015   • Asthma in adult 01/14/2015      Allergies:Savella [kdc:milnacipran+ci pigment blue 63]; Ciprofloxacin; Reglan [kdc:yellow dye+ci pigment blue 63+metoclopramide]; Sulfa drugs; Tetanus antitoxin; and Tramadol    Current Outpatient Medications   Medication Sig Dispense Refill   • triamterene-hctz (MAXZIDE-25/DYAZIDE) 37.5-25 MG Tab Take 1 Tab by mouth every day. 30 Tab 3   • coenzyme Q-10 30 MG capsule Take 60 mg by mouth every  day.     • amoxicillin (AMOXIL) 500 MG Cap Take 1 Cap by mouth 3 times a day. 30 Cap 0   • Etanercept (ENBREL) 25 MG Recon Soln Inject 1 Each as instructed every 7 days. Prescribed by Dr. Lynch 12 Each 3   • escitalopram (LEXAPRO) 20 MG tablet Take 1 Tab by mouth every day. 90 Tab 3   • Chlorhexidine Gluconate 2 % Solution 5 mL by Apply externally route 2 Times a Day. 1 Bottle 0   • ADALIMUMAB SC      • ARNUITY ELLIPTA 200 MCG/ACT AEROSOL POWDER, BREATH ACTIVATED      • ondansetron (ZOFRAN ODT) 4 MG TABLET DISPERSIBLE DIS 1 T ON THE TONGUE Q 8 H PRN  2   • cyanocobalamin (VITAMIN B-12) 1000 MCG/ML Solution ADM 1 ML SC WEEKLY  2   • rizatriptan (MAXALT) 10 MG tablet Take 1 Tab by mouth Once PRN. 10 Tab 3   • hydroxychloroquine (PLAQUENIL) 200 MG Tab      • fluticasone (FLONASE) 50 MCG/ACT nasal spray Spray 1 Spray in nose every day.     • EPIPEN 2-NOLAN 0.3 MG/0.3ML Solution Auto-injector solution for injection      • albuterol (VENTOLIN HFA) 108 (90 BASE) MCG/ACT AERS inhalation aerosol Inhale 2 Puffs by mouth every 6 hours as needed for Shortness of Breath. 8.5 g 3     No current facility-administered medications for this visit.        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 She is  with four children.        Family History   Problem Relation Age of Onset   • Arthritis Brother         psoriatic arthritis   • Arthritis Maternal Grandmother    • Psychiatric Illness Mother         Major depression   • Other Mother         discoid lupus   • Psychiatric Illness Father         Bipolar depression   • Heart Disease Father         CHF   • Hypertension Father    • Diabetes Father    • Other Father         Agent orange exposure 125% disabled   • Diabetes  "Paternal Uncle    • Stroke Maternal Grandfather    • Diabetes Paternal Grandmother    • Alcohol/Drug Paternal Grandfather    • Cancer Paternal Aunt         gyn cancer   • GI Disease Son    • GI Disease Daughter    • GI Disease Daughter    • GI Disease Daughter         ROS:     - Constitutional: Positive for fatigue and malaise worsening over the last 3 months.     - HEENT:  Negative for headaches, vision changes, hearing changes, ear pain, ear discharge, rhinorrhea, sinus congestion, sore throat, and neck pain.      - Respiratory: Negative for cough, sputum production, chest congestion, dyspnea, wheezing, and crackles.      - Cardiovascular: Negative for chest pain, palpitations, orthopnea, and bilateral lower extremity edema.     - Gastrointestinal: Left flank pain of unclear etiology otherwise denies heartburn, nausea, vomiting, abdominal pain, hematochezia, melena, diarrhea, constipation, and greasy/foul-smelling stools.     - Genitourinary: Negative for dysuria, polyuria, hematuria, pyuria, urinary urgency, and urinary incontinence.     - Musculoskeletal: Negative for myalgias, back pain, and joint pain.     - Skin: Negative for rash, itching, cyanotic skin color change.     - Neurological: Negative for dizziness, tingling, tremors, focal sensory deficit, focal weakness and headaches.     - Endo/Heme/Allergies: Does not bruise/bleed easily.     - Psychiatric/Behavioral: Negative for depression, suicidal/homicidal ideation and memory loss.          - NOTE: All other systems reviewed and are negative, except as in HPI.      Exam:    /92 (BP Location: Left arm, Patient Position: Sitting, BP Cuff Size: Adult)   Pulse 88   Temp 36.6 °C (97.8 °F) (Temporal)   Resp 14   Ht 1.59 m (5' 2.6\")   Wt 74.9 kg (165 lb 2 oz)   SpO2 96%  Body mass index is 29.63 kg/m².    General:  Well nourished, well developed female in NAD  Head is grossly normal.  Neck: Supple without JVD or bruit. Thyroid is not " enlarged.  Pulmonary: Clear to ausculation and percussion.  Normal effort. No rales, ronchi, or wheezing.  Cardiovascular: Regular rate and rhythm without murmur. Carotid and radial pulses are intact and equal bilaterally.  Extremities: no clubbing, cyanosis, or edema.    Please note that this dictation was created using voice recognition software. I have made every reasonable attempt to correct obvious errors, but I expect that there are errors of grammar and possibly content that I did not discover before finalizing the note.    Assessment/Plan:  1. Elevated BP without diagnosis of hypertension  Unclear etiology.  We will try triamterene hydrochlorothiazide 37.5/25 see if we can help with fluid retention and blood pressures.  Patient will monitor to make certain we do not drop her less than 120/80.    2. Weight gain, abnormal  Uncontrolled I am concerned that this is all edema related to her not getting her Plaquenil.  Patient will get back on her meds and we will see her back    3. EVANGELISTA (dyspnea on exertion)  We will check a chest x-ray to make sure she has not developed cardiomegaly or pulmonary edema peer  - DX-CHEST-2 VIEWS; Future

## 2019-10-14 NOTE — ASSESSMENT & PLAN NOTE
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  Over the last 6 months this patient has gained nearly 20 pounds and cannot explain it based on food intake.  She is very concerned.  She does not feel well because of the excessive fatigue.  We could start her on a mild diuretic and have her keep benign her blood pressure.  Her last time here in September she was 98/68 so I do not want to take the chance that were going to give her hypotension.  But if any of this is fluid retention we could certainly help with that.

## 2019-10-14 NOTE — ASSESSMENT & PLAN NOTE
This is a new problem for this patient that seems of gotten worse over the last 6 weeks.  Patient finds that she has dyspnea with any exertion even turning over in bed she will find herself feeling short of breath.  She is put on approximately 18 pounds in a 6-month timeframe.  She cannot really explain that weight gain.  She does have a history in the past of cardiomyopathy.  I think we had a set her up to see cardiology to have a work-up to make certain that she does not have a cardiac condition going on.

## 2019-10-29 ENCOUNTER — HOSPITAL ENCOUNTER (OUTPATIENT)
Facility: MEDICAL CENTER | Age: 44
End: 2019-10-29
Attending: NURSE PRACTITIONER
Payer: COMMERCIAL

## 2019-10-29 PROCEDURE — 87177 OVA AND PARASITES SMEARS: CPT

## 2019-10-29 PROCEDURE — 87493 C DIFF AMPLIFIED PROBE: CPT

## 2019-10-29 PROCEDURE — 83993 ASSAY FOR CALPROTECTIN FECAL: CPT

## 2019-10-29 PROCEDURE — 87046 STOOL CULTR AEROBIC BACT EA: CPT

## 2019-10-29 PROCEDURE — 87045 FECES CULTURE AEROBIC BACT: CPT

## 2019-10-29 PROCEDURE — 87899 AGENT NOS ASSAY W/OPTIC: CPT

## 2019-10-29 PROCEDURE — 87209 SMEAR COMPLEX STAIN: CPT

## 2019-10-30 LAB
C DIFF DNA SPEC QL NAA+PROBE: NEGATIVE
C DIFF TOX GENS STL QL NAA+PROBE: NEGATIVE

## 2019-10-31 LAB
CALPROTECTIN STL-MCNT: <16 UG/G
E COLI SXT1+2 STL IA: NORMAL
SIGNIFICANT IND 70042: NORMAL
SITE SITE: NORMAL
SOURCE SOURCE: NORMAL

## 2019-11-02 LAB
BACTERIA STL CULT: NORMAL
E COLI SXT1+2 STL IA: NORMAL
SIGNIFICANT IND 70042: NORMAL
SITE SITE: NORMAL
SOURCE SOURCE: NORMAL

## 2019-11-04 LAB
O+P STL MICRO: NEGATIVE
O+P STL TRI STN: NEGATIVE

## 2019-11-11 ENCOUNTER — OFFICE VISIT (OUTPATIENT)
Dept: MEDICAL GROUP | Facility: MEDICAL CENTER | Age: 44
End: 2019-11-11
Payer: COMMERCIAL

## 2019-11-11 VITALS
HEIGHT: 63 IN | HEART RATE: 77 BPM | TEMPERATURE: 98.1 F | WEIGHT: 159.39 LBS | BODY MASS INDEX: 28.24 KG/M2 | SYSTOLIC BLOOD PRESSURE: 126 MMHG | RESPIRATION RATE: 12 BRPM | DIASTOLIC BLOOD PRESSURE: 82 MMHG | OXYGEN SATURATION: 96 %

## 2019-11-11 DIAGNOSIS — F41.9 ANXIETY: ICD-10-CM

## 2019-11-11 DIAGNOSIS — Z12.31 ENCOUNTER FOR SCREENING MAMMOGRAM FOR BREAST CANCER: ICD-10-CM

## 2019-11-11 PROCEDURE — 99214 OFFICE O/P EST MOD 30 MIN: CPT | Performed by: FAMILY MEDICINE

## 2019-11-11 RX ORDER — FLUOXETINE HYDROCHLORIDE 20 MG/1
20 CAPSULE ORAL DAILY
Qty: 90 CAP | Refills: 3 | Status: SHIPPED | OUTPATIENT
Start: 2019-11-11 | End: 2020-01-02 | Stop reason: SDUPTHER

## 2019-11-11 NOTE — PROGRESS NOTES
CC:Diagnoses of Anxiety and Encounter for screening mammogram for breast cancer were pertinent to this visit.    HISTORY OF PRESENT ILLNESS: Patient is a 44 y.o. female established patient who presents today to talk about her medications including the generic Lexapro that she thinks is contributing to some of her fatigue.  She like to try going back to fluoxetine which she believes has worked better for her in the past.  She well knows that she needs to be on some type of an SSRI to help with her underlying anxiety.  Patient is also seen her gastroenterologist and has an upcoming colonoscopy also is now seeing cardiology as well for dyspnea on exertion.      Anxiety  This is a chronic health problem for this patient for which she utilizes generic Lexapro 20 mg daily.  She neglected to take it one day and was definitely less fatigued than she is on the days that she takes it.  She really like to look at switching back to fluoxetine.  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  I absolutely agree with that decision.  We will switch her over to a 20 mg dose of fluoxetine.      Patient Active Problem List    Diagnosis Date Noted   • Elevated BP without diagnosis of hypertension 10/14/2019   • Weight gain, abnormal 10/14/2019   • EVANGELISTA (dyspnea on exertion) 10/14/2019   • Other fatigue 09/09/2019   • Bruising 09/09/2019   • Polyuria 07/17/2019   • Morphea 01/09/2019   • Vitamin D deficiency 05/22/2018   • SVT (supraventricular tachycardia) ([facility]) 03/23/2018   • Other forms of systemic lupus erythematosus ([facility]) 10/20/2017   • Chronic constipation 12/15/2016   • Reactive hypoglycemia 09/28/2016   • CNS demyelinating disease ([facility]) 04/19/2016   • Vocal cord dysfunction 04/19/2016   • Multiple allergies 12/21/2015   • Anxiety 11/24/2015   • Intractable migraine  without aura and without status migrainosus 11/24/2015   • Ankylosing spondylitis of lumbosacral region ([facility]) 07/06/2015   • Overactive bladder 04/17/2015   • Asthma in adult 01/14/2015      Allergies:Savella [kdc:milnacipran+ci pigment blue 63]; Ciprofloxacin; Reglan [kdc:yellow dye+ci pigment blue 63+metoclopramide]; Sulfa drugs; Tetanus antitoxin; and Tramadol    Current Outpatient Medications   Medication Sig Dispense Refill   • FLUoxetine (PROZAC) 20 MG Cap Take 1 Cap by mouth every day. 90 Cap 3   • triamterene-hctz (MAXZIDE-25/DYAZIDE) 37.5-25 MG Tab Take 1 Tab by mouth every day. 30 Tab 3   • coenzyme Q-10 30 MG capsule Take 60 mg by mouth every day.     • amoxicillin (AMOXIL) 500 MG Cap Take 1 Cap by mouth 3 times a day. 30 Cap 0   • Etanercept (ENBREL) 25 MG Recon Soln Inject 1 Each as instructed every 7 days. Prescribed by Dr. Lynch 12 Each 3   • Chlorhexidine Gluconate 2 % Solution 5 mL by Apply externally route 2 Times a Day. 1 Bottle 0   • ADALIMUMAB SC      • ARNUITY ELLIPTA 200 MCG/ACT AEROSOL POWDER, BREATH ACTIVATED      • ondansetron (ZOFRAN ODT) 4 MG TABLET DISPERSIBLE DIS 1 T ON THE TONGUE Q 8 H PRN  2   • cyanocobalamin (VITAMIN B-12) 1000 MCG/ML Solution ADM 1 ML SC WEEKLY  2   • rizatriptan (MAXALT) 10 MG tablet Take 1 Tab by mouth Once PRN. 10 Tab 3   • hydroxychloroquine (PLAQUENIL) 200 MG Tab      • fluticasone (FLONASE) 50 MCG/ACT nasal spray Spray 1 Spray in nose every day.     • EPIPEN 2-NOLAN 0.3 MG/0.3ML Solution Auto-injector solution for injection      • albuterol (VENTOLIN HFA) 108 (90 BASE) MCG/ACT AERS inhalation aerosol Inhale 2 Puffs by mouth every 6 hours as needed for Shortness of Breath. 8.5 g 3     No current facility-administered medications for this visit.        Social History     Tobacco Use   • Smoking status: Never Smoker   • Smokeless tobacco: Never Used   Substance Use Topics   • Alcohol use: No     Alcohol/week: 0.0 oz   • Drug use: No     Social History     Patient  does not qualify to have social determinant information on file (likely too young).   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 She is  with four children.        Family History   Problem Relation Age of Onset   • Arthritis Brother         psoriatic arthritis   • Arthritis Maternal Grandmother    • Psychiatric Illness Mother         Major depression   • Other Mother         discoid lupus   • Psychiatric Illness Father         Bipolar depression   • Heart Disease Father         CHF   • Hypertension Father    • Diabetes Father    • Other Father         Agent orange exposure 125% disabled   • Diabetes Paternal Uncle    • Stroke Maternal Grandfather    • Diabetes Paternal Grandmother    • Alcohol/Drug Paternal Grandfather    • Cancer Paternal Aunt         gyn cancer   • GI Disease Son    • GI Disease Daughter    • GI Disease Daughter    • GI Disease Daughter         ROS:     - Constitutional: Positive for fatigue of unclear etiology but patient is the child of a gentleman who had exposure to agent orange.    - HEENT:  Negative for headaches, vision changes, hearing changes, ear pain, ear discharge, rhinorrhea, sinus congestion, sore throat, and neck pain.      - Respiratory: Negative for cough, sputum production, chest congestion, dyspnea, wheezing, and crackles.      - Cardiovascular: Negative for chest pain, palpitations, orthopnea, and bilateral lower extremity edema.     - Gastrointestinal: Negative for heartburn, nausea, vomiting, abdominal pain, hematochezia, melena, diarrhea, constipation, and greasy/foul-smelling stools.     - Genitourinary: Negative for dysuria, polyuria, hematuria, pyuria, urinary urgency, and urinary incontinence.     - Musculoskeletal: Negative for myalgias, back pain, and joint pain.     - Skin: Negative for rash, itching, cyanotic skin color change.     - Neurological: Negative for dizziness, tingling, tremors, focal sensory  "deficit, focal weakness and headaches.     - Endo/Heme/Allergies: Does not bruise/bleed easily.     - Psychiatric/Behavioral: Negative for depression, suicidal/homicidal ideation and memory loss.          - NOTE: All other systems reviewed and are negative, except as in HPI.      Exam:    /82 (BP Location: Left arm, Patient Position: Sitting, BP Cuff Size: Adult)   Pulse 77   Temp 36.7 °C (98.1 °F) (Temporal)   Resp 12   Ht 1.59 m (5' 2.6\")   Wt 72.3 kg (159 lb 6.3 oz)   SpO2 96%  Body mass index is 28.6 kg/m².    General:  Well nourished, well developed female in NAD  Head is grossly normal.  Neck: Supple without JVD or bruit. Thyroid is not enlarged.  Pulmonary: Clear to ausculation and percussion.  Normal effort. No rales, ronchi, or wheezing.  Cardiovascular: Regular rate and rhythm without murmur. Carotid and radial pulses are intact and equal bilaterally.  Extremities: no clubbing, cyanosis, or edema.  Reviewed CXR report  Please note that this dictation was created using voice recognition software. I have made every reasonable attempt to correct obvious errors, but I expect that there are errors of grammar and possibly content that I did not discover before finalizing the note.    Assessment/Plan:  1. Anxiety  Controlled, but side effects from the medications.  I recommended we make the switch she requested to fluoxetine    2. Encounter for screening mammogram for breast cancer  Pt will schedule.  - MA-SCREENING MAMMO BILAT W/TOMOSYNTHESIS W/CAD; Future         "

## 2019-11-11 NOTE — ASSESSMENT & PLAN NOTE
This is a chronic health problem for this patient for which she utilizes generic Lexapro 20 mg daily.  She neglected to take it one day and was definitely less fatigued than she is on the days that she takes it.  She really like to look at switching back to fluoxetine.  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  I absolutely agree with that decision.  We will switch her over to a 20 mg dose of fluoxetine.

## 2019-11-20 ENCOUNTER — HOSPITAL ENCOUNTER (OUTPATIENT)
Dept: RADIOLOGY | Facility: MEDICAL CENTER | Age: 44
End: 2019-11-20
Attending: FAMILY MEDICINE
Payer: COMMERCIAL

## 2019-11-20 DIAGNOSIS — Z12.31 ENCOUNTER FOR SCREENING MAMMOGRAM FOR BREAST CANCER: ICD-10-CM

## 2019-11-20 PROCEDURE — 77063 BREAST TOMOSYNTHESIS BI: CPT

## 2019-11-22 ENCOUNTER — HOSPITAL ENCOUNTER (OUTPATIENT)
Dept: RADIOLOGY | Facility: MEDICAL CENTER | Age: 44
End: 2019-11-22
Attending: FAMILY MEDICINE
Payer: COMMERCIAL

## 2019-11-22 DIAGNOSIS — R92.8 ABNORMAL MAMMOGRAM: ICD-10-CM

## 2019-11-22 PROCEDURE — 76642 ULTRASOUND BREAST LIMITED: CPT | Mod: LT

## 2019-12-24 ENCOUNTER — PATIENT MESSAGE (OUTPATIENT)
Dept: MEDICAL GROUP | Facility: MEDICAL CENTER | Age: 44
End: 2019-12-24

## 2019-12-24 DIAGNOSIS — R03.0 ELEVATED BP WITHOUT DIAGNOSIS OF HYPERTENSION: ICD-10-CM

## 2019-12-26 NOTE — TELEPHONE ENCOUNTER
From: Joycelyn Byers  To: Monique Bower M.D.  Sent: 12/24/2019 12:27 PM PST  Subject: Non-Urgent Medical Question    Good afternoon, can we check my potassium levels since I have been on the triamterene/hydrochlorothiazide for a couple of months? Just to be safe? Thank you & Joycelyn Lynne

## 2020-01-02 ENCOUNTER — PATIENT MESSAGE (OUTPATIENT)
Dept: MEDICAL GROUP | Facility: MEDICAL CENTER | Age: 45
End: 2020-01-02

## 2020-01-02 ENCOUNTER — HOSPITAL ENCOUNTER (OUTPATIENT)
Dept: LAB | Facility: MEDICAL CENTER | Age: 45
End: 2020-01-02
Attending: FAMILY MEDICINE
Payer: COMMERCIAL

## 2020-01-02 DIAGNOSIS — R03.0 ELEVATED BP WITHOUT DIAGNOSIS OF HYPERTENSION: ICD-10-CM

## 2020-01-02 LAB
ANION GAP SERPL CALC-SCNC: 12 MMOL/L (ref 0–11.9)
BUN SERPL-MCNC: 13 MG/DL (ref 8–22)
CALCIUM SERPL-MCNC: 9.2 MG/DL (ref 8.5–10.5)
CHLORIDE SERPL-SCNC: 101 MMOL/L (ref 96–112)
CO2 SERPL-SCNC: 27 MMOL/L (ref 20–33)
CREAT SERPL-MCNC: 0.89 MG/DL (ref 0.5–1.4)
FASTING STATUS PATIENT QL REPORTED: NORMAL
GLUCOSE SERPL-MCNC: 125 MG/DL (ref 65–99)
POTASSIUM SERPL-SCNC: 3.2 MMOL/L (ref 3.6–5.5)
SODIUM SERPL-SCNC: 140 MMOL/L (ref 135–145)

## 2020-01-02 PROCEDURE — 36415 COLL VENOUS BLD VENIPUNCTURE: CPT

## 2020-01-02 PROCEDURE — 80048 BASIC METABOLIC PNL TOTAL CA: CPT

## 2020-01-02 RX ORDER — FLUOXETINE HYDROCHLORIDE 20 MG/1
40 CAPSULE ORAL DAILY
Qty: 180 CAP | Refills: 3 | Status: SHIPPED | OUTPATIENT
Start: 2020-01-02 | End: 2020-04-01

## 2020-01-02 NOTE — TELEPHONE ENCOUNTER
From: Joycelyn Byers  To: Monique Bower M.D.  Sent: 1/2/2020 7:51 AM PST  Subject: Prescription Question    I titrated up to 40 mg Q daily of fluoxetine. [facility]    Thank you.

## 2020-01-03 ENCOUNTER — PATIENT MESSAGE (OUTPATIENT)
Dept: MEDICAL GROUP | Facility: MEDICAL CENTER | Age: 45
End: 2020-01-03

## 2020-01-03 NOTE — TELEPHONE ENCOUNTER
From: Joycelyn Byers  To: Monique Bower M.D.  Sent: 1/3/2020 10:33 AM PST  Subject: Test Result Question    I have been seeing Dr. Delaney for a couple of years. I would like to see him again if possible. Should I increase my potassium dietary intake for now?    ----- Message -----  From: Monique Bower M.D.  Sent: 1/3/20 7:53 AM  To: Joycelny Byers  Subject: RE: Test Result Question    You Nurses are too smart! Could be! Need to set you up to see an endocrinologist -- this is beyond my abilities, I am sorry to admit. Do you want me to put in a referral and who would you like to see?  Thank you,  Dr. FINLEY      ----- Message -----   From: Joycelyn Byers   Sent: 1/2/2020 9:53 PM PST   To: Monique Bower M.D.  Subject: Test Result Question    Or can the etiology of my sudden weight gain, HTN, muscle weakness, and now hypokalemia be from excess cortisol and adrenal malfunction?

## 2020-01-03 NOTE — TELEPHONE ENCOUNTER
From: Joycelyn Byers  To: Monique Bower M.D.  Sent: 1/2/2020 9:53 PM PST  Subject: Test Result Question    Or can the etiology of my sudden weight gain, HTN, muscle weakness, and now hypokalemia be from excess cortisol and adrenal malfunction?

## 2020-01-06 ENCOUNTER — OFFICE VISIT (OUTPATIENT)
Dept: ENDOCRINOLOGY | Facility: MEDICAL CENTER | Age: 45
End: 2020-01-06
Payer: COMMERCIAL

## 2020-01-06 VITALS
HEIGHT: 63 IN | SYSTOLIC BLOOD PRESSURE: 104 MMHG | DIASTOLIC BLOOD PRESSURE: 68 MMHG | WEIGHT: 163 LBS | BODY MASS INDEX: 28.88 KG/M2 | HEART RATE: 85 BPM | OXYGEN SATURATION: 99 %

## 2020-01-06 DIAGNOSIS — I10 HYPERTENSION, UNSPECIFIED TYPE: ICD-10-CM

## 2020-01-06 DIAGNOSIS — R63.5 WEIGHT GAIN: ICD-10-CM

## 2020-01-06 DIAGNOSIS — R63.5 WEIGHT GAIN, ABNORMAL: ICD-10-CM

## 2020-01-06 DIAGNOSIS — E87.6 HYPOKALEMIA: ICD-10-CM

## 2020-01-06 PROCEDURE — 99214 OFFICE O/P EST MOD 30 MIN: CPT | Performed by: INTERNAL MEDICINE

## 2020-01-07 NOTE — PROGRESS NOTES
Chief Complaint   Patient presents with   • Hypertension     Rule out Cushing's disease        HPI:     Cushing’s disease.?     The patient has had some changes over the past three months or so.  Dr. Bower sent her back for me to consider Cushing’s disease.  She has had a relatively sudden weight gain of about 25 pounds.  Five pounds of that probably was fluid retention and has been relieved by Maxzide.  Also she has had hypokalemia which may be diuretic related but unclear.  She has become hypertensive over the past three months and that is a new and agreement. unexplained development.  She cannot account for her weight gain in terms of excess calories.  She is not taking thyroid hormone.  TSH level in September was normal at 1.9.  She has not developed striae.  Her weight gain is primarily truncal but she does not show other features of Cushing’s in terms of moon face or full supraclavicular fat pads.  However we know that takes some time to develop so that certainly does not rule out Cushing’s disease.      She does have some weakness and particularly that might be proximal weakness.  She has difficulty getting up out of a squat or out of a chair and climbing stairs.  Her blood pressure has been controlled with Maxzide plus a beta blocker although I don’t see that on her med list right now.    In the background, she has had recurrent renal stones that she has passed on five occasions over the past three years.  Her mother had renal stone disease.  Io have CT scans in 2015 and 2016 that did not show evidence of kidney stones.  On her recent metabolic panel, her creatinine is normal at 0.8.     I will do some general screening and Cushing screening.  That is the point of this visit so we will try to evaluate that properly and she is in     ROS:  All other systems reported as negative or unchanged since last exam      Allergies:   Allergies   Allergen Reactions   • Savella [Kdc:Milnacipran+Ci Pigment Blue 63]  Myalgia   • Ciprofloxacin    • Reglan [Kdc:Yellow Dye+Ci Pigment Blue 63+Metoclopramide]    • Sulfa Drugs Nausea     GI upset   • Tetanus Antitoxin      Mild spasms and pain     • Tramadol Rash and Shortness of Breath       Current medicines including changes today:  Current Outpatient Medications   Medication Sig Dispense Refill   • FLUoxetine (PROZAC) 20 MG Cap Take 2 Caps by mouth every day for 90 days. 180 Cap 3   • triamterene-hctz (MAXZIDE-25/DYAZIDE) 37.5-25 MG Tab Take 1 Tab by mouth every day. 30 Tab 3   • coenzyme Q-10 30 MG capsule Take 60 mg by mouth every day.     • Etanercept (ENBREL) 25 MG Recon Soln Inject 1 Each as instructed every 7 days. Prescribed by Dr. Lynch 12 Each 3   • ADALIMUMAB SC      • ARNUITY ELLIPTA 200 MCG/ACT AEROSOL POWDER, BREATH ACTIVATED      • ondansetron (ZOFRAN ODT) 4 MG TABLET DISPERSIBLE DIS 1 T ON THE TONGUE Q 8 H PRN  2   • cyanocobalamin (VITAMIN B-12) 1000 MCG/ML Solution ADM 1 ML SC WEEKLY  2   • rizatriptan (MAXALT) 10 MG tablet Take 1 Tab by mouth Once PRN. 10 Tab 3   • hydroxychloroquine (PLAQUENIL) 200 MG Tab      • fluticasone (FLONASE) 50 MCG/ACT nasal spray Spray 1 Spray in nose every day.     • EPIPEN 2-NOLAN 0.3 MG/0.3ML Solution Auto-injector solution for injection      • albuterol (VENTOLIN HFA) 108 (90 BASE) MCG/ACT AERS inhalation aerosol Inhale 2 Puffs by mouth every 6 hours as needed for Shortness of Breath. 8.5 g 3   • amoxicillin (AMOXIL) 500 MG Cap Take 1 Cap by mouth 3 times a day. (Patient not taking: Reported on 1/6/2020) 30 Cap 0   • Chlorhexidine Gluconate 2 % Solution 5 mL by Apply externally route 2 Times a Day. (Patient not taking: Reported on 1/6/2020) 1 Bottle 0     No current facility-administered medications for this visit.         Past Medical History:   Diagnosis Date   • Allergy, unspecified not elsewhere classified    • Ankylosing spondylitis of lumbosacral region (HCC) 7/6/2015   • Anxiety 11/24/2015   • GERD (gastroesophageal reflux  "disease)    • Headache, classical migraine    • Hiatal hernia    • Intractable migraine without aura and without status migrainosus 11/24/2015   • Lupus (HCC)    • Morphea 1/9/2019   • Oropharyngeal dysphagia    • Other forms of systemic lupus erythematosus (HCC) 10/20/2017   • Overactive bladder    • Peptic ulcer    • Polyuria 7/17/2019   • Prediabetes 12/8/2016   • Vocal cord dysfunction        PHYSICAL EXAM:    /68 (BP Location: Left arm, Patient Position: Sitting, BP Cuff Size: Adult)   Pulse 85   Ht 1.59 m (5' 2.6\")   Wt 73.9 kg (163 lb)   LMP 03/29/2016   SpO2 99%   BMI 29.25 kg/m²     Gen. overweight but otherwise does not appear cushingoid     Skin   appropriate for sex and age, no purpura or thin skin.  No ecchymoses or streia consistent with steroid access    HEENT  unremarkable    Neck   thyroid gland is difficult to palpate.  Supraclavicular fat pads and dorsal fat pad are not prominent    Lungs  clear    Abdomen    soft and nontender without organomegaly.  No cushingoid streia    Heart  regular    Extremities  no edema    Neuro  gait and station normal    Psych  appropriate    ASSESSMENT AND RECOMMENDATIONS    1. Weight gain, abnormal              Rule out Cushing's disease, doubt clinically  - CORTISOL; Future  - ACTH; Future  - SALIVARY CORTISOL,MS  - URINE CORTISOL 24 HR, ICMA; Future  - Comp Metabolic Panel; Future    2. Hypertension, unspecified type              Recent onset with weight gain of unknown etiology  - CORTISOL; Future    3. Hypokalemia            Possibly related to diuretic use  - CORTISOL; Future    4. Weight gain              Unexplained  - CORTISOL; Future  - ACTH; Future      DISPOSITION: Follow-up lab by telephone.  Any question we will do a dexamethasone suppression      Justin Delaney M.D.    Copies to: Monique Bower M.D. [phone]  "

## 2020-01-08 ENCOUNTER — HOSPITAL ENCOUNTER (OUTPATIENT)
Dept: LAB | Facility: MEDICAL CENTER | Age: 45
End: 2020-01-08
Attending: INTERNAL MEDICINE
Payer: COMMERCIAL

## 2020-01-08 DIAGNOSIS — E87.6 HYPOKALEMIA: ICD-10-CM

## 2020-01-08 DIAGNOSIS — R63.5 WEIGHT GAIN: ICD-10-CM

## 2020-01-08 DIAGNOSIS — I10 HYPERTENSION, UNSPECIFIED TYPE: ICD-10-CM

## 2020-01-08 DIAGNOSIS — R63.5 WEIGHT GAIN, ABNORMAL: ICD-10-CM

## 2020-01-08 LAB
ALBUMIN SERPL BCP-MCNC: 3.6 G/DL (ref 3.2–4.9)
ALBUMIN/GLOB SERPL: 1.2 G/DL
ALP SERPL-CCNC: 56 U/L (ref 30–99)
ALT SERPL-CCNC: 15 U/L (ref 2–50)
ANION GAP SERPL CALC-SCNC: 9 MMOL/L (ref 0–11.9)
AST SERPL-CCNC: 19 U/L (ref 12–45)
BILIRUB SERPL-MCNC: 0.4 MG/DL (ref 0.1–1.5)
BUN SERPL-MCNC: 13 MG/DL (ref 8–22)
CALCIUM SERPL-MCNC: 8.9 MG/DL (ref 8.5–10.5)
CHLORIDE SERPL-SCNC: 107 MMOL/L (ref 96–112)
CO2 SERPL-SCNC: 23 MMOL/L (ref 20–33)
CORTIS SERPL-MCNC: 9.8 UG/DL (ref 0–23)
CREAT SERPL-MCNC: 0.8 MG/DL (ref 0.5–1.4)
GLOBULIN SER CALC-MCNC: 3.1 G/DL (ref 1.9–3.5)
GLUCOSE SERPL-MCNC: 82 MG/DL (ref 65–99)
POTASSIUM SERPL-SCNC: 4.4 MMOL/L (ref 3.6–5.5)
PROT SERPL-MCNC: 6.7 G/DL (ref 6–8.2)
SODIUM SERPL-SCNC: 139 MMOL/L (ref 135–145)

## 2020-01-08 PROCEDURE — 82024 ASSAY OF ACTH: CPT

## 2020-01-08 PROCEDURE — 80053 COMPREHEN METABOLIC PANEL: CPT

## 2020-01-08 PROCEDURE — 82533 TOTAL CORTISOL: CPT

## 2020-01-08 PROCEDURE — 36415 COLL VENOUS BLD VENIPUNCTURE: CPT

## 2020-01-09 ENCOUNTER — HOSPITAL ENCOUNTER (OUTPATIENT)
Facility: MEDICAL CENTER | Age: 45
End: 2020-01-09
Attending: INTERNAL MEDICINE
Payer: COMMERCIAL

## 2020-01-09 DIAGNOSIS — R63.5 WEIGHT GAIN, ABNORMAL: ICD-10-CM

## 2020-01-09 LAB — ACTH PLAS-MCNC: 36.1 PG/ML (ref 7.2–63.3)

## 2020-01-09 PROCEDURE — 82533 TOTAL CORTISOL: CPT

## 2020-01-09 PROCEDURE — 82530 CORTISOL FREE: CPT

## 2020-01-13 LAB
ANNOTATION COMMENT IMP: NORMAL
COLLECT DURATION TIME SPEC: 24 HRS
CORTIS F 24H UR HPLC-MCNC: 3.84 UG/L
CORTIS F 24H UR-MRATE: 3.6 UG/D
CORTIS F/CREAT 24H UR: 4.92 UG/G CRT
CREAT 24H UR-MCNC: 78 MG/DL
CREAT 24H UR-MRATE: 741 MG/D (ref 700–1600)
SPECIMEN VOL ?TM UR: 950 ML
TEST NAME 95000: NORMAL

## 2020-01-22 DIAGNOSIS — I10 HYPERTENSION, UNSPECIFIED TYPE: ICD-10-CM

## 2020-01-22 DIAGNOSIS — E87.6 HYPOKALEMIA: ICD-10-CM

## 2020-01-28 ENCOUNTER — OFFICE VISIT (OUTPATIENT)
Dept: NEPHROLOGY | Facility: MEDICAL CENTER | Age: 45
End: 2020-01-28
Payer: COMMERCIAL

## 2020-01-28 VITALS
DIASTOLIC BLOOD PRESSURE: 70 MMHG | HEART RATE: 83 BPM | BODY MASS INDEX: 29.13 KG/M2 | TEMPERATURE: 98.1 F | WEIGHT: 164.4 LBS | HEIGHT: 63 IN | SYSTOLIC BLOOD PRESSURE: 104 MMHG | OXYGEN SATURATION: 98 % | RESPIRATION RATE: 16 BRPM

## 2020-01-28 DIAGNOSIS — N20.0 NEPHROLITHIASIS: ICD-10-CM

## 2020-01-28 DIAGNOSIS — I10 ESSENTIAL HYPERTENSION: ICD-10-CM

## 2020-01-28 PROCEDURE — 99214 OFFICE O/P EST MOD 30 MIN: CPT | Performed by: INTERNAL MEDICINE

## 2020-01-28 RX ORDER — CYCLOBENZAPRINE HCL 10 MG
10 TABLET ORAL
COMMUNITY
Start: 2019-12-27

## 2020-01-28 RX ORDER — AMLODIPINE BESYLATE 2.5 MG/1
2.5 TABLET ORAL EVERY MORNING
COMMUNITY
Start: 2020-01-09 | End: 2021-08-16

## 2020-01-28 ASSESSMENT — PAIN SCALES - GENERAL: PAINLEVEL: NO PAIN

## 2020-01-28 ASSESSMENT — ENCOUNTER SYMPTOMS
HYPERTENSION: 1
SHORTNESS OF BREATH: 0
COUGH: 0
VOMITING: 0
FEVER: 0
NERVOUS/ANXIOUS: 1
CHILLS: 0
NAUSEA: 0

## 2020-01-29 NOTE — PROGRESS NOTES
"Subjective:      Joycelyn Byers is a 44 y.o. female who presents with Chronic Kidney Disease and Hypertension            Patient was recently diagnosed with new onset hypertension, she has been evaluated by endocrinology to rule out Cushing's syndrome.  Patient also had recurrent kidney stone.    Chronic Kidney Disease   This is a chronic problem. The current episode started more than 1 year ago. The problem occurs constantly. The problem has been unchanged. Pertinent negatives include no chest pain, chills, coughing, fever, nausea, urinary symptoms or vomiting.   Hypertension   This is a new problem. The current episode started more than 1 month ago. The problem has been gradually improving since onset. Pertinent negatives include no chest pain, malaise/fatigue, peripheral edema or shortness of breath. Risk factors for coronary artery disease include stress. Past treatments include diuretics and calcium channel blockers. The current treatment provides significant improvement. There are no compliance problems.  Hypertensive end-organ damage includes kidney disease. Identifiable causes of hypertension include chronic renal disease.       Review of Systems   Constitutional: Negative for chills, fever and malaise/fatigue.   Respiratory: Negative for cough and shortness of breath.    Cardiovascular: Negative for chest pain and leg swelling.   Gastrointestinal: Negative for nausea and vomiting.   Genitourinary: Negative for dysuria, frequency and urgency.   Psychiatric/Behavioral: The patient is nervous/anxious.           Objective:     /70 (BP Location: Right arm, Patient Position: Sitting, BP Cuff Size: Adult)   Pulse 83   Temp 36.7 °C (98.1 °F) (Temporal)   Resp 16   Ht 1.59 m (5' 2.6\")   Wt 74.6 kg (164 lb 6.4 oz)   LMP 03/29/2016   SpO2 98%   BMI 29.50 kg/m²      Physical Exam  Vitals signs and nursing note reviewed.   Constitutional:       General: She is not in acute distress.     " Appearance: Normal appearance. She is well-developed. She is not diaphoretic.   HENT:      Head: Normocephalic and atraumatic.      Right Ear: External ear normal.      Left Ear: External ear normal.      Nose: Nose normal.   Eyes:      General: No scleral icterus.        Right eye: No discharge.         Left eye: No discharge.      Conjunctiva/sclera: Conjunctivae normal.   Cardiovascular:      Rate and Rhythm: Normal rate and regular rhythm.      Heart sounds: No murmur.   Pulmonary:      Effort: Pulmonary effort is normal. No respiratory distress.      Breath sounds: Normal breath sounds.   Musculoskeletal:         General: No swelling or tenderness.   Skin:     General: Skin is warm and dry.      Findings: No erythema.   Neurological:      General: No focal deficit present.      Mental Status: She is alert and oriented to person, place, and time.      Cranial Nerves: No cranial nerve deficit.   Psychiatric:         Mood and Affect: Mood normal.         Behavior: Behavior normal.       Past Medical History:   Diagnosis Date   • Allergy, unspecified not elsewhere classified    • Ankylosing spondylitis of lumbosacral region (HCC) 7/6/2015   • Anxiety 11/24/2015   • GERD (gastroesophageal reflux disease)    • Headache, classical migraine    • Hiatal hernia    • Intractable migraine without aura and without status migrainosus 11/24/2015   • Lupus ([facility])    • Morphea 1/9/2019   • Oropharyngeal dysphagia    • Other forms of systemic lupus erythematosus ([facility]) 10/20/2017   • Overactive bladder    • Peptic ulcer    • Polyuria 7/17/2019   • Prediabetes 12/8/2016   • Vocal cord dysfunction      Social History     Socioeconomic History   • Marital status:      Spouse name: Not on file   • Number of children: Not on file   • Years of education: Not on file   • Highest education level: Not on file   Occupational History   • Not on file   Social Needs   • Financial resource strain: Not on file   • Food insecurity:      Worry: Not on file     Inability: Not on file   • Transportation needs:     Medical: Not on file     Non-medical: Not on file   Tobacco Use   • Smoking status: Never Smoker   • Smokeless tobacco: Never Used   Substance and Sexual Activity   • Alcohol use: No     Alcohol/week: 0.0 oz   • Drug use: No   • Sexual activity: Yes     Partners: Male     Birth control/protection: Surgical     Comment: ., 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, studying for PhD in psych nursing   Lifestyle   • Physical activity:     Days per week: Not on file     Minutes per session: Not on file   • Stress: Not on file   Relationships   • Social connections:     Talks on phone: Not on file     Gets together: Not on file     Attends Scientologist service: Not on file     Active member of club or organization: Not on file     Attends meetings of clubs or organizations: Not on file     Relationship status: Not on file   • Intimate partner violence:     Fear of current or ex partner: Not on file     Emotionally abused: Not on file     Physically abused: Not on file     Forced sexual activity: Not on file   Other Topics Concern   • Not on file   Social History Narrative    Patient is currently disabled, 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 She is  with four children.      Family History   Problem Relation Age of Onset   • Arthritis Brother         psoriatic arthritis   • Arthritis Maternal Grandmother    • Psychiatric Illness Mother         Major depression   • Other Mother         discoid lupus   • Psychiatric Illness Father         Bipolar depression   • Heart Disease Father         CHF   • Hypertension Father    • Diabetes Father    • Other Father         Agent orange exposure 125% disabled   • Diabetes Paternal Uncle    • Stroke Maternal Grandfather    • Diabetes Paternal Grandmother    • Alcohol/Drug Paternal Grandfather    • Cancer Paternal Aunt         gyn cancer   • GI Disease Son    • GI Disease Daughter    • GI Disease  Daughter    • GI Disease Daughter      Recent Labs     07/24/19  0737 09/09/19  1159 01/02/20  0948 01/08/20  0741   ALBUMIN 3.6 4.1  --  3.6   SODIUM 136 136 140 139   POTASSIUM 4.2 4.1 3.2* 4.4   CHLORIDE 104 102 101 107   CO2 27 27 27 23   BUN 19 15 13 13   CREATININE 0.81 0.73 0.89 0.80               Assessment/Plan:       1. Essential hypertension  I believe it is essential hypertension, considering normal cortisol level, normal kidney function, and good blood pressure control on small dose of calcium channel blocker and diuretics  Controlled  Continue same medication regimen  Continue low-sodium diet    2. Nephrolithiasis  Patient was advised to be on low-sodium diet  Increase water intake 2.5 L per 24-hour  - US-RENAL; Future

## 2020-02-01 ENCOUNTER — HOSPITAL ENCOUNTER (OUTPATIENT)
Dept: LAB | Facility: MEDICAL CENTER | Age: 45
End: 2020-02-01
Attending: INTERNAL MEDICINE
Payer: COMMERCIAL

## 2020-02-01 DIAGNOSIS — I10 HYPERTENSION, UNSPECIFIED TYPE: ICD-10-CM

## 2020-02-01 DIAGNOSIS — N20.0 NEPHROLITHIASIS: ICD-10-CM

## 2020-02-01 DIAGNOSIS — I10 ESSENTIAL HYPERTENSION: ICD-10-CM

## 2020-02-01 DIAGNOSIS — E87.6 HYPOKALEMIA: ICD-10-CM

## 2020-02-01 LAB
ANION GAP SERPL CALC-SCNC: 8 MMOL/L (ref 0–11.9)
APPEARANCE UR: CLEAR
BILIRUB UR QL STRIP.AUTO: NEGATIVE
BUN SERPL-MCNC: 19 MG/DL (ref 8–22)
CALCIUM SERPL-MCNC: 9.6 MG/DL (ref 8.5–10.5)
CHLORIDE SERPL-SCNC: 103 MMOL/L (ref 96–112)
CO2 SERPL-SCNC: 25 MMOL/L (ref 20–33)
COLOR UR: YELLOW
CREAT SERPL-MCNC: 0.91 MG/DL (ref 0.5–1.4)
CREAT UR-MCNC: 134.8 MG/DL
GLUCOSE SERPL-MCNC: 101 MG/DL (ref 65–99)
GLUCOSE UR STRIP.AUTO-MCNC: NEGATIVE MG/DL
KETONES UR STRIP.AUTO-MCNC: NEGATIVE MG/DL
LEUKOCYTE ESTERASE UR QL STRIP.AUTO: NEGATIVE
MICRO URNS: NORMAL
MICROALBUMIN UR-MCNC: <0.7 MG/DL
MICROALBUMIN/CREAT UR: NORMAL MG/G (ref 0–30)
NITRITE UR QL STRIP.AUTO: NEGATIVE
PH UR STRIP.AUTO: 5.5 [PH] (ref 5–8)
POTASSIUM SERPL-SCNC: 3.8 MMOL/L (ref 3.6–5.5)
PROT UR QL STRIP: NEGATIVE MG/DL
RBC UR QL AUTO: NEGATIVE
SODIUM SERPL-SCNC: 136 MMOL/L (ref 135–145)
SP GR UR STRIP.AUTO: 1.02
UROBILINOGEN UR STRIP.AUTO-MCNC: 0.2 MG/DL

## 2020-02-01 PROCEDURE — 80048 BASIC METABOLIC PNL TOTAL CA: CPT

## 2020-02-01 PROCEDURE — 81003 URINALYSIS AUTO W/O SCOPE: CPT

## 2020-02-01 PROCEDURE — 82384 ASSAY THREE CATECHOLAMINES: CPT

## 2020-02-01 PROCEDURE — 84244 ASSAY OF RENIN: CPT

## 2020-02-01 PROCEDURE — 82088 ASSAY OF ALDOSTERONE: CPT

## 2020-02-01 PROCEDURE — 82043 UR ALBUMIN QUANTITATIVE: CPT

## 2020-02-01 PROCEDURE — 36415 COLL VENOUS BLD VENIPUNCTURE: CPT

## 2020-02-01 PROCEDURE — 82570 ASSAY OF URINE CREATININE: CPT

## 2020-02-02 ENCOUNTER — HOSPITAL ENCOUNTER (OUTPATIENT)
Facility: MEDICAL CENTER | Age: 45
End: 2020-02-02
Attending: INTERNAL MEDICINE
Payer: COMMERCIAL

## 2020-02-02 DIAGNOSIS — I10 HYPERTENSION, UNSPECIFIED TYPE: ICD-10-CM

## 2020-02-02 PROCEDURE — 83835 ASSAY OF METANEPHRINES: CPT

## 2020-02-03 LAB
ALDOST SERPL-MCNC: 46.7 NG/DL
RENIN PLAS-CCNC: 3 NG/ML/HR

## 2020-02-05 ENCOUNTER — OFFICE VISIT (OUTPATIENT)
Dept: ENDOCRINOLOGY | Facility: MEDICAL CENTER | Age: 45
End: 2020-02-05
Payer: COMMERCIAL

## 2020-02-05 VITALS
BODY MASS INDEX: 28.99 KG/M2 | HEIGHT: 63 IN | WEIGHT: 163.6 LBS | SYSTOLIC BLOOD PRESSURE: 112 MMHG | OXYGEN SATURATION: 100 % | HEART RATE: 94 BPM | DIASTOLIC BLOOD PRESSURE: 80 MMHG

## 2020-02-05 DIAGNOSIS — R63.5 WEIGHT GAIN: ICD-10-CM

## 2020-02-05 LAB
COLLECT DURATION TIME SPEC: 24 HRS
CREAT 24H UR-MCNC: 66 MG/DL
CREAT 24H UR-MRATE: 280 MG/D (ref 700–1600)
DOPAMINE SERPL-MCNC: <20 PG/ML (ref 0–20)
EPINEPH PLAS-MCNC: 42 PG/ML (ref 10–200)
METANEPH 24H UR-MCNC: 69 UG/L
METANEPH 24H UR-MRATE: 29 UG/D (ref 36–229)
METANEPH/CREAT 24H UR: 105 UG/G CRT (ref 0–300)
NOREPINEPH PLAS-MCNC: 469 PG/ML (ref 80–520)
NORMETANEPHRINE 24H UR-MCNC: 134 UG/L
NORMETANEPHRINE 24H UR-MRATE: 57 UG/D (ref 95–650)
NORMETANEPHRINE/CREAT 24H UR: 203 UG/G CRT (ref 0–400)
SPECIMEN VOL ?TM UR: 425 ML

## 2020-02-05 PROCEDURE — 99213 OFFICE O/P EST LOW 20 MIN: CPT | Performed by: INTERNAL MEDICINE

## 2020-02-06 ENCOUNTER — OFFICE VISIT (OUTPATIENT)
Dept: URGENT CARE | Facility: PHYSICIAN GROUP | Age: 45
End: 2020-02-06
Payer: COMMERCIAL

## 2020-02-06 VITALS
TEMPERATURE: 98.9 F | RESPIRATION RATE: 16 BRPM | HEIGHT: 63 IN | DIASTOLIC BLOOD PRESSURE: 60 MMHG | BODY MASS INDEX: 28.99 KG/M2 | WEIGHT: 163.6 LBS | HEART RATE: 77 BPM | SYSTOLIC BLOOD PRESSURE: 104 MMHG | OXYGEN SATURATION: 94 %

## 2020-02-06 DIAGNOSIS — R10.9 FLANK PAIN: ICD-10-CM

## 2020-02-06 LAB
APPEARANCE UR: CLEAR
BILIRUB UR STRIP-MCNC: NORMAL MG/DL
COLOR UR AUTO: NORMAL
GLUCOSE UR STRIP.AUTO-MCNC: NORMAL MG/DL
KETONES UR STRIP.AUTO-MCNC: NORMAL MG/DL
LEUKOCYTE ESTERASE UR QL STRIP.AUTO: NORMAL
NITRITE UR QL STRIP.AUTO: NORMAL
PH UR STRIP.AUTO: 6 [PH] (ref 5–8)
PROT UR QL STRIP: NORMAL MG/DL
RBC UR QL AUTO: NORMAL
SP GR UR STRIP.AUTO: 1.02
UROBILINOGEN UR STRIP-MCNC: 0.2 MG/DL

## 2020-02-06 PROCEDURE — 81002 URINALYSIS NONAUTO W/O SCOPE: CPT | Performed by: PHYSICIAN ASSISTANT

## 2020-02-06 PROCEDURE — 99214 OFFICE O/P EST MOD 30 MIN: CPT | Performed by: PHYSICIAN ASSISTANT

## 2020-02-06 RX ORDER — MEDROXYPROGESTERONE ACETATE 150 MG/ML
1 INJECTION, SUSPENSION INTRAMUSCULAR
COMMUNITY
Start: 2020-01-16

## 2020-02-06 ASSESSMENT — ENCOUNTER SYMPTOMS
VOMITING: 0
NAUSEA: 1
NECK PAIN: 0
CONSTIPATION: 0
FLANK PAIN: 1
DIARRHEA: 0
CHILLS: 0
HEADACHES: 0
ABDOMINAL PAIN: 0
BLOOD IN STOOL: 0
DIZZINESS: 0
HEARTBURN: 1
MYALGIAS: 0
FEVER: 0

## 2020-02-06 NOTE — PROGRESS NOTES
Chief Complaint   Patient presents with   • Weight Gain        HPI:    Patient is back to review her evaluation.  I have not found any endocrine problem to treat.  I did screening tests for Cushing's disease although I did not do a dexamethasone suppression test.  I did not do that because her 24-hour urinary cortisol was already somewhat low at 3.6 with upper normal around 45-50.  She insists that she did have a complete 24-hour urine collection.  Otherwise her midnight salivary cortisol was less than measurable.  Morning cortisol was 9.8 and ACTH was 36.  Beyond that she does not have cushingoid features and in particular does not have prominent supraclavicular or dorsal fat pad prominence.  She does not have proximal muscle weakness.    She has recently developed hypertension which is now in good control on minimal medication.  Endocrine work-up for hypertension was also negative    She has absolutely no insight into why she does not lose weight with what she considers to be a restrictive diet.  Regular exercise is limited because she is in school and her last semester is very stressful for her.    I think she would benefit from Dr. Narvaez's .  She would very much like to do that.    ROS:  All other systems reported as negative or unchanged since last exam      Allergies:   Allergies   Allergen Reactions   • Savella [Kdc:Milnacipran+Ci Pigment Blue 63] Myalgia   • Ciprofloxacin    • Reglan [Kdc:Yellow Dye+Ci Pigment Blue 63+Metoclopramide]    • Sulfa Drugs Nausea     GI upset   • Tetanus Antitoxin      Mild spasms and pain     • Tramadol Rash and Shortness of Breath       Current medicines including changes today:  Current Outpatient Medications   Medication Sig Dispense Refill   • amLODIPine (NORVASC) 2.5 MG Tab      • cyclobenzaprine (FLEXERIL) 10 MG Tab TK 1 T PO QHS PRF MUSCLE SPASM     • esomeprazole (NEXIUM) 20 MG capsule TK 1 C PO QD 30 MIN B LEXI MEAL     • HYDROcodone-acetaminophen 2.5-108  mg/5mL (HYCET) 7.5-325 MG/15ML solution TK 15 ML PO QD     • FLUoxetine (PROZAC) 20 MG Cap Take 2 Caps by mouth every day for 90 days. 180 Cap 3   • triamterene-hctz (MAXZIDE-25/DYAZIDE) 37.5-25 MG Tab Take 1 Tab by mouth every day. 30 Tab 3   • coenzyme Q-10 30 MG capsule Take 60 mg by mouth every day.     • Etanercept (ENBREL) 25 MG Recon Soln Inject 1 Each as instructed every 7 days. Prescribed by Dr. Lynch 12 Each 3   • ARNUITY ELLIPTA 200 MCG/ACT AEROSOL POWDER, BREATH ACTIVATED      • ondansetron (ZOFRAN ODT) 4 MG TABLET DISPERSIBLE DIS 1 T ON THE TONGUE Q 8 H PRN  2   • cyanocobalamin (VITAMIN B-12) 1000 MCG/ML Solution ADM 1 ML SC WEEKLY  2   • rizatriptan (MAXALT) 10 MG tablet Take 1 Tab by mouth Once PRN. 10 Tab 3   • hydroxychloroquine (PLAQUENIL) 200 MG Tab      • fluticasone (FLONASE) 50 MCG/ACT nasal spray Spray 1 Spray in nose every day.     • EPIPEN 2-NOLAN 0.3 MG/0.3ML Solution Auto-injector solution for injection      • albuterol (VENTOLIN HFA) 108 (90 BASE) MCG/ACT AERS inhalation aerosol Inhale 2 Puffs by mouth every 6 hours as needed for Shortness of Breath. 8.5 g 3   • amoxicillin (AMOXIL) 500 MG Cap Take 1 Cap by mouth 3 times a day. (Patient not taking: Reported on 1/6/2020) 30 Cap 0   • Chlorhexidine Gluconate 2 % Solution 5 mL by Apply externally route 2 Times a Day. (Patient not taking: Reported on 1/6/2020) 1 Bottle 0   • ADALIMUMAB SC        No current facility-administered medications for this visit.         Past Medical History:   Diagnosis Date   • Allergy, unspecified not elsewhere classified    • Ankylosing spondylitis of lumbosacral region (HCC) 7/6/2015   • Anxiety 11/24/2015   • GERD (gastroesophageal reflux disease)    • Headache, classical migraine    • Hiatal hernia    • Intractable migraine without aura and without status migrainosus 11/24/2015   • Lupus (HCC)    • Morphea 1/9/2019   • Oropharyngeal dysphagia    • Other forms of systemic lupus erythematosus (HCC) 10/20/2017  "  • Overactive bladder    • Peptic ulcer    • Polyuria 7/17/2019   • Prediabetes 12/8/2016   • Vocal cord dysfunction        PHYSICAL EXAM:    /80 (BP Location: Left arm, Patient Position: Sitting, BP Cuff Size: Adult)   Pulse 94   Ht 1.59 m (5' 2.6\")   Wt 74.2 kg (163 lb 9.6 oz)   LMP 03/29/2016   SpO2 100%   BMI 29.35 kg/m²     Gen.   appears healthy, no cushingoid features    Skin   appropriate for sex and age    HEENT  unremarkable    Neck   thyroid gland is small and not abnormal    Heart  regular    Extremities  no edema    Neuro  gait and station normal    Psych  appropriate, calm, pleasant      ASSESSMENT AND RECOMMENDATIONS    1. Weight gain            No endocrine disturbance or abnormality is found that might account for this               I have suggested she consult  Dr. Narvaez's clinic and she would very much like to do that.  I will put in a referral      DISPOSITION: Referral to Dr. Eleanor Delaney M.D.    Copies to: Monique Bower M.D. [phone]  "

## 2020-02-07 NOTE — PROGRESS NOTES
Subjective:      Joycelyn Byers is a 44 y.o. female who presents with Back Pain (poss UTI/Kidney problem, mid back pain, frequency, abdomin tender, nausea, x2 weeks )            HPI  44-year-old female presents to urgent care with new problem of right flank pain and nausea worsening since onset about 2-3 days ago.  Patient reports urinary frequency/urgency.  She denies dysuria or hematuria.  Denies lower abdominal pain.  No vomiting or diarrhea.  Hx of kidney stones, states this feels similar.   Has not taken any OTC meds for her symptoms.  Denies other associated aggravating or alleviating factors.     Review of Systems   Constitutional: Negative for chills, fever and malaise/fatigue.   Gastrointestinal: Positive for heartburn and nausea. Negative for abdominal pain, blood in stool, constipation, diarrhea and vomiting.   Genitourinary: Positive for flank pain, frequency and urgency. Negative for dysuria and hematuria.   Musculoskeletal: Negative for myalgias and neck pain.   Neurological: Negative for dizziness and headaches.   All other systems reviewed and are negative.      Past Medical History:   Diagnosis Date   • Allergy, unspecified not elsewhere classified    • Ankylosing spondylitis of lumbosacral region (HCC) 7/6/2015   • Anxiety 11/24/2015   • GERD (gastroesophageal reflux disease)    • Headache, classical migraine    • Hiatal hernia    • Intractable migraine without aura and without status migrainosus 11/24/2015   • Lupus ([facility])    • Morphea 1/9/2019   • Oropharyngeal dysphagia    • Other forms of systemic lupus erythematosus ([facility]) 10/20/2017   • Overactive bladder    • Peptic ulcer    • Polyuria 7/17/2019   • Prediabetes 12/8/2016   • Vocal cord dysfunction      Current Outpatient Medications on File Prior to Visit   Medication Sig Dispense Refill   • ENBREL SURECLICK 50 MG/ML Solution Auto-injector      • amLODIPine (NORVASC) 2.5 MG Tab      • cyclobenzaprine (FLEXERIL) 10 MG Tab TK 1 T PO  "QHS PRF MUSCLE SPASM     • esomeprazole (NEXIUM) 20 MG capsule TK 1 C PO QD 30 MIN B LEXI MEAL     • HYDROcodone-acetaminophen 2.5-108 mg/5mL (HYCET) 7.5-325 MG/15ML solution TK 15 ML PO QD     • FLUoxetine (PROZAC) 20 MG Cap Take 2 Caps by mouth every day for 90 days. 180 Cap 3   • coenzyme Q-10 30 MG capsule Take 60 mg by mouth every day.     • Etanercept (ENBREL) 25 MG Recon Soln Inject 1 Each as instructed every 7 days. Prescribed by Dr. Lynch 12 Each 3   • Chlorhexidine Gluconate 2 % Solution 5 mL by Apply externally route 2 Times a Day. 1 Bottle 0   • ADALIMUMAB SC      • ARNUITY ELLIPTA 200 MCG/ACT AEROSOL POWDER, BREATH ACTIVATED      • ondansetron (ZOFRAN ODT) 4 MG TABLET DISPERSIBLE DIS 1 T ON THE TONGUE Q 8 H PRN  2   • cyanocobalamin (VITAMIN B-12) 1000 MCG/ML Solution ADM 1 ML SC WEEKLY  2   • rizatriptan (MAXALT) 10 MG tablet Take 1 Tab by mouth Once PRN. 10 Tab 3   • hydroxychloroquine (PLAQUENIL) 200 MG Tab      • fluticasone (FLONASE) 50 MCG/ACT nasal spray Spray 1 Spray in nose every day.     • EPIPEN 2-NOLAN 0.3 MG/0.3ML Solution Auto-injector solution for injection        No current facility-administered medications on file prior to visit.      Allergies   Allergen Reactions   • Savella [Kdc:Milnacipran+Ci Pigment Blue 63] Myalgia   • Ciprofloxacin    • Reglan [Kdc:Yellow Dye+Ci Pigment Blue 63+Metoclopramide]    • Sulfa Drugs Nausea     GI upset   • Tetanus Antitoxin      Mild spasms and pain     • Tramadol Rash and Shortness of Breath     Social History     Tobacco Use   • Smoking status: Never Smoker   • Smokeless tobacco: Never Used   Substance Use Topics   • Alcohol use: No     Alcohol/week: 0.0 oz      Objective:     /60 (BP Location: Left arm, Patient Position: Sitting, BP Cuff Size: Adult)   Pulse 77   Temp 37.2 °C (98.9 °F) (Temporal)   Resp 16   Ht 1.59 m (5' 2.6\")   Wt 74.2 kg (163 lb 9.6 oz)   LMP 03/29/2016   SpO2 94%   BMI 29.35 kg/m²      Physical Exam  Vitals signs " reviewed.   Constitutional:       General: She is not in acute distress.     Appearance: Normal appearance. She is well-developed. She is not ill-appearing.   HENT:      Head: Normocephalic and atraumatic.      Mouth/Throat:      Mouth: Mucous membranes are moist.      Pharynx: Oropharynx is clear.   Eyes:      Extraocular Movements: Extraocular movements intact.      Conjunctiva/sclera: Conjunctivae normal.   Neck:      Musculoskeletal: Normal range of motion and neck supple.   Cardiovascular:      Rate and Rhythm: Normal rate and regular rhythm.   Pulmonary:      Effort: Pulmonary effort is normal. No respiratory distress.      Breath sounds: Normal breath sounds.   Abdominal:      General: Bowel sounds are normal. There is no distension.      Palpations: Abdomen is soft.      Tenderness: There is no abdominal tenderness. There is right CVA tenderness. There is no left CVA tenderness or guarding.   Musculoskeletal: Normal range of motion.   Skin:     General: Skin is warm and dry.   Neurological:      Mental Status: She is alert and oriented to person, place, and time.   Psychiatric:         Mood and Affect: Mood normal.         Behavior: Behavior normal.         Thought Content: Thought content normal.         Judgment: Judgment normal.                 Assessment/Plan:     1. Flank pain  POCT Urinalysis    US-RENAL    CANCELED: US-RENAL     Results for orders placed or performed in visit on 02/06/20   POCT Urinalysis   Result Value Ref Range    POC Color Light Yellow Negative    POC Appearance Clear Negative    POC Leukocyte Esterase Neg Negative    POC Nitrites Neg Negative    POC Urobiligen 0.2 Negative (0.2) mg/dL    POC Protein Neg Negative mg/dL    POC Urine PH 6.0 5.0 - 8.0    POC Blood TR-Intact Negative    POC Specific Gravity 1.025 <1.005 - >1.030    POC Ketones Neg Negative mg/dL    POC Bilirubin Neg Negative mg/dL    POC Glucose Neg Negative mg/dL       We will follow-up pending ultrasound results.   Patient advised to proceed to emergency department for any worsening or persistent symptoms.  Recommend Tylenol/Motrin for pain.  Follow-up with PCP as needed. Patient verbalized understanding of treatment plan and has no further questions regarding care.

## 2020-02-09 ENCOUNTER — HOSPITAL ENCOUNTER (OUTPATIENT)
Dept: RADIOLOGY | Facility: MEDICAL CENTER | Age: 45
End: 2020-02-09
Attending: PHYSICIAN ASSISTANT
Payer: COMMERCIAL

## 2020-02-09 DIAGNOSIS — R10.9 FLANK PAIN: ICD-10-CM

## 2020-02-09 PROCEDURE — 76775 US EXAM ABDO BACK WALL LIM: CPT

## 2020-02-11 ENCOUNTER — OFFICE VISIT (OUTPATIENT)
Dept: URGENT CARE | Facility: PHYSICIAN GROUP | Age: 45
End: 2020-02-11
Payer: COMMERCIAL

## 2020-02-11 VITALS
DIASTOLIC BLOOD PRESSURE: 78 MMHG | RESPIRATION RATE: 14 BRPM | HEIGHT: 62 IN | TEMPERATURE: 98.5 F | OXYGEN SATURATION: 95 % | BODY MASS INDEX: 30 KG/M2 | WEIGHT: 163 LBS | HEART RATE: 95 BPM | SYSTOLIC BLOOD PRESSURE: 104 MMHG

## 2020-02-11 DIAGNOSIS — J02.9 PHARYNGITIS, UNSPECIFIED ETIOLOGY: ICD-10-CM

## 2020-02-11 DIAGNOSIS — J22 LRTI (LOWER RESPIRATORY TRACT INFECTION): ICD-10-CM

## 2020-02-11 LAB
INT CON NEG: NEGATIVE
INT CON POS: POSITIVE
S PYO AG THROAT QL: NEGATIVE

## 2020-02-11 PROCEDURE — 87880 STREP A ASSAY W/OPTIC: CPT | Performed by: PHYSICIAN ASSISTANT

## 2020-02-11 PROCEDURE — 99214 OFFICE O/P EST MOD 30 MIN: CPT | Performed by: PHYSICIAN ASSISTANT

## 2020-02-11 RX ORDER — AMOXICILLIN AND CLAVULANATE POTASSIUM 875; 125 MG/1; MG/1
1 TABLET, FILM COATED ORAL 2 TIMES DAILY
Qty: 14 TAB | Refills: 0 | Status: SHIPPED | OUTPATIENT
Start: 2020-02-11 | End: 2020-02-18

## 2020-02-11 RX ORDER — LEVALBUTEROL TARTRATE 45 UG/1
1-2 AEROSOL, METERED ORAL EVERY 4 HOURS PRN
Qty: 1 INHALER | Refills: 0 | Status: SHIPPED | OUTPATIENT
Start: 2020-02-11 | End: 2020-08-25

## 2020-02-11 RX ORDER — METHYLPREDNISOLONE 4 MG/1
TABLET ORAL
Qty: 21 TAB | Refills: 0 | Status: SHIPPED | OUTPATIENT
Start: 2020-02-11 | End: 2020-03-03

## 2020-02-11 ASSESSMENT — ENCOUNTER SYMPTOMS
SHORTNESS OF BREATH: 1
COUGH: 1
HEADACHES: 1
PALPITATIONS: 0
WHEEZING: 1
CHILLS: 0
SORE THROAT: 1
HEMOPTYSIS: 0
SPUTUM PRODUCTION: 1
DIZZINESS: 0
FEVER: 0

## 2020-02-11 NOTE — PROGRESS NOTES
Subjective:   Joycelyn Byers is a 44 y.o. female who presents today with   Chief Complaint   Patient presents with   • Cough       Cough   This is a new problem. The current episode started in the past 7 days. The problem has been gradually worsening. The problem occurs every few minutes. The cough is productive of sputum. Associated symptoms include headaches, a sore throat, shortness of breath and wheezing. Pertinent negatives include no chest pain, chills, fever or hemoptysis. She has tried a beta-agonist inhaler for the symptoms. The treatment provided mild relief. Her past medical history is significant for asthma and bronchitis.       PMH:  has a past medical history of Allergy, unspecified not elsewhere classified, Ankylosing spondylitis of lumbosacral region ([facility]) (7/6/2015), Anxiety (11/24/2015), GERD (gastroesophageal reflux disease), Headache, classical migraine, Hiatal hernia, Intractable migraine without aura and without status migrainosus (11/24/2015), Lupus ([facility]), Morphea (1/9/2019), Oropharyngeal dysphagia, Other forms of systemic lupus erythematosus ([facility]) (10/20/2017), Overactive bladder, Peptic ulcer, Polyuria (7/17/2019), Prediabetes (12/8/2016), and Vocal cord dysfunction.  MEDS:   Current Outpatient Medications:   •  levalbuterol (XOPENEX HFA) 45 MCG/ACT inhaler, Inhale 1-2 Puffs by mouth every four hours as needed for Shortness of Breath., Disp: 1 Inhaler, Rfl: 0  •  amoxicillin-clavulanate (AUGMENTIN) 875-125 MG Tab, Take 1 Tab by mouth 2 times a day for 7 days., Disp: 14 Tab, Rfl: 0  •  methylPREDNISolone (MEDROL DOSEPAK) 4 MG Tablet Therapy Pack, Follow schedule on package instructions., Disp: 21 Tab, Rfl: 0  •  ENBREL SURECLICK 50 MG/ML Solution Auto-injector, , Disp: , Rfl:   •  amLODIPine (NORVASC) 2.5 MG Tab, , Disp: , Rfl:   •  cyclobenzaprine (FLEXERIL) 10 MG Tab, TK 1 T PO QHS PRF MUSCLE SPASM, Disp: , Rfl:   •  esomeprazole (NEXIUM) 20 MG capsule, TK 1 C PO QD 30 MIN B  LEXI MEAL, Disp: , Rfl:   •  HYDROcodone-acetaminophen 2.5-108 mg/5mL (HYCET) 7.5-325 MG/15ML solution, TK 15 ML PO QD, Disp: , Rfl:   •  FLUoxetine (PROZAC) 20 MG Cap, Take 2 Caps by mouth every day for 90 days., Disp: 180 Cap, Rfl: 3  •  triamterene-hctz (MAXZIDE-25/DYAZIDE) 37.5-25 MG Tab, Take 1 Tab by mouth every day., Disp: 30 Tab, Rfl: 3  •  coenzyme Q-10 30 MG capsule, Take 60 mg by mouth every day., Disp: , Rfl:   •  Etanercept (ENBREL) 25 MG Recon Soln, Inject 1 Each as instructed every 7 days. Prescribed by Dr. Lynch, Disp: 12 Each, Rfl: 3  •  Chlorhexidine Gluconate 2 % Solution, 5 mL by Apply externally route 2 Times a Day., Disp: 1 Bottle, Rfl: 0  •  ADALIMUMAB SC, , Disp: , Rfl:   •  ARNUITY ELLIPTA 200 MCG/ACT AEROSOL POWDER, BREATH ACTIVATED, , Disp: , Rfl:   •  ondansetron (ZOFRAN ODT) 4 MG TABLET DISPERSIBLE, DIS 1 T ON THE TONGUE Q 8 H PRN, Disp: , Rfl: 2  •  cyanocobalamin (VITAMIN B-12) 1000 MCG/ML Solution, ADM 1 ML SC WEEKLY, Disp: , Rfl: 2  •  rizatriptan (MAXALT) 10 MG tablet, Take 1 Tab by mouth Once PRN., Disp: 10 Tab, Rfl: 3  •  hydroxychloroquine (PLAQUENIL) 200 MG Tab, , Disp: , Rfl:   •  fluticasone (FLONASE) 50 MCG/ACT nasal spray, Spray 1 Spray in nose every day., Disp: , Rfl:   •  EPIPEN 2-NOLAN 0.3 MG/0.3ML Solution Auto-injector solution for injection, , Disp: , Rfl:   ALLERGIES:   Allergies   Allergen Reactions   • Savella [Kdc:Milnacipran+Ci Pigment Blue 63] Myalgia   • Ciprofloxacin    • Reglan [Kdc:Yellow Dye+Ci Pigment Blue 63+Metoclopramide]    • Sulfa Drugs Nausea     GI upset   • Tetanus Antitoxin      Mild spasms and pain     • Tramadol Rash and Shortness of Breath     SURGHX:   Past Surgical History:   Procedure Laterality Date   • HYSTERECTOMY LAPAROSCOPY  2013    utrine and right ovary removed   • HERNIA REPAIR  2007    umblical hernia   • CHOLECYSTECTOMY  2007   • SALPINGO-OOPHORECTOMY, UNILATERAL Right      SOCHX:  reports that she has never smoked. She has never used  "smokeless tobacco. She reports that she does not drink alcohol or use drugs.  FH: Reviewed with patient, not pertinent to this visit.       Review of Systems   Constitutional: Positive for malaise/fatigue. Negative for chills and fever.   HENT: Positive for congestion and sore throat.    Respiratory: Positive for cough, sputum production, shortness of breath and wheezing. Negative for hemoptysis.    Cardiovascular: Negative for chest pain and palpitations.   Neurological: Positive for headaches. Negative for dizziness.   All other systems reviewed and are negative.       Objective:   /78   Pulse 95   Temp 36.9 °C (98.5 °F) (Temporal)   Resp 14   Ht 1.575 m (5' 2\")   Wt 73.9 kg (163 lb)   LMP 03/29/2016   SpO2 95%   BMI 29.81 kg/m²   Physical Exam  Vitals signs and nursing note reviewed.   Constitutional:       General: She is not in acute distress.     Appearance: She is well-developed.   HENT:      Head: Normocephalic and atraumatic.      Right Ear: Hearing normal.      Left Ear: Hearing normal.      Nose: Congestion present.      Mouth/Throat:      Pharynx: Posterior oropharyngeal erythema present.   Eyes:      Pupils: Pupils are equal, round, and reactive to light.   Cardiovascular:      Rate and Rhythm: Normal rate and regular rhythm.      Heart sounds: Normal heart sounds.   Pulmonary:      Effort: Pulmonary effort is normal.      Breath sounds: Normal breath sounds. No stridor. No wheezing, rhonchi or rales.      Comments: Congested cough  Musculoskeletal:      Comments: Normal movement in all 4 extremities   Lymphadenopathy:      Head:      Right side of head: Submandibular and tonsillar adenopathy present.      Left side of head: Submandibular and tonsillar adenopathy present.   Skin:     General: Skin is warm and dry.   Neurological:      Mental Status: She is alert.      Coordination: Coordination normal.   Psychiatric:         Mood and Affect: Mood normal.       STREP A " NEG    Assessment/Plan:   Assessment    1. LRTI (lower respiratory tract infection)  - levalbuterol (XOPENEX HFA) 45 MCG/ACT inhaler; Inhale 1-2 Puffs by mouth every four hours as needed for Shortness of Breath.  Dispense: 1 Inhaler; Refill: 0  - amoxicillin-clavulanate (AUGMENTIN) 875-125 MG Tab; Take 1 Tab by mouth 2 times a day for 7 days.  Dispense: 14 Tab; Refill: 0  - methylPREDNISolone (MEDROL DOSEPAK) 4 MG Tablet Therapy Pack; Follow schedule on package instructions.  Dispense: 21 Tab; Refill: 0    2. Pharyngitis, unspecified etiology  - POCT Rapid Strep A  Discussed potential side effects of medication with patient and she is understanding and agreeable at this time.  Differential diagnosis, natural history, supportive care, and indications for immediate follow-up discussed.   Patient given instructions and understanding of medications and treatment.    If not improving in 3-5 days, F/U with PCP or return to UC if symptoms worsen.    Patient agreeable to plan.      Please note that this dictation was created using voice recognition software. I have made every reasonable attempt to correct obvious errors, but I expect that there are errors of grammar and possibly content that I did not discover before finalizing the note.    Devon Anderson PA-C

## 2020-02-11 NOTE — LETTER
February 11, 2020         Patient: Joycelyn Byers   YOB: 1975   Date of Visit: 2/11/2020           To Whom it May Concern:    Joycelyn Byers was seen in my clinic on 2/11/2020.  Please excuse her recent absence from work.    If you have any questions or concerns, please don't hesitate to call.        Sincerely,           Devon Anderson P.A.-C.  Electronically Signed

## 2020-02-12 RX ORDER — TRIAMTERENE AND HYDROCHLOROTHIAZIDE 37.5; 25 MG/1; MG/1
TABLET ORAL
Qty: 30 TAB | Refills: 3 | Status: SHIPPED | OUTPATIENT
Start: 2020-02-12 | End: 2020-06-09

## 2020-02-12 NOTE — TELEPHONE ENCOUNTER
Received request via: Patient    Was the patient seen in the last year in this department? Yes    Patients next Appointment:  Visit date not found     Requested Prescriptions     Pending Prescriptions Disp Refills   • triamterene-hctz (MAXZIDE-25/DYAZIDE) 37.5-25 MG Tab [Pharmacy Med Name: TRIAMTERENE 37.5MG/ HCTZ 25MG TABS] 30 Tab 3     Sig: TAKE 1 TABLET BY MOUTH EVERY DAY

## 2020-02-14 ENCOUNTER — HOSPITAL ENCOUNTER (OUTPATIENT)
Dept: RADIOLOGY | Facility: MEDICAL CENTER | Age: 45
End: 2020-02-14
Attending: FAMILY MEDICINE
Payer: COMMERCIAL

## 2020-02-14 ENCOUNTER — OFFICE VISIT (OUTPATIENT)
Dept: URGENT CARE | Facility: PHYSICIAN GROUP | Age: 45
End: 2020-02-14
Payer: COMMERCIAL

## 2020-02-14 VITALS
BODY MASS INDEX: 28.88 KG/M2 | WEIGHT: 163 LBS | TEMPERATURE: 98.8 F | OXYGEN SATURATION: 95 % | HEART RATE: 74 BPM | SYSTOLIC BLOOD PRESSURE: 118 MMHG | DIASTOLIC BLOOD PRESSURE: 78 MMHG | HEIGHT: 63 IN

## 2020-02-14 DIAGNOSIS — J02.9 PHARYNGITIS, UNSPECIFIED ETIOLOGY: ICD-10-CM

## 2020-02-14 DIAGNOSIS — R50.9 FEVER, UNSPECIFIED FEVER CAUSE: ICD-10-CM

## 2020-02-14 DIAGNOSIS — R05.9 COUGH: ICD-10-CM

## 2020-02-14 PROCEDURE — 71046 X-RAY EXAM CHEST 2 VIEWS: CPT

## 2020-02-14 PROCEDURE — 99214 OFFICE O/P EST MOD 30 MIN: CPT | Performed by: FAMILY MEDICINE

## 2020-02-14 RX ORDER — LIDOCAINE HYDROCHLORIDE 20 MG/ML
15 SOLUTION OROPHARYNGEAL EVERY 4 HOURS PRN
Qty: 120 ML | Refills: 0 | Status: SHIPPED | OUTPATIENT
Start: 2020-02-14 | End: 2020-06-26

## 2020-02-14 ASSESSMENT — ENCOUNTER SYMPTOMS
NAUSEA: 0
EYE REDNESS: 0
EYE DISCHARGE: 0
MYALGIAS: 1
VOMITING: 0
HEADACHES: 1
WEIGHT LOSS: 0

## 2020-02-14 NOTE — PROGRESS NOTES
"Subjective:      Joycelyn Byers is a 44 y.o. female who presents with Cough (congestion, sore throat x 5 days on ABX )            5d productive cough without blood in sputum. ST. Sores on throat/severe pain. 2weeks anterior neck pain. Subjective fever last night. No rash. Not responding to augmentin and oral CS. PMH pneumonia and concerned about this.  No other aggravating or alleviating factors.        Review of Systems   Constitutional: Positive for malaise/fatigue. Negative for weight loss.   Eyes: Negative for discharge and redness.   Gastrointestinal: Negative for nausea and vomiting.   Musculoskeletal: Positive for myalgias. Negative for joint pain.   Skin: Negative for itching and rash.   Neurological: Positive for headaches.       .  Medications, Allergies, and current problem list reviewed today in Epic     Objective:     /78 (BP Location: Left arm, Patient Position: Sitting, BP Cuff Size: Adult)   Pulse 74   Temp 37.1 °C (98.8 °F) (Temporal)   Ht 1.6 m (5' 3\")   Wt 73.9 kg (163 lb)   LMP 03/29/2016   SpO2 95%   BMI 28.87 kg/m²      Physical Exam  Constitutional:       General: She is not in acute distress.     Appearance: She is well-developed.   HENT:      Head: Normocephalic and atraumatic.      Right Ear: Tympanic membrane normal.      Left Ear: Tympanic membrane normal.      Nose: Congestion present. No rhinorrhea.      Mouth/Throat:      Pharynx: Posterior oropharyngeal erythema present. No oropharyngeal exudate.   Eyes:      Conjunctiva/sclera: Conjunctivae normal.   Neck:      Musculoskeletal: Normal range of motion and neck supple. Muscular tenderness present.   Cardiovascular:      Rate and Rhythm: Normal rate and regular rhythm.      Heart sounds: Normal heart sounds. No murmur.   Pulmonary:      Effort: Pulmonary effort is normal.      Breath sounds: Normal breath sounds. No wheezing.   Lymphadenopathy:      Cervical: Cervical adenopathy present.   Skin:     General: Skin " is warm and dry.      Findings: No rash.   Neurological:      Mental Status: She is alert and oriented to person, place, and time.                 Assessment/Plan:     CXR: no acute cardiopulmonary process per radiology    1. Cough  DX-CHEST-2 VIEWS    Hydrocod Polst-CPM Polst ER (TUSSIONEX) 10-8 MG/5ML Suspension Extended Release   2. Pharyngitis, unspecified etiology  lidocaine (XYLOCAINE) 2 % Solution   3. Fever, unspecified fever cause       Differential diagnosis, natural history, supportive care, and indications for immediate follow-up discussed at length.     Very high probability viral illness. May stop abx.

## 2020-02-14 NOTE — LETTER
February 14, 2020         Patient: Joycelyn Byers   YOB: 1975   Date of Visit: 2/14/2020           To Whom it May Concern:    Joycelyn Byers was seen in my clinic on 2/14/2020. Please excuse today.         Sincerely,           Aiden Atkins M.D.  Electronically Signed

## 2020-03-03 ENCOUNTER — OFFICE VISIT (OUTPATIENT)
Dept: HEALTH INFORMATION MANAGEMENT | Facility: MEDICAL CENTER | Age: 45
End: 2020-03-03
Payer: COMMERCIAL

## 2020-03-03 VITALS
BODY MASS INDEX: 28.47 KG/M2 | HEIGHT: 63 IN | SYSTOLIC BLOOD PRESSURE: 116 MMHG | DIASTOLIC BLOOD PRESSURE: 78 MMHG | OXYGEN SATURATION: 96 % | HEART RATE: 104 BPM | WEIGHT: 160.7 LBS

## 2020-03-03 DIAGNOSIS — R73.9 HYPERGLYCEMIA: ICD-10-CM

## 2020-03-03 DIAGNOSIS — R63.5 WEIGHT GAIN: ICD-10-CM

## 2020-03-03 DIAGNOSIS — E55.9 VITAMIN D DEFICIENCY: ICD-10-CM

## 2020-03-03 DIAGNOSIS — E66.3 OVERWEIGHT (BMI 25.0-29.9): ICD-10-CM

## 2020-03-03 DIAGNOSIS — R94.31 PROLONGED Q-T INTERVAL ON ECG: ICD-10-CM

## 2020-03-03 DIAGNOSIS — I10 ESSENTIAL HYPERTENSION: ICD-10-CM

## 2020-03-03 DIAGNOSIS — E78.00 HYPERCHOLESTEROLEMIA: ICD-10-CM

## 2020-03-03 DIAGNOSIS — R53.83 OTHER FATIGUE: ICD-10-CM

## 2020-03-03 PROCEDURE — 99205 OFFICE O/P NEW HI 60 MIN: CPT | Performed by: INTERNAL MEDICINE

## 2020-03-03 PROCEDURE — 93000 ELECTROCARDIOGRAM COMPLETE: CPT | Performed by: INTERNAL MEDICINE

## 2020-03-03 PROCEDURE — 97802 MEDICAL NUTRITION INDIV IN: CPT | Performed by: DIETITIAN, REGISTERED

## 2020-03-03 ASSESSMENT — FIBROSIS 4 INDEX: FIB4 SCORE: 0.69

## 2020-03-03 ASSESSMENT — PATIENT HEALTH QUESTIONNAIRE - PHQ9: CLINICAL INTERPRETATION OF PHQ2 SCORE: 0
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
## 2020-03-03 NOTE — PROGRESS NOTES
"3/3/2020     Joycelyn Byers 44 y.o.        Time in/out: 9:46-10:18    Anthropometrics/Objective  Vitals:    03/03/20 0914   BP: 116/78   Weight: 72.9 kg (160 lb 11.2 oz)   Height: 1.588 m (5' 2.5\")     BMI: Body mass index is 28.92 kg/m².  Stated Goal Weight: See MD note  See comprehensive patient history form for further information     Subjective:  Pt is here today for the initial screening visit for the medical weight management program.     - in Nurse Practitioner school   - wants to try tracking  - wants to try 2 MR a day  - skips meals but high carb options when she eats foods  - has gained about 30 lbs  - avoids gluten for her AI diseases and finds this helps symptoms tremendously   - has tried NOOM, keto, and WW in past  - used to track using [email or web address] kirby    See HIP Medical Questionnaire in media for more detailed diet history     Drinks: water, tea with stevia, soda 1x every 2 weeks    Nutrition Diagnosis (PES Statement)  · Obesity related to excessive energy intake and inadequate energy expenditure as evidenced by BMI >30     Client history:  Condition(s) associated with a diagnosis or treatment / Pertinent Medical Hx: anxiety, reactive hypoglycemia, chronic constipation, Vit D def, weight gain, dyspnea on exertion, HTN, hypokalemia, weight gain    Biochemical data, medical test and procedures  Lab Results   Component Value Date/Time    HBA1C 5.7 (H) 07/24/2019 07:37 AM     No results found for: POCGLUCOSE  Lab Results   Component Value Date/Time    CHOLSTRLTOT 186 05/24/2018 07:17 AM     (H) 05/24/2018 07:17 AM    HDL 63 05/24/2018 07:17 AM    TRIGLYCERIDE 69 05/24/2018 07:17 AM         Nutrition Intervention  Nutrition Prescription  Recommended Daily Kcals: 1200 Kcal based on REE of 1438    Recommended Daily Protein: 100g or ~30% of kcals      Meal Plan Recommendation   Calorie controlled, high protein, low carb diet    with 2 meal replacements per day  1-2 snacks; 1 oz protein + " non-starchy veggies or 1 fruit      Comprehensive Nutrition Education Instruction or training leading to in-depth nutrition related knowledge about:   Benefits to following meal plan, combine carb, protein and fat at each meal, meal timing and spacing, portion control, sweets and alcohol in moderation.  Handouts provided regarding topics discussed:   - Meal Plan   - My Plate Planner    - Snack list with fruit   - Meal ideas   - MyFitnessPal How-To     Monitoring & Evaluation Plan    Behavioral-Environmental:  Behavior: Keep a food journal and bring to next appointment   Physical activity: Did not discuss today     Food / Nutrient Intake:  Food intake: Follow meal plan as discussed. Avoid concentrated sweets and processed carbs. Use the plate method for portion control and macronutrient balance. Limit carbs/starch to up to 1 cup per meal. Snacks should be 1oz protein + ns veggies or fruit. Fruit once per day.     Fluid intake: Consume at least 64 oz water per day. Avoid all sweetened beverages. Limit coffee to 1 cup a day (ideally no cream or sugar). Limit or avoid alcohol as discussed with Dr. Webster.     Physical Signs / Symptoms:  Weight loss towards BMI < 30   Weight change: loss of 1-2 lbs/month    Assessment Notes:  Today I oriented Joycelyn to Dr. Turner's Medical Weight Management program. Encouraged a higher protein, lower carbohydrate, and calorie controlled meal plan consisting of 3 meals and 1-2 snacks per day with optional use of meal replacements. Encouraged patient to track their food/beverage intake on My fitness Pal or utilize a written food journal.  We discussed how to build protein into each meal and snack, incorporating 1/2 plate non-starchy vegetable twice daily, optional 1/2 cup of complex carbohydrate at meals if desired, and avoiding refined carbohydrates and simple sugars. Reviewed snack guidelines to include 1 oz protein and optional non-starchy vegetable or 1 serving of fruit.       Joycelyn will be aiming for 1200 kcal/d.  She will also be looking at the macronutrient feature and aiming for 100g or less of carbohydrate and 100g or more of protein per day per Dr. Turner recommendations (or 30% or less of CHO and 30% or more of protein from calories).     Joycelyn will be tracking using MFP and doing 2 MR shakes a day for B and L. She tries to avoid gluten as much as she can as she feels her AI symptoms and energy levels improve greatly. She also noticed better weight control as she states she was not eating so many excess carbs. 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  At next visit suggest reviewing tracking information    Goals:  1. 2 MR a day for breakfast and lunch  2. Tracking using [employer] kirby  3. 10 minutes of exercise 3x a week      F/U: 2 weeks RD, 4-6 weeks ETELVINA/MD

## 2020-03-16 ENCOUNTER — OFFICE VISIT (OUTPATIENT)
Dept: URGENT CARE | Facility: CLINIC | Age: 45
End: 2020-03-16
Payer: COMMERCIAL

## 2020-03-16 ENCOUNTER — APPOINTMENT (OUTPATIENT)
Dept: HEALTH INFORMATION MANAGEMENT | Facility: MEDICAL CENTER | Age: 45
End: 2020-03-16
Payer: COMMERCIAL

## 2020-03-16 VITALS
OXYGEN SATURATION: 98 % | HEART RATE: 88 BPM | WEIGHT: 165.4 LBS | RESPIRATION RATE: 16 BRPM | DIASTOLIC BLOOD PRESSURE: 66 MMHG | BODY MASS INDEX: 29.3 KG/M2 | SYSTOLIC BLOOD PRESSURE: 108 MMHG | TEMPERATURE: 96.9 F | HEIGHT: 63 IN

## 2020-03-16 DIAGNOSIS — R05.9 COUGH: ICD-10-CM

## 2020-03-16 DIAGNOSIS — J22 LRTI (LOWER RESPIRATORY TRACT INFECTION): ICD-10-CM

## 2020-03-16 DIAGNOSIS — J11.1 INFLUENZA-LIKE ILLNESS: ICD-10-CM

## 2020-03-16 LAB
FLUAV+FLUBV AG SPEC QL IA: NEGATIVE
INT CON NEG: NORMAL
INT CON POS: NORMAL

## 2020-03-16 PROCEDURE — 99214 OFFICE O/P EST MOD 30 MIN: CPT | Performed by: NURSE PRACTITIONER

## 2020-03-16 PROCEDURE — 87804 INFLUENZA ASSAY W/OPTIC: CPT | Performed by: NURSE PRACTITIONER

## 2020-03-16 RX ORDER — DOXYCYCLINE HYCLATE 100 MG
100 TABLET ORAL 2 TIMES DAILY
Qty: 10 TAB | Refills: 0 | Status: SHIPPED | OUTPATIENT
Start: 2020-03-16 | End: 2020-04-14

## 2020-03-16 ASSESSMENT — FIBROSIS 4 INDEX: FIB4 SCORE: 0.69

## 2020-03-16 NOTE — PROGRESS NOTES
Subjective:      Joycelyn Byers is a 44 y.o. female who presents with Cough (sob, stomach ache, [id number])            Chief Complaint   Patient presents with   • Cough     sob, stomach ache, [id number]       Cough  This is a new problem. Patient reports she was treated one month ago for Lower Respiratory tract infection with Augmentin. Symptoms did resolve but then 2 days ago she reports she developed fever, SOB and chills.. The problem has been worsening. The problem occurs constantly. The cough is dry. Associated symptoms include : fatigue, congestion, headaches, chills, muscle aches, fever. Pertinent negatives include no   nausea, vomiting, diarrhea, sweats, weight loss or wheezing. Nothing aggravates the symptoms.  Patient has tried nothing for the symptoms. There is no history of asthma.     PMH:  has a past medical history of Allergy, unspecified not elsewhere classified, Ankylosing spondylitis of lumbosacral region (HCC) (7/6/2015), Anxiety (11/24/2015), GERD (gastroesophageal reflux disease), Headache, classical migraine, Hiatal hernia, Intractable migraine without aura and without status migrainosus (11/24/2015), Lupus ([facility]), Morphea (1/9/2019), Oropharyngeal dysphagia, Other forms of systemic lupus erythematosus ([facility]) (10/20/2017), Overactive bladder, Peptic ulcer, Polyuria (7/17/2019), Prediabetes (12/8/2016), and Vocal cord dysfunction.  MEDS:   Current Outpatient Medications:   •  lidocaine (XYLOCAINE) 2 % Solution, Take 15 mL by mouth every four hours as needed for Throat/Mouth Pain. Gargle and spit every 4 hours as needed, Disp: 120 mL, Rfl: 0  •  triamterene-hctz (MAXZIDE-25/DYAZIDE) 37.5-25 MG Tab, TAKE 1 TABLET BY MOUTH EVERY DAY, Disp: 30 Tab, Rfl: 3  •  levalbuterol (XOPENEX HFA) 45 MCG/ACT inhaler, Inhale 1-2 Puffs by mouth every four hours as needed for Shortness of Breath., Disp: 1 Inhaler, Rfl: 0  •  ENBREL SURECLICK 50 MG/ML Solution Auto-injector, , Disp: , Rfl:   •  amLODIPine  (NORVASC) 2.5 MG Tab, , Disp: , Rfl:   •  cyclobenzaprine (FLEXERIL) 10 MG Tab, TK 1 T PO QHS PRF MUSCLE SPASM, Disp: , Rfl:   •  esomeprazole (NEXIUM) 20 MG capsule, TK 1 C PO QD 30 MIN B LEXI MEAL, Disp: , Rfl:   •  HYDROcodone-acetaminophen 2.5-108 mg/5mL (HYCET) 7.5-325 MG/15ML solution, TK 15 ML PO QD, Disp: , Rfl:   •  FLUoxetine (PROZAC) 20 MG Cap, Take 2 Caps by mouth every day for 90 days., Disp: 180 Cap, Rfl: 3  •  coenzyme Q-10 30 MG capsule, Take 60 mg by mouth every day., Disp: , Rfl:   •  ADALIMUMAB SC, , Disp: , Rfl:   •  ARNUITY ELLIPTA 200 MCG/ACT AEROSOL POWDER, BREATH ACTIVATED, , Disp: , Rfl:   •  ondansetron (ZOFRAN ODT) 4 MG TABLET DISPERSIBLE, DIS 1 T ON THE TONGUE Q 8 H PRN, Disp: , Rfl: 2  •  cyanocobalamin (VITAMIN B-12) 1000 MCG/ML Solution, ADM 1 ML SC WEEKLY, Disp: , Rfl: 2  •  rizatriptan (MAXALT) 10 MG tablet, Take 1 Tab by mouth Once PRN., Disp: 10 Tab, Rfl: 3  •  hydroxychloroquine (PLAQUENIL) 200 MG Tab, , Disp: , Rfl:   •  fluticasone (FLONASE) 50 MCG/ACT nasal spray, Spray 1 Spray in nose every day., Disp: , Rfl:   •  EPIPEN 2-NOLAN 0.3 MG/0.3ML Solution Auto-injector solution for injection, , Disp: , Rfl:   ALLERGIES:   Allergies   Allergen Reactions   • Savella [Kdc:Milnacipran+Ci Pigment Blue 63] Myalgia   • Ciprofloxacin    • Reglan [Kdc:Yellow Dye+Ci Pigment Blue 63+Metoclopramide]    • Sulfa Drugs Nausea     GI upset   • Tetanus Antitoxin      Mild spasms and pain     • Tramadol Rash and Shortness of Breath     SURGHX:   Past Surgical History:   Procedure Laterality Date   • HYSTERECTOMY LAPAROSCOPY  2013    utrine and right ovary removed   • HERNIA REPAIR  2007    umblical hernia   • CHOLECYSTECTOMY  2007   • SALPINGO-OOPHORECTOMY, UNILATERAL Right      SOCHX:  reports that she has never smoked. She has never used smokeless tobacco. She reports that she does not drink alcohol or use drugs.  FH: Reviewed with patient, not pertinent to this visit.       No family history on  "file.    Review of Systems   Constitutional: positive for fever and chills  HENT: negative for otalgia, sore throat  Cardiovascular - denies chest pain or dyspnea  Respiratory: Positive for cough.  Negative for wheezing.    Neurological: Negative for headaches, dizziness   GI - denies nausea, vomiting or diarrhea   - denies dysuria, discharge  Psych - denies depression, anxiety  Neuro - denies numbness or tingling.   10 point ROS otherwise negative, except per HPI         Objective:     /84 (BP Location: Right arm)   Pulse 82   Temp 36.8 °C (98.2 °F) (Temporal)   Resp 18   Ht 1.753 m (5' 9\")   Wt 97.5 kg (215 lb)   SpO2 99%       Physical Exam   Constitutional: patient is oriented to person, place, and time. Patient appears well-developed and well-nourished. No distress.   HENT:   Head: Normocephalic and atraumatic.   Right Ear: External ear normal.   Left Ear: External ear normal.   TMs normal  Nose: Mucosal edema and rhinorrhea  present. Right sinus exhibits no maxillary sinus tenderness. Left sinus exhibits no maxillary sinus tenderness.   Mouth/Throat: Mucous membranes are normal. No oral lesions.  No posterior pharyngeal erythema.  No oropharyngeal exudate or posterior oropharyngeal edema.   Eyes: Conjunctivae and EOM are normal. Pupils are equal, round, and reactive to light. Right eye exhibits no discharge. Left eye exhibits no discharge. No scleral icterus.   Neck: Normal range of motion. Neck supple. No tracheal deviation present.   Cardiovascular: Normal rate, regular rhythm and normal heart sounds.  Exam reveals no friction rub.    Pulmonary/Chest: Effort normal. No respiratory distress. Patient has no wheezes or rhonchi. Patient has no rales.    Musculoskeletal:  exhibits no edema.   Lymphadenopathy:     Patient has no cervical adenopathy.      Neurological: patient is alert and oriented to person, place, and time.   Skin: Skin is warm and dry. No rash noted. No erythema.   Psychiatric: " patient  has a normal mood and affect.  behavior is normal.       Assesment/Plan:    POCT Influenza A/B: Negative for Influenza A    1. Influenza-like symptoms  Discussed likely viral etiology, possibly influenza.  Vitals and exam unremarkable.  Low suspicion for pneumonia.  Discussed appropriate over-the-counter symptomatic medication, and when to return to clinic. Follow up for worsening or persistent.  Rest, fluids encouraged.  OTC decongestant for congestion/cough  Note given for work.  AVS with medical info given.  Pt was in full understanding and agreement with the plan.  Differential diagnosis, natural history, supportive care, and indications for immediate follow-up discussed. All questions answered. Patient agrees with the plan of care.  Follow-up as needed if symptoms worsen or fail to improve to PCP, Urgent care or Emergency Room.    - POCT Influenza A/B    3. LRTI (lower respiratory tract infection)  Patient reports she feels symptoms will deteriorate rapidly and is concerned with access to medical care.   Contingent antibiotic prescription given to patient to fill upon meeting criteria of guidelines discussed.   - doxycycline (VIBRAMYCIN) 100 MG Tab; Take 1 Tab by mouth 2 times a day.  Dispense: 10 Tab; Refill: 0

## 2020-03-20 ENCOUNTER — OFFICE VISIT (OUTPATIENT)
Dept: MEDICAL GROUP | Facility: MEDICAL CENTER | Age: 45
End: 2020-03-20
Payer: COMMERCIAL

## 2020-03-20 VITALS
HEIGHT: 63 IN | OXYGEN SATURATION: 94 % | BODY MASS INDEX: 29.77 KG/M2 | HEART RATE: 84 BPM | DIASTOLIC BLOOD PRESSURE: 62 MMHG | TEMPERATURE: 98.2 F | WEIGHT: 167.99 LBS | SYSTOLIC BLOOD PRESSURE: 110 MMHG | RESPIRATION RATE: 14 BRPM

## 2020-03-20 DIAGNOSIS — R05.9 COUGH IN ADULT: ICD-10-CM

## 2020-03-20 DIAGNOSIS — R05.9 COUGH: ICD-10-CM

## 2020-03-20 PROCEDURE — 99214 OFFICE O/P EST MOD 30 MIN: CPT | Performed by: FAMILY MEDICINE

## 2020-03-20 RX ORDER — NITROFURANTOIN 25; 75 MG/1; MG/1
100 CAPSULE ORAL 2 TIMES DAILY
Qty: 14 CAP | Refills: 0 | Status: SHIPPED | OUTPATIENT
Start: 2020-03-20 | End: 2020-03-27

## 2020-03-20 RX ORDER — BENZONATATE 100 MG/1
100 CAPSULE ORAL 3 TIMES DAILY PRN
Qty: 60 CAP | Refills: 0 | Status: SHIPPED | OUTPATIENT
Start: 2020-03-20 | End: 2020-06-24

## 2020-03-20 ASSESSMENT — FIBROSIS 4 INDEX: FIB4 SCORE: 0.69

## 2020-03-20 NOTE — ASSESSMENT & PLAN NOTE
This is a new problem for this patient started about 5 weeks ago.  She states that she was seen in the urgent care and they treated her withAugmentin.  She took that did not get better with still having a cough productive of tons of sputum.  The cough has persisted for about 5 weeks.  She finds her self feeling hot and sweaty and then getting cold but has not had a fever.  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  She had noted some improvement until today where she felt fatigued again.  She was swabbed for coronavirus on Tuesday and was contacted by the [facility] health department and told she was negative on Thursday.

## 2020-03-24 NOTE — PROGRESS NOTES
CC:Diagnoses of Cough in adult and Cough were pertinent to this visit.    HISTORY OF PRESENT ILLNESS: Patient is a 44 y.o. female established patient who presents today to talk about the cough she has been battling for the last 5 weeks.      Cough in adult  This is a new problem for this patient started about 5 weeks ago.  She states that she was seen in the urgent care and they treated her withAugmentin.  She took that did not get better with still having a cough productive of tons of sputum.  The cough has persisted for about 5 weeks.  She finds her self feeling hot and sweaty and then getting cold but has not had a fever.  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  She had noted some improvement until today where she felt fatigued again.  She was swabbed for coronavirus on Tuesday and was contacted by the [facility] department and told she was negative on Thursday.      Patient Active Problem List    Diagnosis Date Noted   • Cough in adult 03/20/2020   • Overweight (BMI 25.0-29.9) 03/03/2020   • Hyperglycemia 03/03/2020   • Hypercholesterolemia 03/03/2020   • Prolonged Q-T interval on ECG 03/03/2020   • Hypertension 01/06/2020   • Hypokalemia 01/06/2020   • Weight gain 01/06/2020   • Elevated BP without diagnosis of hypertension 10/14/2019   • Weight gain, abnormal 10/14/2019   • EVANGELISTA (dyspnea on exertion) 10/14/2019   • Other fatigue 09/09/2019   • Bruising 09/09/2019   • Polyuria 07/17/2019   • Morphea 01/09/2019   • Vitamin D deficiency 05/22/2018   • SVT (supraventricular tachycardia) ([facility]) 03/23/2018   • Other forms of systemic lupus erythematosus ([facility]) 10/20/2017   • Chronic constipation 12/15/2016   • Reactive hypoglycemia 09/28/2016   • CNS demyelinating disease ([facility]) 04/19/2016   • Vocal cord dysfunction 04/19/2016   • Multiple allergies  12/21/2015   • Anxiety 11/24/2015   • Intractable migraine without aura and without status migrainosus 11/24/2015   • Ankylosing spondylitis of lumbosacral region (HCC) 07/06/2015   • Overactive bladder 04/17/2015   • Asthma in adult 01/14/2015      Allergies:Savella [kdc:milnacipran+ci pigment blue 63]; Ciprofloxacin; Reglan [kdc:yellow dye+ci pigment blue 63+metoclopramide]; Sulfa drugs; Tetanus antitoxin; and Tramadol    Current Outpatient Medications   Medication Sig Dispense Refill   • nitrofurantoin (MACROBID) 100 MG Cap Take 1 Cap by mouth 2 times a day for 7 days. 14 Cap 0   • Hydrocod Polst-CPM Polst ER (TUSSIONEX) 10-8 MG/5ML Suspension Extended Release Take 5 mL by mouth every 12 hours for 10 days. 100 mL 0   • benzonatate (TESSALON) 100 MG Cap Take 1 Cap by mouth 3 times a day as needed for Cough. 60 Cap 0   • doxycycline (VIBRAMYCIN) 100 MG Tab Take 1 Tab by mouth 2 times a day. 10 Tab 0   • lidocaine (XYLOCAINE) 2 % Solution Take 15 mL by mouth every four hours as needed for Throat/Mouth Pain. Gargle and spit every 4 hours as needed 120 mL 0   • triamterene-hctz (MAXZIDE-25/DYAZIDE) 37.5-25 MG Tab TAKE 1 TABLET BY MOUTH EVERY DAY 30 Tab 3   • levalbuterol (XOPENEX HFA) 45 MCG/ACT inhaler Inhale 1-2 Puffs by mouth every four hours as needed for Shortness of Breath. 1 Inhaler 0   • ENBREL SURECLICK 50 MG/ML Solution Auto-injector      • amLODIPine (NORVASC) 2.5 MG Tab      • cyclobenzaprine (FLEXERIL) 10 MG Tab TK 1 T PO QHS PRF MUSCLE SPASM     • esomeprazole (NEXIUM) 20 MG capsule TK 1 C PO QD 30 MIN B LEXI MEAL     • HYDROcodone-acetaminophen 2.5-108 mg/5mL (HYCET) 7.5-325 MG/15ML solution TK 15 ML PO QD     • FLUoxetine (PROZAC) 20 MG Cap Take 2 Caps by mouth every day for 90 days. 180 Cap 3   • coenzyme Q-10 30 MG capsule Take 60 mg by mouth every day.     • ADALIMUMAB SC      • ARNUITY ELLIPTA 200 MCG/ACT AEROSOL POWDER, BREATH ACTIVATED      • ondansetron (ZOFRAN ODT) 4 MG TABLET DISPERSIBLE DIS 1  T ON THE TONGUE Q 8 H PRN  2   • cyanocobalamin (VITAMIN B-12) 1000 MCG/ML Solution ADM 1 ML SC WEEKLY  2   • rizatriptan (MAXALT) 10 MG tablet Take 1 Tab by mouth Once PRN. 10 Tab 3   • hydroxychloroquine (PLAQUENIL) 200 MG Tab      • fluticasone (FLONASE) 50 MCG/ACT nasal spray Spray 1 Spray in nose every day.     • EPIPEN 2-NOLAN 0.3 MG/0.3ML Solution Auto-injector solution for injection        No current facility-administered medications for this visit.        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 She is  with four children.        Family History   Problem Relation Age of Onset   • Arthritis Brother         psoriatic arthritis   • Arthritis Maternal Grandmother    • Psychiatric Illness Mother         Major depression   • Other Mother         discoid lupus   • Psychiatric Illness Father         Bipolar depression   • Heart Disease Father         CHF   • Hypertension Father    • Diabetes Father    • Other Father         Agent orange exposure 125% disabled   • Diabetes Paternal Uncle    • Stroke Maternal Grandfather    • Diabetes Paternal Grandmother    • Alcohol/Drug Paternal Grandfather    • Cancer Paternal Aunt         gyn cancer   • GI Disease Son    • GI Disease Daughter    • GI Disease Daughter    • GI Disease Daughter         ROS:     - Constitutional:  Negative for fever, chills, unexpected weight change, and fatigue/generalized weakness.    - HEENT:  Negative for headaches, vision changes, hearing changes, ear pain, ear discharge, rhinorrhea, sinus congestion, sore throat, and neck pain.      - Respiratory: Cough as in HPI.      - Cardiovascular: Negative for chest pain, palpitations, orthopnea, and bilateral lower extremity edema.     -  "Gastrointestinal: Negative for heartburn, nausea, vomiting, abdominal pain, hematochezia, melena, diarrhea, constipation, and greasy/foul-smelling stools.     - Genitourinary: Negative for dysuria, polyuria, hematuria, pyuria, urinary urgency, and urinary incontinence.     - Musculoskeletal: Negative for myalgias, back pain, and joint pain.     - Skin: Negative for rash, itching, cyanotic skin color change.     - Neurological: Negative for dizziness, tingling, tremors, focal sensory deficit, focal weakness and headaches.     - Endo/Heme/Allergies: Does not bruise/bleed easily.     - Psychiatric/Behavioral: Negative for depression, suicidal/homicidal ideation and memory loss.          - NOTE: All other systems reviewed and are negative, except as in HPI.      Exam:    /62 (BP Location: Left arm, Patient Position: Sitting, BP Cuff Size: Adult)   Pulse 84   Temp 36.8 °C (98.2 °F) (Temporal)   Resp 14   Ht 1.6 m (5' 3\")   Wt 76.2 kg (167 lb 15.9 oz)   SpO2 94%  Body mass index is 29.76 kg/m².    General:  Well nourished, well developed female in NAD  Head is grossly normal.  Neck: Supple without JVD or bruit. Thyroid is not enlarged.  Pulmonary: Clear to ausculation and percussion.  Normal effort. No rales, ronchi, or wheezing.  Cardiovascular: Regular rate and rhythm without murmur. Carotid and radial pulses are intact and equal bilaterally.  Extremities: no clubbing, cyanosis, or edema.  Patient was seen for 25 minutes face to face of which, 20 minutes was spent counseling regarding discussion of her current symptomatology and studies that have already been done and how to best deal with this in light of the corona crisis for which she has tested negative..    Please note that this dictation was created using voice recognition software. I have made every reasonable attempt to correct obvious errors, but I expect that there are errors of grammar and possibly content that I did not discover before finalizing " the note.    Assessment/Plan:  1. Cough in adult  Uncontrolled, more than likely this is a viral cough and I recommended to the patient that we go ahead and have her use over-the-counter cough syrup during the day and then will give her Tussionex for bedtime.    2. Cough  Plan as per #1 above  - Hydrocod Polst-CPM Polst ER (TUSSIONEX) 10-8 MG/5ML Suspension Extended Release; Take 5 mL by mouth every 12 hours for 10 days.  Dispense: 100 mL; Refill: 0

## 2020-04-08 ENCOUNTER — APPOINTMENT (OUTPATIENT)
Dept: HEALTH INFORMATION MANAGEMENT | Facility: MEDICAL CENTER | Age: 45
End: 2020-04-08
Payer: COMMERCIAL

## 2020-04-13 ENCOUNTER — TELEMEDICINE (OUTPATIENT)
Dept: HEALTH INFORMATION MANAGEMENT | Facility: MEDICAL CENTER | Age: 45
End: 2020-04-13
Payer: COMMERCIAL

## 2020-04-13 DIAGNOSIS — R63.5 WEIGHT GAIN: ICD-10-CM

## 2020-04-13 PROCEDURE — 97803 MED NUTRITION INDIV SUBSEQ: CPT | Mod: 95,CR | Performed by: DIETITIAN, REGISTERED

## 2020-04-13 NOTE — PROGRESS NOTES
Nutrition Reassess: Medical Weight Management   Today's date: 4/13/2020  Referring Provider: No ref. provider found      Time: in/out 9:34-9:58 AM  Visit#: 2    Subjective:  - has been tracking daily since last visit  - on Thursday (04/09) pt's MFP intake was: 1035 kcal, 76g cho and 72g pro  - tends to eat more carbs with dinners   - stressed with work, school and busy with kids; likes to go outside for stress relief  - started walking for exercise in the past week  - uses [employer] for walking and exercise      Diet history:   Breakfast - banana or ND MR shake  Snack - fruit or trailmix or kids z-bar  Lunch - ND MR shake/soup  Snack - fruit or trailmix or kids z-bar  Dinner - smoked turkey and1 cup of potato salad with eggs    Anthropometrics/Objective  Today's weight:  There were no vitals filed for this visit.  BMI:  There is no height or weight on file to calculate BMI.  Starting weight/date 160.7 lb (03/02/2020)   Total weight change : no weight taken at today's visit           Meal Plan:   High protein, low carb diet with 2 meal replacements per day    ReAssesment/Notes: Joycelyn has started working towards her weight loss goal. She ran out of ND MR kenny but plans to get more so she can use 2 ND MR's per day. She finds it helpful to use the shakes for 2 meals/day since she has been stressed. Based on her diet recall she is not having protein with each meal and snack. Suggested for her to include protein with all meals and snacks. We discussed reviewing the snack list for protein ideas for snacks or to have a protein snack bar. Provided her the Protein Snack Bar Options, MR shake Options, and ND Order Form handouts via e-mail. Suggested for Joycelyn to increase exercise as tolerated. Next visit suggest to review nutrition basics.    Goals:  1. Exercise 3x/ week for 15 minutes  2. Use 2 ND MR shakes per day    This encounter was conducted via Zoom .   Verbal consent was obtained. Patient's identity was  verified.    Follow-up: 1 month with RD/MD

## 2020-04-14 ENCOUNTER — TELEMEDICINE (OUTPATIENT)
Dept: HEALTH INFORMATION MANAGEMENT | Facility: MEDICAL CENTER | Age: 45
End: 2020-04-14
Payer: COMMERCIAL

## 2020-04-14 VITALS — BODY MASS INDEX: 28.7 KG/M2 | WEIGHT: 162 LBS | HEIGHT: 63 IN

## 2020-04-14 DIAGNOSIS — E78.00 HYPERCHOLESTEROLEMIA: ICD-10-CM

## 2020-04-14 DIAGNOSIS — E66.3 OVERWEIGHT: ICD-10-CM

## 2020-04-14 DIAGNOSIS — E55.9 VITAMIN D DEFICIENCY: ICD-10-CM

## 2020-04-14 DIAGNOSIS — I10 ESSENTIAL HYPERTENSION: ICD-10-CM

## 2020-04-14 DIAGNOSIS — R73.9 HYPERGLYCEMIA: ICD-10-CM

## 2020-04-14 DIAGNOSIS — F41.9 ANXIETY: ICD-10-CM

## 2020-04-14 PROCEDURE — 99214 OFFICE O/P EST MOD 30 MIN: CPT | Mod: 95,CR | Performed by: INTERNAL MEDICINE

## 2020-04-14 ASSESSMENT — PATIENT HEALTH QUESTIONNAIRE - PHQ9: CLINICAL INTERPRETATION OF PHQ2 SCORE: 0

## 2020-04-14 ASSESSMENT — FIBROSIS 4 INDEX: FIB4 SCORE: 0.69

## 2020-04-14 NOTE — PROGRESS NOTES
"This encounter was conducted via Zoom .   Verbal consent was obtained. Patient's identity was verified.      Bariatric Medicine Follow Up  Chief Complaint   Patient presents with   • Weight Gain       History of Present Illness:   Joycelyn Byers is a 44 y.o. female who presents for weight management follow-up and to help address co-morbidities caused by overweight, as below.    During the patient's last visit, the following were discussed and recommended:  Weight Goal: 5% wt loss at one month after start (pt goal weight is 135 lb)  Diet:     MR: 2 ND MR shakes daily  High Protein/Low Carb Meal and 2 snacks between meals daily  >100 g protein, <100 g total carbs daily, 1200 kcals per day  Track daily intake with My Fitness Pal, bring to next visit  64+ oz water per day  Avoid skipping meals  Physical Activity: Walking, biking, yoga at least 3 times per week  Risk level for moderate/vigorous exercise program:   Low, aim for consistency  New Rx:   Consider anti-obesity medication pending course  Behavior change:   More consistent lifestyle change    She had not been feeling well.  Eating better, resuming MR again.  Wants to resume 2 ND MR daily, 1 protein bar daily again.  Resumed tracking, with RD guidance. She wants to try this approach before starting antiobesity meds.    She has increased her activity level as below.    HTN: Continues on amlodipine, triamterene/hydrochlorothiazide  VDD: Not currently on vitamin D, with last vitamin D level low normal 9/2019    Exercise:   Walking 7K steps/d plus yoga 2 d/wk     Review of Systems   Denies lightheadedness, worsening fatigue or insomnia.  All other ROS were reviewed and are otherwise unchanged from my previous visit with patient.    Physical Exam:    Ht 1.6 m (5' 3\")   Wt 73.5 kg (162 lb)   LMP 03/29/2016   BMI 28.70 kg/m²    Last wt 160 lb  Weight change since last visit:  +2 lb     Constitutional: Oriented to person, place, and time and well-developed, " well-nourished, and in no distress.    Head: Normocephalic.   Psychiatric: Mood, memory, affect and judgment normal.     Laboratory:   Recent labs reviewed.      Dietitian Assessment: yesterday    ASSESSMENT/PLAN:  Body mass index is 28.7 kg/m².    Obesity Stage ([place]):  1; Class overweight    1. Overweight     2. Hypercholesterolemia     3. Hyperglycemia     4. Essential hypertension     5. Vitamin D deficiency     6. Anxiety       Patient has struggled with some slight weight regain, lost focus and was not using meal replacements.  She has resumed tracking, use of stimulus narrowing, increasing her activity level.  Continue to monitor fasting glucose, lipids, blood pressure.  Anxiety appears controlled.  Monitor vitamin D levels.    The patient and I have discussed at length and agree to the following recommendations, which are all addressing the above diagnoses:    Weight Goal: 3-5% wt loss each month (pt goal weight is 135 lb)  Diet: Resume 2 ND MR daily, 1 ND protein bar  Resume tracking, around 1200 kcals per day, around 100 g CHO per day as previously discussed  Review with RD in 2 weeks  Physical Activity: Walking 7000 steps daily, yoga or similar 2 times per week  Rx: Patient defers  Behavior change: Resume above consistently  Follow-up: 2 weeks with RD, 4 weeks with MD    Patient's body mass index is 28.7 kg/m². Exercise and nutrition counseling were performed at this visit.

## 2020-05-02 ENCOUNTER — HOSPITAL ENCOUNTER (OUTPATIENT)
Dept: LAB | Facility: MEDICAL CENTER | Age: 45
End: 2020-05-02
Attending: SPECIALIST
Payer: COMMERCIAL

## 2020-05-02 ENCOUNTER — HOSPITAL ENCOUNTER (OUTPATIENT)
Dept: LAB | Facility: MEDICAL CENTER | Age: 45
End: 2020-05-02
Attending: INTERNAL MEDICINE
Payer: COMMERCIAL

## 2020-05-02 DIAGNOSIS — E78.00 HYPERCHOLESTEROLEMIA: ICD-10-CM

## 2020-05-02 LAB
ALBUMIN SERPL BCP-MCNC: 4.1 G/DL (ref 3.2–4.9)
ALBUMIN/GLOB SERPL: 1.1 G/DL
ALP SERPL-CCNC: 72 U/L (ref 30–99)
ALT SERPL-CCNC: 20 U/L (ref 2–50)
ANION GAP SERPL CALC-SCNC: 12 MMOL/L (ref 7–16)
AST SERPL-CCNC: 21 U/L (ref 12–45)
BASOPHILS # BLD AUTO: 1.5 % (ref 0–1.8)
BASOPHILS # BLD: 0.1 K/UL (ref 0–0.12)
BILIRUB SERPL-MCNC: 0.2 MG/DL (ref 0.1–1.5)
BUN SERPL-MCNC: 20 MG/DL (ref 8–22)
CALCIUM SERPL-MCNC: 9.2 MG/DL (ref 8.5–10.5)
CHLORIDE SERPL-SCNC: 100 MMOL/L (ref 96–112)
CHOLEST SERPL-MCNC: 216 MG/DL (ref 100–199)
CO2 SERPL-SCNC: 25 MMOL/L (ref 20–33)
CREAT SERPL-MCNC: 0.74 MG/DL (ref 0.5–1.4)
EOSINOPHIL # BLD AUTO: 0.21 K/UL (ref 0–0.51)
EOSINOPHIL NFR BLD: 3.1 % (ref 0–6.9)
ERYTHROCYTE [DISTWIDTH] IN BLOOD BY AUTOMATED COUNT: 41.7 FL (ref 35.9–50)
ERYTHROCYTE [SEDIMENTATION RATE] IN BLOOD BY WESTERGREN METHOD: 9 MM/HOUR (ref 0–20)
FASTING STATUS PATIENT QL REPORTED: NORMAL
GLOBULIN SER CALC-MCNC: 3.6 G/DL (ref 1.9–3.5)
GLUCOSE SERPL-MCNC: 98 MG/DL (ref 65–99)
HCT VFR BLD AUTO: 45.8 % (ref 37–47)
HDLC SERPL-MCNC: 80 MG/DL
HGB BLD-MCNC: 14.1 G/DL (ref 12–16)
IMM GRANULOCYTES # BLD AUTO: 0.02 K/UL (ref 0–0.11)
IMM GRANULOCYTES NFR BLD AUTO: 0.3 % (ref 0–0.9)
LDLC SERPL CALC-MCNC: 124 MG/DL
LYMPHOCYTES # BLD AUTO: 1.86 K/UL (ref 1–4.8)
LYMPHOCYTES NFR BLD: 27.5 % (ref 22–41)
MCH RBC QN AUTO: 26.3 PG (ref 27–33)
MCHC RBC AUTO-ENTMCNC: 30.8 G/DL (ref 33.6–35)
MCV RBC AUTO: 85.3 FL (ref 81.4–97.8)
MONOCYTES # BLD AUTO: 0.55 K/UL (ref 0–0.85)
MONOCYTES NFR BLD AUTO: 8.1 % (ref 0–13.4)
NEUTROPHILS # BLD AUTO: 4.03 K/UL (ref 2–7.15)
NEUTROPHILS NFR BLD: 59.5 % (ref 44–72)
NRBC # BLD AUTO: 0 K/UL
NRBC BLD-RTO: 0 /100 WBC
PLATELET # BLD AUTO: 306 K/UL (ref 164–446)
PMV BLD AUTO: 11 FL (ref 9–12.9)
POTASSIUM SERPL-SCNC: 3.9 MMOL/L (ref 3.6–5.5)
PROT SERPL-MCNC: 7.7 G/DL (ref 6–8.2)
RBC # BLD AUTO: 5.37 M/UL (ref 4.2–5.4)
SODIUM SERPL-SCNC: 137 MMOL/L (ref 135–145)
TRIGL SERPL-MCNC: 61 MG/DL (ref 0–149)
WBC # BLD AUTO: 6.8 K/UL (ref 4.8–10.8)

## 2020-05-02 PROCEDURE — 36415 COLL VENOUS BLD VENIPUNCTURE: CPT

## 2020-05-02 PROCEDURE — 86769 SARS-COV-2 COVID-19 ANTIBODY: CPT

## 2020-05-02 PROCEDURE — 80053 COMPREHEN METABOLIC PANEL: CPT

## 2020-05-02 PROCEDURE — 80061 LIPID PANEL: CPT

## 2020-05-02 PROCEDURE — 85652 RBC SED RATE AUTOMATED: CPT

## 2020-05-02 PROCEDURE — 85025 COMPLETE CBC W/AUTO DIFF WBC: CPT

## 2020-05-04 LAB — TEST NAME 95000: NORMAL

## 2020-05-18 ENCOUNTER — PATIENT MESSAGE (OUTPATIENT)
Dept: MEDICAL GROUP | Facility: PHYSICIAN GROUP | Age: 45
End: 2020-05-18

## 2020-05-18 DIAGNOSIS — R92.8 ABNORMAL MAMMOGRAM OF LEFT BREAST: ICD-10-CM

## 2020-05-29 ENCOUNTER — APPOINTMENT (OUTPATIENT)
Dept: RADIOLOGY | Facility: IMAGING CENTER | Age: 45
End: 2020-05-29
Attending: PHYSICIAN ASSISTANT
Payer: COMMERCIAL

## 2020-05-29 ENCOUNTER — OFFICE VISIT (OUTPATIENT)
Dept: URGENT CARE | Facility: CLINIC | Age: 45
End: 2020-05-29
Payer: COMMERCIAL

## 2020-05-29 VITALS
BODY MASS INDEX: 29.57 KG/M2 | SYSTOLIC BLOOD PRESSURE: 108 MMHG | RESPIRATION RATE: 16 BRPM | TEMPERATURE: 98.1 F | HEIGHT: 63 IN | DIASTOLIC BLOOD PRESSURE: 84 MMHG | OXYGEN SATURATION: 96 % | HEART RATE: 96 BPM | WEIGHT: 166.9 LBS

## 2020-05-29 DIAGNOSIS — S90.31XA CONTUSION OF RIGHT FOOT, INITIAL ENCOUNTER: ICD-10-CM

## 2020-05-29 DIAGNOSIS — M79.671 RIGHT FOOT PAIN: ICD-10-CM

## 2020-05-29 PROCEDURE — 73630 X-RAY EXAM OF FOOT: CPT | Mod: TC,RT | Performed by: PHYSICIAN ASSISTANT

## 2020-05-29 PROCEDURE — 99213 OFFICE O/P EST LOW 20 MIN: CPT | Performed by: PHYSICIAN ASSISTANT

## 2020-05-29 ASSESSMENT — ENCOUNTER SYMPTOMS
TINGLING: 0
JOINT SWELLING: 1
FOCAL WEAKNESS: 0
CHILLS: 0
VOMITING: 0
NUMBNESS: 0
ARTHRALGIAS: 1
WEAKNESS: 0
FEVER: 0
SENSORY CHANGE: 0
NAUSEA: 0

## 2020-05-29 ASSESSMENT — FIBROSIS 4 INDEX: FIB4 SCORE: 0.68

## 2020-05-29 NOTE — PROGRESS NOTES
Subjective:      Joycelyn Byers is a 45 y.o. female who presents with Bump ((R) foot x 1 week; lateral side; tenderness; swelling; pain)            Foot Problem   This is a new problem. Episode onset: 1 week. Patient hit the side of her right foot on a door jam. Continues to have pain and swelling  The problem occurs constantly. The problem has been unchanged. Associated symptoms include arthralgias (right foot pain and swelling ) and joint swelling. Pertinent negatives include no chills, fever, nausea, numbness, rash, vomiting or weakness. Exacerbated by: palpation, bearing weight  She has tried NSAIDs, ice, acetaminophen and immobilization for the symptoms. The treatment provided no relief.       Past Medical History:   Diagnosis Date   • Allergy, unspecified not elsewhere classified    • Ankylosing spondylitis of lumbosacral region (HCC) 7/6/2015   • Anxiety 11/24/2015   • GERD (gastroesophageal reflux disease)    • Headache, classical migraine    • Hiatal hernia    • Intractable migraine without aura and without status migrainosus 11/24/2015   • Lupus ([facility])    • Morphea 1/9/2019   • Oropharyngeal dysphagia    • Other forms of systemic lupus erythematosus ([facility]) 10/20/2017   • Overactive bladder    • Peptic ulcer    • Polyuria 7/17/2019   • Prediabetes 12/8/2016   • Vocal cord dysfunction        Past Surgical History:   Procedure Laterality Date   • HYSTERECTOMY LAPAROSCOPY  2013    utrine and right ovary removed   • HERNIA REPAIR  2007    umblical hernia   • CHOLECYSTECTOMY  2007   • SALPINGO-OOPHORECTOMY, UNILATERAL Right        Family History   Problem Relation Age of Onset   • Arthritis Brother         psoriatic arthritis   • Arthritis Maternal Grandmother    • Psychiatric Illness Mother         Major depression   • Other Mother         discoid lupus   • Psychiatric Illness Father         Bipolar depression   • Heart Disease Father         CHF   • Hypertension Father    • Diabetes Father    • Other  "Father         Agent orange exposure 125% disabled   • Diabetes Paternal Uncle    • Stroke Maternal Grandfather    • Diabetes Paternal Grandmother    • Alcohol/Drug Paternal Grandfather    • Cancer Paternal Aunt         gyn cancer   • GI Disease Son    • GI Disease Daughter    • GI Disease Daughter    • GI Disease Daughter        Allergies   Allergen Reactions   • Savella [Kdc:Milnacipran+Ci Pigment Blue 63] Myalgia   • Ciprofloxacin    • Reglan [Kdc:Yellow Dye+Ci Pigment Blue 63+Metoclopramide]    • Sulfa Drugs Nausea     GI upset   • Tetanus Antitoxin      Mild spasms and pain     • Tramadol Rash and Shortness of Breath       Medications, Allergies, and current problem list reviewed today in Epic    Review of Systems   Constitutional: Negative for chills, fever and malaise/fatigue.   Gastrointestinal: Negative for nausea and vomiting.   Musculoskeletal: Positive for arthralgias (right foot pain and swelling ), joint pain (right foot pain ) and joint swelling.   Skin: Negative for rash.   Neurological: Negative for tingling, sensory change, focal weakness, weakness and numbness.     All other systems reviewed and are negative.        Objective:     /84 (BP Location: Right arm, Patient Position: Sitting)   Pulse 96   Temp 36.7 °C (98.1 °F) (Temporal)   Resp 16   Ht 1.6 m (5' 3\")   Wt 75.7 kg (166 lb 14.4 oz)   LMP 03/29/2016   SpO2 96%   BMI 29.57 kg/m²      Physical Exam  Constitutional:       General: She is not in acute distress.  HENT:      Head: Normocephalic and atraumatic.   Eyes:      Conjunctiva/sclera: Conjunctivae normal.   Cardiovascular:      Rate and Rhythm: Normal rate.   Pulmonary:      Effort: Pulmonary effort is normal. No respiratory distress.   Musculoskeletal:        Feet:    Skin:     General: Skin is warm and dry.   Neurological:      General: No focal deficit present.      Mental Status: She is alert and oriented to person, place, and time.   Psychiatric:         Mood and " Affect: Mood normal.         Behavior: Behavior normal.         Thought Content: Thought content normal.         Judgment: Judgment normal.          5/29/2020 10:17 AM     HISTORY/REASON FOR EXAM:  Lateral foot pain after striking against hard object        TECHNIQUE/EXAM DESCRIPTION AND NUMBER OF VIEWS:  3 views of the RIGHT foot.     COMPARISON:  None.     FINDINGS:  Bone mineralization is age appropriate. Bony alignment is anatomic. There is no evidence of acute fracture or dislocation.     IMPRESSION:     No acute fracture or malalignment.          Assessment/Plan:       1. Contusion of right foot, initial encounter  DX-FOOT-COMPLETE 3+ RIGHT       - DX-FOOT-COMPLETE 3+ RIGHT; Future    X-ray reviewed.  Discussed with patient via telephone,  Continue RICE   OTC NSAIDS or Tylenol    Differential diagnoses, Supportive care, and indications for immediate follow-up discussed with patient.   Pathogenesis of diagnosis discussed including typical length and natural progression.   Instructed to return to clinic or nearest emergency department for any change in condition, further concerns, or worsening of symptoms.    The patient demonstrated a good understanding and agreed with the treatment plan.    Archana Fernandez P.A.-C.

## 2020-06-08 ENCOUNTER — PATIENT MESSAGE (OUTPATIENT)
Dept: MEDICAL GROUP | Facility: PHYSICIAN GROUP | Age: 45
End: 2020-06-08

## 2020-06-24 ENCOUNTER — TELEMEDICINE (OUTPATIENT)
Dept: HEALTH INFORMATION MANAGEMENT | Facility: MEDICAL CENTER | Age: 45
End: 2020-06-24
Payer: COMMERCIAL

## 2020-06-24 VITALS — BODY MASS INDEX: 29.06 KG/M2 | WEIGHT: 164 LBS | HEIGHT: 63 IN

## 2020-06-24 DIAGNOSIS — R63.5 WEIGHT GAIN: ICD-10-CM

## 2020-06-24 DIAGNOSIS — F41.9 ANXIETY: ICD-10-CM

## 2020-06-24 DIAGNOSIS — R73.9 HYPERGLYCEMIA: ICD-10-CM

## 2020-06-24 DIAGNOSIS — E78.00 HYPERCHOLESTEROLEMIA: ICD-10-CM

## 2020-06-24 DIAGNOSIS — I10 ESSENTIAL HYPERTENSION: ICD-10-CM

## 2020-06-24 DIAGNOSIS — E66.3 OVERWEIGHT: ICD-10-CM

## 2020-06-24 PROBLEM — R03.0 ELEVATED BLOOD PRESSURE READING WITHOUT DIAGNOSIS OF HYPERTENSION: Status: RESOLVED | Noted: 2019-10-14 | Resolved: 2020-06-24

## 2020-06-24 PROBLEM — T14.8XXA BRUISING: Status: RESOLVED | Noted: 2019-09-09 | Resolved: 2020-06-24

## 2020-06-24 PROCEDURE — 99214 OFFICE O/P EST MOD 30 MIN: CPT | Mod: 95,CR | Performed by: INTERNAL MEDICINE

## 2020-06-24 ASSESSMENT — FIBROSIS 4 INDEX: FIB4 SCORE: 0.68

## 2020-06-24 NOTE — PROGRESS NOTES
This encounter was conducted via Zoom .   Verbal consent was obtained. Patient's identity was verified.      Bariatric Medicine Follow Up  Chief Complaint   Patient presents with   • Weight Gain       History of Present Illness:   Joycelyn Byers is a 45 y.o. female who presents for weight management follow-up and to help address co-morbidities caused by overweight, as below.    During the patient's last visit, the following were discussed and recommended:  Weight Goal: 3-5% wt loss each month (pt goal weight is 135 lb)  Diet: Resume 2 ND MR daily, 1 ND protein bar  Resume tracking, around 1200 kcals per day, around 100 g CHO per day as previously discussed  Review with RD in 2 weeks  Physical Activity: Walking 7000 steps daily, yoga or similar 2 times per week  Rx: Patient defers  Behavior change: Resume above consistently    After her last visit, she noted some weight gain, was getting frustrated and planned to start Contrave.  However, she has an allergy to 1 of the ingredients.    She stopped using ND MR daily, ran out.  Using MM 1/d in am to avoid skipping.  She may have second MR which is ND MR daily at work.  She is trying to track as instructed above, watch her refined CHO intake.      She is getting very frustrated that she has not lost any weight since starting the medical weight loss program, wants to start anti-obesity medication.  She does not know of any dye allergy, wants to try Contrave despite warning about dye allergy.    HLD: Not on statin or fenofibrate, with LDL mildly elevated 5/2020  I FG: Not on glucose lowering agent, with last fasting glucose normal  HTN: Blood pressure controlled on amlodipine, Maxide/Dyazide  VDD: Not on vitamin D supplement, with last vitamin D level low normal 9/2019    Exercise:   Walking 2 mi daily     Review of Systems   Pt denies lightheadedness, weakness, worsening fatigue, excessive dry mouth, mood changes, paresthesias.  All other ROS were reviewed and  "are otherwise unchanged from my previous visit with patient.    Physical Exam:    Ht 1.588 m (5' 2.5\")   Wt 74.4 kg (164 lb)   LMP 03/29/2016   BMI 29.52 kg/m²       Last weight 162 pounds  Weight change since last visit: +2 lb    Constitutional: Oriented to person, place, and time and well-developed, well-nourished, and in no distress.    Head: Normocephalic.   Skin:  Not diaphoretic.   Psychiatric: Mood, memory, affect and judgment normal.     Laboratory:   Recent labs reviewed.      Dietitian Assessment: I have reviewed the Dietitian's assessment related to this encounter.     ASSESSMENT/PLAN:  Body mass index is 29.52 kg/m².    Obesity Stage ([place]):  1; Class overweight    1. Overweight     2. Hyperglycemia     3. Hypercholesterolemia     4. Weight gain     5. Essential hypertension     6. Anxiety       The patient is frustrated by ongoing overweight, weight gain.  Will start anti-obesity medication.  She does not think she has a dye allergy, wants to try Contrave.  Continue to monitor fasting glucose, lipids, blood pressure and anxiety.  Continue increasing activity level.    The patient and I have discussed at length and agree to the following recommendations, which are all addressing the above diagnoses:    Weight Goal: 3-5% wt loss each month (pt goal weight is 135 lb)  Diet: 1 ND MR, 1 muscle milk daily  Resume tracking, around 1200 kcals per day, around 100 g CHO per day as previously discussed  Physical Activity: Walking 2 miles daily  Rx: Start Contrave  Patient not concerned about possible dye allergy does not think she has  Hold on pain medication while on Contrave, will be less effective  Side Effects: consent in chart  Behavior change: As above  Follow-up: 1 mo    Patient's body mass index is 29.52 kg/m². Exercise and nutrition counseling were performed at this visit.        "

## 2020-06-26 ENCOUNTER — HOSPITAL ENCOUNTER (EMERGENCY)
Facility: MEDICAL CENTER | Age: 45
End: 2020-06-26
Attending: EMERGENCY MEDICINE
Payer: COMMERCIAL

## 2020-06-26 ENCOUNTER — APPOINTMENT (OUTPATIENT)
Dept: RADIOLOGY | Facility: MEDICAL CENTER | Age: 45
End: 2020-06-26
Attending: EMERGENCY MEDICINE
Payer: COMMERCIAL

## 2020-06-26 VITALS
TEMPERATURE: 98.3 F | HEART RATE: 76 BPM | DIASTOLIC BLOOD PRESSURE: 75 MMHG | HEIGHT: 63 IN | SYSTOLIC BLOOD PRESSURE: 113 MMHG | OXYGEN SATURATION: 97 % | BODY MASS INDEX: 29.41 KG/M2 | WEIGHT: 166.01 LBS | RESPIRATION RATE: 16 BRPM

## 2020-06-26 DIAGNOSIS — R10.9 FLANK PAIN: ICD-10-CM

## 2020-06-26 DIAGNOSIS — R30.0 DYSURIA: ICD-10-CM

## 2020-06-26 LAB
ALBUMIN SERPL BCP-MCNC: 3.8 G/DL (ref 3.2–4.9)
ALBUMIN/GLOB SERPL: 1 G/DL
ALP SERPL-CCNC: 65 U/L (ref 30–99)
ALT SERPL-CCNC: 13 U/L (ref 2–50)
ANION GAP SERPL CALC-SCNC: 10 MMOL/L (ref 7–16)
APPEARANCE UR: CLEAR
AST SERPL-CCNC: 17 U/L (ref 12–45)
BACTERIA GENITAL QL WET PREP: NORMAL
BASOPHILS # BLD AUTO: 0.9 % (ref 0–1.8)
BASOPHILS # BLD: 0.08 K/UL (ref 0–0.12)
BILIRUB SERPL-MCNC: 0.3 MG/DL (ref 0.1–1.5)
BILIRUB UR QL STRIP.AUTO: NEGATIVE
BUN SERPL-MCNC: 20 MG/DL (ref 8–22)
CALCIUM SERPL-MCNC: 9 MG/DL (ref 8.4–10.2)
CHLORIDE SERPL-SCNC: 104 MMOL/L (ref 96–112)
CO2 SERPL-SCNC: 24 MMOL/L (ref 20–33)
COLOR UR: YELLOW
CREAT SERPL-MCNC: 0.62 MG/DL (ref 0.5–1.4)
EOSINOPHIL # BLD AUTO: 0.1 K/UL (ref 0–0.51)
EOSINOPHIL NFR BLD: 1.1 % (ref 0–6.9)
ERYTHROCYTE [DISTWIDTH] IN BLOOD BY AUTOMATED COUNT: 39.4 FL (ref 35.9–50)
GLOBULIN SER CALC-MCNC: 3.7 G/DL (ref 1.9–3.5)
GLUCOSE SERPL-MCNC: 99 MG/DL (ref 65–99)
GLUCOSE UR STRIP.AUTO-MCNC: NEGATIVE MG/DL
HCG SERPL QL: NEGATIVE
HCT VFR BLD AUTO: 41.8 % (ref 37–47)
HGB BLD-MCNC: 13.6 G/DL (ref 12–16)
IMM GRANULOCYTES # BLD AUTO: 0.04 K/UL (ref 0–0.11)
IMM GRANULOCYTES NFR BLD AUTO: 0.4 % (ref 0–0.9)
KETONES UR STRIP.AUTO-MCNC: NEGATIVE MG/DL
LEUKOCYTE ESTERASE UR QL STRIP.AUTO: NEGATIVE
LIPASE SERPL-CCNC: 19 U/L (ref 7–58)
LYMPHOCYTES # BLD AUTO: 1.85 K/UL (ref 1–4.8)
LYMPHOCYTES NFR BLD: 20.8 % (ref 22–41)
MCH RBC QN AUTO: 26.1 PG (ref 27–33)
MCHC RBC AUTO-ENTMCNC: 32.5 G/DL (ref 33.6–35)
MCV RBC AUTO: 80.2 FL (ref 81.4–97.8)
MICRO URNS: NORMAL
MONOCYTES # BLD AUTO: 0.65 K/UL (ref 0–0.85)
MONOCYTES NFR BLD AUTO: 7.3 % (ref 0–13.4)
NEUTROPHILS # BLD AUTO: 6.17 K/UL (ref 2–7.15)
NEUTROPHILS NFR BLD: 69.5 % (ref 44–72)
NITRITE UR QL STRIP.AUTO: NEGATIVE
NRBC # BLD AUTO: 0 K/UL
NRBC BLD-RTO: 0 /100 WBC
PH UR STRIP.AUTO: 7 [PH] (ref 5–8)
PLATELET # BLD AUTO: 346 K/UL (ref 164–446)
PMV BLD AUTO: 10 FL (ref 9–12.9)
POTASSIUM SERPL-SCNC: 3.9 MMOL/L (ref 3.6–5.5)
PROT SERPL-MCNC: 7.5 G/DL (ref 6–8.2)
PROT UR QL STRIP: NEGATIVE MG/DL
RBC # BLD AUTO: 5.21 M/UL (ref 4.2–5.4)
RBC UR QL AUTO: NEGATIVE
SIGNIFICANT IND 70042: NORMAL
SITE SITE: NORMAL
SODIUM SERPL-SCNC: 138 MMOL/L (ref 135–145)
SOURCE SOURCE: NORMAL
SP GR UR STRIP.AUTO: 1.02
WBC # BLD AUTO: 8.9 K/UL (ref 4.8–10.8)

## 2020-06-26 PROCEDURE — 87591 N.GONORRHOEAE DNA AMP PROB: CPT

## 2020-06-26 PROCEDURE — 700102 HCHG RX REV CODE 250 W/ 637 OVERRIDE(OP): Performed by: EMERGENCY MEDICINE

## 2020-06-26 PROCEDURE — 700111 HCHG RX REV CODE 636 W/ 250 OVERRIDE (IP): Performed by: EMERGENCY MEDICINE

## 2020-06-26 PROCEDURE — 36415 COLL VENOUS BLD VENIPUNCTURE: CPT

## 2020-06-26 PROCEDURE — 74176 CT ABD & PELVIS W/O CONTRAST: CPT

## 2020-06-26 PROCEDURE — 96374 THER/PROPH/DIAG INJ IV PUSH: CPT

## 2020-06-26 PROCEDURE — 99285 EMERGENCY DEPT VISIT HI MDM: CPT

## 2020-06-26 PROCEDURE — 700105 HCHG RX REV CODE 258: Performed by: EMERGENCY MEDICINE

## 2020-06-26 PROCEDURE — 96375 TX/PRO/DX INJ NEW DRUG ADDON: CPT

## 2020-06-26 PROCEDURE — 83690 ASSAY OF LIPASE: CPT

## 2020-06-26 PROCEDURE — 84703 CHORIONIC GONADOTROPIN ASSAY: CPT

## 2020-06-26 PROCEDURE — 81003 URINALYSIS AUTO W/O SCOPE: CPT

## 2020-06-26 PROCEDURE — A9270 NON-COVERED ITEM OR SERVICE: HCPCS | Performed by: EMERGENCY MEDICINE

## 2020-06-26 PROCEDURE — 85025 COMPLETE CBC W/AUTO DIFF WBC: CPT

## 2020-06-26 PROCEDURE — 87491 CHLMYD TRACH DNA AMP PROBE: CPT

## 2020-06-26 PROCEDURE — 87086 URINE CULTURE/COLONY COUNT: CPT

## 2020-06-26 PROCEDURE — 80053 COMPREHEN METABOLIC PANEL: CPT

## 2020-06-26 RX ORDER — SODIUM CHLORIDE 9 MG/ML
1000 INJECTION, SOLUTION INTRAVENOUS ONCE
Status: COMPLETED | OUTPATIENT
Start: 2020-06-26 | End: 2020-06-26

## 2020-06-26 RX ORDER — LIDOCAINE 50 MG/G
1 PATCH TOPICAL
COMMUNITY

## 2020-06-26 RX ORDER — CEFDINIR 300 MG/1
300 CAPSULE ORAL ONCE
Status: COMPLETED | OUTPATIENT
Start: 2020-06-26 | End: 2020-06-26

## 2020-06-26 RX ORDER — ONDANSETRON 2 MG/ML
4 INJECTION INTRAMUSCULAR; INTRAVENOUS ONCE
Status: COMPLETED | OUTPATIENT
Start: 2020-06-26 | End: 2020-06-26

## 2020-06-26 RX ORDER — KETOROLAC TROMETHAMINE 30 MG/ML
15 INJECTION, SOLUTION INTRAMUSCULAR; INTRAVENOUS ONCE
Status: COMPLETED | OUTPATIENT
Start: 2020-06-26 | End: 2020-06-26

## 2020-06-26 RX ORDER — CEFDINIR 300 MG/1
300 CAPSULE ORAL 2 TIMES DAILY
Qty: 10 CAP | Refills: 0 | Status: SHIPPED | OUTPATIENT
Start: 2020-06-26 | End: 2020-07-01

## 2020-06-26 RX ORDER — PHENAZOPYRIDINE HYDROCHLORIDE 200 MG/1
200 TABLET, FILM COATED ORAL 3 TIMES DAILY PRN
Qty: 6 TAB | Refills: 0 | Status: SHIPPED | OUTPATIENT
Start: 2020-06-26 | End: 2020-06-28

## 2020-06-26 RX ORDER — PHENAZOPYRIDINE HYDROCHLORIDE 200 MG/1
200 TABLET, FILM COATED ORAL ONCE
Status: COMPLETED | OUTPATIENT
Start: 2020-06-26 | End: 2020-06-26

## 2020-06-26 RX ADMIN — SODIUM CHLORIDE 1000 ML: 9 INJECTION, SOLUTION INTRAVENOUS at 08:18

## 2020-06-26 RX ADMIN — ONDANSETRON 4 MG: 2 INJECTION INTRAMUSCULAR; INTRAVENOUS at 08:18

## 2020-06-26 RX ADMIN — CEFDINIR 300 MG: 300 CAPSULE ORAL at 11:23

## 2020-06-26 RX ADMIN — KETOROLAC TROMETHAMINE 15 MG: 30 INJECTION, SOLUTION INTRAMUSCULAR at 09:25

## 2020-06-26 RX ADMIN — PHENAZOPYRIDINE HYDROCHLORIDE 200 MG: 200 TABLET ORAL at 11:23

## 2020-06-26 ASSESSMENT — FIBROSIS 4 INDEX: FIB4 SCORE: 0.68

## 2020-06-26 ASSESSMENT — PAIN DESCRIPTION - DESCRIPTORS
DESCRIPTORS: ACHING
DESCRIPTORS: CRAMPING

## 2020-06-26 NOTE — ED NOTES
Pharmacy Medication Reconciliation        Medication reconciliation has been completed by interviewing patient with consent to do so with family/visitors in the room.   Allergies were reviewed and updated   No ORAL antibiotics within the past 14 days  Pt home pharmacy:[facility]

## 2020-06-26 NOTE — ED NOTES
Dc instructions and medications discussed with patient at bedside. All questions answered at this time. VSS. No IV in place at this time. Pt to lobby without incident. spouse to drive patient home.

## 2020-06-26 NOTE — ED TRIAGE NOTES
"Chief Complaint   Patient presents with   • Painful Urination   • Flank Pain     bilateral      pt complains of bilateral flank pain with \" bladder spasms\" and painful urination. Pt stats she does have a history of kidney stones and hip arthritis.     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   "

## 2020-06-26 NOTE — DISCHARGE INSTRUCTIONS
Return to the ER for a recheck in 18 to 24 hours unless you are feeling significantly improved.    Please contact your rheumatologist to let him know that you were here today and what symptoms you are having since you have been on Enbrel within the recent past.    Follow-up with Dr. Tai Booker, urologist, this coming week.  Please call for appointment.    Return to the ER for any worsening bladder pain, worsening flank pain, increased pain with urination, increased frequency urgency of urination, blood in urine, cloudy urine, fevers over 100.4, shaking chills, worsening diarrhea, changing or worsening abdominal pain, or for any concerns.  
done

## 2020-06-26 NOTE — ED NOTES
Assumed care of pt at this specific time; no acute exacerbations in condition are noted since last evaluation.  Call light is within reach and pt is strongly encouraged to call for any assistance.   Spouse is at bedside.

## 2020-06-26 NOTE — ED NOTES
The left antecubital IV access appears to be positional, therefore another access was established on the right wrist.  IVF are infusing without apparent discomfort.

## 2020-06-27 LAB
C TRACH DNA SPEC QL NAA+PROBE: NEGATIVE
N GONORRHOEA DNA SPEC QL NAA+PROBE: NEGATIVE
SPECIMEN SOURCE: NORMAL

## 2020-06-28 LAB
BACTERIA UR CULT: NORMAL
SIGNIFICANT IND 70042: NORMAL
SITE SITE: NORMAL
SOURCE SOURCE: NORMAL

## 2020-07-21 ENCOUNTER — HOSPITAL ENCOUNTER (OUTPATIENT)
Dept: RADIOLOGY | Facility: MEDICAL CENTER | Age: 45
End: 2020-07-21
Attending: SPECIALIST
Payer: COMMERCIAL

## 2020-07-21 ENCOUNTER — HOSPITAL ENCOUNTER (OUTPATIENT)
Dept: RADIOLOGY | Facility: MEDICAL CENTER | Age: 45
End: 2020-07-21
Attending: FAMILY MEDICINE
Payer: COMMERCIAL

## 2020-07-21 DIAGNOSIS — M45.4 ANKYLOSING SPONDYLITIS OF THORACIC REGION (HCC): ICD-10-CM

## 2020-07-21 DIAGNOSIS — R92.8 ABNORMAL MAMMOGRAM OF LEFT BREAST: ICD-10-CM

## 2020-07-21 PROCEDURE — 76642 ULTRASOUND BREAST LIMITED: CPT | Mod: LT

## 2020-07-21 PROCEDURE — 72146 MRI CHEST SPINE W/O DYE: CPT

## 2020-07-24 NOTE — NON-PROVIDER
PAT note: PAT call made 07/24/2020 at 1042. Spoke with Joycelyn. Health history, allergies, medications and pre-procedure/sedation instructions reviewed with patient. Pre-procedure respiratory screening completed. Pt denies being sick/symptomatic(fever, cough, shortness of breath)  within 72 hours or having been in close contact with symptomatic individuals in the past 2 weeks.Pt was informed to contact her physician should she or any close personal contact become symptomatic prior to the procedure. Pt was told to bring a mask as she would be wearing it during the entire stay. It was recommended that the pt self isolate until the procedure and that her  would have to remain on site in vehicle til pt is d/amna. Pt verbalized understanding of instructions.

## 2020-07-27 ENCOUNTER — HOSPITAL ENCOUNTER (OUTPATIENT)
Dept: RADIOLOGY | Facility: MEDICAL CENTER | Age: 45
End: 2020-07-27
Attending: SPECIALIST
Payer: COMMERCIAL

## 2020-07-27 DIAGNOSIS — M46.1 SACROILIITIS, NOT ELSEWHERE CLASSIFIED (HCC): ICD-10-CM

## 2020-07-27 PROCEDURE — 72195 MRI PELVIS W/O DYE: CPT

## 2020-07-28 ENCOUNTER — HOSPITAL ENCOUNTER (OUTPATIENT)
Dept: PAIN MANAGEMENT | Facility: REHABILITATION | Age: 45
End: 2020-07-28
Attending: SPECIALIST
Payer: COMMERCIAL

## 2020-07-28 ENCOUNTER — HOSPITAL ENCOUNTER (OUTPATIENT)
Dept: RADIOLOGY | Facility: REHABILITATION | Age: 45
End: 2020-07-28
Attending: SPECIALIST
Payer: COMMERCIAL

## 2020-07-28 VITALS
BODY MASS INDEX: 29.34 KG/M2 | HEART RATE: 85 BPM | WEIGHT: 165.57 LBS | OXYGEN SATURATION: 98 % | TEMPERATURE: 98.1 F | HEIGHT: 63 IN | DIASTOLIC BLOOD PRESSURE: 80 MMHG | RESPIRATION RATE: 18 BRPM | SYSTOLIC BLOOD PRESSURE: 120 MMHG

## 2020-07-28 PROCEDURE — 64633 DESTROY CERV/THOR FACET JNT: CPT

## 2020-07-28 PROCEDURE — 99153 MOD SED SAME PHYS/QHP EA: CPT

## 2020-07-28 PROCEDURE — 64634 DESTROY C/TH FACET JNT ADDL: CPT

## 2020-07-28 PROCEDURE — 700101 HCHG RX REV CODE 250

## 2020-07-28 PROCEDURE — 700117 HCHG RX CONTRAST REV CODE 255

## 2020-07-28 PROCEDURE — 700111 HCHG RX REV CODE 636 W/ 250 OVERRIDE (IP)

## 2020-07-28 PROCEDURE — 99152 MOD SED SAME PHYS/QHP 5/>YRS: CPT

## 2020-07-28 RX ORDER — MIDAZOLAM HYDROCHLORIDE 1 MG/ML
INJECTION INTRAMUSCULAR; INTRAVENOUS
Status: COMPLETED
Start: 2020-07-28 | End: 2020-07-28

## 2020-07-28 RX ORDER — LIDOCAINE HYDROCHLORIDE 10 MG/ML
INJECTION, SOLUTION EPIDURAL; INFILTRATION; INTRACAUDAL; PERINEURAL
Status: COMPLETED
Start: 2020-07-28 | End: 2020-07-28

## 2020-07-28 RX ORDER — DEXAMETHASONE SODIUM PHOSPHATE 10 MG/ML
INJECTION, SOLUTION INTRAMUSCULAR; INTRAVENOUS
Status: COMPLETED
Start: 2020-07-28 | End: 2020-07-28

## 2020-07-28 RX ORDER — LIDOCAINE HYDROCHLORIDE 20 MG/ML
INJECTION, SOLUTION EPIDURAL; INFILTRATION; INTRACAUDAL; PERINEURAL
Status: COMPLETED
Start: 2020-07-28 | End: 2020-07-28

## 2020-07-28 RX ORDER — ONDANSETRON 2 MG/ML
INJECTION INTRAMUSCULAR; INTRAVENOUS
Status: COMPLETED
Start: 2020-07-28 | End: 2020-07-28

## 2020-07-28 RX ADMIN — IOHEXOL 1 ML: 240 INJECTION, SOLUTION INTRATHECAL; INTRAVASCULAR; INTRAVENOUS; ORAL at 15:36

## 2020-07-28 RX ADMIN — MIDAZOLAM HYDROCHLORIDE 0.5 MG: 1 INJECTION, SOLUTION INTRAMUSCULAR; INTRAVENOUS at 15:30

## 2020-07-28 RX ADMIN — FENTANYL CITRATE 50 MCG: 50 INJECTION, SOLUTION INTRAMUSCULAR; INTRAVENOUS at 15:23

## 2020-07-28 RX ADMIN — LIDOCAINE HYDROCHLORIDE 5 ML: 20 INJECTION, SOLUTION EPIDURAL; INFILTRATION; INTRACAUDAL; PERINEURAL at 15:37

## 2020-07-28 RX ADMIN — MIDAZOLAM HYDROCHLORIDE 1 MG: 1 INJECTION, SOLUTION INTRAMUSCULAR; INTRAVENOUS at 15:23

## 2020-07-28 RX ADMIN — ONDANSETRON HYDROCHLORIDE 4 MG: 2 INJECTION, SOLUTION INTRAMUSCULAR; INTRAVENOUS at 15:41

## 2020-07-28 ASSESSMENT — FIBROSIS 4 INDEX: FIB4 SCORE: 0.6

## 2020-07-28 NOTE — NON-PROVIDER
Verified patient name.Confirmed procedure, site and  consent signed. H&P updated. Reviewed medication allergies and current medication in epic. Printed home care discharged, pre and post including infection  prevention verbal instruction given to patient and verbalized understanding. Has  spouse waiting .Pertinent medical health  information reviewed in epic  ( HTN, hypokalemia, hyperglycemia, SVT, CNS demyelinating disease ) .  Temp. rechecked was 98.1f. Patient denied taking blood thinner and anti-inflammatory medication. Hand off given to BELKYS Pablo RN.

## 2020-07-28 NOTE — NON-PROVIDER
Fluids  and food tolerated well. Ice compress applied to the affected area. No nauseous, no light headed noted.. Discharged ambulatory without difficulty with designated .

## 2020-07-28 NOTE — NON-PROVIDER
Pre-procedure assessment completed  and pertinent health information (asthma,SVT,HTN,lupus) communicated to physician and staff during timeout. Pt positionned pre-procedure on table by RN, CST,CNA and xray tech. Pillow placed under feet for support.Grounding pad applied to left flank by CST and verified by RN

## 2020-07-29 NOTE — PROCEDURES
DATE OF SERVICE:  07/28/2020    PROCEDURES:  1.  Left C3, C4, C5, C6 medial branch neurotomies.  2.  Fluoroscopy for needle guidance confirmation of placement.  3.  IV sedation per patient request.    DIAGNOSES:  1.  Cervical facet syndrome with neck pain and headache.  2.  Anxiety over painful procedure, patient requests intravenous sedation for   tolerance.    DESCRIPTION:  After informed consent, the patient was placed prone, monitored   and sedated with Versed and fentanyl.  Fluoroscopy was utilized to identify   the lateral facet body concavities of C3, 4, 5, 6.  Neck was prepped with   Betadine, sterile draped and anesthetized.  Under intermittent fluoroscopic   guidance and local anesthesia, 10 cm 20-gauge radiofrequency needles were   passed to the aforementioned targets making contact with the posterolateral   aspect of the concavities and advanced midway across the lateral facet body   confirmed in the lateral fluoroscopic view.  A 2 Hz motor stimulation was then   carried out at each level up to 2.5 volts confirming local motor activity   without radicular motor activity.  Following this, I injected small amounts of   2% lidocaine at each level and performed the following lesions:  1.  C3 underwent radiofrequency lesioning at 80 degrees centigrade for 90   seconds.  2.  C4 underwent radiofrequency lesioning at 80 degrees centigrade for 90   seconds.  3.  C5 underwent radiofrequency lesioning at 80 degrees centigrade for 90   seconds.  4.  C6 underwent radiofrequency lesioning at 80 degrees centigrade for 90   seconds.    Needle was removed.  She tolerated this well.    PLAN:  Follow up for ongoing pain management needs.       ____________________________________     MD KATIE MEDINA / URBANO    DD:  07/28/2020 15:50:20  DT:  07/29/2020 02:53:45    D#:  [id number]  Job#:  [id number]

## 2020-08-18 ENCOUNTER — OFFICE VISIT (OUTPATIENT)
Dept: URGENT CARE | Facility: PHYSICIAN GROUP | Age: 45
End: 2020-08-18
Payer: COMMERCIAL

## 2020-08-18 ENCOUNTER — HOSPITAL ENCOUNTER (OUTPATIENT)
Facility: MEDICAL CENTER | Age: 45
End: 2020-08-18
Attending: PHYSICIAN ASSISTANT
Payer: COMMERCIAL

## 2020-08-18 VITALS
BODY MASS INDEX: 30.18 KG/M2 | SYSTOLIC BLOOD PRESSURE: 102 MMHG | DIASTOLIC BLOOD PRESSURE: 70 MMHG | WEIGHT: 164 LBS | OXYGEN SATURATION: 99 % | RESPIRATION RATE: 14 BRPM | TEMPERATURE: 97 F | HEART RATE: 84 BPM | HEIGHT: 62 IN

## 2020-08-18 DIAGNOSIS — R51.9 ACUTE NONINTRACTABLE HEADACHE, UNSPECIFIED HEADACHE TYPE: ICD-10-CM

## 2020-08-18 LAB
COVID ORDER STATUS COVID19: NORMAL
SARS-COV-2 RNA RESP QL NAA+PROBE: NOTDETECTED
SPECIMEN SOURCE: NORMAL

## 2020-08-18 PROCEDURE — U0003 INFECTIOUS AGENT DETECTION BY NUCLEIC ACID (DNA OR RNA); SEVERE ACUTE RESPIRATORY SYNDROME CORONAVIRUS 2 (SARS-COV-2) (CORONAVIRUS DISEASE [COVID-19]), AMPLIFIED PROBE TECHNIQUE, MAKING USE OF HIGH THROUGHPUT TECHNOLOGIES AS DESCRIBED BY CMS-2020-01-R: HCPCS

## 2020-08-18 PROCEDURE — 99214 OFFICE O/P EST MOD 30 MIN: CPT | Performed by: PHYSICIAN ASSISTANT

## 2020-08-18 RX ORDER — ONDANSETRON 4 MG/1
4 TABLET, FILM COATED ORAL EVERY 8 HOURS PRN
Qty: 20 TAB | Refills: 0 | Status: SHIPPED | OUTPATIENT
Start: 2020-08-18 | End: 2020-08-18

## 2020-08-18 RX ORDER — PROMETHAZINE HYDROCHLORIDE 25 MG/1
25 TABLET ORAL EVERY 8 HOURS PRN
Qty: 20 TAB | Refills: 0 | Status: SHIPPED | OUTPATIENT
Start: 2020-08-18 | End: 2020-08-18

## 2020-08-18 RX ORDER — RIZATRIPTAN BENZOATE 5 MG/1
5 TABLET ORAL
Qty: 10 TAB | Refills: 0 | Status: SHIPPED | OUTPATIENT
Start: 2020-08-18

## 2020-08-18 RX ORDER — KETOROLAC TROMETHAMINE 30 MG/ML
30 INJECTION, SOLUTION INTRAMUSCULAR; INTRAVENOUS ONCE
Status: COMPLETED | OUTPATIENT
Start: 2020-08-18 | End: 2020-08-18

## 2020-08-18 RX ADMIN — KETOROLAC TROMETHAMINE 30 MG: 30 INJECTION, SOLUTION INTRAMUSCULAR; INTRAVENOUS at 11:12

## 2020-08-18 ASSESSMENT — ENCOUNTER SYMPTOMS
SINUS PAIN: 1
SINUS PRESSURE: 1
LOSS OF BALANCE: 0
PALPITATIONS: 0
PHOTOPHOBIA: 1
HEADACHES: 1
RHINORRHEA: 0
SHORTNESS OF BREATH: 0
VOMITING: 0
MYALGIAS: 0
SWOLLEN GLANDS: 0
CHILLS: 0
DIARRHEA: 0
DIZZINESS: 1
TINGLING: 0
WHEEZING: 0
CLUSTER HEADACHES: 1
ABNORMAL BEHAVIOR: 0
SCALP TENDERNESS: 0
EYE REDNESS: 0
COUGH: 0
SORE THROAT: 0
NAUSEA: 1
NUMBNESS: 0
LOSS OF CONSCIOUSNESS: 0
MIGRAINE HEADACHES: 1
EYE PAIN: 0
VISUAL CHANGE: 0
ABDOMINAL PAIN: 0
FOCAL WEAKNESS: 0
BLURRED VISION: 0
WEAKNESS: 0
SENSORY CHANGE: 0
FEVER: 0

## 2020-08-18 ASSESSMENT — FIBROSIS 4 INDEX: FIB4 SCORE: 0.6

## 2020-08-18 NOTE — PATIENT INSTRUCTIONS
A concern for COVID-19 has been identified and testing is in progress.     We are asking you to excuse absences while they follow self-isolation protocol per CDC guidelines. They will be able to access their results through our electronic delivery system called [employer].     If the results of testing are negative then they may return to work once there has been no fever greater than 100.4 F for at least 72 hours without treatment and no vomiting or diarrhea for at least 48 hours.     If the results of testing are positive then they will be contacted by the [facility] for further instructions on duration of self-isolation and return to work protocol. In general this will also follow the CDC guidelines with a minimum of 10 days from the onset of symptoms and improving without fever, vomiting, or diarrhea as above.     In most cases repeat testing is not necessary and not offered through our urgent care.     This is the only note that will be provided from [facility] for this visit. Please schedule a visit with primary care if documents for FMLA, disability, or unemployment are required.     Archana Fernandez P.A.-C.

## 2020-08-18 NOTE — PROGRESS NOTES
Subjective:      Joycelyn Byers is a 45 y.o. female who presents with Headache (nausea, headache, dizziness, feels like it could be sinus xthis morning )            Headache   This is a new problem. The current episode started today. The problem occurs constantly. The problem has been unchanged. The pain is located in the bilateral (L>R) region. The pain does not radiate. The pain quality is similar to prior headaches. The quality of the pain is described as aching. Associated symptoms include dizziness, ear pain (ear pressure ), nausea, photophobia and sinus pressure (left side ). Pertinent negatives include no abdominal pain, abnormal behavior, blurred vision, coughing, eye pain, eye redness, fever, loss of balance, muscle aches, numbness, rhinorrhea, scalp tenderness, sore throat, swollen glands, tingling, tinnitus, visual change, vomiting or weakness. She has tried triptans (old RX for Maxalt) for the symptoms. The treatment provided moderate relief. Her past medical history is significant for cluster headaches, migraine headaches and sinus disease. There is no history of hypertension or recent head traumas.       The patient denies worst headache of her life. She believes her headache may be a migraine or sinus related. 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    Past Medical History:   Diagnosis Date   • Allergy, unspecified not elsewhere classified    • Ankylosing spondylitis of lumbosacral region (HCC) 7/6/2015   • Anxiety 11/24/2015   • GERD (gastroesophageal reflux disease)    • Headache, classical migraine    • Hiatal hernia    • Intractable migraine without aura and without status migrainosus 11/24/2015   • Lupus ([facility])    • Morphea 1/9/2019   • Oropharyngeal dysphagia    • Other forms of systemic lupus erythematosus ([facility]) 10/20/2017   • Overactive bladder    • Peptic ulcer    • Polyuria 7/17/2019   • Prediabetes 12/8/2016   • Vocal cord  dysfunction      Past Surgical History:   Procedure Laterality Date   • HYSTERECTOMY LAPAROSCOPY  2013    utrine and right ovary removed   • HERNIA REPAIR  2007    umblical hernia   • CHOLECYSTECTOMY  2007   • SALPINGO-OOPHORECTOMY, UNILATERAL Right        Family History   Problem Relation Age of Onset   • Arthritis Brother         psoriatic arthritis   • Arthritis Maternal Grandmother    • Psychiatric Illness Mother         Major depression   • Other Mother         discoid lupus   • Psychiatric Illness Father         Bipolar depression   • Heart Disease Father         CHF   • Hypertension Father    • Diabetes Father    • Other Father         Agent orange exposure 125% disabled   • Diabetes Paternal Uncle    • Stroke Maternal Grandfather    • Diabetes Paternal Grandmother    • Alcohol/Drug Paternal Grandfather    • Cancer Paternal Aunt         gyn cancer   • GI Disease Son    • GI Disease Daughter    • GI Disease Daughter    • GI Disease Daughter        Allergies   Allergen Reactions   • Milnacipran Myalgia   • Tramadol Shortness of Breath and Rash     Shortness of breath & rash     • Ciprofloxacin Unspecified     Knee & muscle pain     • Metoclopramide Unspecified     Akathisia     • Sulfa Drugs Nausea     GI upset   • Tetanus Antitoxin Unspecified     Mild spasms and pain         Medications, Allergies, and current problem list reviewed today in Epic      Review of Systems   Constitutional: Positive for malaise/fatigue. Negative for chills and fever.   HENT: Positive for ear pain (ear pressure ), sinus pressure (left side ) and sinus pain. Negative for congestion, ear discharge, rhinorrhea, sore throat and tinnitus.    Eyes: Positive for photophobia. Negative for blurred vision, pain and redness.   Respiratory: Negative for cough, shortness of breath and wheezing.    Cardiovascular: Negative for chest pain, palpitations and leg swelling.   Gastrointestinal: Positive for nausea. Negative for abdominal pain,  "diarrhea and vomiting.   Musculoskeletal: Negative for myalgias.   Skin: Negative for rash.   Neurological: Positive for dizziness and headaches. Negative for tingling, sensory change, focal weakness, loss of consciousness, weakness, numbness and loss of balance.     All other systems reviewed and are negative.        Objective:     /70   Pulse 84   Temp 36.1 °C (97 °F) (Temporal)   Resp 14   Ht 1.575 m (5' 2\")   Wt 74.4 kg (164 lb)   LMP 03/29/2016   SpO2 99%   BMI 30.00 kg/m²      Physical Exam  Constitutional:       General: She is not in acute distress.     Appearance: Normal appearance.   HENT:      Head: Normocephalic and atraumatic.      Right Ear: Ear canal and external ear normal.      Left Ear: Ear canal and external ear normal.      Nose: Nose normal. No congestion or rhinorrhea.   Eyes:      Extraocular Movements: Extraocular movements intact.      Conjunctiva/sclera: Conjunctivae normal.      Pupils: Pupils are equal, round, and reactive to light.   Neck:      Musculoskeletal: Normal range of motion.   Cardiovascular:      Rate and Rhythm: Normal rate and regular rhythm.      Heart sounds: Normal heart sounds. No murmur. No friction rub. No gallop.    Pulmonary:      Effort: Pulmonary effort is normal. No respiratory distress.      Breath sounds: Normal breath sounds. No wheezing, rhonchi or rales.   Musculoskeletal: Normal range of motion.   Lymphadenopathy:      Cervical: No cervical adenopathy.   Skin:     General: Skin is warm and dry.      Findings: No rash.   Neurological:      General: No focal deficit present.      Mental Status: She is alert and oriented to person, place, and time.      Comments: CN II-XII intact. Cerebellar function  intact. Motor coordination intact.  strength strong and symmetrical bilaterally. Proprioception intact. Strength 5/5 equal in the upper and lower extremities. Facial features symmetric with equal movement. No focal deficits. Romberg negative.   "   Psychiatric:         Mood and Affect: Mood normal.         Behavior: Behavior normal.         Thought Content: Thought content normal.         Judgment: Judgment normal.                 Assessment/Plan:        1. Acute nonintractable headache, unspecified headache type    - Discussed possible underlying sinusitis- explained that only 1 days of symptoms indicates viral etiology and that most sinus infections will resolve on there own.  Continue Flonase and/or saline rinses.    - ketorolac (TORADOL) injection 30 mg  - COVID/SARS COV-2 PCR; Future  - rizatriptan (MAXALT) 5 MG tablet; Take 1 Tab by mouth Once PRN for Migraine for up to 1 dose.  Dispense: 10 Tab; Refill: 0    Differential diagnoses, Supportive care, and indications for immediate follow-up discussed with patient.   Pathogenesis of diagnosis discussed including typical length and natural progression.   Instructed to return to clinic or nearest emergency department for any change in condition, further concerns, or worsening of symptoms.    The patient demonstrated a good understanding and agreed with the treatment plan.    Archana Fernandez P.A.-C.

## 2020-08-25 ENCOUNTER — TELEMEDICINE (OUTPATIENT)
Dept: MEDICAL GROUP | Facility: PHYSICIAN GROUP | Age: 45
End: 2020-08-25
Payer: COMMERCIAL

## 2020-08-25 VITALS — HEIGHT: 62 IN | BODY MASS INDEX: 29.87 KG/M2 | WEIGHT: 162.3 LBS

## 2020-08-25 DIAGNOSIS — I10 ESSENTIAL HYPERTENSION: ICD-10-CM

## 2020-08-25 DIAGNOSIS — J45.40 MODERATE PERSISTENT ASTHMA IN ADULT WITHOUT COMPLICATION: ICD-10-CM

## 2020-08-25 DIAGNOSIS — F41.9 ANXIETY: ICD-10-CM

## 2020-08-25 PROCEDURE — 99214 OFFICE O/P EST MOD 30 MIN: CPT | Mod: 95,CR | Performed by: FAMILY MEDICINE

## 2020-08-25 RX ORDER — BUPROPION HYDROCHLORIDE 75 MG/1
75 TABLET ORAL 2 TIMES DAILY
Qty: 60 TAB | Refills: 11 | Status: SHIPPED | OUTPATIENT
Start: 2020-08-25 | End: 2020-09-21

## 2020-08-25 RX ORDER — FLUTICASONE FUROATE 200 UG/1
1 POWDER RESPIRATORY (INHALATION) EVERY MORNING
Qty: 90 EACH | Refills: 3 | Status: SHIPPED | OUTPATIENT
Start: 2020-08-25 | End: 2021-08-16 | Stop reason: SDUPTHER

## 2020-08-25 RX ORDER — ALBUTEROL SULFATE 90 UG/1
2 AEROSOL, METERED RESPIRATORY (INHALATION) EVERY 4 HOURS PRN
Qty: 3 EACH | Refills: 3 | Status: SHIPPED | OUTPATIENT
Start: 2020-08-25

## 2020-08-25 ASSESSMENT — FIBROSIS 4 INDEX: FIB4 SCORE: 0.6

## 2020-08-25 NOTE — ASSESSMENT & PLAN NOTE
This is a chronic health problem for this patient where previously we had her on amlodipine 2.5 mg along with Maxide 37.5/25.  She currently is still on the amlodipine 2.5 but is cutting her triamterene hydrochlorothiazide in half.  With those 2 she is able to keep her blood pressure well below 130/80 but not be hypotensive.

## 2020-08-25 NOTE — ASSESSMENT & PLAN NOTE
"Is a chronic health problem for this patient where we did have her on fluoxetine at one time and another time she was on Lexapro.  Both of those she has weaned herself off of but she is having some problems with concentration which is 1 of the symptoms of her anxiety.  Were going to try low-dose Wellbutrin we will write for the 75 mg that she could take twice a day but she needs to definitely take it once a day in the morning before she had to work to see if she can tolerate that.  She had tried \"Contrave\" which is a combination of naltrexone and Wellbutrin in the past.  That made her very irritable but it may have been the naltrexone portion of that med.  We will try the Wellbutrin and then we will check back with her in 4 weeks to see how she is doing.  "
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
## 2020-09-09 ENCOUNTER — PATIENT MESSAGE (OUTPATIENT)
Dept: MEDICAL GROUP | Facility: PHYSICIAN GROUP | Age: 45
End: 2020-09-09

## 2020-09-10 RX ORDER — FLUOXETINE HYDROCHLORIDE 20 MG/1
20 CAPSULE ORAL DAILY
Qty: 90 CAP | Refills: 3 | Status: SHIPPED | OUTPATIENT
Start: 2020-09-10 | End: 2021-06-07

## 2020-09-17 ENCOUNTER — OFFICE VISIT (OUTPATIENT)
Dept: URGENT CARE | Facility: PHYSICIAN GROUP | Age: 45
End: 2020-09-17
Payer: COMMERCIAL

## 2020-09-17 ENCOUNTER — HOSPITAL ENCOUNTER (OUTPATIENT)
Dept: LAB | Facility: MEDICAL CENTER | Age: 45
End: 2020-09-17
Attending: PHYSICIAN ASSISTANT
Payer: COMMERCIAL

## 2020-09-17 VITALS
HEART RATE: 86 BPM | DIASTOLIC BLOOD PRESSURE: 92 MMHG | HEIGHT: 63 IN | SYSTOLIC BLOOD PRESSURE: 134 MMHG | BODY MASS INDEX: 27.82 KG/M2 | OXYGEN SATURATION: 95 % | WEIGHT: 157 LBS | RESPIRATION RATE: 14 BRPM | TEMPERATURE: 98.6 F

## 2020-09-17 DIAGNOSIS — R00.2 PALPITATIONS: ICD-10-CM

## 2020-09-17 DIAGNOSIS — F41.9 ANXIETY: ICD-10-CM

## 2020-09-17 DIAGNOSIS — R42 DIZZINESS: ICD-10-CM

## 2020-09-17 LAB
ALBUMIN SERPL BCP-MCNC: 4.4 G/DL (ref 3.2–4.9)
ALBUMIN/GLOB SERPL: 1.3 G/DL
ALP SERPL-CCNC: 70 U/L (ref 30–99)
ALT SERPL-CCNC: 24 U/L (ref 2–50)
ANION GAP SERPL CALC-SCNC: 16 MMOL/L (ref 7–16)
AST SERPL-CCNC: 21 U/L (ref 12–45)
BASOPHILS # BLD AUTO: 1.2 % (ref 0–1.8)
BASOPHILS # BLD: 0.11 K/UL (ref 0–0.12)
BILIRUB SERPL-MCNC: 0.3 MG/DL (ref 0.1–1.5)
BUN SERPL-MCNC: 20 MG/DL (ref 8–22)
CALCIUM SERPL-MCNC: 9.7 MG/DL (ref 8.5–10.5)
CHLORIDE SERPL-SCNC: 95 MMOL/L (ref 96–112)
CO2 SERPL-SCNC: 25 MMOL/L (ref 20–33)
CREAT SERPL-MCNC: 0.84 MG/DL (ref 0.5–1.4)
EOSINOPHIL # BLD AUTO: 0.13 K/UL (ref 0–0.51)
EOSINOPHIL NFR BLD: 1.4 % (ref 0–6.9)
ERYTHROCYTE [DISTWIDTH] IN BLOOD BY AUTOMATED COUNT: 39.1 FL (ref 35.9–50)
GLOBULIN SER CALC-MCNC: 3.5 G/DL (ref 1.9–3.5)
GLUCOSE SERPL-MCNC: 98 MG/DL (ref 65–99)
HCT VFR BLD AUTO: 45.4 % (ref 37–47)
HGB BLD-MCNC: 14.7 G/DL (ref 12–16)
IMM GRANULOCYTES # BLD AUTO: 0.03 K/UL (ref 0–0.11)
IMM GRANULOCYTES NFR BLD AUTO: 0.3 % (ref 0–0.9)
LYMPHOCYTES # BLD AUTO: 1.78 K/UL (ref 1–4.8)
LYMPHOCYTES NFR BLD: 19.6 % (ref 22–41)
MCH RBC QN AUTO: 26.1 PG (ref 27–33)
MCHC RBC AUTO-ENTMCNC: 32.4 G/DL (ref 33.6–35)
MCV RBC AUTO: 80.6 FL (ref 81.4–97.8)
MONOCYTES # BLD AUTO: 0.88 K/UL (ref 0–0.85)
MONOCYTES NFR BLD AUTO: 9.7 % (ref 0–13.4)
NEUTROPHILS # BLD AUTO: 6.15 K/UL (ref 2–7.15)
NEUTROPHILS NFR BLD: 67.8 % (ref 44–72)
NRBC # BLD AUTO: 0 K/UL
NRBC BLD-RTO: 0 /100 WBC
PLATELET # BLD AUTO: 312 K/UL (ref 164–446)
PMV BLD AUTO: 11.1 FL (ref 9–12.9)
POTASSIUM SERPL-SCNC: 3.3 MMOL/L (ref 3.6–5.5)
PROT SERPL-MCNC: 7.9 G/DL (ref 6–8.2)
RBC # BLD AUTO: 5.63 M/UL (ref 4.2–5.4)
SODIUM SERPL-SCNC: 136 MMOL/L (ref 135–145)
TSH SERPL DL<=0.005 MIU/L-ACNC: 1.32 UIU/ML (ref 0.38–5.33)
WBC # BLD AUTO: 9.1 K/UL (ref 4.8–10.8)

## 2020-09-17 PROCEDURE — 99214 OFFICE O/P EST MOD 30 MIN: CPT | Performed by: PHYSICIAN ASSISTANT

## 2020-09-17 PROCEDURE — 84443 ASSAY THYROID STIM HORMONE: CPT

## 2020-09-17 PROCEDURE — 36415 COLL VENOUS BLD VENIPUNCTURE: CPT

## 2020-09-17 PROCEDURE — 80053 COMPREHEN METABOLIC PANEL: CPT

## 2020-09-17 PROCEDURE — 85025 COMPLETE CBC W/AUTO DIFF WBC: CPT

## 2020-09-17 ASSESSMENT — ENCOUNTER SYMPTOMS
SORE THROAT: 0
COUGH: 0
VOMITING: 0
ABDOMINAL PAIN: 0
CLAUDICATION: 0
WHEEZING: 0
DIARRHEA: 0
SENSORY CHANGE: 0
MYALGIAS: 0
NAUSEA: 1
SHORTNESS OF BREATH: 0
LOSS OF CONSCIOUSNESS: 0
DIZZINESS: 1
HEADACHES: 0
FEVER: 0
PALPITATIONS: 1
DIAPHORESIS: 1
FOCAL WEAKNESS: 0
CHILLS: 0
EYES NEGATIVE: 1

## 2020-09-17 ASSESSMENT — FIBROSIS 4 INDEX: FIB4 SCORE: 0.6

## 2020-09-17 NOTE — PATIENT INSTRUCTIONS
Dizziness  Dizziness is a common problem. It makes you feel unsteady or light-headed. You may feel like you are about to pass out (faint). Dizziness can lead to getting hurt if you stumble or fall. Dizziness can be caused by many things, including:  · Medicines.  · Not having enough water in your body (dehydration).  · Illness.  Follow these instructions at home:  Eating and drinking    · Drink enough fluid to keep your pee (urine) clear or pale yellow. This helps to keep you from getting dehydrated. Try to drink more clear fluids, such as water.  · Do not drink alcohol.  · Limit how much caffeine you drink or eat, if your doctor tells you to do that.  · Limit how much salt (sodium) you drink or eat, if your doctor tells you to do that.  Activity    · Avoid making quick movements.  ? When you stand up from sitting in a chair, steady yourself until you feel okay.  ? In the morning, first sit up on the side of the bed. When you feel okay, stand slowly while you hold onto something. Do this until you know that your balance is fine.  · If you need to  one place for a long time, move your legs often. Tighten and relax the muscles in your legs while you are standing.  · Do not drive or use heavy machinery if you feel dizzy.  · Avoid bending down if you feel dizzy. Place items in your home so you can reach them easily without leaning over.  Lifestyle  · Do not use any products that contain nicotine or tobacco, such as cigarettes and e-cigarettes. If you need help quitting, ask your doctor.  · Try to lower your stress level. You can do this by using methods such as yoga or meditation. Talk with your doctor if you need help.  General instructions  · Watch your dizziness for any changes.  · Take over-the-counter and prescription medicines only as told by your doctor. Talk with your doctor if you think that you are dizzy because of a medicine that you are taking.  · Tell a friend or a family member that you are feeling  dizzy. If he or she notices any changes in your behavior, have this person call your doctor.  · Keep all follow-up visits as told by your doctor. This is important.  Contact a doctor if:  · Your dizziness does not go away.  · Your dizziness or light-headedness gets worse.  · You feel sick to your stomach (nauseous).  · You have trouble hearing.  · You have new symptoms.  · You are unsteady on your feet.  · You feel like the room is spinning.  Get help right away if:  · You throw up (vomit) or have watery poop (diarrhea), and you cannot eat or drink anything.  · You have trouble:  ? Talking.  ? Walking.  ? Swallowing.  ? Using your arms, hands, or legs.  · You feel generally weak.  · You are not thinking clearly, or you have trouble forming sentences. A friend or family member may notice this.  · You have:  ? Chest pain.  ? Pain in your belly (abdomen).  ? Shortness of breath.  ? Sweating.  · Your vision changes.  · You are bleeding.  · You have a very bad headache.  · You have neck pain or a stiff neck.  · You have a fever.  These symptoms may be an emergency. Do not wait to see if the symptoms will go away. Get medical help right away. Call your local emergency services (911 in the [place] Do not drive yourself to the hospital.  Summary  · Dizziness makes you feel unsteady or light-headed. You may feel like you are about to pass out (faint).  · Drink enough fluid to keep your pee (urine) clear or pale yellow. Do not drink alcohol.  · Avoid making quick movements if you feel dizzy.  · Watch your dizziness for any changes.  This information is not intended to replace advice given to you by your health care provider. Make sure you discuss any questions you have with your health care provider.  Document Released: 12/06/2012 Document Revised: 12/21/2018 Document Reviewed: 01/04/2018  Elsevier Patient Education © 2020 [employer]  Palpitations  Palpitations are feelings that your heartbeat is not normal. Your heartbeat  may feel like it is:  · Uneven.  · Faster than normal.  · Fluttering.  · Skipping a beat.  This is usually not a serious problem. In some cases, you may need tests to rule out any serious problems.  Follow these instructions at home:  Pay attention to any changes in your condition. Take these actions to help manage your symptoms:  Eating and drinking  · Avoid:  ? Coffee, tea, soft drinks, and energy drinks.  ? Chocolate.  ? Alcohol.  ? Diet pills.  Lifestyle    · Try to lower your stress. These things can help you relax:  ? Yoga.  ? Deep breathing and meditation.  ? Exercise.  ? Using words and images to create positive thoughts (guided imagery).  ? Using your mind to control things in your body (biofeedback).  · Do not use drugs.  · Get plenty of rest and sleep. Keep a regular bed time.  General instructions    · Take over-the-counter and prescription medicines only as told by your doctor.  · Do not use any products that contain nicotine or tobacco, such as cigarettes and e-cigarettes. If you need help quitting, ask your doctor.  · Keep all follow-up visits as told by your doctor. This is important. You may need more tests if palpitations do not go away or get worse.  Contact a doctor if:  · Your symptoms last more than 24 hours.  · Your symptoms occur more often.  Get help right away if you:  · Have chest pain.  · Feel short of breath.  · Have a very bad headache.  · Feel dizzy.  · Pass out (faint).  Summary  · Palpitations are feelings that your heartbeat is uneven or faster than normal. It may feel like your heart is fluttering or skipping a beat.  · Avoid food and drinks that may cause palpitations. These include caffeine, chocolate, and alcohol.  · Try to lower your stress. Do not smoke or use drugs.  · Get help right away if you faint or have chest pain, shortness of breath, a severe headache, or dizziness.  This information is not intended to replace advice given to you by your health care provider. Make  sure you discuss any questions you have with your health care provider.  Document Released: 09/26/2009 Document Revised: 01/30/2019 Document Reviewed: 01/30/2019  Elsevier Patient Education © 2020 [employer]

## 2020-09-17 NOTE — PROGRESS NOTES
"Subjective:   Joycelyn Byers is a 45 y.o. female who presents for Palpitations (on and off x 1 week, dizzy)      Palpitations   Associated symptoms include diaphoresis, dizziness and nausea. Pertinent negatives include no chest pain, coughing, fever, malaise/fatigue, shortness of breath or vomiting.   Patient is a 45-year-old female who presents with 1 week of intermittent dizziness, nausea, sweating and increased anxiety.  She states her episodes usually last 15 to 30 seconds with the longest episode lasting 1 minute to 1.5 minutes.  She then lays down and rests which resolves symptoms.  Is been happening about 2-4 times per day.  She denies any loss of consciousness or syncope.  She describes her palpitations and feels like a \"squeeze\".   She has had a normal diet, normal energy.  New medication of estradiol vaginal cream, however denies any other new medications.  She denies any fevers, chills, sore throat, cough, chest pain, abdominal pain, vomiting, diarrhea.    Currently the patient states she feels fine without any symptoms.  She reports she is going through menopause and has been experiencing her hot flashes.   Does report history of SVT about 1 year ago that lasted a couple months and no complications.  She also reports history of hypokalemia from triamterene.  History of anxiety, \"but no severe to a point of panic attacks\".   History of ankylosing spondylitis of the lumbar sacral region.    Has PCP appointment on 09/22/2020    Review of Systems   Constitutional: Positive for diaphoresis. Negative for chills, fever and malaise/fatigue.   HENT: Negative.  Negative for sore throat.    Eyes: Negative.    Respiratory: Negative for cough, shortness of breath and wheezing.    Cardiovascular: Positive for palpitations. Negative for chest pain and claudication.   Gastrointestinal: Positive for nausea. Negative for abdominal pain, diarrhea and vomiting.   Genitourinary: Negative.    Musculoskeletal: " Negative for myalgias.   Skin: Negative.  Negative for itching.   Neurological: Positive for dizziness. Negative for sensory change, focal weakness, loss of consciousness and headaches.   Endo/Heme/Allergies:        Hot flashes       Medications:    • albuterol Aers  • amLODIPine Tabs  • Arnuity Ellipta Aepb  • buPROPion Tabs  • coenzyme Q-10  • cyanocobalamin Soln  • cyclobenzaprine Tabs  • Enbrel SureClick Soaj  • EpiPen 2-Abelino Soaj  • esomeprazole  • FLUoxetine Caps  • HYDROcodone-acetaminophen 2.5-108 mg/5mL  • hydroxychloroquine Tabs  • lidocaine Ptch  • rizatriptan  • triamterene-hctz Tabs  • VITAMIN B-1 PO    Allergies: Milnacipran, Tramadol, Ciprofloxacin, Metoclopramide, Sulfa drugs, and Tetanus antitoxin    Problem List: Joycelyn Byers has Asthma in adult; Overactive bladder; Ankylosing spondylitis of lumbosacral region (HCC); Anxiety; Intractable migraine without aura and without status migrainosus; Multiple allergies; CNS demyelinating disease (HCC); Vocal cord dysfunction; Reactive hypoglycemia; Chronic constipation; Other forms of systemic lupus erythematosus (HCC); SVT [facility]supraventricular tachycardia) ([facility]); Vitamin D deficiency; Morphea; Polyuria; Other fatigue; EVANGELISTA (dyspnea on exertion); Hypertension; Hypokalemia; Weight gain; Overweight; Hyperglycemia; Hypercholesterolemia; Prolonged Q-T interval on ECG; and Cough in adult on their problem list.    Surgical History:  Past Surgical History:   Procedure Laterality Date   • HYSTERECTOMY LAPAROSCOPY  2013    utrine and right ovary removed   • HERNIA REPAIR  2007    umblical hernia   • CHOLECYSTECTOMY  2007   • SALPINGO-OOPHORECTOMY, UNILATERAL Right        Past Social Hx: Joycelyn Byers  reports that she has never smoked. She has never used smokeless tobacco. She reports that she does not drink alcohol or use drugs.     Past Family Hx:  Joycelyn Byers family history includes Alcohol/Drug in her paternal grandfather; Arthritis  "in her brother and maternal grandmother; Cancer in her paternal aunt; Diabetes in her father, paternal grandmother, and paternal uncle; GI Disease in her daughter, daughter, daughter, and son; Heart Disease in her father; Hypertension in her father; Other in her father and mother; Psychiatric Illness in her father and mother; Stroke in her maternal grandfather.     Problem list, medications, and allergies reviewed by myself today in Epic.     Objective:     /92   Pulse 86   Temp 37 °C (98.6 °F)   Resp 14   Ht 1.6 m (5' 3\")   Wt 71.2 kg (157 lb)   LMP 03/29/2016   SpO2 95%   BMI 27.81 kg/m²     Physical Exam  Vitals signs reviewed.   Constitutional:       General: She is not in acute distress.     Appearance: Normal appearance. She is not ill-appearing or toxic-appearing.   HENT:      Head: Normocephalic and atraumatic.      Right Ear: Tympanic membrane normal.      Left Ear: Tympanic membrane normal.      Mouth/Throat:      Mouth: Mucous membranes are moist.      Pharynx: Oropharynx is clear. No oropharyngeal exudate or posterior oropharyngeal erythema.   Eyes:      Extraocular Movements: Extraocular movements intact.      Conjunctiva/sclera: Conjunctivae normal.      Pupils: Pupils are equal, round, and reactive to light.   Neck:      Musculoskeletal: Neck supple.   Cardiovascular:      Rate and Rhythm: Normal rate and regular rhythm.      Heart sounds: Normal heart sounds. No murmur. No friction rub. No gallop.    Pulmonary:      Effort: Pulmonary effort is normal. No respiratory distress.      Breath sounds: Normal breath sounds. No wheezing, rhonchi or rales.   Musculoskeletal:      Right lower leg: No edema.      Left lower leg: No edema.   Lymphadenopathy:      Cervical: No cervical adenopathy.   Skin:     General: Skin is warm and dry.   Neurological:      General: No focal deficit present.      Mental Status: She is alert and oriented to person, place, and time.      GCS: GCS eye subscore is 4. " GCS verbal subscore is 5. GCS motor subscore is 6.      Cranial Nerves: Cranial nerves are intact.      Sensory: Sensation is intact.      Motor: Motor function is intact.      Coordination: Coordination is intact.      Gait: Gait is intact.      Deep Tendon Reflexes:      Reflex Scores:       Patellar reflexes are 2+ on the right side and 2+ on the left side.       Achilles reflexes are 2+ on the right side and 2+ on the left side.  Psychiatric:         Mood and Affect: Mood normal.         Behavior: Behavior normal.       EKG Interpretation:  Interpreted by me    Rhythm:  Normal sinus rhythm   Rate: 84  Axis: normal  Conduction: normal  ST Segments: no acute change  T Waves: no acute change  Q Waves: none  Clinical Impression: Normal EKG without acute changes     Assessment/Plan:     Diagnosis and associated orders:     1. Palpitations  CBC WITH DIFFERENTIAL    Comp Metabolic Panel    TSH WITH REFLEX TO FT4   2. Dizziness  CBC WITH DIFFERENTIAL    Comp Metabolic Panel    TSH WITH REFLEX TO FT4   3. Anxiety  CBC WITH DIFFERENTIAL    Comp Metabolic Panel    TSH WITH REFLEX TO FT4      Comments/MDM:     • Patient is a 45-year-old female who presents with intermittent palpitations, dizziness, and increased anxiety.  She is currently going through menopause and has hot flashes.  History of SVT in the past that has resolved and also history of hypokalemia due to medication effect.   • EKG normal.   • Otherwise, the patient is asymptomatic.  Normal vital signs.  Normal examination.  Suspicions for emergent heart pathology or emergent intracranial pathology are low.   • Order basic labs evaluate for anemia, electrolytes, and thyroid function.   • We will call the patient if any concerning results.   • Plenty of fluids, normal diet, avoid exacerbating factors, continue medications as prescribed.  She may take Mg supplement.   • Follow-up with scheduled PCP appointment on Tuesday for consideration of further management and  treatment.   • Offered cardiology referral, however patient declined.  She has a cardiologist and she is going to see her PCP first.       Red flags discussed and indications to immediately call 911 or present to the Emergency Department.   Supportive care, differential diagnoses, and indications for immediate follow-up discussed with patient.    Pathogenesis of diagnosis discussed including typical length and natural progression. Patient expresses understanding and agrees to plan.    Advised the patient to follow-up with the primary care physician for recheck, reevaluation, and consideration of further management.    Please note that this dictation was created using voice recognition software. I have made a reasonable attempt to correct obvious errors, but I expect that there are errors of grammar and possibly content that I did not discover before finalizing the note.    This note was electronically signed by Nacho Cruz PA-C

## 2020-09-20 ENCOUNTER — TELEPHONE (OUTPATIENT)
Dept: URGENT CARE | Facility: PHYSICIAN GROUP | Age: 45
End: 2020-09-20

## 2020-09-20 DIAGNOSIS — E87.6 HYPOKALEMIA: ICD-10-CM

## 2020-09-20 NOTE — TELEPHONE ENCOUNTER
Spoke to the patient on the phone regarding her lab results.     Mildly decreased potassium level at 3.3.     Patient states she feels okay.     She states she took 500 mg of potassium supplement, however she is not exactly sure how much. She may take another 500mg OTC Potassium.      Because unknown amount of potassium supplement will hold off and any potassium prescription.     She has a scheduled appointment with her PCP tomorrow on 09/21/2020.  Follow-up with your PCP.     ER precautions discussed.     The patient verbalized understanding and agreement.  Patient had no further questions or concerns.  The patient appreciated the call.

## 2020-09-21 ENCOUNTER — TELEMEDICINE (OUTPATIENT)
Dept: MEDICAL GROUP | Facility: PHYSICIAN GROUP | Age: 45
End: 2020-09-21
Payer: COMMERCIAL

## 2020-09-21 VITALS
BODY MASS INDEX: 27.82 KG/M2 | WEIGHT: 157 LBS | OXYGEN SATURATION: 97 % | DIASTOLIC BLOOD PRESSURE: 89 MMHG | HEART RATE: 105 BPM | TEMPERATURE: 97.7 F | HEIGHT: 63 IN | SYSTOLIC BLOOD PRESSURE: 123 MMHG

## 2020-09-21 DIAGNOSIS — E87.6 HYPOKALEMIA: ICD-10-CM

## 2020-09-21 DIAGNOSIS — F41.9 ANXIETY: ICD-10-CM

## 2020-09-21 PROCEDURE — 99214 OFFICE O/P EST MOD 30 MIN: CPT | Mod: 95,CR | Performed by: FAMILY MEDICINE

## 2020-09-21 ASSESSMENT — FIBROSIS 4 INDEX: FIB4 SCORE: 0.6

## 2020-09-21 NOTE — PROGRESS NOTES
This evaluation was conducted via Zoom using secure and encrypted videoconferencing technology. The patient was [facility] a [facility].    The patient's identity was confirmed and verbal consent was obtained for this virtual visit.    CC:Diagnoses of Hypokalemia and Anxiety were pertinent to this visit.    HISTORY OF PRESENT ILLNESS: Patient is a 45 y.o. female established patient who presents today to about her chronic health problems as outlined below.      Hypokalemia  This is a chronic health problem that is uncontrolled with current medications and lifestyle measures.  Patient was seen in the urgent care on 9/17/2020 because she was having tachycardia and a squeezing feeling in her chest.  Her EKG was fine but the lab work showed that her potassium had gotten a little low at 3.3.  She typically takes 1 additional potassium per week at 550 mg.  I have asked her to do that daily for the coming week and then could go back to her usual dosing.  She is willing to do that.    Anxiety  This is been a problem for this patient in the past and we at one time had her on fluoxetine 20 mg eventually having to go up to 40 mg to get control of her anxiety.  I do believe she is perimenopausal or actually heading through menopause.  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  We had switched her to Wellbutrin 75 mg at least daily or twice a day but it made her blood pressure go up.  She contacted me and we now have her back on fluoxetine but only at the 20 mg dose.  She finds with that dose her anxiety is under control and her concentration is good.  Organ to give this 6 months and then try weaning off again and see if she just does not need anything because this may be all menopausal symptomatology.      Patient Active Problem List     Diagnosis Date Noted   • Cough in adult 03/20/2020   • Overweight 03/03/2020   • Hyperglycemia 03/03/2020   • Hypercholesterolemia 03/03/2020   • Prolonged Q-T interval on ECG 03/03/2020   • Hypertension 01/06/2020   • Hypokalemia 01/06/2020   • Weight gain 01/06/2020   • EVANGELISTA (dyspnea on exertion) 10/14/2019   • Other fatigue 09/09/2019   • Polyuria 07/17/2019   • Morphea 01/09/2019   • Vitamin D deficiency 05/22/2018   • SVT (supraventricular tachycardia) ([facility]) 03/23/2018   • Other forms of systemic lupus erythematosus ([facility]) 10/20/2017   • Chronic constipation 12/15/2016   • Reactive hypoglycemia 09/28/2016   • CNS demyelinating disease ([facility]) 04/19/2016   • Vocal cord dysfunction 04/19/2016   • Multiple allergies 12/21/2015   • Anxiety 11/24/2015   • Intractable migraine without aura and without status migrainosus 11/24/2015   • Ankylosing spondylitis of lumbosacral region ([facility]) 07/06/2015   • Overactive bladder 04/17/2015   • Asthma in adult 01/14/2015      Allergies:Milnacipran, Tramadol, Ciprofloxacin, Metoclopramide, Sulfa drugs, and Tetanus antitoxin    Current Outpatient Medications   Medication Sig Dispense Refill   • FLUoxetine (PROZAC) 20 MG Cap Take 1 Cap by mouth every day. 90 Cap 3   • ARNUITY ELLIPTA 200 MCG/ACT AEROSOL POWDER, BREATH ACTIVATED Take 1 Puff by mouth every morning. 90 Each 3   • albuterol 108 (90 Base) MCG/ACT Aero Soln inhalation aerosol Inhale 2 Puffs by mouth every four hours as needed for Shortness of Breath. 3 Each 3   • rizatriptan (MAXALT) 5 MG tablet Take 1 Tab by mouth Once PRN for Migraine for up to 1 dose. 10 Tab 0   • HYDROcodone-acetaminophen 2.5-108 mg/5mL (HYCET) 7.5-325 MG/15ML solution Take 15 mL by mouth 4 times a day as needed for Severe Pain.     • Thiamine HCl (VITAMIN B-1 PO) Take 1 Tab by mouth every morning.     • lidocaine (LIDODERM) 5 % Patch Apply 1 Patch to skin as directed 1 time daily as needed (pain).     • triamterene-hctz (MAXZIDE-25/DYAZIDE) 37.5-25 MG  Tab TAKE 1 TABLET BY MOUTH EVERY DAY 90 Tab 3   • ENBREL SURECLICK 50 MG/ML Solution Auto-injector 1 Each by Injection route every 7 days.     • amLODIPine (NORVASC) 2.5 MG Tab Take 2.5 mg by mouth every morning.     • cyclobenzaprine (FLEXERIL) 10 MG Tab Take 10 mg by mouth at bedtime as needed for Muscle Spasms.     • esomeprazole (NEXIUM) 20 MG capsule Take 20 mg by mouth every morning before breakfast.     • coenzyme Q-10 30 MG capsule Take 30 mg by mouth every morning.     • cyanocobalamin (VITAMIN B-12) 1000 MCG/ML Solution 1,000 mcg by Injection route every 30 days.  2   • hydroxychloroquine (PLAQUENIL) 200 MG Tab Take 300 mg by mouth every morning.     • EPIPEN 2-NOLAN 0.3 MG/0.3ML Solution Auto-injector solution for injection 0.3 mg by Intramuscular route as needed.       No current facility-administered medications for this visit.        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 She is  with four children.        Family History   Problem Relation Age of Onset   • Arthritis Brother         psoriatic arthritis   • Arthritis Maternal Grandmother    • Psychiatric Illness Mother         Major depression   • Other Mother         discoid lupus   • Psychiatric Illness Father         Bipolar depression   • Heart Disease Father         CHF   • Hypertension Father    • Diabetes Father    • Other Father         Agent orange exposure 125% disabled   • Diabetes Paternal Uncle    • Stroke Maternal Grandfather    • Diabetes Paternal Grandmother    • Alcohol/Drug Paternal Grandfather    • Cancer Paternal Aunt         gyn cancer   • GI Disease Son    • GI Disease Daughter    • GI Disease Daughter    • GI Disease Daughter         ROS:     - Constitutional:   "Negative for fever, chills, unexpected weight change, and fatigue/generalized weakness.    - HEENT:  Negative for headaches, vision changes, hearing changes, ear pain, ear discharge, rhinorrhea, sinus congestion, sore throat, and neck pain.      - Respiratory: Negative for cough, sputum production, chest congestion, dyspnea, wheezing, and crackles.      - Cardiovascular: Patient had recent palpitations but those seem to have settled down.      - Gastrointestinal: Patient continues to experience heartburn and nausea fairly regularly otherwise denies vomiting, abdominal pain, hematochezia, melena, diarrhea, constipation, and greasy/foul-smelling stools.     - Genitourinary: Negative for dysuria, polyuria, hematuria, pyuria, urinary urgency, and urinary incontinence.     - Musculoskeletal: Negative for myalgias, back pain, and joint pain.     - Skin: Negative for rash, itching, cyanotic skin color change.     - Neurological: Negative for dizziness, tingling, tremors, focal sensory deficit, focal weakness and headaches.     - Endo/Heme/Allergies: Does not bruise/bleed easily.     - Psychiatric/Behavioral: Negative for depression, suicidal/homicidal ideation and memory loss.          - NOTE: All other systems reviewed and are negative, except as in HPI.      Exam:    /89 (BP Location: Left arm, Patient Position: Sitting, BP Cuff Size: Adult)   Pulse (!) 105   Temp 36.5 °C (97.7 °F) (Temporal)   Ht 1.6 m (5' 3\")   Wt 71.2 kg (157 lb)   SpO2 97%  Body mass index is 27.81 kg/m².    General:  Well nourished, well developed female in NAD  Head is grossly normal.    LABS: 9/17/2020: Results reviewed and discussed with the patient, questions answered.    Patient was seen for 25 minutes face to face of which, 20 minutes was spent counseling regarding her labs, and her medications as well as a plan going forward..      Please note that this dictation was created using voice recognition software. I have made every " reasonable attempt to correct obvious errors, but I expect that there are errors of grammar and possibly content that I did not discover before finalizing the note.    Assessment/Plan:  1. Hypokalemia  Uncontrolled, patient will increase her potassium supplements and recheck next Monday.  I will notify her via [employer] regarding those results.  - Basic Metabolic Panel; Future    2. Anxiety  Patient stopped the Wellbutrin and has gone back onto fluoxetine at 20 mg and seems to be doing well.

## 2020-09-21 NOTE — ASSESSMENT & PLAN NOTE
This is been a problem for this patient in the past and we at one time had her on fluoxetine 20 mg eventually having to go up to 40 mg to get control of her anxiety.  I do believe she is perimenopausal or actually heading through menopause.  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  We had switched her to Wellbutrin 75 mg at least daily or twice a day but it made her blood pressure go up.  She contacted me and we now have her back on fluoxetine but only at the 20 mg dose.  She finds with that dose her anxiety is under control and her concentration is good.  Organ to give this 6 months and then try weaning off again and see if she just does not need anything because this may be all menopausal symptomatology.

## 2020-09-21 NOTE — ASSESSMENT & PLAN NOTE
This is a chronic health problem that is uncontrolled with current medications and lifestyle measures.  Patient was seen in the urgent care on 9/17/2020 because she was having tachycardia and a squeezing feeling in her chest.  Her EKG was fine but the lab work showed that her potassium had gotten a little low at 3.3.  She typically takes 1 additional potassium per week at 550 mg.  I have asked her to do that daily for the coming week and then could go back to her usual dosing.  She is willing to do that.

## 2020-11-19 ENCOUNTER — HOSPITAL ENCOUNTER (OUTPATIENT)
Facility: MEDICAL CENTER | Age: 45
End: 2020-11-19
Attending: PHYSICIAN ASSISTANT
Payer: COMMERCIAL

## 2020-11-19 ENCOUNTER — OFFICE VISIT (OUTPATIENT)
Dept: URGENT CARE | Facility: PHYSICIAN GROUP | Age: 45
End: 2020-11-19
Payer: COMMERCIAL

## 2020-11-19 VITALS
RESPIRATION RATE: 14 BRPM | SYSTOLIC BLOOD PRESSURE: 112 MMHG | DIASTOLIC BLOOD PRESSURE: 68 MMHG | BODY MASS INDEX: 28.49 KG/M2 | WEIGHT: 160.8 LBS | HEART RATE: 90 BPM | HEIGHT: 63 IN | OXYGEN SATURATION: 99 % | TEMPERATURE: 97.8 F

## 2020-11-19 DIAGNOSIS — N12 PYELONEPHRITIS: Primary | ICD-10-CM

## 2020-11-19 LAB
APPEARANCE UR: CLEAR
BILIRUB UR STRIP-MCNC: NORMAL MG/DL
COLOR UR AUTO: YELLOW
GLUCOSE UR STRIP.AUTO-MCNC: NORMAL MG/DL
KETONES UR STRIP.AUTO-MCNC: NORMAL MG/DL
LEUKOCYTE ESTERASE UR QL STRIP.AUTO: NORMAL
NITRITE UR QL STRIP.AUTO: NORMAL
PH UR STRIP.AUTO: 5.5 [PH] (ref 5–8)
PROT UR QL STRIP: NORMAL MG/DL
RBC UR QL AUTO: NORMAL
SP GR UR STRIP.AUTO: 1.02
UROBILINOGEN UR STRIP-MCNC: 0.2 MG/DL

## 2020-11-19 PROCEDURE — 87086 URINE CULTURE/COLONY COUNT: CPT

## 2020-11-19 PROCEDURE — 81002 URINALYSIS NONAUTO W/O SCOPE: CPT | Performed by: PHYSICIAN ASSISTANT

## 2020-11-19 PROCEDURE — 99214 OFFICE O/P EST MOD 30 MIN: CPT | Performed by: PHYSICIAN ASSISTANT

## 2020-11-19 RX ORDER — CIPROFLOXACIN 500 MG/1
500 TABLET, FILM COATED ORAL EVERY 12 HOURS
Qty: 14 TAB | Refills: 0 | Status: SHIPPED | OUTPATIENT
Start: 2020-11-19 | End: 2020-11-26

## 2020-11-19 ASSESSMENT — ENCOUNTER SYMPTOMS
CONSTIPATION: 0
VOMITING: 0
FLANK PAIN: 1
COUGH: 0
SHORTNESS OF BREATH: 0
DIARRHEA: 0
ABDOMINAL PAIN: 0
FEVER: 0
NAUSEA: 1
CHILLS: 1

## 2020-11-19 ASSESSMENT — FIBROSIS 4 INDEX: FIB4 SCORE: 0.6

## 2020-11-19 NOTE — PROGRESS NOTES
Subjective:   Joycelyn Byers is a 45 y.o. female who presents for UTI (x5 days, dsyuria, frequent urinaton, fatigue, left lower back pain )        Dysuria   This is a new problem. Episode onset: 1 week  The problem has been unchanged. There has been no fever. There is a history of pyelonephritis. Associated symptoms include chills, flank pain (L ), frequency, hesitancy, nausea and urgency. Pertinent negatives include no discharge, hematuria, possible pregnancy or vomiting. Associated symptoms comments: Fatigue . Treatments tried: macrobid 100 BID x 3 days  The treatment provided no relief. Her past medical history is significant for kidney stones and recurrent UTIs.     She recently started Macrobid course.  She does have a standing order from urology.  She is currently taking Macrobid 100 mg twice a day for 3 days.    Review of Systems   Constitutional: Positive for chills. Negative for fever and malaise/fatigue.   Respiratory: Negative for cough and shortness of breath.    Gastrointestinal: Positive for nausea. Negative for abdominal pain, constipation, diarrhea and vomiting.   Genitourinary: Positive for dysuria, flank pain (L ), frequency, hesitancy and urgency. Negative for hematuria.   All other systems reviewed and are negative.      PMH:  has a past medical history of Allergy, unspecified not elsewhere classified, Ankylosing spondylitis of lumbosacral region ([facility]) (7/6/2015), Anxiety (11/24/2015), GERD (gastroesophageal reflux disease), Headache, classical migraine, Hiatal hernia, Intractable migraine without aura and without status migrainosus (11/24/2015), Lupus ([facility]), Morphea (1/9/2019), Oropharyngeal dysphagia, Other forms of systemic lupus erythematosus (HCC) (10/20/2017), Overactive bladder, Peptic ulcer, Polyuria (7/17/2019), Prediabetes (12/8/2016), and Vocal cord dysfunction.  MEDS:   Current Outpatient Medications:   •  ciprofloxacin (CIPRO) 500 MG Tab, Take 1 Tab by mouth every 12 hours  for 7 days., Disp: 14 Tab, Rfl: 0  •  amLODIPine (NORVASC) 2.5 MG Tab, Take 2.5 mg by mouth every morning., Disp: , Rfl:   •  FLUoxetine (PROZAC) 20 MG Cap, Take 1 Cap by mouth every day., Disp: 90 Cap, Rfl: 3  •  ARNUITY ELLIPTA 200 MCG/ACT AEROSOL POWDER, BREATH ACTIVATED, Take 1 Puff by mouth every morning., Disp: 90 Each, Rfl: 3  •  albuterol 108 (90 Base) MCG/ACT Aero Soln inhalation aerosol, Inhale 2 Puffs by mouth every four hours as needed for Shortness of Breath., Disp: 3 Each, Rfl: 3  •  rizatriptan (MAXALT) 5 MG tablet, Take 1 Tab by mouth Once PRN for Migraine for up to 1 dose., Disp: 10 Tab, Rfl: 0  •  HYDROcodone-acetaminophen 2.5-108 mg/5mL (HYCET) 7.5-325 MG/15ML solution, Take 15 mL by mouth 4 times a day as needed for Severe Pain., Disp: , Rfl:   •  Thiamine HCl (VITAMIN B-1 PO), Take 1 Tab by mouth every morning., Disp: , Rfl:   •  lidocaine (LIDODERM) 5 % Patch, Apply 1 Patch to skin as directed 1 time daily as needed (pain)., Disp: , Rfl:   •  triamterene-hctz (MAXZIDE-25/DYAZIDE) 37.5-25 MG Tab, TAKE 1 TABLET BY MOUTH EVERY DAY, Disp: 90 Tab, Rfl: 3  •  ENBREL SURECLICK 50 MG/ML Solution Auto-injector, 1 Each by Injection route every 7 days., Disp: , Rfl:   •  cyclobenzaprine (FLEXERIL) 10 MG Tab, Take 10 mg by mouth at bedtime as needed for Muscle Spasms., Disp: , Rfl:   •  esomeprazole (NEXIUM) 20 MG capsule, Take 20 mg by mouth every morning before breakfast., Disp: , Rfl:   •  coenzyme Q-10 30 MG capsule, Take 30 mg by mouth every morning., Disp: , Rfl:   •  cyanocobalamin (VITAMIN B-12) 1000 MCG/ML Solution, 1,000 mcg by Injection route every 30 days., Disp: , Rfl: 2  •  hydroxychloroquine (PLAQUENIL) 200 MG Tab, Take 300 mg by mouth every morning., Disp: , Rfl:   •  EPIPEN 2-NOLAN 0.3 MG/0.3ML Solution Auto-injector solution for injection, 0.3 mg by Intramuscular route as needed., Disp: , Rfl:   ALLERGIES:   Allergies   Allergen Reactions   • Milnacipran Myalgia   • Tramadol Shortness of  "Breath and Rash     Shortness of breath & rash     • Ciprofloxacin Unspecified     Knee & muscle pain     • Metoclopramide Unspecified     Akathisia     • Sulfa Drugs Nausea     GI upset   • Tetanus Antitoxin Unspecified     Mild spasms and pain     • Carisoprodol Unspecified     Slurred speech, stumbling   • Nortriptyline Hcl Unspecified     detatchment and fatigue.     SURGHX:   Past Surgical History:   Procedure Laterality Date   • HYSTERECTOMY LAPAROSCOPY  2013    utrine and right ovary removed   • HERNIA REPAIR  2007    umblical hernia   • CHOLECYSTECTOMY  2007   • SALPINGO-OOPHORECTOMY, UNILATERAL Right      SOCHX:  reports that she has never smoked. She has never used smokeless tobacco. She reports that she does not drink alcohol or use drugs.  Family History   Problem Relation Age of Onset   • Arthritis Brother         psoriatic arthritis   • Arthritis Maternal Grandmother    • Psychiatric Illness Mother         Major depression   • Other Mother         discoid lupus   • Psychiatric Illness Father         Bipolar depression   • Heart Disease Father         CHF   • Hypertension Father    • Diabetes Father    • Other Father         Agent orange exposure 125% disabled   • Diabetes Paternal Uncle    • Stroke Maternal Grandfather    • Diabetes Paternal Grandmother    • Alcohol/Drug Paternal Grandfather    • Cancer Paternal Aunt         gyn cancer   • GI Disease Son    • GI Disease Daughter    • GI Disease Daughter    • GI Disease Daughter         Objective:   /68   Pulse 90   Temp 36.6 °C (97.8 °F) (Temporal)   Resp 14   Ht 1.6 m (5' 3\")   Wt 72.9 kg (160 lb 12.8 oz)   LMP 03/29/2016   SpO2 99%   BMI 28.48 kg/m²     Physical Exam  Vitals signs reviewed.   Constitutional:       General: She is not in acute distress.     Appearance: She is well-developed.   HENT:      Head: Normocephalic and atraumatic.      Right Ear: External ear normal.      Left Ear: External ear normal.      Nose: Nose normal.    "   Mouth/Throat:      Mouth: Mucous membranes are moist.   Eyes:      Conjunctiva/sclera: Conjunctivae normal.      Pupils: Pupils are equal, round, and reactive to light.   Neck:      Musculoskeletal: Normal range of motion and neck supple.      Trachea: No tracheal deviation.   Cardiovascular:      Rate and Rhythm: Normal rate and regular rhythm.   Pulmonary:      Effort: Pulmonary effort is normal.      Breath sounds: Normal breath sounds.   Abdominal:      General: There is no distension.      Palpations: Abdomen is soft.      Tenderness: There is no abdominal tenderness. There is right CVA tenderness and left CVA tenderness.   Skin:     General: Skin is warm and dry.      Capillary Refill: Capillary refill takes less than 2 seconds.   Neurological:      General: No focal deficit present.      Mental Status: She is alert and oriented to person, place, and time.   Psychiatric:         Mood and Affect: Mood normal.         Behavior: Behavior normal.           Assessment/Plan:     1. Pyelonephritis  ciprofloxacin (CIPRO) 500 MG Tab    Urine Culture    POCT Urinalysis     UA negative.  Urine culture ordered likely negative due to recent antibiotic use.  She will be notified via [employer] final urine culture results.    The patient will be treated empirically with Cipro.  She had tendinopathy to bilateral knees after taking medication years ago.  She does have multiple allergies including sulfa.  She will start Cipro twice a day for 1 week avoid strenuous activity.  If she starts to develop tendinopathy she can contact our office for alternative medication.     If symptoms worsen or persist patient can return to clinic for reevaluation.  Red flags and strict emergency room precautions discussed. All side effects of medication discussed including allergic response, GI upset, tendon injury, etc. Patient confirmed understanding of information.    Please note that this dictation was created using voice recognition software.  I have made every reasonable attempt to correct obvious errors, but I expect that there are errors of grammar and possibly content that I did not discover before finalizing the note.

## 2020-11-20 DIAGNOSIS — N12 PYELONEPHRITIS: ICD-10-CM

## 2020-11-23 LAB
BACTERIA UR CULT: NORMAL
SIGNIFICANT IND 70042: NORMAL
SITE SITE: NORMAL
SOURCE SOURCE: NORMAL

## 2020-12-22 DIAGNOSIS — Z23 NEED FOR VACCINATION: ICD-10-CM

## 2021-03-21 ENCOUNTER — OFFICE VISIT (OUTPATIENT)
Dept: URGENT CARE | Facility: PHYSICIAN GROUP | Age: 46
End: 2021-03-21
Payer: COMMERCIAL

## 2021-03-21 ENCOUNTER — HOSPITAL ENCOUNTER (OUTPATIENT)
Dept: RADIOLOGY | Facility: MEDICAL CENTER | Age: 46
End: 2021-03-21
Attending: PHYSICIAN ASSISTANT
Payer: COMMERCIAL

## 2021-03-21 VITALS
OXYGEN SATURATION: 98 % | DIASTOLIC BLOOD PRESSURE: 88 MMHG | HEART RATE: 84 BPM | HEIGHT: 63 IN | RESPIRATION RATE: 15 BRPM | TEMPERATURE: 98.4 F | BODY MASS INDEX: 28.53 KG/M2 | SYSTOLIC BLOOD PRESSURE: 126 MMHG | WEIGHT: 161 LBS

## 2021-03-21 DIAGNOSIS — J45.41 MODERATE PERSISTENT ASTHMA WITH ACUTE EXACERBATION: ICD-10-CM

## 2021-03-21 DIAGNOSIS — U07.1 COVID-19: ICD-10-CM

## 2021-03-21 PROBLEM — Z87.442 HISTORY OF RENAL CALCULI: Status: ACTIVE | Noted: 2020-12-30

## 2021-03-21 PROCEDURE — 71046 X-RAY EXAM CHEST 2 VIEWS: CPT

## 2021-03-21 PROCEDURE — 99214 OFFICE O/P EST MOD 30 MIN: CPT | Mod: CS | Performed by: PHYSICIAN ASSISTANT

## 2021-03-21 RX ORDER — PREDNISONE 10 MG/1
TABLET ORAL
Qty: 15 TABLET | Refills: 0 | Status: SHIPPED | OUTPATIENT
Start: 2021-03-21 | End: 2021-06-07

## 2021-03-21 ASSESSMENT — ENCOUNTER SYMPTOMS
COUGH: 1
CHILLS: 1
MYALGIAS: 1
FEVER: 1
SORE THROAT: 1
SHORTNESS OF BREATH: 1
HEADACHES: 1

## 2021-03-21 ASSESSMENT — FIBROSIS 4 INDEX: FIB4 SCORE: 0.6

## 2021-03-21 NOTE — PROGRESS NOTES
Subjective:   Joycelyn Byers is a 45 y.o. female who presents for Cough (asthma, Having SOB)        Cough  This is a new problem. Episode onset: 13 days  The cough is non-productive. Associated symptoms include chills, a fever, headaches, myalgias, a sore throat and shortness of breath. Treatments tried: Delsym  Her past medical history is significant for asthma (arnutiy, albuterol ) and pneumonia.     The patient presents to urgent care today with daughter who has tested positive for Covid.  Her  is currently being treated for Covid with secondary viral pneumonia.  She is concerned she may have contracted secondary infection as well.  Her Covid symptoms were improving but started to worsen over the past few days.  She does have a history of pneumonia in the past.  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        Review of Systems   Constitutional: Positive for chills and fever.   HENT: Positive for sore throat.    Respiratory: Positive for cough and shortness of breath.    Musculoskeletal: Positive for myalgias.   Neurological: Positive for headaches.       PMH:  has a past medical history of Allergy, unspecified not elsewhere classified, Ankylosing spondylitis of lumbosacral region ([facility]) (7/6/2015), Anxiety (11/24/2015), GERD (gastroesophageal reflux disease), Headache, classical migraine, Hiatal hernia, Intractable migraine without aura and without status migrainosus (11/24/2015), Lupus ([facility]), Morphea (1/9/2019), Oropharyngeal dysphagia, Other forms of systemic lupus erythematosus (HCC) (10/20/2017), Overactive bladder, Peptic ulcer, Polyuria (7/17/2019), Prediabetes (12/8/2016), and Vocal cord dysfunction.  MEDS:   Current Outpatient Medications:   •  predniSONE (DELTASONE) 10 MG Tab, Take 20 mg PO daily for 5 days then decrease to 10 mg PO daily for 5 days, Disp: 15 tablet, Rfl: 0  •  FLUoxetine (PROZAC) 20 MG Cap, Take 1 Cap by mouth every day., Disp: 90 Cap,  Rfl: 3  •  ARNUITY ELLIPTA 200 MCG/ACT AEROSOL POWDER, BREATH ACTIVATED, Take 1 Puff by mouth every morning., Disp: 90 Each, Rfl: 3  •  albuterol 108 (90 Base) MCG/ACT Aero Soln inhalation aerosol, Inhale 2 Puffs by mouth every four hours as needed for Shortness of Breath., Disp: 3 Each, Rfl: 3  •  rizatriptan (MAXALT) 5 MG tablet, Take 1 Tab by mouth Once PRN for Migraine for up to 1 dose., Disp: 10 Tab, Rfl: 0  •  HYDROcodone-acetaminophen 2.5-108 mg/5mL (HYCET) 7.5-325 MG/15ML solution, Take 15 mL by mouth 4 times a day as needed for Severe Pain., Disp: , Rfl:   •  Thiamine HCl (VITAMIN B-1 PO), Take 1 Tab by mouth every morning., Disp: , Rfl:   •  ENBREL SURECLICK 50 MG/ML Solution Auto-injector, 1 Each by Injection route every 7 days., Disp: , Rfl:   •  amLODIPine (NORVASC) 2.5 MG Tab, Take 2.5 mg by mouth every morning., Disp: , Rfl:   •  cyclobenzaprine (FLEXERIL) 10 MG Tab, Take 10 mg by mouth at bedtime as needed for Muscle Spasms., Disp: , Rfl:   •  esomeprazole (NEXIUM) 20 MG capsule, Take 20 mg by mouth every morning before breakfast., Disp: , Rfl:   •  coenzyme Q-10 30 MG capsule, Take 30 mg by mouth every morning., Disp: , Rfl:   •  cyanocobalamin (VITAMIN B-12) 1000 MCG/ML Solution, 1,000 mcg by Injection route every 30 days., Disp: , Rfl: 2  •  hydroxychloroquine (PLAQUENIL) 200 MG Tab, Take 300 mg by mouth every morning., Disp: , Rfl:   •  EPIPEN 2-NOLAN 0.3 MG/0.3ML Solution Auto-injector solution for injection, 0.3 mg by Intramuscular route as needed., Disp: , Rfl:   •  lidocaine (LIDODERM) 5 % Patch, Apply 1 Patch to skin as directed 1 time daily as needed (pain)., Disp: , Rfl:   ALLERGIES:   Allergies   Allergen Reactions   • Milnacipran Myalgia   • Tramadol Shortness of Breath and Rash     Shortness of breath & rash     • Ciprofloxacin Unspecified     Knee & muscle pain     • Metoclopramide Unspecified     Akathisia     • Sulfa Drugs Nausea     GI upset   • Tetanus Antitoxin Unspecified     Mild  "spasms and pain     • Carisoprodol Unspecified     Slurred speech, stumbling   • Nortriptyline Hcl Unspecified     detatchment and fatigue.     SURGHX:   Past Surgical History:   Procedure Laterality Date   • HYSTERECTOMY LAPAROSCOPY  2013    utrine and right ovary removed   • HERNIA REPAIR  2007    umblical hernia   • CHOLECYSTECTOMY  2007   • SALPINGO-OOPHORECTOMY, UNILATERAL Right      SOCHX:  reports that she has never smoked. She has never used smokeless tobacco. She reports that she does not drink alcohol and does not use drugs.  Family History   Problem Relation Age of Onset   • Arthritis Brother         psoriatic arthritis   • Arthritis Maternal Grandmother    • Psychiatric Illness Mother         Major depression   • Other Mother         discoid lupus   • Psychiatric Illness Father         Bipolar depression   • Heart Disease Father         CHF   • Hypertension Father    • Diabetes Father    • Other Father         Agent orange exposure 125% disabled   • Diabetes Paternal Uncle    • Stroke Maternal Grandfather    • Diabetes Paternal Grandmother    • Alcohol/Drug Paternal Grandfather    • Cancer Paternal Aunt         gyn cancer   • GI Disease Son    • GI Disease Daughter    • GI Disease Daughter    • GI Disease Daughter         Objective:   /88   Pulse 84   Temp 36.9 °C (98.4 °F)   Resp 15   Ht 1.6 m (5' 3\")   Wt 73 kg (161 lb)   LMP 03/29/2016   SpO2 98%   BMI 28.52 kg/m²     Physical Exam  Vitals reviewed.   Constitutional:       General: She is not in acute distress.     Appearance: She is well-developed. She is not ill-appearing.   HENT:      Head: Normocephalic and atraumatic.      Right Ear: Tympanic membrane and external ear normal.      Left Ear: Tympanic membrane and external ear normal.      Nose: Congestion present.      Mouth/Throat:      Mouth: Mucous membranes are moist.      Pharynx: Posterior oropharyngeal erythema present.   Eyes:      Conjunctiva/sclera: Conjunctivae normal.     "  Pupils: Pupils are equal, round, and reactive to light.   Neck:      Trachea: No tracheal deviation.   Cardiovascular:      Rate and Rhythm: Normal rate and regular rhythm.   Pulmonary:      Effort: Pulmonary effort is normal. No respiratory distress.      Breath sounds: Normal breath sounds. No wheezing.   Musculoskeletal:      Cervical back: Normal range of motion and neck supple.   Lymphadenopathy:      Cervical: No cervical adenopathy.   Skin:     General: Skin is warm and dry.      Capillary Refill: Capillary refill takes less than 2 seconds.   Neurological:      General: No focal deficit present.      Mental Status: She is alert and oriented to person, place, and time.   Psychiatric:         Mood and Affect: Mood normal.         Behavior: Behavior normal.           Assessment/Plan:     1. COVID-19  DX-CHEST-2 VIEWS   2. Moderate persistent asthma with acute exacerbation  DX-CHEST-2 VIEWS    predniSONE (DELTASONE) 10 MG Tab     DX: neg     Supportive care reviewed.  Monitor symptoms closely. Patient was given a prescription to fill and use as directed if symptoms progressed as discussed and agreed upon.    Follow-up with primary care provider within 7-10 days.  If symptoms worsen or persist patient can return to clinic for reevaluation.  Red flags and strict emergency room precautions discussed. Side effects of medication discussed. Patient confirmed understanding of information.    Please note that this dictation was created using voice recognition software. I have made every reasonable attempt to correct obvious errors, but I expect that there are errors of grammar and possibly content that I did not discover before finalizing the note.

## 2021-04-28 NOTE — ASSESSMENT & PLAN NOTE
Physical Therapy Treatment Session Note    Patient's Name: Elijah Honeycutt    Admitting Diagnosis  Palpitations [R00 2]  Hypokalemia [E87 6]  Rapid heart rate [R00 0]  COPD exacerbation ([facility] ) [J44 1]  Atrial fibrillation with rapid ventricular response ([facility] ) [I48 91]    Problem List  Patient Active Problem List   Diagnosis    Asthma    Other emphysema ([facility] )    Cardiac disease    Diverticulosis of intestine with bleeding    Diarrhea    Colorectal polyps    Effusion of bursa of left elbow    Cardiomegaly    Chronic anxiety    Chronic cystitis    Chronic bilateral low back pain without sciatica    Acute on chronic diastolic congestive heart failure ([address] )    Deviated nasal septum    Diverticulosis of colon    Gastroesophageal reflux disease without esophagitis    Gout    History of angioedema    History of colonic polyps    Lumbar radiculopathy    Macular degeneration of both eyes    Obesity    Osteoporosis    Other specified forms of chronic ischemic heart disease    Seasonal allergies    Panic attack    Vocal cord dysfunction    Colitis presumed infectious    Atrial fibrillation with rapid ventricular response ([facility] )    Left elbow pain    Acute on chronic respiratory failure with hypoxia ([facility] )    Debility    Positive culture findings in sputum    Compression fracture of L4 vertebra (HCC)    Closed wedge compression fracture of T12 vertebra (HCC)    Wound dehiscence, surgical, subsequent encounter    Chronic peptic ulcer of stomach    Chronic pain syndrome    Sacroiliitis (HCC)    Bronchitis, chronic, simple (HCC)    Paroxysmal atrial fibrillation (HCC)    Chronic diastolic CHF (congestive heart failure) (HCC)    SIRS (systemic inflammatory response syndrome) (HCC)    Hypokalemia    Shortness of breath    Gastrostomy complication, unspecified (HCC)    Anxiety    Chronic respiratory failure with hypoxia (HCC)    Absolute anemia    Encounter for long-term opiate This is a chronic health problem for this patient that she is actually seeing improvement in. She stated that previously when she goes upstairs her heart rate would go up to the 130s now tends to go up to about the 1:15 range. She does continue to challenge herself with regular cardiovascular activity. She will continue to work on this.   analgesic use    Uncomplicated opioid dependence ([facility] )    Type 2 diabetes mellitus without complication, without long-term current use of insulin ([facility] )    Ambulatory dysfunction    History of recent hospitalization    Iron deficiency anemia    Hyponatremia       Past Medical History  Past Medical History:   Diagnosis Date    Allergies     Asthma     Chronic diastolic CHF (congestive heart failure) (HCC)     Colitis     Colon polyp     COPD (chronic obstructive pulmonary disease)     Diverticulosis     DJD (degenerative joint disease)     Gait abnormality     Hypertension     Paroxysmal atrial fibrillation (HCC)        Past Surgical History  Past Surgical History:   Procedure Laterality Date    BLADDER SURGERY      COLON SURGERY      COLONOSCOPY      COLONOSCOPY W/ POLYPECTOMY N/A 1/21/2019    Procedure: COLONOSCOPY W/ POLYPECTOMY;  Surgeon: Mamadou Fermin MD;  Location: [facility] GI [facility]; Service: General    FL GUIDED NEEDLE PLAC BX/ASP/INJ  9/2/2020    FL GUIDED NEEDLE PLAC BX/ASP/INJ  1/21/2021    GASTROJEJUNOSTOMY W/ JEJUNOSTOMY TUBE N/A 2/3/2020    Procedure: Antrectomy with bilroth II Anastomosis;   Surgeon: Dianna Goldstein MD;  Location:  MAIN OR;  Service: General   Brigida See JOINT Right 9/2/2020    Procedure: BLOCK / INJECTION SACROILIAC;  Surgeon: Jose Tinajero MD;  Location: [facility];  Service: Pain Management     MO INJECTION,SACROILIAC JOINT Right 1/21/2021    Procedure: RIGHT SACROILIAC JOINT INJECTION;  Surgeon: Jose Tinajero MD;  Location: [facility];  Service: Pain Management     SMALL INTESTINE SURGERY  03/2020    with partial gastrectomy        04/28/21 1359   PT Last Visit   PT Visit Date 04/28/21   Note Type   Note Type Treatment   Pain Assessment   Pain Assessment Tool Pain Assessment not indicated - pt denies pain   Pain Score No Pain   Restrictions/Precautions   Weight Bearing Precautions Per Order No   Other Precautions Multiple lines;Telemetry;O2;Fall Risk;Hard of hearing;Visual impairment  (3 L O2 via NC)   General   Chart Reviewed Yes   Additional Pertinent History prefers to be called  "steff"   Response to Previous Treatment Patient with no complaints from previous session  Family/Caregiver Present No   Cognition   Overall Cognitive Status WFL   Arousal/Participation Alert; Responsive; Cooperative   Attention Within functional limits   Orientation Level Oriented X4   Memory Decreased recall of precautions   Following Commands Follows one step commands without difficulty   Comments Pt  agreeable to PT tx session, pleasant  Subjective   Subjective "I can't put pressure on that elbow", however denies pain  Bed Mobility   Supine to Sit 5  Supervision   Additional items Assist x 1;HOB elevated; Bedrails;Verbal cues   Sit to Supine   (DNT as pt  was sitting OOB on chair upon conclusion )   Additional Comments Pt  denied lightheadedness/dizziness with positional changes  Transfers   Sit to Stand 5  Supervision   Additional items Assist x 1;Bedrails;Verbal cues   Stand to Sit 5  Supervision   Additional items Assist x 1; Armrests; Verbal cues   Stand pivot 5  Supervision   Additional items Assist x 1;Verbal cues   Ambulation/Elevation   Gait pattern Forward Flexion; Short stride   Gait Assistance 5  Supervision  (close)   Additional items Assist x 1;Verbal cues   Assistive Device Rolling walker   Distance 50 feet x 2   Stair Management Assistance Not tested   Balance   Static Sitting Good   Dynamic Sitting Fair +   Static Standing Fair +   Dynamic Standing Fair +   Ambulatory Fair   Endurance Deficit   Endurance Deficit Yes   Endurance Deficit Description JOHNSON noted with ambulatory advancement  Resolution ~ 1 minute of static standing & breathing conservation techniques  Activity Tolerance   Activity Tolerance Patient tolerated treatment well   Nurse Made Aware Yes, Maddi Rangel RN assisted prn     Exercises   Glute Sets Sitting;AROM; Bilateral  (30 reps)   Hip Flexion Sitting;AROM; Bilateral  (30 reps)   Hip Abduction Sitting;AROM; Bilateral  (30 reps, also completed 30 reps AROM hip AROM IR/ER B)   Hip Adduction Sitting;AROM; Bilateral  (30 reps)   Knee AROM [employer]; Bilateral  (30 reps)   Ankle Pumps Sitting;AROM; Bilateral  (30 reps)   Marching Standing;15 reps;AROM; Bilateral   Assessment   Prognosis Good   Problem List Decreased strength;Decreased mobility; Impaired balance; Impaired vision;Decreased endurance; Impaired hearing   Assessment Pt seen for PT treatment session this date with interventions consisting of gait training w/ emphasis on improving pt's ability to ambulate level surfaces x 100 feet total with close S provided by therapist with RW and Therapeutic exercise consisting of: AROM 30 reps B LE in sitting position  Pt agreeable to PT treatment session upon arrival, pt found supine in bed w/ HOB elevated, A&O x 4  In comparison to previous session, pt with improvements in ambulatory distance  Post session: all needs in reach and RN notified of session findings/recommendations  Continue to recommend home with home health rehabilitation at time of d/c in order to maximize pt's functional independence and safety w/ mobility  Pt continues to be functioning below baseline level, and remains limited 2* factors listed above and including decreased endurance, gait deviations  PT will continue to see pt during current hospitalization in order to address the deficits listed above and provide interventions consistent w/ POC in effort to achieve LTGs  Goals   Patient Goals to have the rest of her therapy at the [facility]   LTG Expiration Date 05/07/21   Long Term Goal #1 LTGs remain appropriate   PT Treatment Day 2   Plan   Treatment/Interventions Functional transfer training;LE strengthening/ROM; Therapeutic exercise; Endurance training;Patient/family training;Equipment eval/education; Bed mobility;Gait training;Spoke to nursing   Progress Progressing toward goals   PT Frequency Other (Comment)  (3-5x/wk)   Recommendation   PT Discharge Recommendation Home with home health rehabilitation   Equipment Recommended Walker  (pt  has own)   PT - OK to Discharge Yes  (when medically stable, from functional perspective)   Additional Comments Upon conclusion, pt  was sitting OOB on chair w/all needs within reach  Additional Comments 2 Pt's raw score on the AM-PAC Basic Mobility inpatient short form is 18, standardized score is 41 05  Patients at this level are likely to benefit from DC to [address]  However, please refer to therapist recommendation for safe DC planning     AM-PAC Basic Mobility Inpatient   Turning in Bed Without Bedrails 3   Lying on Back to Sitting on Edge of Flat Bed 3   Moving Bed to Chair 3   Standing Up From Chair 3   Walk in Room 3   Climb 3-5 Stairs 3   Basic Mobility Inpatient Raw Score 18   Basic Mobility Standardized Score 41 05     Treatment Time: 3916-4037    Dionisio Thomas, PT

## 2021-05-23 ENCOUNTER — HOSPITAL ENCOUNTER (OUTPATIENT)
Facility: MEDICAL CENTER | Age: 46
End: 2021-05-23
Attending: PHYSICIAN ASSISTANT
Payer: COMMERCIAL

## 2021-05-23 ENCOUNTER — OFFICE VISIT (OUTPATIENT)
Dept: URGENT CARE | Facility: PHYSICIAN GROUP | Age: 46
End: 2021-05-23
Payer: COMMERCIAL

## 2021-05-23 ENCOUNTER — HOSPITAL ENCOUNTER (OUTPATIENT)
Dept: RADIOLOGY | Facility: MEDICAL CENTER | Age: 46
End: 2021-05-23
Attending: PHYSICIAN ASSISTANT
Payer: COMMERCIAL

## 2021-05-23 VITALS
DIASTOLIC BLOOD PRESSURE: 58 MMHG | HEART RATE: 84 BPM | SYSTOLIC BLOOD PRESSURE: 102 MMHG | HEIGHT: 63 IN | OXYGEN SATURATION: 99 % | TEMPERATURE: 98.6 F | RESPIRATION RATE: 16 BRPM | BODY MASS INDEX: 29.59 KG/M2 | WEIGHT: 167 LBS

## 2021-05-23 DIAGNOSIS — J45.909 MILD ASTHMA, UNSPECIFIED WHETHER COMPLICATED, UNSPECIFIED WHETHER PERSISTENT: ICD-10-CM

## 2021-05-23 DIAGNOSIS — R05.9 COUGH: ICD-10-CM

## 2021-05-23 DIAGNOSIS — J06.9 UPPER RESPIRATORY TRACT INFECTION, UNSPECIFIED TYPE: ICD-10-CM

## 2021-05-23 DIAGNOSIS — M32.9 SYSTEMIC LUPUS ERYTHEMATOSUS, UNSPECIFIED SLE TYPE, UNSPECIFIED ORGAN INVOLVEMENT STATUS (HCC): ICD-10-CM

## 2021-05-23 PROCEDURE — 99214 OFFICE O/P EST MOD 30 MIN: CPT | Performed by: PHYSICIAN ASSISTANT

## 2021-05-23 PROCEDURE — U0005 INFEC AGEN DETEC AMPLI PROBE: HCPCS

## 2021-05-23 PROCEDURE — U0003 INFECTIOUS AGENT DETECTION BY NUCLEIC ACID (DNA OR RNA); SEVERE ACUTE RESPIRATORY SYNDROME CORONAVIRUS 2 (SARS-COV-2) (CORONAVIRUS DISEASE [COVID-19]), AMPLIFIED PROBE TECHNIQUE, MAKING USE OF HIGH THROUGHPUT TECHNOLOGIES AS DESCRIBED BY CMS-2020-01-R: HCPCS

## 2021-05-23 PROCEDURE — 71046 X-RAY EXAM CHEST 2 VIEWS: CPT

## 2021-05-23 RX ORDER — METHYLPREDNISOLONE 4 MG/1
TABLET ORAL
Qty: 21 TABLET | Refills: 0 | Status: SHIPPED | OUTPATIENT
Start: 2021-05-23 | End: 2021-06-07

## 2021-05-23 ASSESSMENT — ENCOUNTER SYMPTOMS
MYALGIAS: 1
DIARRHEA: 0
SPUTUM PRODUCTION: 0
SENSORY CHANGE: 0
RHINORRHEA: 0
ABDOMINAL PAIN: 0
HEMOPTYSIS: 0
VOMITING: 0
SORE THROAT: 0
TINGLING: 0
FOCAL WEAKNESS: 0
SHORTNESS OF BREATH: 1
FEVER: 0
NAUSEA: 0
CHILLS: 0
COUGH: 1
WHEEZING: 1
HEADACHES: 1
PALPITATIONS: 0
SINUS PAIN: 0

## 2021-05-23 ASSESSMENT — FIBROSIS 4 INDEX: FIB4 SCORE: 0.62

## 2021-05-23 NOTE — PROGRESS NOTES
Subjective:      Joycelyn Byers is a 46 y.o. female who presents with Cough (dry cough x7 days, fatigue, headaches, no fever, tested positive in COVID in march )            Cough  This is a new problem. Episode onset: 7 days. The problem has been unchanged. The cough is non-productive. Associated symptoms include headaches, myalgias, shortness of breath (difficulty taking a deep breath- improved with breathing treatment ) and wheezing. Pertinent negatives include no chest pain, chills, ear congestion, ear pain, fever, hemoptysis, nasal congestion, rash, rhinorrhea or sore throat. Associated symptoms comments: Generalized joint pain . Nothing aggravates the symptoms. She has tried a beta-agonist inhaler and prescription cough suppressant (Tessalon ) for the symptoms. Her past medical history is significant for asthma, environmental allergies and pneumonia. SLE     The patient feels that her asthma may have flared due to irritants at work- new carpet has been installed. Also, she feels that she may have a Lupus flare occurring due to her generalized body aches. She reports episodes of lower BP which has happened in the past with a SLE flare. The patient tested positive for COVID 2 months ago. She is not vaccinated. No known recent COVID exposures.     Past Medical History:   Diagnosis Date   • Allergy, unspecified not elsewhere classified    • Ankylosing spondylitis of lumbosacral region (HCC) 7/6/2015   • Anxiety 11/24/2015   • GERD (gastroesophageal reflux disease)    • Headache, classical migraine    • Hiatal hernia    • Intractable migraine without aura and without status migrainosus 11/24/2015   • Lupus ([facility])    • Morphea 1/9/2019   • Oropharyngeal dysphagia    • Other forms of systemic lupus erythematosus (HCC) 10/20/2017   • Overactive bladder    • Peptic ulcer    • Polyuria 7/17/2019   • Prediabetes 12/8/2016   • Vocal cord dysfunction        Past Surgical History:   Procedure Laterality Date   •  HYSTERECTOMY LAPAROSCOPY  2013    utrine and right ovary removed   • HERNIA REPAIR  2007    umblical hernia   • CHOLECYSTECTOMY  2007   • SALPINGO-OOPHORECTOMY, UNILATERAL Right        Family History   Problem Relation Age of Onset   • Arthritis Brother         psoriatic arthritis   • Arthritis Maternal Grandmother    • Psychiatric Illness Mother         Major depression   • Other Mother         discoid lupus   • Psychiatric Illness Father         Bipolar depression   • Heart Disease Father         CHF   • Hypertension Father    • Diabetes Father    • Other Father         Agent orange exposure 125% disabled   • Diabetes Paternal Uncle    • Stroke Maternal Grandfather    • Diabetes Paternal Grandmother    • Alcohol/Drug Paternal Grandfather    • Cancer Paternal Aunt         gyn cancer   • GI Disease Son    • GI Disease Daughter    • GI Disease Daughter    • GI Disease Daughter        Allergies   Allergen Reactions   • Milnacipran Myalgia   • Tramadol Shortness of Breath and Rash     Shortness of breath & rash     • Ciprofloxacin Unspecified     Knee & muscle pain     • Metoclopramide Unspecified     Akathisia     • Sulfa Drugs Nausea     GI upset   • Tetanus Antitoxin Unspecified     Mild spasms and pain     • Carisoprodol Unspecified     Slurred speech, stumbling   • Nortriptyline Hcl Unspecified     detatchment and fatigue.       Medications, Allergies, and current problem list reviewed today in Epic      Review of Systems   Constitutional: Positive for malaise/fatigue. Negative for chills and fever.   HENT: Negative for congestion, ear discharge, ear pain, rhinorrhea, sinus pain and sore throat.    Respiratory: Positive for cough, shortness of breath (difficulty taking a deep breath- improved with breathing treatment ) and wheezing. Negative for hemoptysis and sputum production.    Cardiovascular: Negative for chest pain, palpitations and leg swelling.   Gastrointestinal: Negative for abdominal pain, diarrhea,  "nausea and vomiting.   Musculoskeletal: Positive for myalgias.   Skin: Negative for rash.   Neurological: Positive for headaches. Negative for tingling, sensory change and focal weakness.   Endo/Heme/Allergies: Positive for environmental allergies.     All other systems reviewed and are negative.        Objective:     /58   Pulse 84   Temp 37 °C (98.6 °F) (Temporal)   Resp 16   Ht 1.6 m (5' 3\")   Wt 75.8 kg (167 lb)   LMP 03/29/2016   SpO2 99%   BMI 29.58 kg/m²      Physical Exam  Constitutional:       General: She is not in acute distress.     Appearance: She is not ill-appearing.   HENT:      Head: Normocephalic and atraumatic.      Nose: Nose normal.   Eyes:      Conjunctiva/sclera: Conjunctivae normal.   Cardiovascular:      Rate and Rhythm: Normal rate and regular rhythm.      Heart sounds: Normal heart sounds. No murmur heard.     Pulmonary:      Effort: Pulmonary effort is normal. No respiratory distress.      Breath sounds: Normal breath sounds. No wheezing, rhonchi or rales.   Musculoskeletal:         General: Normal range of motion.      Right lower leg: No edema.      Left lower leg: No edema.      Comments: No calf TTP or swelling bilaterally.    Skin:     General: Skin is warm and dry.      Findings: No rash.   Neurological:      General: No focal deficit present.      Mental Status: She is alert and oriented to person, place, and time.   Psychiatric:         Mood and Affect: Mood normal.         Behavior: Behavior normal.         Thought Content: Thought content normal.         Judgment: Judgment normal.     5/23/2021 1:21 PM     HISTORY/REASON FOR EXAM:  Cough  Shortness of breath     TECHNIQUE/EXAM DESCRIPTION AND NUMBER OF VIEWS:  Two views of the chest.     COMPARISON:  3/21/2021.     FINDINGS:     No pulmonary infiltrates or consolidations are noted.  No pleural effusions, no pneumothorax are appreciated.  Normal cardiopericardial silhouette.        IMPRESSION:        1. No active " cardiopulmonary abnormalities are identified.                   Assessment/Plan:        1. Upper respiratory tract infection, unspecified type  DX-CHEST-2 VIEWS    methylPREDNISolone (MEDROL DOSEPAK) 4 MG Tablet Therapy Pack    SARS-CoV-2 PCR (24 hour In-House): Collect NP swab in VTM   2. Mild asthma, unspecified whether complicated, unspecified whether persistent  DX-CHEST-2 VIEWS    methylPREDNISolone (MEDROL DOSEPAK) 4 MG Tablet Therapy Pack    SARS-CoV-2 PCR (24 hour In-House): Collect NP swab in VTM   3. Systemic lupus erythematosus, unspecified SLE type, unspecified organ involvement status (HCC)  DX-CHEST-2 VIEWS    methylPREDNISolone (MEDROL DOSEPAK) 4 MG Tablet Therapy Pack    SARS-CoV-2 PCR (24 hour In-House): Collect NP swab in VTM       X-ray reviewed. Agree with RAD above.     Discussed with patient.   Suspect asthma exacerbation and SLE flare with possible URI  RX MEdrol  COVID test pending.  Isolation guidelines, conservative measures and ER precautions discussed.   Differential diagnoses, Supportive care, and indications for immediate follow-up discussed with patient.   Pathogenesis of diagnosis discussed including typical length and natural progression.   Instructed to return to clinic or nearest emergency department for any change in condition, further concerns, or worsening of symptoms.    The patient demonstrated a good understanding and agreed with the treatment plan.    Archana Fernandez P.A.-C.

## 2021-05-24 DIAGNOSIS — J45.909 MILD ASTHMA, UNSPECIFIED WHETHER COMPLICATED, UNSPECIFIED WHETHER PERSISTENT: ICD-10-CM

## 2021-05-24 DIAGNOSIS — M32.9 SYSTEMIC LUPUS ERYTHEMATOSUS, UNSPECIFIED SLE TYPE, UNSPECIFIED ORGAN INVOLVEMENT STATUS (HCC): ICD-10-CM

## 2021-05-24 DIAGNOSIS — J06.9 UPPER RESPIRATORY TRACT INFECTION, UNSPECIFIED TYPE: ICD-10-CM

## 2021-06-07 ENCOUNTER — OFFICE VISIT (OUTPATIENT)
Dept: MEDICAL GROUP | Facility: PHYSICIAN GROUP | Age: 46
End: 2021-06-07
Payer: COMMERCIAL

## 2021-06-07 VITALS
BODY MASS INDEX: 28.17 KG/M2 | HEART RATE: 80 BPM | OXYGEN SATURATION: 97 % | SYSTOLIC BLOOD PRESSURE: 112 MMHG | DIASTOLIC BLOOD PRESSURE: 62 MMHG | RESPIRATION RATE: 16 BRPM | WEIGHT: 165 LBS | TEMPERATURE: 96.7 F | HEIGHT: 64 IN

## 2021-06-07 DIAGNOSIS — M32.8 OTHER FORMS OF SYSTEMIC LUPUS ERYTHEMATOSUS, UNSPECIFIED ORGAN INVOLVEMENT STATUS (HCC): ICD-10-CM

## 2021-06-07 DIAGNOSIS — G43.019 INTRACTABLE MIGRAINE WITHOUT AURA AND WITHOUT STATUS MIGRAINOSUS: ICD-10-CM

## 2021-06-07 DIAGNOSIS — I10 ESSENTIAL HYPERTENSION: ICD-10-CM

## 2021-06-07 DIAGNOSIS — L30.9 NIPPLE DERMATITIS: ICD-10-CM

## 2021-06-07 DIAGNOSIS — N60.02 BENIGN CYST OF LEFT BREAST: ICD-10-CM

## 2021-06-07 DIAGNOSIS — E53.8 VITAMIN B 12 DEFICIENCY: ICD-10-CM

## 2021-06-07 DIAGNOSIS — E87.6 HYPOKALEMIA: ICD-10-CM

## 2021-06-07 DIAGNOSIS — R73.09 ELEVATED HEMOGLOBIN A1C: ICD-10-CM

## 2021-06-07 DIAGNOSIS — J45.40 MODERATE PERSISTENT ASTHMA IN ADULT WITHOUT COMPLICATION: ICD-10-CM

## 2021-06-07 DIAGNOSIS — E55.9 VITAMIN D DEFICIENCY: ICD-10-CM

## 2021-06-07 DIAGNOSIS — F41.9 ANXIETY: ICD-10-CM

## 2021-06-07 DIAGNOSIS — K21.9 GASTROESOPHAGEAL REFLUX DISEASE WITHOUT ESOPHAGITIS: ICD-10-CM

## 2021-06-07 DIAGNOSIS — Z76.89 ENCOUNTER TO ESTABLISH CARE: ICD-10-CM

## 2021-06-07 DIAGNOSIS — M45.7 ANKYLOSING SPONDYLITIS OF LUMBOSACRAL REGION (HCC): ICD-10-CM

## 2021-06-07 PROBLEM — R63.5 WEIGHT GAIN: Status: RESOLVED | Noted: 2020-01-06 | Resolved: 2021-06-07

## 2021-06-07 PROBLEM — R05.9 COUGH IN ADULT: Status: RESOLVED | Noted: 2020-03-20 | Resolved: 2021-06-07

## 2021-06-07 PROBLEM — R06.09 DOE (DYSPNEA ON EXERTION): Status: RESOLVED | Noted: 2019-10-14 | Resolved: 2021-06-07

## 2021-06-07 PROBLEM — R35.89 POLYURIA: Status: RESOLVED | Noted: 2019-07-17 | Resolved: 2021-06-07

## 2021-06-07 PROCEDURE — 99215 OFFICE O/P EST HI 40 MIN: CPT | Performed by: NURSE PRACTITIONER

## 2021-06-07 RX ORDER — TRIAMTERENE AND HYDROCHLOROTHIAZIDE 37.5; 25 MG/1; MG/1
TABLET ORAL
COMMUNITY
Start: 2021-05-29 | End: 2021-07-19

## 2021-06-07 RX ORDER — PREGABALIN 50 MG/1
50 CAPSULE ORAL
COMMUNITY
Start: 2021-04-21

## 2021-06-07 RX ORDER — DULOXETIN HYDROCHLORIDE 20 MG/1
20 CAPSULE, DELAYED RELEASE ORAL DAILY
Qty: 30 CAPSULE | Refills: 2 | Status: SHIPPED | OUTPATIENT
Start: 2021-06-07 | End: 2021-07-19 | Stop reason: SDUPTHER

## 2021-06-07 ASSESSMENT — PATIENT HEALTH QUESTIONNAIRE - PHQ9: CLINICAL INTERPRETATION OF PHQ2 SCORE: 0

## 2021-06-07 ASSESSMENT — FIBROSIS 4 INDEX: FIB4 SCORE: 0.62

## 2021-06-07 NOTE — ASSESSMENT & PLAN NOTE
Chronic medical problem.  She is due for updated labs.  Last lab results:  Results for RM BENÍTEZ (MRN [id number]) as of 6/7/2021 13:00   Ref. Range 7/24/2019 07:37   Glycohemoglobin Latest Ref Range: 0.0 - 5.6 % 5.7 (H)

## 2021-06-07 NOTE — ASSESSMENT & PLAN NOTE
Chronic medical problem.  She is taking fluoxetine 20 mg daily. She was previously on duloxetine 1 year ago. She is interested in restarting 20 mg duloxetine as her blood pressure has been stabilized with her current amlodipine and triamterene-hydrochlorothiazide treatment.  She denies any self-harm or SI today.

## 2021-06-07 NOTE — ASSESSMENT & PLAN NOTE
Chronic medical problem.  She denies taking any supplementation.  She is due for updated labs.  Last lab results:  Results for RM BENÍTEZ (MRN [id number]) as of 6/7/2021 09:11   Ref. Range 9/9/2019 11:59   25-Hydroxy   Vitamin D 25 Latest Ref Range: 30 - 100 ng/mL 24 (L)

## 2021-06-07 NOTE — ASSESSMENT & PLAN NOTE
Chronic medical problem. She is taking enbrel weekly. She is followed by Dr. Favio Lynch with rheumatology. She uses lidocaine patch PRN. She gets trigger point injections every 6-8 weeks that help. She will take hydrocodone-acetaminophen 15 ml PRN, this is prescribed by Dr. Jaramillo with [facility] every 2-3 months.  [facility] manages her Lidoderm, Lyrica, trigger blocks and hydrocodone-acetaminophen.

## 2021-06-07 NOTE — ASSESSMENT & PLAN NOTE
Chronic medical problem. She is using Albuterol PRN and before exercise. She is averaging about once a month albuterol use. She is taking arnuity Ellipta daily.  She denies any shortness of breath, cough or chest pain today.

## 2021-06-07 NOTE — ASSESSMENT & PLAN NOTE
Chronic medical problem.  She is taking hydroxychloroquine 300 mg daily. She is followed by rheumatology.

## 2021-06-07 NOTE — ASSESSMENT & PLAN NOTE
Chronic medical problem.  Her blood pressure is at goal today.  She is taking amlodipine 2.5 mg daily and triamterene-hctz 37.5-25 mg daily. She is checking her blood pressure at home.  She gets readings 112/60, 115/70, and reports that she stays below 120/80.  She denies any chest pain, shortness of breath, dizziness, headaches.

## 2021-06-07 NOTE — ASSESSMENT & PLAN NOTE
Chronic medical problem.  Her last mammogram 11/20/2019 did show left breast upper outer quadrant benign-appearing mass.  She had ultrasound left breast completed that did show a cyst.  She is requesting a diagnostic mammogram as she like to follow-up and has no complaints of itching nipples.  She states that she tried to schedule her screening mammogram and she was told she would need to order a diagnostic mammogram.

## 2021-06-07 NOTE — ASSESSMENT & PLAN NOTE
Chronic medical problem.  She is taking vitamin B1 complex.  She was previously on vitamin B12 injections but ran out of prescription.  She is due for updated labs.  She states that she has noticed increased bruising and oral ulcers which occurs when she has low vitamin B12.

## 2021-06-07 NOTE — ASSESSMENT & PLAN NOTE
Chronic medical problem. She is using rizatriptan PRN. Her last migraine was 1 month ago. She has been on maintenance medication in the past.

## 2021-06-07 NOTE — ASSESSMENT & PLAN NOTE
Chronic medical problem.  She has been taking omeprazole for last 2 weeks. She ran out of her esomeprazole.  She would like a refill on her esomeprazole.  Denies any heartburn or indigestion.

## 2021-06-07 NOTE — PROGRESS NOTES
Subjective:     CC:  Diagnoses of Encounter to establish care, Ankylosing spondylitis of lumbosacral region (Formerly [facility]), Essential hypertension, Moderate persistent asthma in adult without complication, Other forms of systemic lupus erythematosus, unspecified organ involvement status (Formerly [facility] [place]), Gastroesophageal reflux disease without esophagitis, Vitamin B 12 deficiency, Intractable migraine without aura and without status migrainosus, Anxiety, Benign cyst of left breast, Nipple dermatitis, Elevated hemoglobin A1c, Hypokalemia, and Vitamin D deficiency were pertinent to this visit.    HISTORY OF THE PRESENT ILLNESS: Patient is a 46 y.o. female. This pleasant patient is here today to establish care and discuss the following. Her prior PCP was Dr. Monique Shannon.    Ankylosing spondylitis of lumbosacral region (CMS-Formerly [facility] [place])  Chronic medical problem. She is taking enbrel weekly. She is followed by Dr. Favio Lynch with rheumatology. She uses lidocaine patch PRN. She gets trigger point injections every 6-8 weeks that help. She will take hydrocodone-acetaminophen 15 ml PRN, this is prescribed by Dr. Jaramillo with [facility] every 2-3 months.  [facility] manages her Lidoderm, Lyrica, trigger blocks and hydrocodone-acetaminophen.    Asthma in adult  Chronic medical problem. She is using Albuterol PRN and before exercise. She is averaging about once a month albuterol use. She is taking arnuity Ellipta daily.  She denies any shortness of breath, cough or chest pain today.    Hypertension  Chronic medical problem.  Her blood pressure is at goal today.  She is taking amlodipine 2.5 mg daily and triamterene-hctz 37.5-25 mg daily. She is checking her blood pressure at home.  She gets readings 112/60, 115/70, and reports that she stays below 120/80.  She denies any chest pain, shortness of breath, dizziness, headaches.    Gastroesophageal reflux disease without esophagitis  Chronic medical problem.  She has been  taking omeprazole for last 2 weeks. She ran out of her esomeprazole.  She would like a refill on her esomeprazole.  Denies any heartburn or indigestion.    Other forms of systemic lupus erythematosus (CMS-HCC) (HCC)  Chronic medical problem.  She is taking hydroxychloroquine 300 mg daily. She is followed by rheumatology.    Intractable migraine without aura and without status migrainosus  Chronic medical problem. She is using rizatriptan PRN. Her last migraine was 1 month ago. She has been on maintenance medication in the past.     Anxiety  Chronic medical problem.  She is taking fluoxetine 20 mg daily. She was previously on duloxetine 1 year ago. She is interested in restarting 20 mg duloxetine as her blood pressure has been stabilized with her current amlodipine and triamterene-hydrochlorothiazide treatment.  She denies any self-harm or SI today.    Vitamin D deficiency  Chronic medical problem.  She denies taking any supplementation.  She is due for updated labs.  Last lab results:  Results for RM BENÍTEZ (MRN [id number]) as of 6/7/2021 09:11   Ref. Range 9/9/2019 11:59   25-Hydroxy   Vitamin D 25 Latest Ref Range: 30 - 100 ng/mL 24 (L)       Vitamin B 12 deficiency  Chronic medical problem.  She is taking vitamin B1 complex.  She was previously on vitamin B12 injections but ran out of prescription.  She is due for updated labs.  She states that she has noticed increased bruising and oral ulcers which occurs when she has low vitamin B12.    Hypokalemia  Chronic medical problem.  She would like updated labs ordered.    Elevated hemoglobin A1c  Chronic medical problem.  She is due for updated labs.  Last lab results:  Results for RM BENÍTEZ (MRN [id number]) as of 6/7/2021 13:00   Ref. Range 7/24/2019 07:37   Glycohemoglobin Latest Ref Range: 0.0 - 5.6 % 5.7 (H)       Benign cyst of left breast  Chronic medical problem.  Her last mammogram 11/20/2019 did show left breast upper outer quadrant  benign-appearing mass.  She had ultrasound left breast completed that did show a cyst.  She is requesting a diagnostic mammogram as she like to follow-up and has no complaints of itching nipples.  She states that she tried to schedule her screening mammogram and she was told she would need to order a diagnostic mammogram.      Allergies: Milnacipran, Tramadol, Ciprofloxacin, Metoclopramide, Sulfa drugs, Tetanus antitoxin, Carisoprodol, and Nortriptyline hcl    Current Outpatient Medications Ordered in Epic   Medication Sig Dispense Refill   • DULoxetine (CYMBALTA) 20 MG Cap DR Particles Take 1 capsule by mouth every day. 30 capsule 2   • esomeprazole (NEXIUM) 20 MG capsule Take 1 capsule by mouth every morning before breakfast. 90 capsule 3   • ARNUITY ELLIPTA 200 MCG/ACT AEROSOL POWDER, BREATH ACTIVATED Take 1 Puff by mouth every morning. 90 Each 3   • albuterol 108 (90 Base) MCG/ACT Aero Soln inhalation aerosol Inhale 2 Puffs by mouth every four hours as needed for Shortness of Breath. 3 Each 3   • rizatriptan (MAXALT) 5 MG tablet Take 1 Tab by mouth Once PRN for Migraine for up to 1 dose. 10 Tab 0   • HYDROcodone-acetaminophen 2.5-108 mg/5mL (HYCET) 7.5-325 MG/15ML solution Take 15 mL by mouth 4 times a day as needed for Severe Pain.     • Thiamine HCl (VITAMIN B-1 PO) Take 1 Tab by mouth every morning.     • lidocaine (LIDODERM) 5 % Patch Apply 1 Patch to skin as directed 1 time daily as needed (pain).     • ENBREL SURECLICK 50 MG/ML Solution Auto-injector 1 Each by Injection route every 7 days.     • amLODIPine (NORVASC) 2.5 MG Tab Take 2.5 mg by mouth every morning.     • cyclobenzaprine (FLEXERIL) 10 MG Tab Take 10 mg by mouth at bedtime as needed for Muscle Spasms.     • coenzyme Q-10 30 MG capsule Take 30 mg by mouth every morning.     • cyanocobalamin (VITAMIN B-12) 1000 MCG/ML Solution 1,000 mcg by Injection route every 30 days.  2   • hydroxychloroquine (PLAQUENIL) 200 MG Tab Take 300 mg by mouth every  morning.     • EPIPEN 2-NOLAN 0.3 MG/0.3ML Solution Auto-injector solution for injection 0.3 mg by Intramuscular route as needed.     • triamterene-hctz (MAXZIDE-25/DYAZIDE) 37.5-25 MG Tab      • pregabalin (LYRICA) 50 MG capsule Take 50 mg by mouth.       No current Epic-ordered facility-administered medications on file.       Past Medical History:   Diagnosis Date   • Allergy, unspecified not elsewhere classified    • Ankylosing spondylitis of lumbosacral region ([facility]) 7/6/2015   • Anxiety 11/24/2015   • GERD (gastroesophageal reflux disease)    • Headache, classical migraine    • Hiatal hernia    • Intractable migraine without aura and without status migrainosus 11/24/2015   • Lupus ([facility])    • Morphea 1/9/2019   • Oropharyngeal dysphagia    • Other forms of systemic lupus erythematosus ([facility]) 10/20/2017   • Overactive bladder    • Peptic ulcer    • Polyuria 7/17/2019   • Prediabetes 12/8/2016   • Vocal cord dysfunction        Past Surgical History:   Procedure Laterality Date   • HYSTERECTOMY LAPAROSCOPY  2013    utrine and right ovary removed   • HERNIA REPAIR  2007    umblical hernia   • CHOLECYSTECTOMY  2007   • SALPINGO-OOPHORECTOMY, UNILATERAL Right        Social History     Tobacco Use   • Smoking status: Never Smoker   • Smokeless tobacco: Never Used   Vaping Use   • Vaping Use: Never used   Substance Use Topics   • Alcohol use: No     Alcohol/week: 0.0 oz   • Drug use: No       Social History     Social History Narrative    Patient is currently disabled, 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 She is  with four children.        Family History   Problem Relation Age of Onset   • Arthritis Brother         psoriatic arthritis   • Psoriasis Brother    • Arthritis Maternal Grandmother    • Psychiatric Illness Mother         Major depression   • Other Mother         discoid lupus, brain anerysm   • Psychiatric Illness Father         Bipolar disorder   • Heart Disease Father         CHF   • Hypertension  "Father    • Diabetes Father    • Other Father         Agent orange exposure 125% disabled   • Diabetes Paternal Uncle    • Stroke Maternal Grandfather    • Diabetes Paternal Grandmother    • Alcohol/Drug Paternal Grandfather    • Cancer Paternal Aunt         gyn cancer   • GI Disease Son         gerd   • GI Disease Daughter         gerd   • GI Disease Daughter         gerd   • GI Disease Daughter         gerd   • Diabetes Paternal Aunt 60        type 1 DM       Health Maintenance: Due for Covid vaccine and mammogram    ROS:   Gen: no fevers/chills, no changes in weight, denies any self-harm or SI  Eyes: no changes in vision  ENT: no sore throat, no ear pain  Pulm: no shortness of breath  CV: no chest pain, no palpitations  GI: no nausea/vomiting, no diarrhea  : no dysuria  MSk: no myalgias  Skin: no rash  Neuro: no headaches, no dizziness      Objective:     Vital signs reviewed  Exam: /62 (BP Location: Left arm, Patient Position: Sitting, BP Cuff Size: Adult)   Pulse 80   Temp 35.9 °C (96.7 °F) (Temporal)   Resp 16   Ht 1.63 m (5' 4.17\")   Wt 74.8 kg (165 lb)   SpO2 97%  Body mass index is 28.17 kg/m².    General: Normal appearing. No distress.  Eyes: Eyes conjunctiva clear lids without ptosis, lids normal.  Glasses in place.  Neck: Supple without JVD. Thyroid is not enlarged.  Pulmonary: Clear to ausculation.  Normal effort. No rales, ronchi, or wheezing.  Cardiovascular: Regular rate and rhythm without murmur. Radial pulses are intact and equal bilaterally.  Abdomen: Soft, nondistended.   Neurologic: Grossly nonfocal  Lymph: No cervical or supraclavicular lymph nodes are palpable  Skin: Warm and dry.  No obvious lesions.  Musculoskeletal: Normal gait. No extremity cyanosis, clubbing, or edema.  Psych: Normal mood and affect. Alert and oriented x3. Judgment and insight is normal.      Assessment & Plan:   46 y.o. female with the following -    1. Encounter to establish care  Acute uncomplicated " problem.  Care established today.  Discussed reviewed recent lab results, imaging, and progress notes.    2. Ankylosing spondylitis of lumbosacral region (HCC)  Chronic stable problem.  She is followed by rheumatology and she will continue follow-up with them.  She will continue her Enbrel per rheumatology.  She will continue with pain management for her Lidoderm, Lyrica, trigger blocks, and hydrocodone-acetaminophen.  No acute complaints today.    3. Essential hypertension  Chronic stable problem.  Her blood pressure is at goal today.  She will continue with her amlodipine and triamterene-hydrochlorothiazide.  She will continue home blood pressure monitoring.  Red flags discussed.  She is due for updated labs.  - CBC WITH DIFFERENTIAL; Future  - Comp Metabolic Panel; Future  - Lipid Profile; Future  - TSH WITH REFLEX TO FT4; Future    4. Moderate persistent asthma in adult without complication  Chronic stable problem.  She will continue with her Arnuity Ellipta and albuterol as needed.  No acute complaints today.    5. Other forms of systemic lupus erythematosus, unspecified organ involvement status (HCC)  Chronic stable problem.  Should continue with her hydroxychloroquine. She will continue with rheumatology.     6. Gastroesophageal reflux disease without esophagitis  Chronic stable problem.  She will continue with her esomeprazole, medication refilled today.  Continue to avoid diet triggers.  - esomeprazole (NEXIUM) 20 MG capsule; Take 1 capsule by mouth every morning before breakfast.  Dispense: 90 capsule; Refill: 3    7. Vitamin B 12 deficiency  Chronic stable problem.  Will check updated labs.  Orders placed.  - VITAMIN B12; Future  - VITAMIN B1; Future    8. Intractable migraine without aura and without status migrainosus  Chronic stable problem.  Should continue with rizatriptan as needed.  No acute complaints today.    9. Anxiety  Chronic stable problem.  Will stop her fluoxetine and restart her  duloxetine 20 mg daily.  She will return in 1 month for follow-up.  Red flags discussed.  Will check thyroid level ruled any abnormality.  - DULoxetine (CYMBALTA) 20 MG Cap DR Particles; Take 1 capsule by mouth every day.  Dispense: 30 capsule; Refill: 2  - TSH WITH REFLEX TO FT4; Future    10. Benign cyst of left breast  Chronic medical problem.  Will check updated mammogram.  - MA-DIAGNOSTIC MAMMO BILAT W/TOMOSYNTHESIS W/CAD; Future    11. Nipple dermatitis  Acute uncomplicated problem.  Given her complaint will check diagnostic mammogram.  - MA-DIAGNOSTIC MAMMO BILAT W/TOMOSYNTHESIS W/CAD; Future    12. Elevated hemoglobin A1c  Chronic stable problem.  She is due for updated labs.  Orders placed.  - HEMOGLOBIN A1C; Future    13. Hypokalemia  Chronic stable problem.  She is due for updated labs.  Orders placed.    14. Vitamin D deficiency  Chronic stable problem.  She is due for updated labs.  Orders placed.  - VITAMIN D,25 HYDROXY; Future      Total 24 minutes face-to-face time spent with patient, with greater than 50% of the total time discussing patient's issues and symptoms as listed above in assessment and plan, as well as managing coordination of care for future evaluation and treatment.      Return in about 4 weeks (around 7/5/2021) for Labs, medication management .    Educated in proper administration of medication(s) ordered today including safety, possible SE, risks, benefits, rationale and alternatives to therapy.     Please note that this dictation was created using voice recognition software. I have made every reasonable attempt to correct obvious errors, but I expect that there are errors of grammar and possibly content that I did not discover before finalizing the note.

## 2021-06-28 ENCOUNTER — HOSPITAL ENCOUNTER (OUTPATIENT)
Dept: RADIOLOGY | Facility: MEDICAL CENTER | Age: 46
End: 2021-06-28
Attending: NURSE PRACTITIONER
Payer: COMMERCIAL

## 2021-06-28 DIAGNOSIS — N60.02 BENIGN CYST OF LEFT BREAST: ICD-10-CM

## 2021-06-28 DIAGNOSIS — L30.9 NIPPLE DERMATITIS: ICD-10-CM

## 2021-06-28 PROCEDURE — G0279 TOMOSYNTHESIS, MAMMO: HCPCS

## 2021-06-28 PROCEDURE — 76642 ULTRASOUND BREAST LIMITED: CPT | Mod: RT

## 2021-06-29 ENCOUNTER — TELEPHONE (OUTPATIENT)
Dept: MEDICAL GROUP | Facility: PHYSICIAN GROUP | Age: 46
End: 2021-06-29

## 2021-06-29 NOTE — TELEPHONE ENCOUNTER
----- Message from TERA Loving sent at 6/28/2021  4:35 PM PDT -----  Results released to [facility].

## 2021-06-29 NOTE — TELEPHONE ENCOUNTER
Phone Number Called:[phone] (home)        Call outcome: Left detailed message for patient. Informed to call back with any additional questions.    Message: Left a voice message notifying the patient that results have been released to my chart

## 2021-07-16 ENCOUNTER — HOSPITAL ENCOUNTER (OUTPATIENT)
Dept: LAB | Facility: MEDICAL CENTER | Age: 46
End: 2021-07-16
Attending: NURSE PRACTITIONER
Payer: COMMERCIAL

## 2021-07-16 DIAGNOSIS — I10 ESSENTIAL HYPERTENSION: ICD-10-CM

## 2021-07-16 DIAGNOSIS — E55.9 VITAMIN D DEFICIENCY: ICD-10-CM

## 2021-07-16 DIAGNOSIS — E53.8 VITAMIN B 12 DEFICIENCY: ICD-10-CM

## 2021-07-16 DIAGNOSIS — F41.9 ANXIETY: ICD-10-CM

## 2021-07-16 DIAGNOSIS — R73.09 ELEVATED HEMOGLOBIN A1C: ICD-10-CM

## 2021-07-16 LAB
25(OH)D3 SERPL-MCNC: 38 NG/ML (ref 30–100)
ALBUMIN SERPL BCP-MCNC: 4.3 G/DL (ref 3.2–4.9)
ALBUMIN/GLOB SERPL: 1.2 G/DL
ALP SERPL-CCNC: 87 U/L (ref 30–99)
ALT SERPL-CCNC: 19 U/L (ref 2–50)
ANION GAP SERPL CALC-SCNC: 11 MMOL/L (ref 7–16)
AST SERPL-CCNC: 21 U/L (ref 12–45)
BASOPHILS # BLD AUTO: 1.7 % (ref 0–1.8)
BASOPHILS # BLD: 0.1 K/UL (ref 0–0.12)
BILIRUB SERPL-MCNC: 0.2 MG/DL (ref 0.1–1.5)
BUN SERPL-MCNC: 16 MG/DL (ref 8–22)
CALCIUM SERPL-MCNC: 9.5 MG/DL (ref 8.5–10.5)
CHLORIDE SERPL-SCNC: 105 MMOL/L (ref 96–112)
CHOLEST SERPL-MCNC: 216 MG/DL (ref 100–199)
CO2 SERPL-SCNC: 26 MMOL/L (ref 20–33)
CREAT SERPL-MCNC: 0.75 MG/DL (ref 0.5–1.4)
EOSINOPHIL # BLD AUTO: 0.22 K/UL (ref 0–0.51)
EOSINOPHIL NFR BLD: 3.8 % (ref 0–6.9)
ERYTHROCYTE [DISTWIDTH] IN BLOOD BY AUTOMATED COUNT: 42.9 FL (ref 35.9–50)
EST. AVERAGE GLUCOSE BLD GHB EST-MCNC: 120 MG/DL
FASTING STATUS PATIENT QL REPORTED: NORMAL
GLOBULIN SER CALC-MCNC: 3.5 G/DL (ref 1.9–3.5)
GLUCOSE SERPL-MCNC: 87 MG/DL (ref 65–99)
HBA1C MFR BLD: 5.8 % (ref 4–5.6)
HCT VFR BLD AUTO: 46.1 % (ref 37–47)
HDLC SERPL-MCNC: 73 MG/DL
HGB BLD-MCNC: 14.4 G/DL (ref 12–16)
IMM GRANULOCYTES # BLD AUTO: 0.02 K/UL (ref 0–0.11)
IMM GRANULOCYTES NFR BLD AUTO: 0.3 % (ref 0–0.9)
LDLC SERPL CALC-MCNC: 122 MG/DL
LYMPHOCYTES # BLD AUTO: 1.65 K/UL (ref 1–4.8)
LYMPHOCYTES NFR BLD: 28.3 % (ref 22–41)
MCH RBC QN AUTO: 26.5 PG (ref 27–33)
MCHC RBC AUTO-ENTMCNC: 31.2 G/DL (ref 33.6–35)
MCV RBC AUTO: 84.9 FL (ref 81.4–97.8)
MONOCYTES # BLD AUTO: 0.55 K/UL (ref 0–0.85)
MONOCYTES NFR BLD AUTO: 9.4 % (ref 0–13.4)
NEUTROPHILS # BLD AUTO: 3.29 K/UL (ref 2–7.15)
NEUTROPHILS NFR BLD: 56.5 % (ref 44–72)
NRBC # BLD AUTO: 0 K/UL
NRBC BLD-RTO: 0 /100 WBC
PLATELET # BLD AUTO: 322 K/UL (ref 164–446)
PMV BLD AUTO: 11 FL (ref 9–12.9)
POTASSIUM SERPL-SCNC: 4 MMOL/L (ref 3.6–5.5)
PROT SERPL-MCNC: 7.8 G/DL (ref 6–8.2)
RBC # BLD AUTO: 5.43 M/UL (ref 4.2–5.4)
SODIUM SERPL-SCNC: 142 MMOL/L (ref 135–145)
TRIGL SERPL-MCNC: 105 MG/DL (ref 0–149)
TSH SERPL DL<=0.005 MIU/L-ACNC: 2.58 UIU/ML (ref 0.38–5.33)
VIT B12 SERPL-MCNC: 2586 PG/ML (ref 211–911)
WBC # BLD AUTO: 5.8 K/UL (ref 4.8–10.8)

## 2021-07-16 PROCEDURE — 84443 ASSAY THYROID STIM HORMONE: CPT

## 2021-07-16 PROCEDURE — 82607 VITAMIN B-12: CPT

## 2021-07-16 PROCEDURE — 36415 COLL VENOUS BLD VENIPUNCTURE: CPT

## 2021-07-16 PROCEDURE — 85025 COMPLETE CBC W/AUTO DIFF WBC: CPT

## 2021-07-16 PROCEDURE — 80053 COMPREHEN METABOLIC PANEL: CPT

## 2021-07-16 PROCEDURE — 80061 LIPID PANEL: CPT

## 2021-07-16 PROCEDURE — 82306 VITAMIN D 25 HYDROXY: CPT

## 2021-07-16 PROCEDURE — 84425 ASSAY OF VITAMIN B-1: CPT

## 2021-07-16 PROCEDURE — 83036 HEMOGLOBIN GLYCOSYLATED A1C: CPT

## 2021-07-19 ENCOUNTER — OFFICE VISIT (OUTPATIENT)
Dept: MEDICAL GROUP | Facility: PHYSICIAN GROUP | Age: 46
End: 2021-07-19
Payer: COMMERCIAL

## 2021-07-19 VITALS
WEIGHT: 168 LBS | RESPIRATION RATE: 16 BRPM | TEMPERATURE: 97.3 F | SYSTOLIC BLOOD PRESSURE: 104 MMHG | HEART RATE: 72 BPM | DIASTOLIC BLOOD PRESSURE: 70 MMHG | BODY MASS INDEX: 28.68 KG/M2 | HEIGHT: 64 IN | OXYGEN SATURATION: 100 %

## 2021-07-19 DIAGNOSIS — F41.9 ANXIETY: ICD-10-CM

## 2021-07-19 DIAGNOSIS — E78.00 HYPERCHOLESTEROLEMIA: ICD-10-CM

## 2021-07-19 DIAGNOSIS — E53.8 VITAMIN B 12 DEFICIENCY: ICD-10-CM

## 2021-07-19 DIAGNOSIS — I10 ESSENTIAL HYPERTENSION: ICD-10-CM

## 2021-07-19 DIAGNOSIS — R73.09 ELEVATED HEMOGLOBIN A1C: ICD-10-CM

## 2021-07-19 DIAGNOSIS — E55.9 VITAMIN D DEFICIENCY: ICD-10-CM

## 2021-07-19 PROCEDURE — 99214 OFFICE O/P EST MOD 30 MIN: CPT | Performed by: NURSE PRACTITIONER

## 2021-07-19 RX ORDER — DULOXETINE 40 MG/1
20 CAPSULE, DELAYED RELEASE ORAL DAILY
Qty: 30 CAPSULE | Refills: 2 | Status: SHIPPED | OUTPATIENT
Start: 2021-07-19 | End: 2021-07-19

## 2021-07-19 RX ORDER — HYDROCHLOROTHIAZIDE 25 MG/1
25 TABLET ORAL DAILY
Qty: 30 TABLET | Refills: 2 | Status: SHIPPED | OUTPATIENT
Start: 2021-07-19 | End: 2021-12-09 | Stop reason: SDUPTHER

## 2021-07-19 RX ORDER — DULOXETINE 40 MG/1
40 CAPSULE, DELAYED RELEASE ORAL DAILY
Qty: 30 CAPSULE | Refills: 2 | Status: SHIPPED | OUTPATIENT
Start: 2021-07-19 | End: 2021-11-15 | Stop reason: SDUPTHER

## 2021-07-19 ASSESSMENT — FIBROSIS 4 INDEX: FIB4 SCORE: 0.67

## 2021-07-19 ASSESSMENT — ANXIETY QUESTIONNAIRES
1. FEELING NERVOUS, ANXIOUS, OR ON EDGE: NEARLY EVERY DAY
2. NOT BEING ABLE TO STOP OR CONTROL WORRYING: NEARLY EVERY DAY
3. WORRYING TOO MUCH ABOUT DIFFERENT THINGS: NEARLY EVERY DAY
6. BECOMING EASILY ANNOYED OR IRRITABLE: NOT AT ALL
GAD7 TOTAL SCORE: 11
4. TROUBLE RELAXING: MORE THAN HALF THE DAYS
5. BEING SO RESTLESS THAT IT IS HARD TO SIT STILL: NOT AT ALL
7. FEELING AFRAID AS IF SOMETHING AWFUL MIGHT HAPPEN: NOT AT ALL

## 2021-07-19 NOTE — ASSESSMENT & PLAN NOTE
Chronic medical problem.  She has been taking duloxetine 20 mg daily for the last month after stopping her fluoxetine.  Her trino score today is 11.  She states that she has noticed improvement in her pain but is still having anxiety.  She denies any self-harm or SI today.

## 2021-07-19 NOTE — ASSESSMENT & PLAN NOTE
Chronic medical problem.  She has been working on increasing her exercise and watching her diet.  She had recent [facility] at [facility].  Last lab results:  Results for RM BENÍTEZ (MRN [id number]) as of 7/19/2021 11:40   Ref. Range 7/16/2021 07:39   Cholesterol,Tot Latest Ref Range: 100 - 199 mg/dL 216 (H)   Triglycerides Latest Ref Range: 0 - 149 mg/dL 105   HDL Latest Ref Range: >=40 mg/dL 73   LDL Latest Ref Range: <100 mg/dL 122 (H)

## 2021-07-19 NOTE — ASSESSMENT & PLAN NOTE
Chronic medical problem.  She had recent labs that she would like to review today.  Last lab results:  Results for RM BENÍTEZ (MRN [id number]) as of 7/19/2021 11:40   Ref. Range 7/16/2021 07:39   Glycohemoglobin Latest Ref Range: 4.0 - 5.6 % 5.8 (H)   Estim. Avg Glu Latest Units: mg/dL 120   Fasting Status Unknown Fasting

## 2021-07-19 NOTE — PROGRESS NOTES
Subjective:     CC: Lab results and medication management    HPI:   Rm presents today with the following:    Hypertension  Chronic medical problem.  Her blood pressure is at goal today.  She noticed that as her stress improved her blood pressure improved.  She states at home her blood pressure has been ranging 110's/70's.  She has been taking half dose of amlodipine and half dose of her triamterene-hydrochlorothiazide.  She had recent labs that she would like to review today.    Vitamin D deficiency  Chronic medical problem.  She is taking supplementation.  She had recent labs completed that she would like to review today.  Last lab results:  Results for RM BENÍTEZ (MRN [id number]) as of 7/19/2021 11:40   Ref. Range 7/16/2021 07:39   25-Hydroxy   Vitamin D 25 Latest Ref Range: 30 - 100 ng/mL 38         Vitamin B 12 deficiency  Chronic medical problem.  She had recent labs that she would like to review today.  She states that she did take sublingual vitamin B12 prior to her labs.  Last lab results:  Results for RM BENÍTEZ (MRN [id number]) as of 7/19/2021 11:40   Ref. Range 7/16/2021 07:39   Vitamin B12 -True Cobalamin Latest Ref Range: 211 - 911 pg/mL 2586 (H)       Hypercholesterolemia  Chronic medical problem.  She has been working on increasing her exercise and watching her diet.  She had recent labs at [facility].  Last lab results:  Results for RM BENÍTEZ (MRN [id number]) as of 7/19/2021 11:40   Ref. Range 7/16/2021 07:39   Cholesterol,Tot Latest Ref Range: 100 - 199 mg/dL 216 (H)   Triglycerides Latest Ref Range: 0 - 149 mg/dL 105   HDL Latest Ref Range: >=40 mg/dL 73   LDL Latest Ref Range: <100 mg/dL 122 (H)       Elevated hemoglobin A1c  Chronic medical problem.  She had recent labs that she would like to review today.  Last lab results:  Results for RM BENÍTEZ (MRN [id number]) as of 7/19/2021 11:40   Ref. Range 7/16/2021 07:39   Glycohemoglobin  Latest Ref Range: 4.0 - 5.6 % 5.8 (H)   Estim. Avg Glu Latest Units: mg/dL 120   Fasting Status Unknown Fasting       Anxiety  Chronic medical problem.  She has been taking duloxetine 20 mg daily for the last month after stopping her fluoxetine.  Her trino score today is 11.  She states that she has noticed improvement in her pain but is still having anxiety.  She denies any self-harm or SI today.        Past Medical History:   Diagnosis Date   • Allergy, unspecified not elsewhere classified    • Ankylosing spondylitis of lumbosacral region (HCC) 7/6/2015   • Anxiety 11/24/2015   • GERD (gastroesophageal reflux disease)    • Headache, classical migraine    • Hiatal hernia    • Intractable migraine without aura and without status migrainosus 11/24/2015   • Lupus ([facility])    • Morphea 1/9/2019   • Oropharyngeal dysphagia    • Other forms of systemic lupus erythematosus ([facility]) 10/20/2017   • Overactive bladder    • Peptic ulcer    • Polyuria 7/17/2019   • Prediabetes 12/8/2016   • Vocal cord dysfunction        Social History     Tobacco Use   • Smoking status: Never Smoker   • Smokeless tobacco: Never Used   Vaping Use   • Vaping Use: Never used   Substance Use Topics   • Alcohol use: No     Alcohol/week: 0.0 oz   • Drug use: No       Current Outpatient Medications Ordered in Epic   Medication Sig Dispense Refill   • hydroCHLOROthiazide (HYDRODIURIL) 25 MG Tab Take 1 tablet by mouth every day. 30 tablet 2   • DULoxetine HCl 40 MG Cap DR Particles Take 40 mg by mouth every day. 30 capsule 2   • pregabalin (LYRICA) 50 MG capsule Take 50 mg by mouth.     • esomeprazole (NEXIUM) 20 MG capsule Take 1 capsule by mouth every morning before breakfast. 90 capsule 3   • ARNUITY ELLIPTA 200 MCG/ACT AEROSOL POWDER, BREATH ACTIVATED Take 1 Puff by mouth every morning. 90 Each 3   • albuterol 108 (90 Base) MCG/ACT Aero Soln inhalation aerosol Inhale 2 Puffs by mouth every four hours as needed for Shortness of Breath. 3 Each 3   •  "rizatriptan (MAXALT) 5 MG tablet Take 1 Tab by mouth Once PRN for Migraine for up to 1 dose. 10 Tab 0   • HYDROcodone-acetaminophen 2.5-108 mg/5mL (HYCET) 7.5-325 MG/15ML solution Take 15 mL by mouth 4 times a day as needed for Severe Pain.     • Thiamine HCl (VITAMIN B-1 PO) Take 1 Tab by mouth every morning.     • lidocaine (LIDODERM) 5 % Patch Apply 1 Patch to skin as directed 1 time daily as needed (pain).     • ENBREL SURECLICK 50 MG/ML Solution Auto-injector 1 Each by Injection route every 7 days.     • amLODIPine (NORVASC) 2.5 MG Tab Take 2.5 mg by mouth every morning.     • cyclobenzaprine (FLEXERIL) 10 MG Tab Take 10 mg by mouth at bedtime as needed for Muscle Spasms.     • coenzyme Q-10 30 MG capsule Take 30 mg by mouth every morning.     • cyanocobalamin (VITAMIN B-12) 1000 MCG/ML Solution 1,000 mcg by Injection route every 30 days.  2   • hydroxychloroquine (PLAQUENIL) 200 MG Tab Take 300 mg by mouth every morning.     • EPIPEN 2-NOLAN 0.3 MG/0.3ML Solution Auto-injector solution for injection 0.3 mg by Intramuscular route as needed.       No current Epic-ordered facility-administered medications on file.       Allergies:  Milnacipran, Tramadol, Ciprofloxacin, Metoclopramide, Sulfa drugs, Tetanus antitoxin, Carisoprodol, and Nortriptyline hcl    Health Maintenance: Due for Covid vaccine    ROS:  Gen: no fevers/chills, no changes in weight  Eyes: no changes in vision  Pulm: no shortness of breath  CV: no chest pain  GI: no nausea/vomiting  MSk: no myalgias  Skin: no rash  Neuro: no headaches, no dizziness    Objective:     Vital signs reviewed  Exam:  /70 (BP Location: Left arm, Patient Position: Sitting, BP Cuff Size: Adult)   Pulse 72   Temp 36.3 °C (97.3 °F) (Temporal)   Resp 16   Ht 1.626 m (5' 4\")   Wt 76.2 kg (168 lb)   LMP 03/29/2016   SpO2 100%   BMI 28.84 kg/m²  Body mass index is 28.84 kg/m².    Gen: Alert and oriented, No apparent distress.  Eyes:   Lids normal. Glasses in place. "   Lungs: Normal effort, CTA bilaterally, no wheezes, rhonchi, or rales  CV: Regular rate and rhythm. No murmurs, rubs, or gallops.  Ext: No clubbing, cyanosis, edema.      Assessment & Plan:     46 y.o. female with the following -     1. Essential hypertension  Chronic stable problem.  Given that her blood pressure has been slightly low with full dose we discussed stopping the triamterene.  She will continue with hydrochlorothiazide 25 mg daily and half dose of her amlodipine.  We discussed her blood pressure goal to be less than 140/90.  She will start with home blood pressure log.  Reviewed her recent lab results today.  She will return in 1 month for follow-up.  - hydroCHLOROthiazide (HYDRODIURIL) 25 MG Tab; Take 1 tablet by mouth every day.  Dispense: 30 tablet; Refill: 2    2. Anxiety  Chronic exacerbated problem.  Discussed and reviewed her recent trino score today.  We will increase her duloxetine to 40 mg daily.  She denies any self-harm or SI today.  Red flags discussed.  - DULoxetine HCl 40 MG Cap DR Particles; Take 40 mg by mouth every day.  Dispense: 30 capsule; Refill: 2    3. Hypercholesterolemia  Chronic stable problem.  Discussed and reviewed her recent lab results.  Encourage diet and lifestyle modifications. The 10-year ASCVD risk score (Walbridge DC Jr., et al., 2013) is: 0.5%.    4. Elevated hemoglobin A1c  Chronic stable problem.  Discussed and reviewed her recent lab results.  Encourage diet and lifestyle modifications.    5. Vitamin D deficiency  Chronic stable problem.  Discussed and reviewed recent lab results.  She will continue with her supplementation.    6. Vitamin B 12 deficiency  Chronic stable problem.  Discussed reviewed her recent lab results.  She will continue with her supplementation.      Return in about 4 weeks (around 8/16/2021) for HTN, medication management.    Educated in proper administration of medication(s) ordered today including safety, possible SE, risks, benefits, rationale  and alternatives to therapy.     Please note that this dictation was created using voice recognition software. I have made every reasonable attempt to correct obvious errors, but I expect that there are errors of grammar and possibly content that I did not discover before finalizing the note.

## 2021-07-19 NOTE — ASSESSMENT & PLAN NOTE
Chronic medical problem.  Her blood pressure is at goal today.  She noticed that as her stress improved her blood pressure improved.  She states at home her blood pressure has been ranging 110's/70's.  She has been taking half dose of amlodipine and half dose of her triamterene-hydrochlorothiazide.  She had recent labs that she would like to review today.

## 2021-07-19 NOTE — ASSESSMENT & PLAN NOTE
Chronic medical problem.  She is taking supplementation.  She had recent labs completed that she would like to review today.  Last lab results:  Results for RM BENÍTEZ (MRN [id number]) as of 7/19/2021 11:40   Ref. Range 7/16/2021 07:39   25-Hydroxy   Vitamin D 25 Latest Ref Range: 30 - 100 ng/mL 38

## 2021-07-19 NOTE — ASSESSMENT & PLAN NOTE
Chronic medical problem.  She had recent labs that she would like to review today.  She states that she did take sublingual vitamin B12 prior to her labs.  Last lab results:  Results for RM BENÍTEZ (MRN [id number]) as of 7/19/2021 11:40   Ref. Range 7/16/2021 07:39   Vitamin B12 -True Cobalamin Latest Ref Range: 211 - 911 pg/mL 2586 (H)

## 2021-07-21 LAB — VIT B1 BLD-MCNC: 179 NMOL/L (ref 70–180)

## 2021-08-16 ENCOUNTER — OFFICE VISIT (OUTPATIENT)
Dept: MEDICAL GROUP | Facility: PHYSICIAN GROUP | Age: 46
End: 2021-08-16
Payer: COMMERCIAL

## 2021-08-16 VITALS
SYSTOLIC BLOOD PRESSURE: 118 MMHG | DIASTOLIC BLOOD PRESSURE: 62 MMHG | TEMPERATURE: 99.9 F | HEART RATE: 88 BPM | RESPIRATION RATE: 16 BRPM | OXYGEN SATURATION: 97 % | WEIGHT: 171 LBS | HEIGHT: 63 IN | BODY MASS INDEX: 30.3 KG/M2

## 2021-08-16 DIAGNOSIS — J45.40 MODERATE PERSISTENT ASTHMA IN ADULT WITHOUT COMPLICATION: ICD-10-CM

## 2021-08-16 DIAGNOSIS — I10 ESSENTIAL HYPERTENSION: ICD-10-CM

## 2021-08-16 DIAGNOSIS — K21.9 GASTROESOPHAGEAL REFLUX DISEASE WITHOUT ESOPHAGITIS: ICD-10-CM

## 2021-08-16 PROCEDURE — 99214 OFFICE O/P EST MOD 30 MIN: CPT | Performed by: NURSE PRACTITIONER

## 2021-08-16 RX ORDER — ESOMEPRAZOLE MAGNESIUM 40 MG/1
40 CAPSULE, DELAYED RELEASE ORAL
Qty: 90 CAPSULE | Refills: 1 | Status: SHIPPED | OUTPATIENT
Start: 2021-08-16 | End: 2021-11-15 | Stop reason: SDUPTHER

## 2021-08-16 RX ORDER — FLUTICASONE FUROATE 200 UG/1
1 POWDER RESPIRATORY (INHALATION) EVERY MORNING
Qty: 90 EACH | Refills: 3 | Status: SHIPPED | OUTPATIENT
Start: 2021-08-16 | End: 2022-03-08

## 2021-08-16 ASSESSMENT — FIBROSIS 4 INDEX: FIB4 SCORE: 0.67

## 2021-08-16 NOTE — ASSESSMENT & PLAN NOTE
Chronic medical problem.  She is using Arnuity Ellipta daily.  She would like a medication refill today.  Denies any new cough or shortness of breath today.

## 2021-08-16 NOTE — PROGRESS NOTES
Subjective:     CC: Hypertension    HPI:   Joycelyn presents today with the following:    Hypertension  Chronic medical problem.  She is taking hydrochlorothiazide 25 mg daily.  She has not needed the amlodipine.  She does not have any tachycardia and her blood pressures have been less than 140/90.  She states that she does not wake up with dry eyes and the voiding is controllable.  She denies any chest pain, shortness of breath, dizziness, blurry vision, or headaches.    Asthma in adult  Chronic medical problem.  She is using Arnuity Ellipta daily.  She would like a medication refill today.  Denies any new cough or shortness of breath today.    Gastroesophageal reflux disease without esophagitis  Chronic medical problem.  She is taking is esomeprazole 20 mg daily.  She has noticed recently the last 2 days that when she lies down she is coughing more.  She has been compliant with her Arnuity Ellipta.  She states that she did tried additional dose of esomeprazole and this did help her symptoms.      Past Medical History:   Diagnosis Date   • Allergy, unspecified not elsewhere classified    • Ankylosing spondylitis of lumbosacral region (HCC) 7/6/2015   • Anxiety 11/24/2015   • GERD (gastroesophageal reflux disease)    • Headache, classical migraine    • Hiatal hernia    • Intractable migraine without aura and without status migrainosus 11/24/2015   • Lupus ([facility])    • Morphea 1/9/2019   • Oropharyngeal dysphagia    • Other forms of systemic lupus erythematosus ([facility]) 10/20/2017   • Overactive bladder    • Peptic ulcer    • Polyuria 7/17/2019   • Prediabetes 12/8/2016   • Vocal cord dysfunction        Social History     Tobacco Use   • Smoking status: Never Smoker   • Smokeless tobacco: Never Used   Vaping Use   • Vaping Use: Never used   Substance Use Topics   • Alcohol use: No     Alcohol/week: 0.0 oz   • Drug use: No       Current Outpatient Medications Ordered in Epic   Medication Sig Dispense Refill   •  esomeprazole (NEXIUM) 40 MG delayed-release capsule Take 1 Capsule by mouth every morning before breakfast. 90 Capsule 1   • ARNUITY ELLIPTA 200 MCG/ACT AEROSOL POWDER, BREATH ACTIVATED Take 1 Puff by mouth every morning. 90 Each 3   • hydroCHLOROthiazide (HYDRODIURIL) 25 MG Tab Take 1 tablet by mouth every day. 30 tablet 2   • DULoxetine HCl 40 MG Cap DR Particles Take 40 mg by mouth every day. 30 capsule 2   • pregabalin (LYRICA) 50 MG capsule Take 50 mg by mouth.     • albuterol 108 (90 Base) MCG/ACT Aero Soln inhalation aerosol Inhale 2 Puffs by mouth every four hours as needed for Shortness of Breath. 3 Each 3   • rizatriptan (MAXALT) 5 MG tablet Take 1 Tab by mouth Once PRN for Migraine for up to 1 dose. 10 Tab 0   • HYDROcodone-acetaminophen 2.5-108 mg/5mL (HYCET) 7.5-325 MG/15ML solution Take 15 mL by mouth 4 times a day as needed for Severe Pain.     • Thiamine HCl (VITAMIN B-1 PO) Take 1 Tab by mouth every morning.     • lidocaine (LIDODERM) 5 % Patch Apply 1 Patch to skin as directed 1 time daily as needed (pain).     • ENBREL SURECLICK 50 MG/ML Solution Auto-injector 1 Each by Injection route every 7 days.     • cyclobenzaprine (FLEXERIL) 10 MG Tab Take 10 mg by mouth at bedtime as needed for Muscle Spasms.     • coenzyme Q-10 30 MG capsule Take 30 mg by mouth every morning.     • cyanocobalamin (VITAMIN B-12) 1000 MCG/ML Solution 1,000 mcg by Injection route every 30 days.  2   • hydroxychloroquine (PLAQUENIL) 200 MG Tab Take 300 mg by mouth every morning.     • EPIPEN 2-NOLAN 0.3 MG/0.3ML Solution Auto-injector solution for injection 0.3 mg by Intramuscular route as needed.       No current Epic-ordered facility-administered medications on file.       Allergies:  Milnacipran, Tramadol, Ciprofloxacin, Metoclopramide, Sulfa drugs, Tetanus antitoxin, Carisoprodol, and Nortriptyline hcl    Health Maintenance: Due for Covid vaccine    ROS:  Per HPI    Objective:     Vital signs reviewed  Exam:  /62 (BP  "Location: Left arm, Patient Position: Sitting, BP Cuff Size: Adult)   Pulse 88   Temp 37.7 °C (99.9 °F) (Temporal)   Resp 16   Ht 1.588 m (5' 2.5\")   Wt 77.6 kg (171 lb)   LMP 03/29/2016   SpO2 97%   BMI 30.78 kg/m²  Body mass index is 30.78 kg/m².    Gen: Alert and oriented, No apparent distress.  Eyes:   Lids normal. Glasses in place.   Lungs: Normal effort, CTA bilaterally, no wheezes, rhonchi, or rales  CV: Regular rate and rhythm. No murmurs, rubs, or gallops.  Ext: No clubbing, cyanosis, edema.      Assessment & Plan:     46 y.o. female with the following -     1. Essential hypertension  Chronic stable problem.  Her blood pressure is at goal today.  Should continue with her hydrochlorothiazide.  She will continue with home blood pressure monitoring.  Red flags discussed.  She will return in 3 months for follow-up.    2. Gastroesophageal reflux disease without esophagitis  Chronic exacerbated problem.  Will increase her esomeprazole to 40 mg daily.  New prescription refill today.  Encourage diet and lifestyle modifications.  - esomeprazole (NEXIUM) 40 MG delayed-release capsule; Take 1 Capsule by mouth every morning before breakfast.  Dispense: 90 Capsule; Refill: 1    3. Moderate persistent asthma in adult without complication  Chronic stable problem.  She will continue with her Arnuity Ellipta.  Medication refilled today.  She requested that this medication be sent to [facility] on [address] per her insurance requirements on her refill.  Red flags discussed.  - ARNUITY ELLIPTA 200 MCG/ACT AEROSOL POWDER, BREATH ACTIVATED; Take 1 Puff by mouth every morning.  Dispense: 90 Each; Refill: 3      Return in about 3 months (around 11/16/2021) for HTN.    Please note that this dictation was created using voice recognition software. I have made every reasonable attempt to correct obvious errors, but I expect that there are errors of grammar and possibly content that I did not discover before finalizing the " note.

## 2021-08-16 NOTE — ASSESSMENT & PLAN NOTE
Chronic medical problem.  She is taking hydrochlorothiazide 25 mg daily.  She has not needed the amlodipine.  She does not have any tachycardia and her blood pressures have been less than 140/90.  She states that she does not wake up with dry eyes and the voiding is controllable.  She denies any chest pain, shortness of breath, dizziness, blurry vision, or headaches.

## 2021-08-16 NOTE — ASSESSMENT & PLAN NOTE
Chronic medical problem.  She is taking is esomeprazole 20 mg daily.  She has noticed recently the last 2 days that when she lies down she is coughing more.  She has been compliant with her Arnuity Ellipta.  She states that she did tried additional dose of esomeprazole and this did help her symptoms.

## 2021-08-27 RX ORDER — TRIAMTERENE AND HYDROCHLOROTHIAZIDE 37.5; 25 MG/1; MG/1
TABLET ORAL
Qty: 90 TABLET | OUTPATIENT
Start: 2021-08-27

## 2021-08-28 NOTE — TELEPHONE ENCOUNTER
Requested Prescriptions     Refused Prescriptions Disp Refills   • triamterene-hctz (MAXZIDE-25/DYAZIDE) 37.5-25 MG Tab [Pharmacy Med Name: TRIAMTERENE 37.5MG/ HCTZ 25MG TABS] 90 Tablet      Sig: TAKE 1 TABLET BY MOUTH EVERY DAY     Refused By: PRINCESS TINEO     Reason for Refusal: Refill not appropriate.     Princess Tineo A.P.R.N.

## 2021-08-30 ENCOUNTER — HOSPITAL ENCOUNTER (OUTPATIENT)
Facility: MEDICAL CENTER | Age: 46
End: 2021-08-30
Attending: PHYSICIAN ASSISTANT
Payer: COMMERCIAL

## 2021-08-30 ENCOUNTER — HOSPITAL ENCOUNTER (OUTPATIENT)
Dept: RADIOLOGY | Facility: MEDICAL CENTER | Age: 46
End: 2021-08-30
Attending: PHYSICIAN ASSISTANT
Payer: COMMERCIAL

## 2021-08-30 ENCOUNTER — HOSPITAL ENCOUNTER (OUTPATIENT)
Dept: LAB | Facility: MEDICAL CENTER | Age: 46
End: 2021-08-30
Attending: PHYSICIAN ASSISTANT
Payer: COMMERCIAL

## 2021-08-30 ENCOUNTER — OFFICE VISIT (OUTPATIENT)
Dept: URGENT CARE | Facility: PHYSICIAN GROUP | Age: 46
End: 2021-08-30
Payer: COMMERCIAL

## 2021-08-30 VITALS
DIASTOLIC BLOOD PRESSURE: 74 MMHG | HEIGHT: 63 IN | HEART RATE: 96 BPM | TEMPERATURE: 98.4 F | BODY MASS INDEX: 29.41 KG/M2 | SYSTOLIC BLOOD PRESSURE: 108 MMHG | OXYGEN SATURATION: 97 % | RESPIRATION RATE: 18 BRPM | WEIGHT: 166 LBS

## 2021-08-30 DIAGNOSIS — J45.909 UNCOMPLICATED ASTHMA, UNSPECIFIED ASTHMA SEVERITY, UNSPECIFIED WHETHER PERSISTENT: ICD-10-CM

## 2021-08-30 DIAGNOSIS — R52 BODY ACHES: ICD-10-CM

## 2021-08-30 DIAGNOSIS — R53.83 OTHER FATIGUE: ICD-10-CM

## 2021-08-30 DIAGNOSIS — R06.02 SOB (SHORTNESS OF BREATH): ICD-10-CM

## 2021-08-30 LAB
ANION GAP SERPL CALC-SCNC: 11 MMOL/L (ref 7–16)
BASOPHILS # BLD AUTO: 1.2 % (ref 0–1.8)
BASOPHILS # BLD: 0.09 K/UL (ref 0–0.12)
BUN SERPL-MCNC: 19 MG/DL (ref 8–22)
CALCIUM SERPL-MCNC: 9.5 MG/DL (ref 8.5–10.5)
CHLORIDE SERPL-SCNC: 102 MMOL/L (ref 96–112)
CO2 SERPL-SCNC: 27 MMOL/L (ref 20–33)
CREAT SERPL-MCNC: 0.74 MG/DL (ref 0.5–1.4)
D DIMER PPP IA.FEU-MCNC: <0.27 UG/ML (FEU) (ref 0–0.5)
EOSINOPHIL # BLD AUTO: 0.23 K/UL (ref 0–0.51)
EOSINOPHIL NFR BLD: 2.9 % (ref 0–6.9)
ERYTHROCYTE [DISTWIDTH] IN BLOOD BY AUTOMATED COUNT: 40.2 FL (ref 35.9–50)
GLUCOSE SERPL-MCNC: 85 MG/DL (ref 65–99)
HCT VFR BLD AUTO: 44.9 % (ref 37–47)
HGB BLD-MCNC: 14.4 G/DL (ref 12–16)
IMM GRANULOCYTES # BLD AUTO: 0.02 K/UL (ref 0–0.11)
IMM GRANULOCYTES NFR BLD AUTO: 0.3 % (ref 0–0.9)
LYMPHOCYTES # BLD AUTO: 2.17 K/UL (ref 1–4.8)
LYMPHOCYTES NFR BLD: 27.8 % (ref 22–41)
MCH RBC QN AUTO: 26.4 PG (ref 27–33)
MCHC RBC AUTO-ENTMCNC: 32.1 G/DL (ref 33.6–35)
MCV RBC AUTO: 82.4 FL (ref 81.4–97.8)
MONOCYTES # BLD AUTO: 0.58 K/UL (ref 0–0.85)
MONOCYTES NFR BLD AUTO: 7.4 % (ref 0–13.4)
NEUTROPHILS # BLD AUTO: 4.72 K/UL (ref 2–7.15)
NEUTROPHILS NFR BLD: 60.4 % (ref 44–72)
NRBC # BLD AUTO: 0 K/UL
NRBC BLD-RTO: 0 /100 WBC
PLATELET # BLD AUTO: 355 K/UL (ref 164–446)
PMV BLD AUTO: 11 FL (ref 9–12.9)
POTASSIUM SERPL-SCNC: 3.5 MMOL/L (ref 3.6–5.5)
RBC # BLD AUTO: 5.45 M/UL (ref 4.2–5.4)
SODIUM SERPL-SCNC: 140 MMOL/L (ref 135–145)
WBC # BLD AUTO: 7.8 K/UL (ref 4.8–10.8)

## 2021-08-30 PROCEDURE — U0003 INFECTIOUS AGENT DETECTION BY NUCLEIC ACID (DNA OR RNA); SEVERE ACUTE RESPIRATORY SYNDROME CORONAVIRUS 2 (SARS-COV-2) (CORONAVIRUS DISEASE [COVID-19]), AMPLIFIED PROBE TECHNIQUE, MAKING USE OF HIGH THROUGHPUT TECHNOLOGIES AS DESCRIBED BY CMS-2020-01-R: HCPCS

## 2021-08-30 PROCEDURE — 85025 COMPLETE CBC W/AUTO DIFF WBC: CPT

## 2021-08-30 PROCEDURE — 85379 FIBRIN DEGRADATION QUANT: CPT

## 2021-08-30 PROCEDURE — 99215 OFFICE O/P EST HI 40 MIN: CPT | Mod: CS | Performed by: PHYSICIAN ASSISTANT

## 2021-08-30 PROCEDURE — 36415 COLL VENOUS BLD VENIPUNCTURE: CPT

## 2021-08-30 PROCEDURE — 71046 X-RAY EXAM CHEST 2 VIEWS: CPT

## 2021-08-30 PROCEDURE — 80048 BASIC METABOLIC PNL TOTAL CA: CPT

## 2021-08-30 PROCEDURE — U0005 INFEC AGEN DETEC AMPLI PROBE: HCPCS

## 2021-08-30 PROCEDURE — 93000 ELECTROCARDIOGRAM COMPLETE: CPT | Performed by: PHYSICIAN ASSISTANT

## 2021-08-30 ASSESSMENT — ENCOUNTER SYMPTOMS
SHORTNESS OF BREATH: 1
COUGH: 0
FATIGUE: 1
MYALGIAS: 1
CHILLS: 1
HEADACHES: 1
PALPITATIONS: 0
EYE DISCHARGE: 0
EYE REDNESS: 0

## 2021-08-30 ASSESSMENT — FIBROSIS 4 INDEX: FIB4 SCORE: 0.67

## 2021-08-30 NOTE — PROGRESS NOTES
"Edvin Byers is a 46 y.o. 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  Patient reports she received her COVID-19 vaccination on 8-22 when 2 days later she developed new fatigue which has progressively worsened since then.  Patient reports over the last 3 days her chills, fatigue, shortness of breath all have worsened.  She does have history of asthma of which she utilized her nebulizer treatment at home a few days ago however reports minimal improvement.  Patient does report notable concern as she had significant increase in her heart rate upwards of 130s to 140s after walking up the stairs which patient does have history of tachycardia however \"not like this \".  Patient does report intermittent left-sided chest discomfort worse with lying down and taking a deep breath.  Patient does report prior history of COVID-19 of which she has been diagnosed twice before.  Denies any ill contacts or known exposure to COVID-19 that she is aware of.  Patient does report history of autoimmune disease of which she does report the fatigue feels similar to a flare however usually will have significant improvement after lying in bed all day of which she has done the last 2 days with minimal improvement.    Fatigue  This is a new problem. The current episode started in the past 7 days. Associated symptoms include chest pain, chills, fatigue, headaches and myalgias. Pertinent negatives include no congestion or coughing. Associated symptoms comments: Prior cough. Treatments tried: As above.       Review of Systems   Constitutional: Positive for chills, fatigue and malaise/fatigue.   HENT: Negative for congestion.    Eyes: Negative for discharge and redness. " "  Respiratory: Positive for shortness of breath. Negative for cough.    Cardiovascular: Positive for chest pain. Negative for palpitations and leg swelling.   Musculoskeletal: Positive for myalgias.   Neurological: Positive for headaches.   All other systems reviewed and are negative.             Objective     /74   Pulse 96   Temp 36.9 °C (98.4 °F)   Resp 18   Ht 1.588 m (5' 2.5\")   Wt 75.3 kg (166 lb)   LMP 03/29/2016   SpO2 97%   BMI 29.88 kg/m²    PMH:  has a past medical history of Allergy, unspecified not elsewhere classified, Ankylosing spondylitis of lumbosacral region ([facility]) (7/6/2015), Anxiety (11/24/2015), GERD (gastroesophageal reflux disease), Headache, classical migraine, Hiatal hernia, Intractable migraine without aura and without status migrainosus (11/24/2015), Lupus ([facility]), Morphea (1/9/2019), Oropharyngeal dysphagia, Other forms of systemic lupus erythematosus ([facility]) (10/20/2017), Overactive bladder, Peptic ulcer, Polyuria (7/17/2019), Prediabetes (12/8/2016), and Vocal cord dysfunction.  MEDS: Reviewed .   ALLERGIES:   Allergies   Allergen Reactions   • Milnacipran Myalgia   • Tramadol Shortness of Breath and Rash     Shortness of breath & rash     • Ciprofloxacin Unspecified     Knee & muscle pain     • Metoclopramide Unspecified     Akathisia     • Sulfa Drugs Nausea     GI upset   • Tetanus Antitoxin Unspecified     Mild spasms and pain     • Carisoprodol Unspecified     Slurred speech, stumbling   • Nortriptyline Hcl Unspecified     detatchment and fatigue.     SURGHX:   Past Surgical History:   Procedure Laterality Date   • HYSTERECTOMY LAPAROSCOPY  2013    utrine and right ovary removed   • HERNIA REPAIR  2007    umblical hernia   • CHOLECYSTECTOMY  2007   • SALPINGO-OOPHORECTOMY, UNILATERAL Right      SOCHX:  reports that she has never smoked. She has never used smokeless tobacco. She reports that she does not drink alcohol and does not use drugs.  FH: Family history was " reviewed, no pertinent findings to report    Physical Exam  Vitals reviewed.   Constitutional:       General: She is not in acute distress.     Appearance: She is well-developed.   HENT:      Head: Normocephalic and atraumatic.      Right Ear: External ear normal.      Left Ear: External ear normal.      Mouth/Throat:      Pharynx: No oropharyngeal exudate.   Eyes:      Conjunctiva/sclera: Conjunctivae normal.      Pupils: Pupils are equal, round, and reactive to light.   Neck:      Trachea: No tracheal deviation.   Cardiovascular:      Rate and Rhythm: Normal rate and regular rhythm.      Heart sounds: No murmur heard.     Pulmonary:      Effort: Pulmonary effort is normal. No respiratory distress.      Breath sounds: Normal breath sounds.   Musculoskeletal:         General: Normal range of motion.      Cervical back: Normal range of motion and neck supple.   Skin:     General: Skin is warm.      Findings: No rash.   Neurological:      Mental Status: She is alert and oriented to person, place, and time.      Coordination: Coordination normal.   Psychiatric:         Behavior: Behavior normal.         Thought Content: Thought content normal.         Judgment: Judgment normal.                             Assessment & Plan        1. SOB (shortness of breath)  - COVID/SARS CoV-2 PCR; Future  - DX-CHEST-2 VIEWS; Future  - EKG - Clinic Performed  - CBC WITH DIFFERENTIAL; Future  - Basic Metabolic Panel; Future  - D-DIMER; Future    2. Uncomplicated asthma, unspecified asthma severity,   - COVID/SARS CoV-2 PCR; Future  - DX-CHEST-2 VIEWS; Future  - EKG - Clinic Performed  - CBC WITH DIFFERENTIAL; Future  - Basic Metabolic Panel; Future  - D-DIMER; Future    3. Body aches  - COVID/SARS CoV-2 PCR; Future  - DX-CHEST-2 VIEWS; Future  - EKG - Clinic Performed  - CBC WITH DIFFERENTIAL; Future  - Basic Metabolic Panel; Future  - D-DIMER; Future    4. Other fatigue            EKG: Normal sinus rhythm at a rate of 71, without  acute ST changes, normal axis-compared to 3-3-2020.  No new changes.    Patient with a complex history of autoimmune issues along with 2 previous positives COVID-19 infection with recent immunization.  Could be related to progression of immune response to vaccination although patient appears to be with worsening shortness of breath, chills and new symptoms over the last few days.  Will send off for COVID-19 at this time also will order the above blood [id number] also was ordered to screen for potential PE as patient has had chest discomfort, shortness of breath in particular with exertion along with remote history of tachycardia.  Patient is well-appearing on exam without adventitious breath sounds.  Will attempt outpatient work-up at this time however very strict precautions were further discussed with the patient today.  I will follow-up with this patient via MyChart as results return of blood work along with chest x-ray as well.    Appropriate PPE worn at all times by provider.   Pt. Had face mask on throughout entirety of the visit other than oropharyngeal examination today.     Side effects of OTC or prescribed medications discussed.     DDX, Supportive care, and indications for immediate follow-up discussed with patient.    Instructed to return to clinic or nearest emergency department if we are not available for any change in condition, further concerns, or worsening of symptoms.    The patient and/or guardian demonstrated a good understanding and agreed with the treatment plan.  At timing of this note longer than 40 minutes was utilized discussing the above with the patient, review of EKG,  Previous history, reviewing x-ray and laboratory testing along with discussing further plan if abnormal testing returns.    Please note that this dictation was created using voice recognition software. I have made every reasonable attempt to correct obvious errors, but I expect that there are errors of grammar and  possibly content that I did not discover before finalizing the note.

## 2021-08-31 DIAGNOSIS — J45.909 UNCOMPLICATED ASTHMA, UNSPECIFIED ASTHMA SEVERITY, UNSPECIFIED WHETHER PERSISTENT: ICD-10-CM

## 2021-08-31 DIAGNOSIS — R06.02 SOB (SHORTNESS OF BREATH): ICD-10-CM

## 2021-08-31 DIAGNOSIS — R52 BODY ACHES: ICD-10-CM

## 2021-08-31 LAB — COVID ORDER STATUS COVID19: NORMAL

## 2021-09-01 LAB
SARS-COV-2 RNA RESP QL NAA+PROBE: NOTDETECTED
SPECIMEN SOURCE: NORMAL

## 2021-09-10 ENCOUNTER — OFFICE VISIT (OUTPATIENT)
Dept: MEDICAL GROUP | Facility: PHYSICIAN GROUP | Age: 46
End: 2021-09-10
Payer: COMMERCIAL

## 2021-09-10 VITALS
SYSTOLIC BLOOD PRESSURE: 114 MMHG | TEMPERATURE: 98 F | OXYGEN SATURATION: 97 % | DIASTOLIC BLOOD PRESSURE: 82 MMHG | HEART RATE: 92 BPM | WEIGHT: 169 LBS | HEIGHT: 63 IN | BODY MASS INDEX: 29.95 KG/M2 | RESPIRATION RATE: 16 BRPM

## 2021-09-10 DIAGNOSIS — M45.7 ANKYLOSING SPONDYLITIS OF LUMBOSACRAL REGION (HCC): ICD-10-CM

## 2021-09-10 PROCEDURE — 99214 OFFICE O/P EST MOD 30 MIN: CPT | Performed by: NURSE PRACTITIONER

## 2021-09-10 RX ORDER — METHYLPREDNISOLONE 4 MG/1
TABLET ORAL
Qty: 21 TABLET | Refills: 0 | Status: SHIPPED | OUTPATIENT
Start: 2021-09-10 | End: 2021-11-15

## 2021-09-10 ASSESSMENT — FIBROSIS 4 INDEX: FIB4 SCORE: 0.61

## 2021-09-10 NOTE — LETTER
[facility]  [address] [address]     September 10, 2021    Patient: Joycelyn Byers   YOB: 1975   Date of Visit: 9/10/2021       To Whom It May Concern:    Joycelyn Byers was seen and treated in our department on 9/10/2021. 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       Sincerely,         CURTIS Loving.

## 2021-09-10 NOTE — ASSESSMENT & PLAN NOTE
Chronic medical problem.  She reports that she received COVID vaccine 3 weeks and joined a gym.  She states that since then she has been in a flare.  She has been having malaise and joint pain that has not resolved.  She states that her last flare was 6 months ago.  She is asking for medication to help with her flare today and a note so she can get out of her gym contract as she is not able to medically use the gym with her current flares.  She does have history of systemic lupus erythematous.

## 2021-09-10 NOTE — PROGRESS NOTES
Subjective:     CC: Autoimmune flare    HPI:   Joycelyn presents today with the following:    Ankylosing spondylitis of lumbosacral region ([facility])  Chronic medical problem.  She reports that she received COVID vaccine 3 weeks and joined a gym.  She states that since then she has been in a flare.  She has been having malaise and joint pain that has not resolved.  She states that her last flare was 6 months ago.  She is asking for medication to help with her flare today and a note so she can get out of her gym contract as she is not able to medically use the gym with her current flares.  She does have history of systemic lupus erythematous.      Past Medical History:   Diagnosis Date   • Allergy, unspecified not elsewhere classified    • Ankylosing spondylitis of lumbosacral region ([facility]) 7/6/2015   • Anxiety 11/24/2015   • GERD (gastroesophageal reflux disease)    • Headache, classical migraine    • Hiatal hernia    • Intractable migraine without aura and without status migrainosus 11/24/2015   • Lupus ([facility])    • Morphea 1/9/2019   • Oropharyngeal dysphagia    • Other forms of systemic lupus erythematosus ([facility]) 10/20/2017   • Overactive bladder    • Peptic ulcer    • Polyuria 7/17/2019   • Prediabetes 12/8/2016   • Vocal cord dysfunction        Social History     Tobacco Use   • Smoking status: Never Smoker   • Smokeless tobacco: Never Used   Vaping Use   • Vaping Use: Never used   Substance Use Topics   • Alcohol use: No     Alcohol/week: 0.0 oz   • Drug use: No       Current Outpatient Medications Ordered in Epic   Medication Sig Dispense Refill   • methylPREDNISolone (MEDROL DOSEPAK) 4 MG Tablet Therapy Pack As directed on the packaging label. 21 Tablet 0   • esomeprazole (NEXIUM) 40 MG delayed-release capsule Take 1 Capsule by mouth every morning before breakfast. 90 Capsule 1   • ARNUITY ELLIPTA 200 MCG/ACT AEROSOL POWDER, BREATH ACTIVATED Take 1 Puff by mouth every morning. 90 Each 3   • hydroCHLOROthiazide  (HYDRODIURIL) 25 MG Tab Take 1 tablet by mouth every day. 30 tablet 2   • DULoxetine HCl 40 MG Cap DR Particles Take 40 mg by mouth every day. 30 capsule 2   • pregabalin (LYRICA) 50 MG capsule Take 50 mg by mouth.     • albuterol 108 (90 Base) MCG/ACT Aero Soln inhalation aerosol Inhale 2 Puffs by mouth every four hours as needed for Shortness of Breath. 3 Each 3   • rizatriptan (MAXALT) 5 MG tablet Take 1 Tab by mouth Once PRN for Migraine for up to 1 dose. 10 Tab 0   • HYDROcodone-acetaminophen 2.5-108 mg/5mL (HYCET) 7.5-325 MG/15ML solution Take 15 mL by mouth 4 times a day as needed for Severe Pain.     • Thiamine HCl (VITAMIN B-1 PO) Take 1 Tab by mouth every morning.     • lidocaine (LIDODERM) 5 % Patch Apply 1 Patch to skin as directed 1 time daily as needed (pain).     • ENBREL SURECLICK 50 MG/ML Solution Auto-injector 1 Each by Injection route every 7 days.     • cyclobenzaprine (FLEXERIL) 10 MG Tab Take 10 mg by mouth at bedtime as needed for Muscle Spasms.     • coenzyme Q-10 30 MG capsule Take 30 mg by mouth every morning.     • cyanocobalamin (VITAMIN B-12) 1000 MCG/ML Solution 1,000 mcg by Injection route every 30 days.  2   • hydroxychloroquine (PLAQUENIL) 200 MG Tab Take 300 mg by mouth every morning.     • EPIPEN 2-NOLAN 0.3 MG/0.3ML Solution Auto-injector solution for injection 0.3 mg by Intramuscular route as needed.       No current Epic-ordered facility-administered medications on file.       Allergies:  Milnacipran, Tramadol, Ciprofloxacin, Metoclopramide, Sulfa drugs, Tetanus antitoxin, Carisoprodol, and Nortriptyline hcl    Health Maintenance: Due for influenza vaccine.  Vaccine is unavailable today.    ROS:  Gen: no fevers/chills,+ malaise  Pulm: no shortness of breath  CV: no chest pain, no palpitations  GI: no nausea/vomiting, no diarrhea  : no dysuria  MSk: + Joint pain  Skin: no rash  Neuro: no headaches, no numbness/tingling    Objective:     Vital signs reviewed  Exam:  /82 (BP  "Location: Right arm, Patient Position: Sitting, BP Cuff Size: Adult)   Pulse 92   Temp 36.7 °C (98 °F) (Temporal)   Resp 16   Ht 1.588 m (5' 2.5\")   Wt 76.7 kg (169 lb)   LMP 03/29/2016   SpO2 97%   BMI 30.42 kg/m²  Body mass index is 30.42 kg/m².    Gen: Alert and oriented, No apparent distress.  Eyes:   Lids normal. Glasses in place.   Lungs: Normal effort, CTA bilaterally, no wheezes, rhonchi, or rales  CV: Regular rate and rhythm. No murmurs, rubs, or gallops.  Ext: No clubbing, cyanosis, edema.      Assessment & Plan:     46 y.o. female with the following -     1. Ankylosing spondylitis of lumbosacral region (HCC)  Chronic exacerbated problem.  Given her recent flare we will start her on Medrol Dosepak.  Note provided today stating that she is medically able to use her gym.  Red flags discussed.  - methylPREDNISolone (MEDROL DOSEPAK) 4 MG Tablet Therapy Pack; As directed on the packaging label.  Dispense: 21 Tablet; Refill: 0      Return if symptoms worsen or fail to improve.    Please note that this dictation was created using voice recognition software. I have made every reasonable attempt to correct obvious errors, but I expect that there are errors of grammar and possibly content that I did not discover before finalizing the note.      "

## 2021-09-17 ENCOUNTER — HOSPITAL ENCOUNTER (OUTPATIENT)
Facility: MEDICAL CENTER | Age: 46
End: 2021-09-17
Attending: PHYSICIAN ASSISTANT
Payer: COMMERCIAL

## 2021-09-17 PROCEDURE — U0003 INFECTIOUS AGENT DETECTION BY NUCLEIC ACID (DNA OR RNA); SEVERE ACUTE RESPIRATORY SYNDROME CORONAVIRUS 2 (SARS-COV-2) (CORONAVIRUS DISEASE [COVID-19]), AMPLIFIED PROBE TECHNIQUE, MAKING USE OF HIGH THROUGHPUT TECHNOLOGIES AS DESCRIBED BY CMS-2020-01-R: HCPCS

## 2021-09-17 PROCEDURE — U0005 INFEC AGEN DETEC AMPLI PROBE: HCPCS

## 2021-09-18 LAB — COVID ORDER STATUS COVID19: NORMAL

## 2021-09-19 ENCOUNTER — HOSPITAL ENCOUNTER (OUTPATIENT)
Facility: MEDICAL CENTER | Age: 46
End: 2021-09-19
Attending: PHYSICIAN ASSISTANT
Payer: COMMERCIAL

## 2021-09-19 ENCOUNTER — HOSPITAL ENCOUNTER (OUTPATIENT)
Dept: RADIOLOGY | Facility: MEDICAL CENTER | Age: 46
End: 2021-09-19
Attending: PHYSICIAN ASSISTANT
Payer: COMMERCIAL

## 2021-09-19 ENCOUNTER — OFFICE VISIT (OUTPATIENT)
Dept: URGENT CARE | Facility: PHYSICIAN GROUP | Age: 46
End: 2021-09-19
Payer: COMMERCIAL

## 2021-09-19 VITALS
BODY MASS INDEX: 31.1 KG/M2 | TEMPERATURE: 98.5 F | WEIGHT: 169 LBS | SYSTOLIC BLOOD PRESSURE: 112 MMHG | HEIGHT: 62 IN | HEART RATE: 92 BPM | DIASTOLIC BLOOD PRESSURE: 60 MMHG | OXYGEN SATURATION: 100 % | RESPIRATION RATE: 16 BRPM

## 2021-09-19 DIAGNOSIS — R10.9 FLANK PAIN: ICD-10-CM

## 2021-09-19 DIAGNOSIS — N39.0 URINARY TRACT INFECTION WITHOUT HEMATURIA, SITE UNSPECIFIED: ICD-10-CM

## 2021-09-19 LAB
APPEARANCE UR: NORMAL
BILIRUB UR STRIP-MCNC: NEGATIVE MG/DL
COLOR UR AUTO: NORMAL
GLUCOSE UR STRIP.AUTO-MCNC: NEGATIVE MG/DL
KETONES UR STRIP.AUTO-MCNC: NEGATIVE MG/DL
LEUKOCYTE ESTERASE UR QL STRIP.AUTO: NORMAL
NITRITE UR QL STRIP.AUTO: NEGATIVE
PH UR STRIP.AUTO: 6 [PH] (ref 5–8)
PROT UR QL STRIP: 30 MG/DL
RBC UR QL AUTO: NORMAL
SARS-COV-2 RNA RESP QL NAA+PROBE: NOTDETECTED
SP GR UR STRIP.AUTO: 1.03
SPECIMEN SOURCE: NORMAL
UROBILINOGEN UR STRIP-MCNC: 0.2 MG/DL

## 2021-09-19 PROCEDURE — 99214 OFFICE O/P EST MOD 30 MIN: CPT | Performed by: PHYSICIAN ASSISTANT

## 2021-09-19 PROCEDURE — 74176 CT ABD & PELVIS W/O CONTRAST: CPT

## 2021-09-19 PROCEDURE — 81002 URINALYSIS NONAUTO W/O SCOPE: CPT | Performed by: PHYSICIAN ASSISTANT

## 2021-09-19 PROCEDURE — 87086 URINE CULTURE/COLONY COUNT: CPT

## 2021-09-19 RX ORDER — CEFDINIR 300 MG/1
300 CAPSULE ORAL EVERY 12 HOURS
Qty: 14 CAPSULE | Refills: 0 | Status: SHIPPED | OUTPATIENT
Start: 2021-09-19 | End: 2021-09-26

## 2021-09-19 ASSESSMENT — ENCOUNTER SYMPTOMS
NAUSEA: 1
CONSTIPATION: 0
VOMITING: 0
HEARTBURN: 0
PALPITATIONS: 0
SHORTNESS OF BREATH: 0
FEVER: 0
ABDOMINAL PAIN: 1
COUGH: 0
BLOOD IN STOOL: 0
FLANK PAIN: 1
DIAPHORESIS: 0
CHILLS: 1
MYALGIAS: 0
WEAKNESS: 0
HEADACHES: 1
WEIGHT LOSS: 0
DIARRHEA: 0
DIZZINESS: 1

## 2021-09-19 ASSESSMENT — PAIN SCALES - GENERAL: PAINLEVEL: 5=MODERATE PAIN

## 2021-09-19 ASSESSMENT — FIBROSIS 4 INDEX: FIB4 SCORE: 0.61

## 2021-09-19 NOTE — PROGRESS NOTES
Subjective:   Joycelyn Byers is a 46 y.o. female who presents for Abdominal Pain (Kidney stones, pt has history of these along with an auto immue diease. Lower back pain raidiating into R upper quad pain )      HPI:  This is a very pleasant 46-year-old female presented clinic with bilateral flank pain, abdominal pain and urinary frequency/urgency x4 days.  Patient informs me she does have a past medical history significant for recurrent kidney stones.  She has been able to pass all her previous stones without intervention.  States over the last 4 days the pain has gradually worsened.  Pain starts in the flank and radiates to the abdomen bilaterally.  States she has felt nauseous.  Denies any episodes of vomiting.  She also feels extremely fatigued and weak.  Denies any dysuria or hematuria.  Does not believe she has been running a fever.  Has been tolerating oral intake.  Currently pain is moderate.  She took liquid hydrocodone this morning for the pain which provided moderate relief.    Review of Systems   Constitutional: Positive for chills and malaise/fatigue. Negative for diaphoresis, fever and weight loss.   Respiratory: Negative for cough and shortness of breath.    Cardiovascular: Negative for chest pain and palpitations.   Gastrointestinal: Positive for abdominal pain and nausea. Negative for blood in stool, constipation, diarrhea, heartburn, melena and vomiting.   Genitourinary: Positive for flank pain and frequency. Negative for dysuria, hematuria and urgency.   Musculoskeletal: Negative for myalgias.   Skin: Negative for rash.   Neurological: Positive for dizziness and headaches. Negative for weakness.       Medications:    • albuterol Aers  • Arnuity Ellipta Aepb  • cefdinir Caps  • ceftriaxone (ROCEPHIN) 1g - lidocaine 1% 3.6 mL for IM use  • coenzyme Q-10  • cyanocobalamin Soln  • cyclobenzaprine Tabs  • DULoxetine HCl Cpep  • Enbrel SureClick Soaj  • EpiPen 2-Abelino  "Soaj  • esomeprazole  • hydroCHLOROthiazide Tabs  • HYDROcodone-acetaminophen 2.5-108 mg/5mL  • hydroxychloroquine Tabs  • lidocaine Ptch  • methylPREDNISolone Tbpk  • pregabalin  • rizatriptan  • VITAMIN B-1 PO    Allergies: Milnacipran, Tramadol, Ciprofloxacin, Metoclopramide, Sulfa drugs, Tetanus antitoxin, Carisoprodol, and Nortriptyline hcl    Problem List: Joycelyn Byers does not have any pertinent problems on file.    Surgical History:  Past Surgical History:   Procedure Laterality Date   • HYSTERECTOMY LAPAROSCOPY  2013    utrine and right ovary removed   • HERNIA REPAIR  2007    umblical hernia   • CHOLECYSTECTOMY  2007   • SALPINGO-OOPHORECTOMY, UNILATERAL Right        Past Social Hx: Joycelyn Byers  reports that she has never smoked. She has never used smokeless tobacco. She reports that she does not drink alcohol and does not use drugs.     Past Family Hx:  Joycelyn Byers family history includes Alcohol/Drug in her paternal grandfather; Arthritis in her brother and maternal grandmother; Cancer in her paternal aunt; Diabetes in her father, paternal grandmother, and paternal uncle; Diabetes (age of onset: 60) in her paternal aunt; GI Disease in her daughter, daughter, daughter, and son; Heart Disease in her father; Hypertension in her father; Other in her father and mother; Psoriasis in her brother; Psychiatric Illness in her father and mother; Stroke in her maternal grandfather.     Problem list, medications, and allergies reviewed by myself today in Epic.     Objective:     /60   Pulse 92   Temp 36.9 °C (98.5 °F) (Temporal)   Resp 16   Ht 1.575 m (5' 2\")   Wt 76.7 kg (169 lb)   LMP 03/29/2016   SpO2 100%   BMI 30.91 kg/m²     Physical Exam  Constitutional:       General: She is not in acute distress.     Appearance: Normal appearance. She is ill-appearing. She is not toxic-appearing or diaphoretic.   HENT:      Head: Normocephalic and atraumatic.      " Mouth/Throat:      Mouth: Mucous membranes are moist.      Pharynx: No oropharyngeal exudate or posterior oropharyngeal erythema.   Eyes:      Conjunctiva/sclera: Conjunctivae normal.   Cardiovascular:      Rate and Rhythm: Normal rate and regular rhythm.      Pulses: Normal pulses.      Heart sounds: Normal heart sounds.   Pulmonary:      Effort: Pulmonary effort is normal.      Breath sounds: Normal breath sounds. No wheezing.   Abdominal:      General: Bowel sounds are normal. There is no distension.      Palpations: Abdomen is soft. There is no mass.      Tenderness: There is abdominal tenderness. There is right CVA tenderness and left CVA tenderness. There is no guarding or rebound.      Hernia: No hernia is present.      Comments: Mild lower abdominal and suprapubic tenderness to palpation   Musculoskeletal:         General: Normal range of motion.      Cervical back: Normal range of motion. No muscular tenderness.   Lymphadenopathy:      Cervical: No cervical adenopathy.   Skin:     General: Skin is warm and dry.      Capillary Refill: Capillary refill takes less than 2 seconds.   Neurological:      General: No focal deficit present.      Mental Status: She is alert and oriented to person, place, and time. Mental status is at baseline.   Psychiatric:         Mood and Affect: Mood normal.         Thought Content: Thought content normal.       Lab Results/POC Test Results   Results for orders placed or performed in visit on 09/19/21   POCT Urinalysis   Result Value Ref Range    POC Color Dark Yellow Negative    POC Appearance Cloudy Negative    POC Leukocyte Esterase Small Negative    POC Nitrites Negative Negative    POC Urobiligen 0.2 Negative (0.2) mg/dL    POC Protein 30 Negative mg/dL    POC Urine PH 6.0 5.0 - 8.0    POC Blood Trace Negative    POC Specific Gravity 1.030 <1.005 - >1.030    POC Ketones Negative Negative mg/dL    POC Bilirubin Negative Negative mg/dL    POC Glucose Negative Negative mg/dL          RADIOLOGY RESULTS   CT-RENAL COLIC EVALUATION(A/P W/O)    Result Date: 9/19/2021 9/19/2021 12:15 PM HISTORY/REASON FOR EXAM:  Urinary tract stone, symptomatic/complicated; Flank pain. TECHNIQUE/EXAM DESCRIPTION AND NUMBER OF VIEWS: CT scan renal/colic without contrast. 5 mm helical images of the abdomen and pelvis were obtained from the diaphragmatic domes through the pubic symphysis. Low dose optimization technique was utilized for this CT exam including automated exposure control and adjustment of the mA and/or kV according to patient size. COMPARISON: 6/26/2020 FINDINGS: Chest Base: Lung bases are clear. Liver:  Normal. Gallbladder: Gallbladder is surgically absent. Biliary tract: Nondilated. Pancreas: Normal. Spleen: Normal. Adrenals: Normal. Kidneys and Collecting Systems:  Normal. Gastrointestinal tract:  No bowel obstruction. The appendix is normal. Peritoneum: No free air or free fluid. Reproductive organs:  Prior hysterectomy. No significant adnexal mass. Bladder:  Bladder is decompressed. Vessels:  Normal caliber. Lymph  Nodes:  No lymphadenopathy. Abdominal wall: Small fat-containing umbilical hernia. Bones:  No acute or aggressive abnormality.     1.  No renal stones or hydronephrosis. 2.  Prior cholecystectomy.             Assessment/Plan:     Comments/MDM:     • Differential diagnoses and treatment options were discussed with patient at length today.  Differentials at this time include UTI versus pyelonephritis versus nephrolithiasis versus infected stone.  Urinalysis results above.  We will send her urine for culture and follow-up once available.  Empiric antibiotics initiated at this time.  1 g Rocephin provided in clinic.  We will also perform imaging to rule out potential infected stone.  We will follow-up with the patient once imaging studies are complete.  ER precautions discussed for any red flag symptoms.     Diagnosis and associated orders:     1. Flank pain  CT-RENAL COLIC  EVALUATION(A/P W/O)    POCT Urinalysis    URINE CULTURE(NEW)    cefTRIAXone (Rocephin) 1 g, lidocaine (XYLOCAINE) 1 % 3.6 mL for IM use    cefdinir (OMNICEF) 300 MG Cap   2. Urinary tract infection without hematuria, site unspecified  cefdinir (OMNICEF) 300 MG Cap            Differential diagnosis, natural history, supportive care, and indications for immediate follow-up discussed.    Advised the patient to follow-up with the primary care physician for recheck, reevaluation, and consideration of further management.    Please note that this dictation was created using voice recognition software. I have made reasonable attempt to correct obvious errors, but I expect that there are errors of grammar and possibly content that I did not discover before finalizing the note.    This note was electronically signed by KATERINE Posada PA-C

## 2021-09-21 LAB
BACTERIA UR CULT: NORMAL
SIGNIFICANT IND 70042: NORMAL
SITE SITE: NORMAL
SOURCE SOURCE: NORMAL

## 2021-09-26 ENCOUNTER — TELEMEDICINE (OUTPATIENT)
Dept: TELEHEALTH | Facility: TELEMEDICINE | Age: 46
End: 2021-09-26
Payer: COMMERCIAL

## 2021-09-26 DIAGNOSIS — R80.9 PROTEINURIA, UNSPECIFIED TYPE: ICD-10-CM

## 2021-09-26 DIAGNOSIS — T78.40XA ALLERGIC REACTION, INITIAL ENCOUNTER: ICD-10-CM

## 2021-09-26 PROCEDURE — 99214 OFFICE O/P EST MOD 30 MIN: CPT | Mod: 95 | Performed by: FAMILY MEDICINE

## 2021-09-26 NOTE — PROGRESS NOTES
Virtual Visit: Established Patient   This visit was conducted via Zoom using secure and encrypted videoconferencing technology.   The patient was in a private location in the state of [place].    The patient's identity was confirmed and verbal consent was obtained for this virtual visit.    Subjective:   CC: No chief complaint on file.      Joycelyn Byers is a 46 y.o. female presenting for evaluation and management of:    Onset 2 days itching rash bilateral UE and face. Suspect triggered by cefdinir rx for possible UTI. Rash improved with stopping cefdinir and using benadryl. Urinary urgency improved. Bilateral flank pain resolved. She does have PMH kidney stone. No other aggravating or alleviating factors.        ROS       Current medicines (including changes today)  Current Outpatient Medications   Medication Sig Dispense Refill   • cefdinir (OMNICEF) 300 MG Cap Take 1 Capsule by mouth every 12 hours for 7 days. 14 Capsule 0   • methylPREDNISolone (MEDROL DOSEPAK) 4 MG Tablet Therapy Pack As directed on the packaging label. 21 Tablet 0   • esomeprazole (NEXIUM) 40 MG delayed-release capsule Take 1 Capsule by mouth every morning before breakfast. 90 Capsule 1   • ARNUITY ELLIPTA 200 MCG/ACT AEROSOL POWDER, BREATH ACTIVATED Take 1 Puff by mouth every morning. 90 Each 3   • hydroCHLOROthiazide (HYDRODIURIL) 25 MG Tab Take 1 tablet by mouth every day. 30 tablet 2   • DULoxetine HCl 40 MG Cap DR Particles Take 40 mg by mouth every day. 30 capsule 2   • pregabalin (LYRICA) 50 MG capsule Take 50 mg by mouth.     • albuterol 108 (90 Base) MCG/ACT Aero Soln inhalation aerosol Inhale 2 Puffs by mouth every four hours as needed for Shortness of Breath. 3 Each 3   • rizatriptan (MAXALT) 5 MG tablet Take 1 Tab by mouth Once PRN for Migraine for up to 1 dose. 10 Tab 0   • HYDROcodone-acetaminophen 2.5-108 mg/5mL (HYCET) 7.5-325 MG/15ML solution Take 15 mL by mouth 4 times a day as needed for Severe Pain.     • Thiamine  HCl (VITAMIN B-1 PO) Take 1 Tab by mouth every morning.     • lidocaine (LIDODERM) 5 % Patch Apply 1 Patch to skin as directed 1 time daily as needed (pain).     • ENBREL SURECLICK 50 MG/ML Solution Auto-injector 1 Each by Injection route every 7 days.     • cyclobenzaprine (FLEXERIL) 10 MG Tab Take 10 mg by mouth at bedtime as needed for Muscle Spasms.     • coenzyme Q-10 30 MG capsule Take 30 mg by mouth every morning.     • cyanocobalamin (VITAMIN B-12) 1000 MCG/ML Solution 1,000 mcg by Injection route every 30 days.  2   • hydroxychloroquine (PLAQUENIL) 200 MG Tab Take 300 mg by mouth every morning.     • EPIPEN 2-NOLAN 0.3 MG/0.3ML Solution Auto-injector solution for injection 0.3 mg by Intramuscular route as needed.       No current facility-administered medications for this visit.       Patient Active Problem List    Diagnosis Date Noted   • Vitamin B 12 deficiency 06/07/2021   • Gastroesophageal reflux disease without esophagitis 06/07/2021   • Elevated hemoglobin A1c 06/07/2021   • Benign cyst of left breast 06/07/2021   • History of renal calculi 12/30/2020   • Overweight 03/03/2020   • Hyperglycemia 03/03/2020   • Hypercholesterolemia 03/03/2020   • Prolonged Q-T interval on ECG 03/03/2020   • Hypertension 01/06/2020   • Hypokalemia 01/06/2020   • Other fatigue 09/09/2019   • Morphea 01/09/2019   • Vitamin D deficiency 05/22/2018   • SVT (supraventricular tachycardia) ([facility]) 03/23/2018   • Other forms of systemic lupus erythematosus ([facility]) 10/20/2017   • Chronic constipation 12/15/2016   • CNS demyelinating disease ([facility]) 04/19/2016   • Vocal cord dysfunction 04/19/2016   • Multiple allergies 12/21/2015   • Anxiety 11/24/2015   • Intractable migraine without aura and without status migrainosus 11/24/2015   • Ankylosing spondylitis of lumbosacral region ([facility]) 07/06/2015   • Overactive bladder 04/17/2015   • Asthma in adult 01/14/2015        Objective:   LMP 03/29/2016     Physical Exam:  Constitutional: Alert,  no distress, well-groomed.  Skin: resolving pruritic rash bilateral upper extremity  Eye: Round. Conjunctiva clear, lids normal. No icterus.   ENMT: Lips pink without lesions, good dentition, moist mucous membranes. Phonation normal.  Neck: No masses, no thyromegaly. Moves freely without pain.  Respiratory: Unlabored respiratory effort, no cough or audible wheeze  Psych: Alert and oriented x3, normal affect and mood.     Assessment and Plan:   Urgent care note 9/19/2021 reviewed  Urine culture 9/19/2021 reviewed  Urinalysis 9/19/2021 reviewed    1. Allergic reaction, initial encounter     2. Proteinuria, unspecified type  URINALYSIS,CULTURE IF INDICATED    Comp Metabolic Panel     Differential diagnosis, natural history, supportive care, and indications for immediate follow-up discussed at length.     Continue to hold cefdinir    We will repeat urinalysis and follow-up laboratory studies.

## 2021-10-09 ENCOUNTER — OFFICE VISIT (OUTPATIENT)
Dept: URGENT CARE | Facility: PHYSICIAN GROUP | Age: 46
End: 2021-10-09
Payer: COMMERCIAL

## 2021-10-09 VITALS
WEIGHT: 165 LBS | OXYGEN SATURATION: 99 % | BODY MASS INDEX: 30.36 KG/M2 | HEART RATE: 84 BPM | HEIGHT: 62 IN | RESPIRATION RATE: 16 BRPM | SYSTOLIC BLOOD PRESSURE: 110 MMHG | DIASTOLIC BLOOD PRESSURE: 72 MMHG | TEMPERATURE: 97.3 F

## 2021-10-09 DIAGNOSIS — R05.3 CHRONIC COUGH: ICD-10-CM

## 2021-10-09 DIAGNOSIS — J45.909 UNCOMPLICATED ASTHMA, UNSPECIFIED ASTHMA SEVERITY, UNSPECIFIED WHETHER PERSISTENT: ICD-10-CM

## 2021-10-09 PROCEDURE — 99214 OFFICE O/P EST MOD 30 MIN: CPT | Performed by: PHYSICIAN ASSISTANT

## 2021-10-09 ASSESSMENT — ENCOUNTER SYMPTOMS
DIARRHEA: 0
VOMITING: 0
MYALGIAS: 0
COUGH: 1
SHORTNESS OF BREATH: 1
WHEEZING: 0
ABDOMINAL PAIN: 0
PALPITATIONS: 0
SPUTUM PRODUCTION: 0
FEVER: 0

## 2021-10-09 ASSESSMENT — FIBROSIS 4 INDEX: FIB4 SCORE: 0.61

## 2021-10-09 NOTE — PROGRESS NOTES
Subjective     Joycelyn Byers is a 46 y.o. female who presents with Cough (sob, pinching chest pain on and off, x6 months)          Patient is a 46-year-old female who presents to urgent care with persistent cough lasting for the last 6 months.  Patient reports she has had cough since her second COVID-19 infection.  She admits that the cough started in April and since has persisted-patient does have history of reflux, asthma along with history of pneumonia in the past.  Patient reports she has been compliant with reflux medication along with her Arnuity-with remote history in August of being on a steroid none of which appear to be helping with intermittent cough, shortness of breath and chest tightness.  Patient reports that her dry cough is intermittent and will wax and wane in nature.  She reports that she will go through flares of such of which she does report that Tessalon Perles to quiet down her cough.  She reports recently taking a trip to [place] of which the change of climate-scenery did not make a difference to her cough.  She does report coughing episodes that will cause gagging.  She does report remote history of productive cough after such episode with blood-tinged sputum-none since.    Cough  This is a chronic problem. The current episode started more than 1 month ago. The problem has been waxing and waning. The cough is non-productive (Rare sputum production- most recent with blood-tinged sputum after coughing episode. ). Associated symptoms include shortness of breath. Pertinent negatives include no fever, myalgias or wheezing. The symptoms are aggravated by lying down (Nighttime).       Review of Systems   Constitutional: Negative for fever.   Respiratory: Positive for cough and shortness of breath. Negative for sputum production and wheezing.    Cardiovascular: Negative for palpitations and leg swelling.   Gastrointestinal: Negative for abdominal pain, diarrhea and vomiting.  "  Musculoskeletal: Negative for myalgias.   All other systems reviewed and are negative.             Objective     /72   Pulse 84   Temp 36.3 °C (97.3 °F) (Temporal)   Resp 16   Ht 1.575 m (5' 2\")   Wt 74.8 kg (165 lb)   LMP 03/29/2016   SpO2 99%   BMI 30.18 kg/m²    PMH:  has a past medical history of Allergy, unspecified not elsewhere classified, Ankylosing spondylitis of lumbosacral region ([facility]) (7/6/2015), Anxiety (11/24/2015), GERD (gastroesophageal reflux disease), Headache, classical migraine, Hiatal hernia, Intractable migraine without aura and without status migrainosus (11/24/2015), Lupus ([facility]), Morphea (1/9/2019), Oropharyngeal dysphagia, Other forms of systemic lupus erythematosus ([facility]) (10/20/2017), Overactive bladder, Peptic ulcer, Polyuria (7/17/2019), Prediabetes (12/8/2016), and Vocal cord dysfunction.  MEDS: Reviewed .   ALLERGIES:   Allergies   Allergen Reactions   • Milnacipran Myalgia   • Omnicap Rash     Severe diarrhea   • Tramadol Shortness of Breath and Rash     Shortness of breath & rash     • Ciprofloxacin Unspecified     Knee & muscle pain     • Metoclopramide Unspecified     Akathisia     • Sulfa Drugs Nausea     GI upset   • Tetanus Antitoxin Unspecified     Mild spasms and pain     • Carisoprodol Unspecified     Slurred speech, stumbling   • Nortriptyline Hcl Unspecified     detatchment and fatigue.     SURGHX:   Past Surgical History:   Procedure Laterality Date   • HYSTERECTOMY LAPAROSCOPY  2013    utrine and right ovary removed   • HERNIA REPAIR  2007    umblical hernia   • CHOLECYSTECTOMY  2007   • SALPINGO-OOPHORECTOMY, UNILATERAL Right      SOCHX:  reports that she has never smoked. She has never used smokeless tobacco. She reports that she does not drink alcohol and does not use drugs.  FH: Family history was reviewed, no pertinent findings to report    Physical Exam  Vitals reviewed.   Constitutional:       General: She is not in acute distress.     Appearance: " She is well-developed.   HENT:      Head: Normocephalic and atraumatic.      Right Ear: External ear normal.      Left Ear: External ear normal.   Eyes:      Conjunctiva/sclera: Conjunctivae normal.      Pupils: Pupils are equal, round, and reactive to light.   Neck:      Trachea: No tracheal deviation.   Cardiovascular:      Rate and Rhythm: Normal rate and regular rhythm.      Heart sounds: Normal heart sounds.   Pulmonary:      Effort: Pulmonary effort is normal.      Breath sounds: Normal breath sounds. No wheezing or rhonchi.   Musculoskeletal:         General: Normal range of motion.      Cervical back: Normal range of motion and neck supple.   Skin:     General: Skin is warm.      Findings: No rash.      Comments: No rash to area exposed during the visit today.    Neurological:      Mental Status: She is alert and oriented to person, place, and time.      Coordination: Coordination normal.   Psychiatric:         Behavior: Behavior normal.         Thought Content: Thought content normal.         Judgment: Judgment normal.                           Chest x-ray: May: 2021- normal  Chest x-ray: 8/30/21- normal.   Negative D-dimer, EKG was noncontributory.    Assessment & Plan        1. Chronic cough  2. Uncomplicated asthma, unspecified asthma severity, unspecified whether persistent           At this time patient has had imaging, blood work- including neg. D. dimer, EKG, and a trial of reflux medication along with consistent usage of her asthma medication all with minimal relief of symptoms.  Patient is with normal vitals today, and lungs are clear without adventitious breath sounds.    Strongly encourage patient to have close follow-up with pulmonology of which she does go to pulmonology-cardiology at [facility]-she reports she is not seeing them in the last year.      Patient will contact their office in 2 days for further follow-up as she is an established patient.  She does have Tessalon Perles at home  of which this was offered today however patient declined.  Appropriate PPE worn at all times by provider.   Pt. Had face mask on throughout entirety of the visit other than oropharyngeal examination today.     DDX, Supportive care, and indications for immediate follow-up discussed with patient.    Instructed to return to clinic or nearest emergency department if we are not available for any change in condition, further concerns, or worsening of symptoms.    The patient and/or guardian demonstrated a good understanding and agreed with the treatment plan.    Please note that this dictation was created using voice recognition software. I have made every reasonable attempt to correct obvious errors, but I expect that there are errors of grammar and possibly content that I did not discover before finalizing the note.

## 2021-11-15 ENCOUNTER — HOSPITAL ENCOUNTER (OUTPATIENT)
Dept: LAB | Facility: MEDICAL CENTER | Age: 46
End: 2021-11-15
Attending: FAMILY MEDICINE
Payer: COMMERCIAL

## 2021-11-15 ENCOUNTER — OFFICE VISIT (OUTPATIENT)
Dept: MEDICAL GROUP | Facility: PHYSICIAN GROUP | Age: 46
End: 2021-11-15
Payer: COMMERCIAL

## 2021-11-15 ENCOUNTER — HOSPITAL ENCOUNTER (OUTPATIENT)
Dept: LAB | Facility: MEDICAL CENTER | Age: 46
End: 2021-11-15
Attending: SPECIALIST
Payer: COMMERCIAL

## 2021-11-15 VITALS
DIASTOLIC BLOOD PRESSURE: 68 MMHG | WEIGHT: 172 LBS | HEART RATE: 77 BPM | TEMPERATURE: 98.7 F | HEIGHT: 62 IN | RESPIRATION RATE: 16 BRPM | OXYGEN SATURATION: 97 % | BODY MASS INDEX: 31.65 KG/M2 | SYSTOLIC BLOOD PRESSURE: 110 MMHG

## 2021-11-15 DIAGNOSIS — R05.9 COUGH: ICD-10-CM

## 2021-11-15 DIAGNOSIS — K21.9 GASTROESOPHAGEAL REFLUX DISEASE WITHOUT ESOPHAGITIS: ICD-10-CM

## 2021-11-15 DIAGNOSIS — R06.02 SHORTNESS OF BREATH: ICD-10-CM

## 2021-11-15 DIAGNOSIS — R06.83 SNORING: ICD-10-CM

## 2021-11-15 DIAGNOSIS — R80.9 PROTEINURIA, UNSPECIFIED TYPE: ICD-10-CM

## 2021-11-15 DIAGNOSIS — R06.01 ORTHOPNEA: ICD-10-CM

## 2021-11-15 DIAGNOSIS — E66.9 OBESITY (BMI 30-39.9): ICD-10-CM

## 2021-11-15 DIAGNOSIS — F41.9 ANXIETY: ICD-10-CM

## 2021-11-15 LAB
ALBUMIN SERPL BCP-MCNC: 4.2 G/DL (ref 3.2–4.9)
ALBUMIN/GLOB SERPL: 1.2 G/DL
ALP SERPL-CCNC: 84 U/L (ref 30–99)
ALT SERPL-CCNC: 34 U/L (ref 2–50)
ANION GAP SERPL CALC-SCNC: 7 MMOL/L (ref 7–16)
APPEARANCE UR: CLEAR
AST SERPL-CCNC: 32 U/L (ref 12–45)
BILIRUB SERPL-MCNC: 0.3 MG/DL (ref 0.1–1.5)
BILIRUB UR QL STRIP.AUTO: NEGATIVE
BUN SERPL-MCNC: 20 MG/DL (ref 8–22)
CALCIUM SERPL-MCNC: 9.5 MG/DL (ref 8.5–10.5)
CHLORIDE SERPL-SCNC: 103 MMOL/L (ref 96–112)
CO2 SERPL-SCNC: 29 MMOL/L (ref 20–33)
COLOR UR: YELLOW
CREAT SERPL-MCNC: 0.66 MG/DL (ref 0.5–1.4)
CRP SERPL HS-MCNC: <0.3 MG/DL (ref 0–0.75)
ERYTHROCYTE [SEDIMENTATION RATE] IN BLOOD BY WESTERGREN METHOD: 7 MM/HOUR (ref 0–25)
GLOBULIN SER CALC-MCNC: 3.6 G/DL (ref 1.9–3.5)
GLUCOSE SERPL-MCNC: 94 MG/DL (ref 65–99)
GLUCOSE UR STRIP.AUTO-MCNC: NEGATIVE MG/DL
KETONES UR STRIP.AUTO-MCNC: NEGATIVE MG/DL
LEUKOCYTE ESTERASE UR QL STRIP.AUTO: NEGATIVE
MICRO URNS: NORMAL
NITRITE UR QL STRIP.AUTO: NEGATIVE
PH UR STRIP.AUTO: 5.5 [PH] (ref 5–8)
POTASSIUM SERPL-SCNC: 4.2 MMOL/L (ref 3.6–5.5)
PROT SERPL-MCNC: 7.8 G/DL (ref 6–8.2)
PROT UR QL STRIP: NEGATIVE MG/DL
RBC UR QL AUTO: NEGATIVE
SODIUM SERPL-SCNC: 139 MMOL/L (ref 135–145)
SP GR UR STRIP.AUTO: 1.02
UROBILINOGEN UR STRIP.AUTO-MCNC: 0.2 MG/DL

## 2021-11-15 PROCEDURE — 82785 ASSAY OF IGE: CPT

## 2021-11-15 PROCEDURE — 99214 OFFICE O/P EST MOD 30 MIN: CPT | Performed by: NURSE PRACTITIONER

## 2021-11-15 PROCEDURE — 36415 COLL VENOUS BLD VENIPUNCTURE: CPT

## 2021-11-15 PROCEDURE — 85652 RBC SED RATE AUTOMATED: CPT

## 2021-11-15 PROCEDURE — 86140 C-REACTIVE PROTEIN: CPT

## 2021-11-15 PROCEDURE — 80053 COMPREHEN METABOLIC PANEL: CPT

## 2021-11-15 PROCEDURE — 81003 URINALYSIS AUTO W/O SCOPE: CPT

## 2021-11-15 PROCEDURE — 86235 NUCLEAR ANTIGEN ANTIBODY: CPT | Mod: 91

## 2021-11-15 RX ORDER — DULOXETINE 40 MG/1
40 CAPSULE, DELAYED RELEASE ORAL DAILY
Qty: 90 CAPSULE | Refills: 3 | Status: SHIPPED | OUTPATIENT
Start: 2021-11-15 | End: 2022-01-26

## 2021-11-15 RX ORDER — ESOMEPRAZOLE MAGNESIUM 40 MG/1
40 CAPSULE, DELAYED RELEASE ORAL
Qty: 90 CAPSULE | Refills: 3 | Status: SHIPPED | OUTPATIENT
Start: 2021-11-15 | End: 2022-01-26

## 2021-11-15 RX ORDER — BENZONATATE 100 MG/1
100 CAPSULE ORAL 3 TIMES DAILY PRN
Qty: 60 CAPSULE | Refills: 2 | Status: SHIPPED | OUTPATIENT
Start: 2021-11-15 | End: 2022-03-08

## 2021-11-15 ASSESSMENT — FIBROSIS 4 INDEX: FIB4 SCORE: 0.61

## 2021-11-15 NOTE — PROGRESS NOTES
Subjective:     CC: cough for 8 months    HPI:   Joycelyn presents today with the following:     Cough  Chronic medical problem. She reports cough that started 8 months ago.  The cough is intermittent.  The cough is dry and mostly nonproductive. She recently saw rheumatology who ordered labs that she had completed today. She has pulmonology appointment scheduled for 12/3/2021. Recent CXR and d-dimer were unremarkable. She states that her O2 sats have been normal at home. She feels a tickle in her throat. She has tried PPI's, OTC allergy medications, Xyxal has helped.  She does have intermittent chest pain from the couhging. Tessalon mary alice's helps her cough. The cough is severe at times that can wake her up, cause her to gag and get dizzy. No leg swelling, no night sweats, no unexplained weight loss no bloody stool. No coughing when she lays to her right no coughing. She will snore when flat on her back. Her last sleep study was 12 years ago. She does have orthopnea.      Past Medical History:   Diagnosis Date   • Allergy, unspecified not elsewhere classified    • Ankylosing spondylitis of lumbosacral region (HCC) 7/6/2015   • Anxiety 11/24/2015   • GERD (gastroesophageal reflux disease)    • Headache, classical migraine    • Hiatal hernia    • Intractable migraine without aura and without status migrainosus 11/24/2015   • Lupus ([facility])    • Morphea 1/9/2019   • Oropharyngeal dysphagia    • Other forms of systemic lupus erythematosus ([facility]) 10/20/2017   • Overactive bladder    • Peptic ulcer    • Polyuria 7/17/2019   • Prediabetes 12/8/2016   • Vocal cord dysfunction        Social History     Tobacco Use   • Smoking status: Never Smoker   • Smokeless tobacco: Never Used   Vaping Use   • Vaping Use: Never used   Substance Use Topics   • Alcohol use: No     Alcohol/week: 0.0 oz   • Drug use: No       Current Outpatient Medications Ordered in Epic   Medication Sig Dispense Refill   • benzonatate (TESSALON) 100 MG Cap Take  1 Capsule by mouth 3 times a day as needed for Cough. 60 Capsule 2   • DULoxetine HCl 40 MG Cap DR Particles Take 40 mg by mouth every day. 90 Capsule 3   • esomeprazole (NEXIUM) 40 MG delayed-release capsule Take 1 Capsule by mouth every morning before breakfast. 90 Capsule 3   • ARNUITY ELLIPTA 200 MCG/ACT AEROSOL POWDER, BREATH ACTIVATED Take 1 Puff by mouth every morning. 90 Each 3   • hydroCHLOROthiazide (HYDRODIURIL) 25 MG Tab Take 1 tablet by mouth every day. 30 tablet 2   • pregabalin (LYRICA) 50 MG capsule Take 50 mg by mouth.     • albuterol 108 (90 Base) MCG/ACT Aero Soln inhalation aerosol Inhale 2 Puffs by mouth every four hours as needed for Shortness of Breath. 3 Each 3   • rizatriptan (MAXALT) 5 MG tablet Take 1 Tab by mouth Once PRN for Migraine for up to 1 dose. 10 Tab 0   • HYDROcodone-acetaminophen 2.5-108 mg/5mL (HYCET) 7.5-325 MG/15ML solution Take 15 mL by mouth 4 times a day as needed for Severe Pain.     • Thiamine HCl (VITAMIN B-1 PO) Take 1 Tab by mouth every morning.     • lidocaine (LIDODERM) 5 % Patch Apply 1 Patch to skin as directed 1 time daily as needed (pain).     • ENBREL SURECLICK 50 MG/ML Solution Auto-injector 1 Each by Injection route every 7 days.     • cyclobenzaprine (FLEXERIL) 10 MG Tab Take 10 mg by mouth at bedtime as needed for Muscle Spasms.     • coenzyme Q-10 30 MG capsule Take 30 mg by mouth every morning.     • cyanocobalamin (VITAMIN B-12) 1000 MCG/ML Solution 1,000 mcg by Injection route every 30 days.  2   • hydroxychloroquine (PLAQUENIL) 200 MG Tab Take 300 mg by mouth every morning.     • EPIPEN 2-NOLAN 0.3 MG/0.3ML Solution Auto-injector solution for injection 0.3 mg by Intramuscular route as needed.       No current Epic-ordered facility-administered medications on file.       Allergies:  Milnacipran, Omnicap, Tramadol, Ciprofloxacin, Metoclopramide, Sulfa drugs, Tetanus antitoxin, Carisoprodol, and Nortriptyline hcl    Health Maintenance: Due for COVID second  "dose and influenza vaccine. She is feeling ill today, declines her influenza vaccine today.    ROS:  Per HPI    Objective:     Vital signs reviewed   Exam:  /68 (BP Location: Left arm)   Pulse 77   Temp 37.1 °C (98.7 °F)   Resp 16   Ht 1.575 m (5' 2\")   Wt 78 kg (172 lb)   LMP 03/29/2016   SpO2 97%   BMI 31.46 kg/m²  Body mass index is 31.46 kg/m².    Gen: Alert and oriented, No apparent distress.  Neck: Neck is supple without lymphadenopathy.  Lungs: Normal effort, CTA bilaterally, no wheezes, rhonchi, or rales.  CV: Regular rate and rhythm. No murmurs, rubs, or gallops. Bilateral radial pulses are 2+ and bilateral pedal pulses are 2+.  Ext: No clubbing, cyanosis, edema.    Assessment & Plan:     46 y.o. female with the following -     1. Cough  Chronic exacerbated problem.  I encouraged her to keep her upcoming appointment with pulmonology.  Encouraged her to continue with Arnuity Ellipta and her Xyzal.  We did discuss starting Singulair, but in the past she states that it gave her bad breath and would like to avoid the medication.  She will continue with her Tessalon and her esomeprazole.  I am checking echocardiogram and sleep study.  Reflux discussed.  Differential diagnosis includes heart failure, sleep apnea, asthma, allergies, post nasal drip and GERD.   - benzonatate (TESSALON) 100 MG Cap; Take 1 Capsule by mouth 3 times a day as needed for Cough.  Dispense: 60 Capsule; Refill: 2    2. Shortness of breath  3. Orthopnea  Chronic exacerbated problem. See #1 above. Check echocardiogram and sleep study.   - EC-ECHOCARDIOGRAM COMPLETE W/O CONT; Future    4. Snoring  Chronic exacerbated problem. She has not had recent sleep study. She is interested in new sleep study. Order placed today.   - Polysomnography, 4 or More; Future  - Referral to Pulmonary and Sleep Medicine    5. Anxiety  Chronic stable problem. She is asking for a medication refill to be sent to mail order pharmacy. She is tolerating " the medication. She will continue with her duloxetine.   - DULoxetine HCl 40 MG Cap DR Particles; Take 40 mg by mouth every day.  Dispense: 90 Capsule; Refill: 3    6. Gastroesophageal reflux disease without esophagitis  Chronic stable problem.  She is asking for medication refill to be sent to mail order pharmacy.  She is tolerating the medication.  No heartburn or indigestion.  She will continue with her esomeprazole.  - esomeprazole (NEXIUM) 40 MG delayed-release capsule; Take 1 Capsule by mouth every morning before breakfast.  Dispense: 90 Capsule; Refill: 3      Return for pending labs .    Please note that this dictation was created using voice recognition software. I have made every reasonable attempt to correct obvious errors, but I expect that there are errors of grammar and possibly content that I did not discover before finalizing the note.

## 2021-11-15 NOTE — PATIENT INSTRUCTIONS
SLEEP STUDY INSTRUCTIONS    1. Our main concern is to provide the best test and evaluation of your sleep and your cooperation in following the guidelines is very necessary.    2. We have no facilities for family members or guests at the sleep center. Special arrangements will be made for children requiring overnight sleep studies.    3. Unless otherwise instructed, AVOID alcoholic beverages on the day of your sleep study.    4. DO NOT drink coffee or caffeine-containing beverages after 12:00 noon on the day of your sleep study.    5. There is NO smoking at the sleep center.    6. Try to maintain a usual daytime schedule prior to the study (avoid unusual physical activity or meals).    7. DO NOT take a nap on the day of your study.    8. This is an outpatient procedure (test); therefore, nursing services, medications, and meals ARE NOT provided. If you take medications, bring them with you and take them on the schedule you do at home.    9. Please fill your sleep aid prescription (Ambien or Lunesta) and bring to your sleep study. Even patients who normally have no problem going to sleep often need a sleep aid in this different environment.    10. We ask that you wear conventional sleep attire (pajamas or sweats) for the sleep study. We discourage patients from wearing only their underwear to bed. We recommend two-piece pajamas as the techs will need to apply sensors to your stomach.    11. Please shampoo your hair the day of the sleep study. Please DO NOT use any other hair or skin products before your arrival (e.g., mousse, gel, hair or body spray, perfume, body lotion etc.) NOTE: Women should not wear heavy makeup prior to arrival as some wires are taped to the face area.    12. The technician will be applying several small electrodes to the scalp, eye area, chin, chest, and legs, plus respiratory effort belts around the chest. Also, there will be a device placed directly under the nose. (THIS WILL NOT OBSTRUCT  YOUR BREATHING.) This is a painless procedure and the skin is not broken.    13. The test is generally completed in six to eight hours; We are usually done between 6 - 7 a.m., unless you are scheduled for a nap study. You may need to come back another night for a second study to complete your treatment plan.    14. Patients who are scheduled for an MSLT (nap study) will stay at the sleep center for the day following their nighttime study. You will be notified if a nap study was ordered for you at the time the night study is scheduled. Generally, patients having a nap study will leave the sleep center by 4 p.m.    15. You will need to bring food for the following day if you are scheduled for a nap study. A refrigerator and microwave are available.    16. A bathroom is available for your use.    17. We are able to adjust the room temperature for your comfort. Please let the technologist know if you are uncomfortable during the study.    18. If you sleep better with a special pillow or stuffed animal, you may bring it along. Service animals are the only live animals permitted.    19. Cable T.V. is available.    20. You will be scheduled for a follow-up appointment three to five days after the sleep study to review your results.    21. A copy of your sleep study is sent to the referring physician approximately two weeks after your study.    22. Any questions can be directed to our staff at [phone].    23. If CPAP therapy is applied, a home unit will be ordered for you through the [employer] company. You will be contacted to schedule delivery after insurance authorization.

## 2021-11-15 NOTE — ASSESSMENT & PLAN NOTE
Chronic medical problem. She reports cough that started 8 months ago.  The cough is intermittent.  The cough is dry and mostly nonproductive. She recently saw rheumatology who ordered labs that she had completed today. She has pulmonology appointment scheduled for 12/3/2021. Recent CXR and d-dimer were unremarkable. She states that her O2 sats have been normal at home. She feels a tickle in her throat. She has tried PPI's, OTC allergy medications, Xyxal has helped.  She does have intermittent chest pain from the couhging. Tessalon mary alice's helps her cough. The cough is severe at times that can wake her up, cause her to gag and get dizzy. No leg swelling, no night sweats, no unexplained weight loss no bloody stool. No coughing when she lays to her right no coughing. She will snore when flat on her back. Her last sleep study was 12 years ago. She does have orthopnea.

## 2021-11-18 LAB
ENA SS-B IGG SER IA-ACNC: 0 AU/ML (ref 0–40)
SSA52 R0ENA AB IGG Q0420: 0 AU/ML (ref 0–40)
SSA60 R0ENA AB IGG Q0419: 0 AU/ML (ref 0–40)

## 2021-11-19 ENCOUNTER — HOSPITAL ENCOUNTER (OUTPATIENT)
Dept: CARDIOLOGY | Facility: MEDICAL CENTER | Age: 46
End: 2021-11-19
Attending: NURSE PRACTITIONER
Payer: COMMERCIAL

## 2021-11-19 DIAGNOSIS — R06.02 SHORTNESS OF BREATH: ICD-10-CM

## 2021-11-19 DIAGNOSIS — R06.01 ORTHOPNEA: ICD-10-CM

## 2021-11-19 LAB
IGE SERPL-ACNC: 60 KU/L
LV EJECT FRACT  99904: 65
LV EJECT FRACT MOD 2C 99903: 64.44
LV EJECT FRACT MOD 4C 99902: 65.07
LV EJECT FRACT MOD BP 99901: 64.91

## 2021-11-19 PROCEDURE — 93306 TTE W/DOPPLER COMPLETE: CPT

## 2021-11-19 PROCEDURE — 93306 TTE W/DOPPLER COMPLETE: CPT | Mod: 26 | Performed by: INTERNAL MEDICINE

## 2021-12-09 DIAGNOSIS — I10 ESSENTIAL HYPERTENSION: ICD-10-CM

## 2021-12-09 RX ORDER — HYDROCHLOROTHIAZIDE 25 MG/1
25 TABLET ORAL DAILY
Qty: 90 TABLET | Refills: 2 | Status: SHIPPED | OUTPATIENT
Start: 2021-12-09 | End: 2023-02-21

## 2021-12-09 NOTE — TELEPHONE ENCOUNTER
Requested Prescriptions     Signed Prescriptions Disp Refills   • hydroCHLOROthiazide (HYDRODIURIL) 25 MG Tab 90 Tablet 2     Sig: Take 1 Tablet by mouth every day.     Authorizing Provider: BERNADETTE TINEO A.P.R.N.

## 2022-01-10 ENCOUNTER — HOSPITAL ENCOUNTER (OUTPATIENT)
Dept: LAB | Facility: MEDICAL CENTER | Age: 47
End: 2022-01-10
Attending: PHYSICIAN ASSISTANT
Payer: COMMERCIAL

## 2022-01-10 LAB
ALBUMIN SERPL BCP-MCNC: 4.5 G/DL (ref 3.2–4.9)
ALBUMIN/GLOB SERPL: 1.4 G/DL
ALP SERPL-CCNC: 102 U/L (ref 30–99)
ALT SERPL-CCNC: 18 U/L (ref 2–50)
ANION GAP SERPL CALC-SCNC: 12 MMOL/L (ref 7–16)
AST SERPL-CCNC: 22 U/L (ref 12–45)
BASOPHILS # BLD AUTO: 1.8 % (ref 0–1.8)
BASOPHILS # BLD: 0.09 K/UL (ref 0–0.12)
BILIRUB SERPL-MCNC: 0.2 MG/DL (ref 0.1–1.5)
BUN SERPL-MCNC: 11 MG/DL (ref 8–22)
CALCIUM SERPL-MCNC: 9.6 MG/DL (ref 8.5–10.5)
CHLORIDE SERPL-SCNC: 103 MMOL/L (ref 96–112)
CO2 SERPL-SCNC: 26 MMOL/L (ref 20–33)
CREAT SERPL-MCNC: 0.62 MG/DL (ref 0.5–1.4)
EOSINOPHIL # BLD AUTO: 0.21 K/UL (ref 0–0.51)
EOSINOPHIL NFR BLD: 4.1 % (ref 0–6.9)
ERYTHROCYTE [DISTWIDTH] IN BLOOD BY AUTOMATED COUNT: 40.3 FL (ref 35.9–50)
GLOBULIN SER CALC-MCNC: 3.2 G/DL (ref 1.9–3.5)
GLUCOSE SERPL-MCNC: 92 MG/DL (ref 65–99)
HCT VFR BLD AUTO: 45.3 % (ref 37–47)
HGB BLD-MCNC: 14.2 G/DL (ref 12–16)
IMM GRANULOCYTES # BLD AUTO: 0.01 K/UL (ref 0–0.11)
IMM GRANULOCYTES NFR BLD AUTO: 0.2 % (ref 0–0.9)
LIPASE SERPL-CCNC: 26 U/L (ref 11–82)
LYMPHOCYTES # BLD AUTO: 1.72 K/UL (ref 1–4.8)
LYMPHOCYTES NFR BLD: 33.6 % (ref 22–41)
MCH RBC QN AUTO: 25.8 PG (ref 27–33)
MCHC RBC AUTO-ENTMCNC: 31.3 G/DL (ref 33.6–35)
MCV RBC AUTO: 82.4 FL (ref 81.4–97.8)
MONOCYTES # BLD AUTO: 0.55 K/UL (ref 0–0.85)
MONOCYTES NFR BLD AUTO: 10.7 % (ref 0–13.4)
NEUTROPHILS # BLD AUTO: 2.54 K/UL (ref 2–7.15)
NEUTROPHILS NFR BLD: 49.6 % (ref 44–72)
NRBC # BLD AUTO: 0 K/UL
NRBC BLD-RTO: 0 /100 WBC
PLATELET # BLD AUTO: 314 K/UL (ref 164–446)
PMV BLD AUTO: 10.9 FL (ref 9–12.9)
POTASSIUM SERPL-SCNC: 4 MMOL/L (ref 3.6–5.5)
PROT SERPL-MCNC: 7.7 G/DL (ref 6–8.2)
RBC # BLD AUTO: 5.5 M/UL (ref 4.2–5.4)
SODIUM SERPL-SCNC: 141 MMOL/L (ref 135–145)
WBC # BLD AUTO: 5.1 K/UL (ref 4.8–10.8)

## 2022-01-10 PROCEDURE — 36415 COLL VENOUS BLD VENIPUNCTURE: CPT

## 2022-01-10 PROCEDURE — 80053 COMPREHEN METABOLIC PANEL: CPT

## 2022-01-10 PROCEDURE — 85025 COMPLETE CBC W/AUTO DIFF WBC: CPT

## 2022-01-10 PROCEDURE — 83690 ASSAY OF LIPASE: CPT

## 2022-01-26 ENCOUNTER — TELEMEDICINE (OUTPATIENT)
Dept: MEDICAL GROUP | Facility: PHYSICIAN GROUP | Age: 47
End: 2022-01-26
Payer: COMMERCIAL

## 2022-01-26 VITALS — BODY MASS INDEX: 29.95 KG/M2 | HEART RATE: 95 BPM | HEIGHT: 63 IN | RESPIRATION RATE: 18 BRPM | WEIGHT: 169 LBS

## 2022-01-26 DIAGNOSIS — F41.9 ANXIETY: ICD-10-CM

## 2022-01-26 PROCEDURE — 99214 OFFICE O/P EST MOD 30 MIN: CPT | Mod: 95 | Performed by: NURSE PRACTITIONER

## 2022-01-26 RX ORDER — DULOXETIN HYDROCHLORIDE 60 MG/1
60 CAPSULE, DELAYED RELEASE ORAL DAILY
Qty: 30 CAPSULE | Refills: 1 | Status: SHIPPED | OUTPATIENT
Start: 2022-01-26 | End: 2022-03-08 | Stop reason: SDUPTHER

## 2022-01-26 RX ORDER — PANTOPRAZOLE SODIUM 40 MG/1
TABLET, DELAYED RELEASE ORAL
COMMUNITY
Start: 2022-01-18 | End: 2022-08-29

## 2022-01-26 ASSESSMENT — ANXIETY QUESTIONNAIRES
5. BEING SO RESTLESS THAT IT IS HARD TO SIT STILL: NOT AT ALL
IF YOU CHECKED OFF ANY PROBLEMS ON THIS QUESTIONNAIRE, HOW DIFFICULT HAVE THESE PROBLEMS MADE IT FOR YOU TO DO YOUR WORK, TAKE CARE OF THINGS AT HOME, OR GET ALONG WITH OTHER PEOPLE: SOMEWHAT DIFFICULT
2. NOT BEING ABLE TO STOP OR CONTROL WORRYING: NEARLY EVERY DAY
GAD7 TOTAL SCORE: 12
4. TROUBLE RELAXING: NEARLY EVERY DAY
3. WORRYING TOO MUCH ABOUT DIFFERENT THINGS: NEARLY EVERY DAY
1. FEELING NERVOUS, ANXIOUS, OR ON EDGE: MORE THAN HALF THE DAYS
6. BECOMING EASILY ANNOYED OR IRRITABLE: SEVERAL DAYS
7. FEELING AFRAID AS IF SOMETHING AWFUL MIGHT HAPPEN: NOT AT ALL

## 2022-01-26 ASSESSMENT — FIBROSIS 4 INDEX: FIB4 SCORE: 0.74

## 2022-01-26 ASSESSMENT — PATIENT HEALTH QUESTIONNAIRE - PHQ9: CLINICAL INTERPRETATION OF PHQ2 SCORE: 0

## 2022-01-26 NOTE — ASSESSMENT & PLAN NOTE
She is taking duloxetine 40 mg daily. She would like a medication refill today. She continues to have awakening in the middle of night, 3-4 times a week. She denies any self harm or SI today.  Her trino score today is 12.

## 2022-01-26 NOTE — PROGRESS NOTES
Virtual Visit: Established Patient   This visit was conducted via Zoom using secure and encrypted videoconferencing technology.   The patient was in a private location in the state of [place].    The patient's identity was confirmed and verbal consent was obtained for this virtual visit.    Subjective:   CC:   Chief Complaint   Patient presents with   • Medication Refill       Joycelyn Byers is a 46 y.o. female presenting for evaluation and management of:    Anxiety  She is taking duloxetine 40 mg daily. She would like a medication refill today. She continues to have awakening in the middle of night, 3-4 times a week. She denies any self harm or SI today.  Her trino score today is 12.      ROS   Per HPI    Current medicines (including changes today)  Current Outpatient Medications   Medication Sig Dispense Refill   • DULoxetine (CYMBALTA) 60 MG Cap DR Particles delayed-release capsule Take 1 Capsule by mouth every day. 30 Capsule 1   • hydroCHLOROthiazide (HYDRODIURIL) 25 MG Tab Take 1 Tablet by mouth every day. 90 Tablet 2   • benzonatate (TESSALON) 100 MG Cap Take 1 Capsule by mouth 3 times a day as needed for Cough. 60 Capsule 2   • ARNUITY ELLIPTA 200 MCG/ACT AEROSOL POWDER, BREATH ACTIVATED Take 1 Puff by mouth every morning. 90 Each 3   • pregabalin (LYRICA) 50 MG capsule Take 50 mg by mouth.     • albuterol 108 (90 Base) MCG/ACT Aero Soln inhalation aerosol Inhale 2 Puffs by mouth every four hours as needed for Shortness of Breath. 3 Each 3   • rizatriptan (MAXALT) 5 MG tablet Take 1 Tab by mouth Once PRN for Migraine for up to 1 dose. 10 Tab 0   • HYDROcodone-acetaminophen 2.5-108 mg/5mL (HYCET) 7.5-325 MG/15ML solution Take 15 mL by mouth 4 times a day as needed for Severe Pain.     • Thiamine HCl (VITAMIN B-1 PO) Take 1 Tab by mouth every morning.     • lidocaine (LIDODERM) 5 % Patch Apply 1 Patch to skin as directed 1 time daily as needed (pain).     • ENBREL SURECLICK 50 MG/ML Solution  "Auto-injector 1 Each by Injection route every 7 days.     • cyclobenzaprine (FLEXERIL) 10 MG Tab Take 10 mg by mouth at bedtime as needed for Muscle Spasms.     • coenzyme Q-10 30 MG capsule Take 30 mg by mouth every morning.     • cyanocobalamin (VITAMIN B-12) 1000 MCG/ML Solution 1,000 mcg by Injection route every 30 days.  2   • hydroxychloroquine (PLAQUENIL) 200 MG Tab Take 300 mg by mouth every morning.     • EPIPEN 2-NOLAN 0.3 MG/0.3ML Solution Auto-injector solution for injection 0.3 mg by Intramuscular route as needed.     • pantoprazole (PROTONIX) 40 MG Tablet Delayed Response TAKE 1 TABLET BY MOUTH TWICE DAILY 30 MINUTES BEFORE BREAKFAST AND 30 MINUTES BEFORE DINNER MEAL       No current facility-administered medications for this visit.       Patient Active Problem List    Diagnosis Date Noted   • Cough 11/15/2021   • Obesity (BMI 30-39.9) 11/15/2021   • Vitamin B 12 deficiency 06/07/2021   • Gastroesophageal reflux disease without esophagitis 06/07/2021   • Elevated hemoglobin A1c 06/07/2021   • Benign cyst of left breast 06/07/2021   • History of renal calculi 12/30/2020   • Overweight 03/03/2020   • Hyperglycemia 03/03/2020   • Hypercholesterolemia 03/03/2020   • Prolonged Q-T interval on ECG 03/03/2020   • Hypertension 01/06/2020   • Hypokalemia 01/06/2020   • Other fatigue 09/09/2019   • Morphea 01/09/2019   • Vitamin D deficiency 05/22/2018   • SVT (supraventricular tachycardia) ([facility]) 03/23/2018   • Other forms of systemic lupus erythematosus ([facility]) 10/20/2017   • Chronic constipation 12/15/2016   • CNS demyelinating disease ([facility]) 04/19/2016   • Vocal cord dysfunction 04/19/2016   • Multiple allergies 12/21/2015   • Anxiety 11/24/2015   • Intractable migraine without aura and without status migrainosus 11/24/2015   • Ankylosing spondylitis of lumbosacral region ([facility]) 07/06/2015   • Overactive bladder 04/17/2015   • Asthma in adult 01/14/2015        Objective:   Pulse 95   Resp 18   Ht 1.6 m (5' 3\")   " Wt 76.7 kg (169 lb)   LMP 03/29/2016   BMI 29.94 kg/m²   Vital signs reviewed    Physical Exam:  Constitutional: Alert, no distress, well-groomed.  Skin: No rashes in visible areas.  Eye: Round. Conjunctiva clear, lids normal. No icterus.  Good eye contact.  ENMT: Lips pink without lesions, good dentition, moist mucous membranes. Phonation normal.  Neck: No masses, no thyromegaly. Moves freely without pain.  Respiratory: Unlabored respiratory effort, no cough or audible wheeze  Psych: Alert and oriented x3, normal affect and mood.     Assessment and Plan:   The following treatment plan was discussed:     1. Anxiety  Chronic exacerbated problem.  Discussed and reviewed her trino score today.  As she is having breakthrough episodes we will increase her duloxetine to 60 mg daily.  Denies any self-harm or SI today.  She return in 1 month for follow-up.  Virtual visit is acceptable.   - DULoxetine (CYMBALTA) 60 MG Cap DR Particles delayed-release capsule; Take 1 Capsule by mouth every day.  Dispense: 30 Capsule; Refill: 1    Other orders  - pantoprazole (PROTONIX) 40 MG Tablet Delayed Response; TAKE 1 TABLET BY MOUTH TWICE DAILY 30 MINUTES BEFORE BREAKFAST AND 30 MINUTES BEFORE DINNER MEAL      Follow-up: Return in about 4 weeks (around 2/23/2022) for anxiety .    Educated in proper administration of medication(s) ordered today including safety, possible SE, risks, benefits, rationale and alternatives to therapy.     Please note that this dictation was created using voice recognition software. I have made every reasonable attempt to correct obvious errors, but I expect that there are errors of grammar and possibly content that I did not discover before finalizing the note.'

## 2022-03-08 ENCOUNTER — TELEMEDICINE (OUTPATIENT)
Dept: MEDICAL GROUP | Facility: PHYSICIAN GROUP | Age: 47
End: 2022-03-08
Payer: COMMERCIAL

## 2022-03-08 VITALS — HEIGHT: 63 IN | WEIGHT: 169 LBS | HEART RATE: 76 BPM | BODY MASS INDEX: 29.95 KG/M2

## 2022-03-08 DIAGNOSIS — F41.9 ANXIETY: ICD-10-CM

## 2022-03-08 PROCEDURE — 99213 OFFICE O/P EST LOW 20 MIN: CPT | Mod: 95 | Performed by: NURSE PRACTITIONER

## 2022-03-08 RX ORDER — DULOXETIN HYDROCHLORIDE 60 MG/1
60 CAPSULE, DELAYED RELEASE ORAL DAILY
Qty: 90 CAPSULE | Refills: 3 | Status: SHIPPED | OUTPATIENT
Start: 2022-03-08 | End: 2022-08-29

## 2022-03-08 ASSESSMENT — ANXIETY QUESTIONNAIRES
2. NOT BEING ABLE TO STOP OR CONTROL WORRYING: MORE THAN HALF THE DAYS
3. WORRYING TOO MUCH ABOUT DIFFERENT THINGS: MORE THAN HALF THE DAYS
6. BECOMING EASILY ANNOYED OR IRRITABLE: SEVERAL DAYS
1. FEELING NERVOUS, ANXIOUS, OR ON EDGE: NEARLY EVERY DAY
4. TROUBLE RELAXING: MORE THAN HALF THE DAYS
5. BEING SO RESTLESS THAT IT IS HARD TO SIT STILL: NOT AT ALL
7. FEELING AFRAID AS IF SOMETHING AWFUL MIGHT HAPPEN: NOT AT ALL
GAD7 TOTAL SCORE: 10

## 2022-03-08 ASSESSMENT — FIBROSIS 4 INDEX: FIB4 SCORE: 0.74

## 2022-03-08 NOTE — ASSESSMENT & PLAN NOTE
She continues with her duloxetine 60 mg daily. She has been tolerating the medication. She reports that she does feel better. She is making changes in her personal life and work life. 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 Her trino score today is 10. She has been referred by her pain doctor to therapy. She is waiting to schedule with the therapist.  She denies any self-harm or SI today.

## 2022-03-08 NOTE — PROGRESS NOTES
Virtual Visit: Established Patient   This visit was conducted via Zoom using secure and encrypted videoconferencing technology.   The patient was in a private location outside of their home in the state of [place].    The patient's identity was confirmed and verbal consent was obtained for this virtual visit.     Subjective:   CC:   Chief Complaint   Patient presents with   • Medication Management       Joycelyn Byers is a 46 y.o. female presenting for evaluation and management of:    Anxiety  She continues with her duloxetine 60 mg daily. She has been tolerating the medication. She reports that she does feel better. She is making changes in her personal life and work life. 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 Her trino score today is 10. She has been referred by her pain doctor to therapy. She is waiting to schedule with the therapist.  She denies any self-harm or SI today.        ROS   Per HPI    Current medicines (including changes today)  Current Outpatient Medications   Medication Sig Dispense Refill   • DULoxetine (CYMBALTA) 60 MG Cap DR Particles delayed-release capsule Take 1 Capsule by mouth every day. 90 Capsule 3   • pantoprazole (PROTONIX) 40 MG Tablet Delayed Response TAKE 1 TABLET BY MOUTH TWICE DAILY 30 MINUTES BEFORE BREAKFAST AND 30 MINUTES BEFORE DINNER MEAL     • hydroCHLOROthiazide (HYDRODIURIL) 25 MG Tab Take 1 Tablet by mouth every day. 90 Tablet 2   • pregabalin (LYRICA) 50 MG capsule Take 50 mg by mouth.     • albuterol 108 (90 Base) MCG/ACT Aero Soln inhalation aerosol Inhale 2 Puffs by mouth every four hours as needed for Shortness of Breath. 3 Each 3   • rizatriptan (MAXALT) 5 MG tablet Take 1 Tab by mouth Once PRN for Migraine for up to 1 dose. 10 Tab 0   • HYDROcodone-acetaminophen 2.5-108 mg/5mL (HYCET) 7.5-325 MG/15ML solution Take 15 mL by mouth 4 times a day as needed for Severe Pain.     • Thiamine HCl (VITAMIN B-1 PO) Take 1 Tab by mouth every morning.     • lidocaine (LIDODERM)  5 % Patch Apply 1 Patch to skin as directed 1 time daily as needed (pain).     • ENBREL SURECLICK 50 MG/ML Solution Auto-injector 1 Each by Injection route every 7 days.     • cyclobenzaprine (FLEXERIL) 10 MG Tab Take 10 mg by mouth at bedtime as needed for Muscle Spasms.     • coenzyme Q-10 30 MG capsule Take 30 mg by mouth every morning.     • cyanocobalamin (VITAMIN B-12) 1000 MCG/ML Solution 1,000 mcg by Injection route every 30 days.  2   • hydroxychloroquine (PLAQUENIL) 200 MG Tab Take 300 mg by mouth every morning.     • EPIPEN 2-NOLAN 0.3 MG/0.3ML Solution Auto-injector solution for injection 0.3 mg by Intramuscular route as needed.     • TRELEGY ELLIPTA 200-62.5-25 MCG/INH AEROSOL POWDER, BREATH ACTIVATED Inhale 1 Puff every day.       No current facility-administered medications for this visit.       Patient Active Problem List    Diagnosis Date Noted   • Cough 11/15/2021   • Obesity (BMI 30-39.9) 11/15/2021   • Vitamin B 12 deficiency 06/07/2021   • Gastroesophageal reflux disease without esophagitis 06/07/2021   • Elevated hemoglobin A1c 06/07/2021   • Benign cyst of left breast 06/07/2021   • History of renal calculi 12/30/2020   • Overweight 03/03/2020   • Hyperglycemia 03/03/2020   • Hypercholesterolemia 03/03/2020   • Prolonged Q-T interval on ECG 03/03/2020   • Hypertension 01/06/2020   • Hypokalemia 01/06/2020   • Other fatigue 09/09/2019   • Morphea 01/09/2019   • Vitamin D deficiency 05/22/2018   • SVT (supraventricular tachycardia) ([facility]) 03/23/2018   • Other forms of systemic lupus erythematosus ([facility]) 10/20/2017   • Chronic constipation 12/15/2016   • CNS demyelinating disease ([facility]) 04/19/2016   • Vocal cord dysfunction 04/19/2016   • Multiple allergies 12/21/2015   • Anxiety 11/24/2015   • Intractable migraine without aura and without status migrainosus 11/24/2015   • Ankylosing spondylitis of lumbosacral region ([facility]) 07/06/2015   • Overactive bladder 04/17/2015   • Asthma in adult 01/14/2015  "       Objective:   Pulse 76   Ht 1.6 m (5' 3\")   Wt 76.7 kg (169 lb)   LMP 03/29/2016   BMI 29.94 kg/m²   Vital signs reviewed     Physical Exam:  Constitutional: Alert, no distress, well-groomed.  Skin: No rashes in visible areas.  Eye: Round. Conjunctiva clear, lids normal. No icterus. Glasses in place.   ENMT: Lips pink without lesions, good dentition, moist mucous membranes. Phonation normal.  Neck: No masses, no thyromegaly. Moves freely without pain.  Respiratory: Unlabored respiratory effort, no cough or audible wheeze  Psych: Alert and oriented x3, normal affect and mood.     Assessment and Plan:   The following treatment plan was discussed:     1. Anxiety  Chronic stable problem.  She is tolerating her duloxetine.  Discussed and reviewed her trino score.  She will continue with duloxetine 60 mg daily.  Medication refill sent to her mail order pharmacy.  Recommend that she continue to schedule with therapist.  Continue with diet and lifestyle modifications.  She will return to see me in 3 months or sooner if needed.  She denies any self-harm or SI today.  - DULoxetine (CYMBALTA) 60 MG Cap DR Particles delayed-release capsule; Take 1 Capsule by mouth every day.  Dispense: 90 Capsule; Refill: 3    Other orders  - TRELEGY ELLIPTA 200-62.5-25 MCG/INH AEROSOL POWDER, BREATH ACTIVATED; Inhale 1 Puff every day.      Follow-up: Return in about 3 months (around 6/8/2022) for medication management.      Please note that this dictation was created using voice recognition software. I have made every reasonable attempt to correct obvious errors, but I expect that there are errors of grammar and possibly content that I did not discover before finalizing the note.           "

## 2022-05-02 ENCOUNTER — OFFICE VISIT (OUTPATIENT)
Dept: MEDICAL GROUP | Facility: PHYSICIAN GROUP | Age: 47
End: 2022-05-02
Payer: COMMERCIAL

## 2022-05-02 ENCOUNTER — HOSPITAL ENCOUNTER (OUTPATIENT)
Facility: MEDICAL CENTER | Age: 47
End: 2022-05-02
Attending: NURSE PRACTITIONER
Payer: COMMERCIAL

## 2022-05-02 VITALS
TEMPERATURE: 98.1 F | RESPIRATION RATE: 18 BRPM | WEIGHT: 176 LBS | OXYGEN SATURATION: 98 % | HEIGHT: 63 IN | DIASTOLIC BLOOD PRESSURE: 72 MMHG | BODY MASS INDEX: 31.18 KG/M2 | HEART RATE: 72 BPM | SYSTOLIC BLOOD PRESSURE: 122 MMHG

## 2022-05-02 DIAGNOSIS — R82.998 LEUKOCYTES IN URINE: ICD-10-CM

## 2022-05-02 DIAGNOSIS — Z23 VACCINE FOR TETANUS TOXOID: ICD-10-CM

## 2022-05-02 DIAGNOSIS — Z23 NEED FOR VACCINATION: ICD-10-CM

## 2022-05-02 DIAGNOSIS — R53.1 WEAKNESS: ICD-10-CM

## 2022-05-02 DIAGNOSIS — R23.3 EASY BRUISING: ICD-10-CM

## 2022-05-02 DIAGNOSIS — N89.8 VAGINAL DISCHARGE: ICD-10-CM

## 2022-05-02 LAB
APPEARANCE UR: CLEAR
BILIRUB UR STRIP-MCNC: NEGATIVE MG/DL
CANDIDA DNA VAG QL PROBE+SIG AMP: NEGATIVE
COLOR UR AUTO: NORMAL
G VAGINALIS DNA VAG QL PROBE+SIG AMP: POSITIVE
GLUCOSE UR STRIP.AUTO-MCNC: NEGATIVE MG/DL
KETONES UR STRIP.AUTO-MCNC: NEGATIVE MG/DL
LEUKOCYTE ESTERASE UR QL STRIP.AUTO: NORMAL
NITRITE UR QL STRIP.AUTO: NEGATIVE
PH UR STRIP.AUTO: 5.5 [PH] (ref 5–8)
PROT UR QL STRIP: NEGATIVE MG/DL
RBC UR QL AUTO: NEGATIVE
SP GR UR STRIP.AUTO: 1.03
T VAGINALIS DNA VAG QL PROBE+SIG AMP: NEGATIVE
UROBILINOGEN UR STRIP-MCNC: 0.2 MG/DL

## 2022-05-02 PROCEDURE — 90715 TDAP VACCINE 7 YRS/> IM: CPT | Performed by: NURSE PRACTITIONER

## 2022-05-02 PROCEDURE — 81002 URINALYSIS NONAUTO W/O SCOPE: CPT | Performed by: NURSE PRACTITIONER

## 2022-05-02 PROCEDURE — 87480 CANDIDA DNA DIR PROBE: CPT

## 2022-05-02 PROCEDURE — 90471 IMMUNIZATION ADMIN: CPT | Performed by: NURSE PRACTITIONER

## 2022-05-02 PROCEDURE — 87510 GARDNER VAG DNA DIR PROBE: CPT

## 2022-05-02 PROCEDURE — 87086 URINE CULTURE/COLONY COUNT: CPT

## 2022-05-02 PROCEDURE — 99214 OFFICE O/P EST MOD 30 MIN: CPT | Performed by: NURSE PRACTITIONER

## 2022-05-02 PROCEDURE — 87660 TRICHOMONAS VAGIN DIR PROBE: CPT

## 2022-05-02 RX ORDER — RABEPRAZOLE SODIUM 20 MG/1
20 TABLET, DELAYED RELEASE ORAL DAILY
COMMUNITY
End: 2023-08-21

## 2022-05-02 RX ORDER — SUCRALFATE 1 G/1
TABLET ORAL
COMMUNITY
End: 2022-12-06

## 2022-05-02 ASSESSMENT — FIBROSIS 4 INDEX: FIB4 SCORE: 0.74

## 2022-05-02 NOTE — ASSESSMENT & PLAN NOTE
The vaginal discharge started 2-3 months. The discharge stains her underwear brownish color. The discharge is itchy and has a fishy odor. She has tried apple cider vinegar baths and changing pads. Aggravating factors include none. Alleviating factors include changing pads frequently. No recent unprotected sexual intercourse or new partners. Hx of hysterectomy. Denies fever, chills, hematuria, abdominal pain, nausea, vomiting, pelvic pain, dysuria, or urinary urgency.  She does have chronic urinary frequency due to her medications.

## 2022-05-02 NOTE — PROGRESS NOTES
Subjective:     CC: Vaginal discharge    HPI:   Joycelyn presents today with the following:    Vaginal discharge  The vaginal discharge started 2-3 months. The discharge stains her underwear brownish color. The discharge is itchy and has a fishy odor. She has tried apple cider vinegar baths and changing pads. Aggravating factors include none. Alleviating factors include changing pads frequently. No recent unprotected sexual intercourse or new partners. Hx of hysterectomy. Denies fever, chills, hematuria, abdominal pain, nausea, vomiting, pelvic pain, dysuria, or urinary urgency.  She does have chronic urinary frequency due to her medications.    Vaccine for tetanus toxoid  Reports that with her last Tdap vaccination 10 years ago she had myalgias and muscle rigidity. She took pain medication and muscle relaxants that helped. She has medication available.  The symptoms resolved after 2-3 days. She denies any anaphylaxis reactions.  She is due for her Tdap and would like to proceed with vaccination today.      Past Medical History:   Diagnosis Date   • Allergy, unspecified not elsewhere classified    • Ankylosing spondylitis of lumbosacral region (HCC) 7/6/2015   • Anxiety 11/24/2015   • GERD (gastroesophageal reflux disease)    • Headache, classical migraine    • Hiatal hernia    • Intractable migraine without aura and without status migrainosus 11/24/2015   • Lupus ([facility])    • Morphea 1/9/2019   • Oropharyngeal dysphagia    • Other forms of systemic lupus erythematosus ([facility]) 10/20/2017   • Overactive bladder    • Peptic ulcer    • Polyuria 7/17/2019   • Prediabetes 12/8/2016   • Vocal cord dysfunction        Social History     Tobacco Use   • Smoking status: Never Smoker   • Smokeless tobacco: Never Used   Vaping Use   • Vaping Use: Never used   Substance Use Topics   • Alcohol use: No     Alcohol/week: 0.0 oz   • Drug use: No       Current Outpatient Medications Ordered in Epic   Medication Sig Dispense Refill   •  rabeprazole (ACIPHEX) 20 MG tablet Take 20 mg by mouth every day.     • DULoxetine (CYMBALTA) 60 MG Cap DR Particles delayed-release capsule Take 1 Capsule by mouth every day. 90 Capsule 3   • TRELEGY ELLIPTA 200-62.5-25 MCG/INH AEROSOL POWDER, BREATH ACTIVATED Inhale 1 Puff every day.     • albuterol 108 (90 Base) MCG/ACT Aero Soln inhalation aerosol Inhale 2 Puffs by mouth every four hours as needed for Shortness of Breath. 3 Each 3   • rizatriptan (MAXALT) 5 MG tablet Take 1 Tab by mouth Once PRN for Migraine for up to 1 dose. 10 Tab 0   • HYDROcodone-acetaminophen 2.5-108 mg/5mL (HYCET) 7.5-325 MG/15ML solution Take 15 mL by mouth 4 times a day as needed for Severe Pain.     • Thiamine HCl (VITAMIN B-1 PO) Take 1 Tab by mouth every morning.     • lidocaine (LIDODERM) 5 % Patch Apply 1 Patch to skin as directed 1 time daily as needed (pain).     • ENBREL SURECLICK 50 MG/ML Solution Auto-injector 1 Each by Injection route every 7 days.     • cyclobenzaprine (FLEXERIL) 10 MG Tab Take 10 mg by mouth at bedtime as needed for Muscle Spasms.     • coenzyme Q-10 30 MG capsule Take 30 mg by mouth every morning.     • hydroxychloroquine (PLAQUENIL) 200 MG Tab Take 200 mg by mouth every morning.     • EPIPEN 2-NOLAN 0.3 MG/0.3ML Solution Auto-injector solution for injection 0.3 mg by Intramuscular route as needed.     • sucralfate (CARAFATE) 1 GM Tab sucralfate 1 gram tablet   CRUSH AND MIX 1 TABLET IN 30 ML OF WATER AND TAKE BY MOUTH FOUR TIMES DAILY FOR 14 DAYS     • pantoprazole (PROTONIX) 40 MG Tablet Delayed Response TAKE 1 TABLET BY MOUTH TWICE DAILY 30 MINUTES BEFORE BREAKFAST AND 30 MINUTES BEFORE DINNER MEAL (Patient not taking: Reported on 5/2/2022)     • hydroCHLOROthiazide (HYDRODIURIL) 25 MG Tab Take 1 Tablet by mouth every day. (Patient not taking: Reported on 5/2/2022) 90 Tablet 2   • pregabalin (LYRICA) 50 MG capsule Take 50 mg by mouth. (Patient not taking: Reported on 5/2/2022)     • cyanocobalamin (VITAMIN  "B-12) 1000 MCG/ML Solution 1,000 mcg by Injection route every 30 days. (Patient not taking: Reported on 5/2/2022)  2     No current [facility]-ordered facility-administered medications on file.       Allergies:  Milnacipran, Omnicap, Tramadol, Ciprofloxacin, Metoclopramide, Sulfa drugs, Tetanus antitoxin, Carisoprodol, Nortriptyline hcl, and Tetanus toxoid    Health Maintenance: Due for pneumococcal and Tdap.    Objective:     Vital signs reviewed  Exam:  /72 (BP Location: Left arm, Patient Position: Sitting, BP Cuff Size: Adult)   Pulse 72   Temp 36.7 °C (98.1 °F) (Temporal)   Resp 18   Ht 1.6 m (5' 3\")   Wt 79.8 kg (176 lb)   LMP 03/29/2016   SpO2 98%   BMI 31.18 kg/m²  Body mass index is 31.18 kg/m².    Gen: Alert and oriented, No apparent distress.  Eyes:   Lids normal. Glasses in place.   Lungs: Normal effort, CTA bilaterally, no wheezes, rhonchi, or rales  CV: Regular rate and rhythm. No murmurs, rubs, or gallops. No CVA tenderness.   Ext: No clubbing, cyanosis, edema.  Healing bruising noted to bilateral forearms.    Labs:     Results for RM BENÍTEZ (MRN [id number]) as of 5/2/2022 09:19   Ref. Range 5/2/2022 09:09   POC Color Latest Ref Range: Negative  Dark Yello   POC Appearance Latest Ref Range: Negative  Clear   POC Specific Gravity Latest Ref Range: <1.005 - >1.030  1.030   POC Urine PH Latest Ref Range: 5.0 - 8.0  5.5   POC Glucose Latest Ref Range: Negative mg/dL Negative   POC Ketones Latest Ref Range: Negative mg/dL Negative   POC Protein Latest Ref Range: Negative mg/dL Negative   POC Nitrites Latest Ref Range: Negative  Negative   POC Leukocyte Esterase Latest Ref Range: Negative  Trace   POC Blood Latest Ref Range: Negative  Negative   POC Bilirubin Latest Ref Range: Negative mg/dL Negative   POC Urobiligen Latest Ref Range: Negative (0.2) mg/dL 0.2       Assessment & Plan:     46 y.o. female with the following -     1. Vaginal discharge  Acute uncomplicated problem.  She like " to proceed with self collection of DNA pathogen swab.  We are checking urinalysis and plan to culture.  Urine is positive for trace leukocytes.  We discussed that with no current urinary symptoms we will culture and if her culture does grow an organism then we will send over antibiotic.  She verbalized understanding and is in agreement.  She is comfortable with her MyChart we discussed following with results in MyChart.  - VAGINAL PATHOGENS DNA PANEL; Future  - POCT Urinalysis    2. Weakness  3. Easy bruising  Chronic exacerbated problem.  She notes that she has started bruising easily and weakness.  She reports that these are similar symptoms that occur when her vitamin B12 is low.  She is requesting vitamin B12 labs today.  - VITAMIN B12; Future    4. Vaccine for tetanus toxoid  Acute uncomplicated problem.  She denies any anaphylaxis symptoms.  Current symptoms for side effects include muscle rigidity and pain.  She would like her Tdap today.    5. Need for vaccination  Acute uncomplicated problem.  She would like her Tdap today. I have placed the below orders and discussed them with an approved delegating provider. The MA is performing the below orders under the direction of Dr. Washington.  - Tdap Vaccine =>8YO IM    6. Leukocytes in urine  Acute uncomplicated problem.  No urinary symptoms today however with leukocytes present in urinalysis we will culture urine.  Antibiotics if urine positive.  - URINE CULTURE(NEW); Future      Return pending labs.    Please note that this dictation was created using voice recognition software. I have made every reasonable attempt to correct obvious errors, but I expect that there are errors of grammar and possibly content that I did not discover before finalizing the note.

## 2022-05-02 NOTE — ASSESSMENT & PLAN NOTE
Reports that with her last Tdap vaccination 10 years ago she had myalgias and muscle rigidity. She took pain medication and muscle relaxants that helped. She has medication available.  The symptoms resolved after 2-3 days. She denies any anaphylaxis reactions.  She is due for her Tdap and would like to proceed with vaccination today.

## 2022-05-03 DIAGNOSIS — B96.89 BACTERIAL VAGINOSIS: ICD-10-CM

## 2022-05-03 DIAGNOSIS — N76.0 BACTERIAL VAGINOSIS: ICD-10-CM

## 2022-05-03 RX ORDER — METRONIDAZOLE 7.5 MG/G
1 GEL VAGINAL
Qty: 5 EACH | Refills: 0 | Status: SHIPPED | OUTPATIENT
Start: 2022-05-03 | End: 2022-05-08

## 2022-05-03 NOTE — PROGRESS NOTES
Vaginal swab positive for bacterial vaginosis.  Prescription sent for metronidazole 0.75% gel daily for 5 days.

## 2022-05-04 LAB
BACTERIA UR CULT: NORMAL
SIGNIFICANT IND 70042: NORMAL
SITE SITE: NORMAL
SOURCE SOURCE: NORMAL

## 2022-05-18 ENCOUNTER — HOSPITAL ENCOUNTER (OUTPATIENT)
Dept: LAB | Facility: MEDICAL CENTER | Age: 47
End: 2022-05-18
Attending: NURSE PRACTITIONER
Payer: COMMERCIAL

## 2022-05-18 DIAGNOSIS — R53.1 WEAKNESS: ICD-10-CM

## 2022-05-18 DIAGNOSIS — R23.3 EASY BRUISING: ICD-10-CM

## 2022-05-18 LAB — VIT B12 SERPL-MCNC: 663 PG/ML (ref 211–911)

## 2022-05-18 PROCEDURE — 36415 COLL VENOUS BLD VENIPUNCTURE: CPT

## 2022-05-18 PROCEDURE — 82607 VITAMIN B-12: CPT

## 2022-07-16 ENCOUNTER — HOSPITAL ENCOUNTER (OUTPATIENT)
Facility: MEDICAL CENTER | Age: 47
End: 2022-07-16
Attending: NURSE PRACTITIONER
Payer: COMMERCIAL

## 2022-07-16 ENCOUNTER — HOSPITAL ENCOUNTER (OUTPATIENT)
Dept: LAB | Facility: MEDICAL CENTER | Age: 47
End: 2022-07-16
Attending: NURSE PRACTITIONER
Payer: COMMERCIAL

## 2022-07-16 LAB
ALBUMIN SERPL BCP-MCNC: 4.1 G/DL (ref 3.2–4.9)
ALBUMIN/GLOB SERPL: 1.3 G/DL
ALP SERPL-CCNC: 82 U/L (ref 30–99)
ALT SERPL-CCNC: 19 U/L (ref 2–50)
ANION GAP SERPL CALC-SCNC: 10 MMOL/L (ref 7–16)
APPEARANCE UR: CLEAR
AST SERPL-CCNC: 17 U/L (ref 12–45)
BASOPHILS # BLD AUTO: 2.1 % (ref 0–1.8)
BASOPHILS # BLD: 0.11 K/UL (ref 0–0.12)
BILIRUB SERPL-MCNC: 0.2 MG/DL (ref 0.1–1.5)
BILIRUB UR QL STRIP.AUTO: NEGATIVE
BUN SERPL-MCNC: 15 MG/DL (ref 8–22)
C3 SERPL-MCNC: 154.8 MG/DL (ref 87–200)
C4 SERPL-MCNC: 23.4 MG/DL (ref 19–52)
CALCIUM SERPL-MCNC: 9.2 MG/DL (ref 8.5–10.5)
CHLORIDE SERPL-SCNC: 104 MMOL/L (ref 96–112)
CO2 SERPL-SCNC: 23 MMOL/L (ref 20–33)
COLOR UR: YELLOW
CREAT SERPL-MCNC: 0.68 MG/DL (ref 0.5–1.4)
CRP SERPL HS-MCNC: <0.3 MG/DL (ref 0–0.75)
EOSINOPHIL # BLD AUTO: 0.17 K/UL (ref 0–0.51)
EOSINOPHIL NFR BLD: 3.3 % (ref 0–6.9)
ERYTHROCYTE [DISTWIDTH] IN BLOOD BY AUTOMATED COUNT: 40.5 FL (ref 35.9–50)
ERYTHROCYTE [SEDIMENTATION RATE] IN BLOOD BY WESTERGREN METHOD: 6 MM/HOUR (ref 0–25)
G LAMBLIA+C PARVUM AG STL QL RAPID: NORMAL
GFR SERPLBLD CREATININE-BSD FMLA CKD-EPI: 109 ML/MIN/1.73 M 2
GLOBULIN SER CALC-MCNC: 3.2 G/DL (ref 1.9–3.5)
GLUCOSE SERPL-MCNC: 95 MG/DL (ref 65–99)
GLUCOSE UR STRIP.AUTO-MCNC: NEGATIVE MG/DL
HCT VFR BLD AUTO: 44.7 % (ref 37–47)
HGB BLD-MCNC: 14.1 G/DL (ref 12–16)
IMM GRANULOCYTES # BLD AUTO: 0.01 K/UL (ref 0–0.11)
IMM GRANULOCYTES NFR BLD AUTO: 0.2 % (ref 0–0.9)
KETONES UR STRIP.AUTO-MCNC: NEGATIVE MG/DL
LEUKOCYTE ESTERASE UR QL STRIP.AUTO: NEGATIVE
LYMPHOCYTES # BLD AUTO: 1.85 K/UL (ref 1–4.8)
LYMPHOCYTES NFR BLD: 35.7 % (ref 22–41)
MCH RBC QN AUTO: 25.9 PG (ref 27–33)
MCHC RBC AUTO-ENTMCNC: 31.5 G/DL (ref 33.6–35)
MCV RBC AUTO: 82.2 FL (ref 81.4–97.8)
MICRO URNS: NORMAL
MONOCYTES # BLD AUTO: 0.37 K/UL (ref 0–0.85)
MONOCYTES NFR BLD AUTO: 7.1 % (ref 0–13.4)
NEUTROPHILS # BLD AUTO: 2.67 K/UL (ref 2–7.15)
NEUTROPHILS NFR BLD: 51.6 % (ref 44–72)
NITRITE UR QL STRIP.AUTO: NEGATIVE
NRBC # BLD AUTO: 0 K/UL
NRBC BLD-RTO: 0 /100 WBC
PH UR STRIP.AUTO: 6 [PH] (ref 5–8)
PLATELET # BLD AUTO: 351 K/UL (ref 164–446)
PMV BLD AUTO: 11 FL (ref 9–12.9)
POTASSIUM SERPL-SCNC: 4 MMOL/L (ref 3.6–5.5)
PROT SERPL-MCNC: 7.3 G/DL (ref 6–8.2)
PROT UR QL STRIP: NEGATIVE MG/DL
RBC # BLD AUTO: 5.44 M/UL (ref 4.2–5.4)
RBC UR QL AUTO: NEGATIVE
SIGNIFICANT IND 70042: NORMAL
SITE SITE: NORMAL
SODIUM SERPL-SCNC: 137 MMOL/L (ref 135–145)
SOURCE SOURCE: NORMAL
SP GR UR STRIP.AUTO: 1.01
T4 SERPL-MCNC: 6.9 UG/DL (ref 4–12)
TSH SERPL DL<=0.005 MIU/L-ACNC: 1.44 UIU/ML (ref 0.38–5.33)
UROBILINOGEN UR STRIP.AUTO-MCNC: 0.2 MG/DL
WBC # BLD AUTO: 5.2 K/UL (ref 4.8–10.8)

## 2022-07-16 PROCEDURE — 85025 COMPLETE CBC W/AUTO DIFF WBC: CPT

## 2022-07-16 PROCEDURE — 83993 ASSAY FOR CALPROTECTIN FECAL: CPT

## 2022-07-16 PROCEDURE — 87329 GIARDIA AG IA: CPT

## 2022-07-16 PROCEDURE — 84436 ASSAY OF TOTAL THYROXINE: CPT

## 2022-07-16 PROCEDURE — 87328 CRYPTOSPORIDIUM AG IA: CPT

## 2022-07-16 PROCEDURE — 36415 COLL VENOUS BLD VENIPUNCTURE: CPT

## 2022-07-16 PROCEDURE — 82653 EL-1 FECAL QUANTITATIVE: CPT

## 2022-07-16 PROCEDURE — 80053 COMPREHEN METABOLIC PANEL: CPT

## 2022-07-16 PROCEDURE — 82705 FATS/LIPIDS FECES QUAL: CPT

## 2022-07-16 PROCEDURE — 85652 RBC SED RATE AUTOMATED: CPT

## 2022-07-16 PROCEDURE — 84443 ASSAY THYROID STIM HORMONE: CPT

## 2022-07-16 PROCEDURE — 87045 FECES CULTURE AEROBIC BACT: CPT

## 2022-07-16 PROCEDURE — 86160 COMPLEMENT ANTIGEN: CPT

## 2022-07-16 PROCEDURE — 86140 C-REACTIVE PROTEIN: CPT

## 2022-07-16 PROCEDURE — 87899 AGENT NOS ASSAY W/OPTIC: CPT | Mod: 91

## 2022-07-16 PROCEDURE — 81003 URINALYSIS AUTO W/O SCOPE: CPT

## 2022-07-17 LAB
E COLI SXT1+2 STL IA: NORMAL
SIGNIFICANT IND 70042: NORMAL
SITE SITE: NORMAL
SOURCE SOURCE: NORMAL

## 2022-07-18 LAB
BACTERIA STL CULT: NORMAL
C JEJUNI+C COLI AG STL QL: NORMAL
E COLI SXT1+2 STL IA: NORMAL
SIGNIFICANT IND 70042: NORMAL
SITE SITE: NORMAL
SOURCE SOURCE: NORMAL

## 2022-07-19 LAB
FAT STL QL: NORMAL
NEUTRAL FAT STL QL: NORMAL

## 2022-07-20 LAB
CALPROTECTIN STL-MCNT: 55 UG/G
ELASTASE PANC STL-MCNT: >800 UG/G

## 2022-08-29 ENCOUNTER — OFFICE VISIT (OUTPATIENT)
Dept: MEDICAL GROUP | Facility: PHYSICIAN GROUP | Age: 47
End: 2022-08-29
Payer: COMMERCIAL

## 2022-08-29 VITALS
HEIGHT: 63 IN | TEMPERATURE: 97.6 F | DIASTOLIC BLOOD PRESSURE: 78 MMHG | HEART RATE: 96 BPM | BODY MASS INDEX: 31.89 KG/M2 | OXYGEN SATURATION: 98 % | WEIGHT: 180 LBS | SYSTOLIC BLOOD PRESSURE: 118 MMHG

## 2022-08-29 DIAGNOSIS — Z79.890 HORMONE REPLACEMENT THERAPY: ICD-10-CM

## 2022-08-29 DIAGNOSIS — Z12.31 ENCOUNTER FOR SCREENING MAMMOGRAM FOR BREAST CANCER: ICD-10-CM

## 2022-08-29 DIAGNOSIS — F41.9 ANXIETY: ICD-10-CM

## 2022-08-29 DIAGNOSIS — Z23 NEED FOR VACCINATION: ICD-10-CM

## 2022-08-29 DIAGNOSIS — R73.09 ELEVATED HEMOGLOBIN A1C: ICD-10-CM

## 2022-08-29 DIAGNOSIS — I10 PRIMARY HYPERTENSION: ICD-10-CM

## 2022-08-29 LAB
HBA1C MFR BLD: 5.7 % (ref 0–5.6)
INT CON NEG: ABNORMAL
INT CON POS: ABNORMAL

## 2022-08-29 PROCEDURE — 99214 OFFICE O/P EST MOD 30 MIN: CPT | Mod: 25 | Performed by: NURSE PRACTITIONER

## 2022-08-29 PROCEDURE — 90677 PCV20 VACCINE IM: CPT | Performed by: NURSE PRACTITIONER

## 2022-08-29 PROCEDURE — 90471 IMMUNIZATION ADMIN: CPT | Performed by: NURSE PRACTITIONER

## 2022-08-29 PROCEDURE — 83036 HEMOGLOBIN GLYCOSYLATED A1C: CPT | Performed by: NURSE PRACTITIONER

## 2022-08-29 RX ORDER — DULOXETINE 40 MG/1
40 CAPSULE, DELAYED RELEASE ORAL DAILY
Qty: 30 CAPSULE | Refills: 2 | Status: SHIPPED | OUTPATIENT
Start: 2022-08-29 | End: 2023-02-21 | Stop reason: SDUPTHER

## 2022-08-29 ASSESSMENT — FIBROSIS 4 INDEX: FIB4 SCORE: 0.51

## 2022-08-29 NOTE — PROGRESS NOTES
Subjective:     CC: medication management     HPI:   Joycelyn presents today with the following:      Anxiety  She continues with duloxetine 60 mg daily. She would like to discuss decreasing the dose due to side effects of flatulence, water retention up to 5 pounds worth. She would like to go down to 40 mg. She tolerated the 40 mg without side effects. She denies any self harm or SI today.     Hypertension  Her blood pressure is at goal today. She continues with HCTZ 25 mg daily. History of taking amlodipine and triamterene that have been able to be titrated off.      Hormone replacement therapy  She has been having weight gain, hot flashes every 10 minutes, palpations, nausea, pharyngitis and dry skin. She has had ongoing symptoms for 3 years. History of hysterectomy in 2013. She was offered hormones previously by her GYN but declined at the time. She is on duloxetine that did help initially. She does have a GYN at  [facility]. She is on Lyrica. She has tried gabapentin in the past without improvement in her symptoms. She watches her diet and is exercising but her weight continues to increase. Reports history of late onset of Type 1 Diabetes in her family and is concerned for diabetes. Check A1C today.      Past Medical History:   Diagnosis Date    Allergy, unspecified not elsewhere classified     Ankylosing spondylitis of lumbosacral region (HCC) 7/6/2015    Anxiety 11/24/2015    GERD (gastroesophageal reflux disease)     Headache, classical migraine     Hiatal hernia     Intractable migraine without aura and without status migrainosus 11/24/2015    Lupus ([facility])     Morphea 1/9/2019    Oropharyngeal dysphagia     Other forms of systemic lupus erythematosus ([facility]) 10/20/2017    Overactive bladder     Peptic ulcer     Polyuria 7/17/2019    Prediabetes 12/8/2016    Vocal cord dysfunction        Social History     Tobacco Use    Smoking status: Never    Smokeless tobacco: Never   Vaping Use    Vaping Use: Never  used   Substance Use Topics    Alcohol use: No     Alcohol/week: 0.0 oz    Drug use: No       Current Outpatient Medications Ordered in Epic   Medication Sig Dispense Refill    Non Formulary Request hydrochlorothiazide triamtrine      DULoxetine HCl 40 MG Cap DR Particles Take 40 mg by mouth every day. 30 Capsule 2    rabeprazole (ACIPHEX) 20 MG tablet Take 20 mg by mouth every day.      sucralfate (CARAFATE) 1 GM Tab sucralfate 1 gram tablet   CRUSH AND MIX 1 TABLET IN 30 ML OF WATER AND TAKE BY MOUTH FOUR TIMES DAILY FOR 14 DAYS      TRELEGY ELLIPTA 200-62.5-25 MCG/INH AEROSOL POWDER, BREATH ACTIVATED Inhale 1 Puff every day.      pregabalin (LYRICA) 50 MG capsule Take 50 mg by mouth.      albuterol 108 (90 Base) MCG/ACT Aero Soln inhalation aerosol Inhale 2 Puffs by mouth every four hours as needed for Shortness of Breath. 3 Each 3    rizatriptan (MAXALT) 5 MG tablet Take 1 Tab by mouth Once PRN for Migraine for up to 1 dose. 10 Tab 0    HYDROcodone-acetaminophen 2.5-108 mg/5mL (HYCET) 7.5-325 MG/15ML solution Take 15 mL by mouth 4 times a day as needed for Severe Pain.      Thiamine HCl (VITAMIN B-1 PO) Take 1 Tab by mouth every morning.      lidocaine (LIDODERM) 5 % Patch Apply 1 Patch to skin as directed 1 time daily as needed (pain).      ENBREL SURECLICK 50 MG/ML Solution Auto-injector 1 Each by Injection route every 7 days.      cyclobenzaprine (FLEXERIL) 10 MG Tab Take 10 mg by mouth at bedtime as needed for Muscle Spasms.      coenzyme Q-10 30 MG capsule Take 30 mg by mouth every morning.      cyanocobalamin (VITAMIN B-12) 1000 MCG/ML Solution Inject 1,000 mcg as directed every 30 days.  2    hydroxychloroquine (PLAQUENIL) 200 MG Tab Take 200 mg by mouth every morning.      EPIPEN 2-NOLAN 0.3 MG/0.3ML Solution Auto-injector solution for injection 0.3 mg by Intramuscular route as needed.      hydroCHLOROthiazide (HYDRODIURIL) 25 MG Tab Take 1 Tablet by mouth every day. (Patient not taking: Reported on  "8/29/2022) 90 Tablet 2     No current Epic-ordered facility-administered medications on file.       Allergies:  Milnacipran, Omnicap, Tramadol, Ciprofloxacin, Metoclopramide, Sulfa drugs, Tetanus antitoxin, Carisoprodol, Nortriptyline hcl, and Tetanus toxoid    Health Maintenance: Due for mammogram and pneumococcal vaccine today.      Objective:     Vital signs reviewed  Exam:  /78 (BP Location: Left arm, Patient Position: Sitting, BP Cuff Size: Adult)   Pulse 96   Temp 36.4 °C (97.6 °F) (Temporal)   Ht 1.6 m (5' 3\")   Wt 81.6 kg (180 lb)   LMP 03/29/2016   SpO2 98%   BMI 31.89 kg/m²  Body mass index is 31.89 kg/m².    Gen: Alert and oriented, No apparent distress.  Eyes:   Lids normal. Glasses in place.   Lungs: Normal effort, CTA bilaterally, no wheezes, rhonchi, or rales  CV: Regular rate and rhythm. No murmurs, rubs, or gallops.  Ext: No clubbing, cyanosis, edema.      Labs:      Latest Reference Range & Units 8/29/22 07:46   Glycohemoglobin 0.0 - 5.6 % 5.7 !   !: Data is abnormal    Assessment & Plan:     47 y.o. female with the following -     1. Anxiety  Chronic exacerbated problem.  She is experiencing side effects from her duloxetine 60 mg dosing.  She has tolerated previous 40 mg dosing.  Plan is for her to decrease her duloxetine to 40 mg daily to see if this helps improve her symptoms.  - DULoxetine HCl 40 MG Cap DR Particles; Take 40 mg by mouth every day.  Dispense: 30 Capsule; Refill: 2    2. Hormone replacement therapy  Chronic exacerbated problem.  She is already on SNRI, Lyrica and has tried gabapentin in the past.  At this time discussed given her complex medical history that I would like her to follow-up her gynecologist in regards to hormone replacement.  She verbalizes understanding.  She states that she will call and schedule an appointment.    3. Elevated hemoglobin A1c  Chronic stable problem.  A1c is improved from previous 5.8% to current 5.7%.  Continue with diet and lifestyle " modifications.  - POCT  A1C    4. Primary hypertension  Chronic stable problem.  Blood pressure is at goal today.  She will continue with her hydrochlorothiazide 25 mg daily dosing.    5. Encounter for screening mammogram for breast cancer  Chronic stable problem.  Due for a mammogram.  - MA-SCREENING MAMMO BILAT W/TOMOSYNTHESIS W/CAD; Future    6. Need for vaccination  Chronic stable problem.  Due for pneumococcal vaccine, PCV-20.  She is interested in getting the vaccine. I have placed the below orders and discussed them with an approved delegating provider. The MA is performing the below orders under the direction of Dr. Bower.  - Pneumococcal Conjugate Vaccine 20-Valent (19 yrs+)      Return if symptoms worsen or fail to improve.    Please note that this dictation was created using voice recognition software. I have made every reasonable attempt to correct obvious errors, but I expect that there are errors of grammar and possibly content that I did not discover before finalizing the note.

## 2022-08-29 NOTE — ASSESSMENT & PLAN NOTE
She continues with duloxetine 60 mg daily. She would like to discuss decreasing the dose due to side effects of flatulence, water retention up to 5 pounds worth. She would like to go down to 40 mg. She tolerated the 40 mg without side effects. She denies any self harm or SI today.

## 2022-08-29 NOTE — ASSESSMENT & PLAN NOTE
She has been having weight gain, hot flashes every 10 minutes, palpations, nausea, pharyngitis and dry skin. She has had ongoing symptoms for 3 years. History of hysterectomy in 2013. She was offered hormones previously by her GYN but declined at the time. She is on duloxetine that did help initially. She does have a GYN at  [facility]. She is on Lyrica. She has tried gabapentin in the past without improvement in her symptoms. She watches her diet and is exercising but her weight continues to increase. Reports history of late onset of Type 1 Diabetes in her family and is concerned for diabetes. Check A1C today.

## 2022-08-29 NOTE — ASSESSMENT & PLAN NOTE
Her blood pressure is at goal today. She continues with HCTZ 25 mg daily. History of taking amlodipine and triamterene that have been able to be titrated off.

## 2022-09-23 ENCOUNTER — APPOINTMENT (OUTPATIENT)
Dept: RADIOLOGY | Facility: MEDICAL CENTER | Age: 47
End: 2022-09-23
Attending: NURSE PRACTITIONER
Payer: COMMERCIAL

## 2022-11-12 ENCOUNTER — HOSPITAL ENCOUNTER (OUTPATIENT)
Dept: LAB | Facility: MEDICAL CENTER | Age: 47
End: 2022-11-12
Attending: NURSE PRACTITIONER
Payer: COMMERCIAL

## 2022-11-12 LAB
ESTRADIOL SERPL-MCNC: <5 PG/ML
FSH SERPL-ACNC: 78.2 MIU/ML
LH SERPL-ACNC: 50.4 IU/L
PROGEST SERPL-MCNC: 0.26 NG/ML

## 2022-11-12 PROCEDURE — 83002 ASSAY OF GONADOTROPIN (LH): CPT

## 2022-11-12 PROCEDURE — 84270 ASSAY OF SEX HORMONE GLOBUL: CPT

## 2022-11-12 PROCEDURE — 84403 ASSAY OF TOTAL TESTOSTERONE: CPT

## 2022-11-12 PROCEDURE — 83525 ASSAY OF INSULIN: CPT

## 2022-11-12 PROCEDURE — 84402 ASSAY OF FREE TESTOSTERONE: CPT

## 2022-11-12 PROCEDURE — 83001 ASSAY OF GONADOTROPIN (FSH): CPT

## 2022-11-12 PROCEDURE — 84144 ASSAY OF PROGESTERONE: CPT

## 2022-11-12 PROCEDURE — 36415 COLL VENOUS BLD VENIPUNCTURE: CPT

## 2022-11-12 PROCEDURE — 82670 ASSAY OF TOTAL ESTRADIOL: CPT

## 2022-11-14 LAB — INSULIN P FAST SERPL-ACNC: 12 UIU/ML (ref 3–25)

## 2022-11-18 LAB
SHBG SERPL-SCNC: 46 NMOL/L (ref 25–122)
TESTOST FREE SERPL-MCNC: 1.1 PG/ML (ref 1.1–5.8)
TESTOST SERPL-MCNC: 8 NG/DL (ref 9–55)

## 2022-11-29 ENCOUNTER — HOSPITAL ENCOUNTER (OUTPATIENT)
Dept: RADIOLOGY | Facility: MEDICAL CENTER | Age: 47
End: 2022-11-29
Attending: NURSE PRACTITIONER
Payer: COMMERCIAL

## 2022-11-29 DIAGNOSIS — R92.8 ABNORMAL MAMMOGRAM OF RIGHT BREAST: ICD-10-CM

## 2022-11-29 DIAGNOSIS — Z12.31 ENCOUNTER FOR SCREENING MAMMOGRAM FOR BREAST CANCER: ICD-10-CM

## 2022-11-29 PROCEDURE — 77063 BREAST TOMOSYNTHESIS BI: CPT

## 2022-12-06 ENCOUNTER — OFFICE VISIT (OUTPATIENT)
Dept: URGENT CARE | Facility: CLINIC | Age: 47
End: 2022-12-06
Payer: COMMERCIAL

## 2022-12-06 VITALS
TEMPERATURE: 97.7 F | WEIGHT: 177 LBS | SYSTOLIC BLOOD PRESSURE: 100 MMHG | HEART RATE: 72 BPM | HEIGHT: 63 IN | DIASTOLIC BLOOD PRESSURE: 68 MMHG | BODY MASS INDEX: 31.36 KG/M2 | RESPIRATION RATE: 16 BRPM | OXYGEN SATURATION: 99 %

## 2022-12-06 DIAGNOSIS — R05.1 ACUTE COUGH: ICD-10-CM

## 2022-12-06 DIAGNOSIS — R52 BODY ACHES: ICD-10-CM

## 2022-12-06 DIAGNOSIS — R51.9 ACUTE NONINTRACTABLE HEADACHE, UNSPECIFIED HEADACHE TYPE: ICD-10-CM

## 2022-12-06 LAB
FLUAV+FLUBV AG SPEC QL IA: NORMAL
INT CON NEG: NORMAL
INT CON POS: NORMAL

## 2022-12-06 PROCEDURE — 99213 OFFICE O/P EST LOW 20 MIN: CPT | Performed by: NURSE PRACTITIONER

## 2022-12-06 PROCEDURE — 87804 INFLUENZA ASSAY W/OPTIC: CPT | Performed by: NURSE PRACTITIONER

## 2022-12-06 ASSESSMENT — FIBROSIS 4 INDEX: FIB4 SCORE: 0.51

## 2022-12-07 NOTE — PROGRESS NOTES
Joycelyn Byers is a 47 y.o. female who presents for Headache (Headache, body aches x1 day/Cough x1wk)      HPI  This is a new problem. Joycelyn Byers is a 47 y.o. patient who presents to urgent care with c/o: sudden onset of headache, body aches, cough x 1 day. Coughing has been present for the past week but much worse over the last day. The bodyaches, fatigue and headache started today. She has to leave work today. Went and had a covid test. Results pending. Concerned that it could be influenza.   Treatments tried: OTC analgesic.   Denies weakness, ear pain, sore throat, dizziness.    No other aggravating or alleviating factors.       ROS See HPI    Allergies:       Allergies   Allergen Reactions    Milnacipran Myalgia    Omnicap Rash     Severe diarrhea    Tramadol Shortness of Breath and Rash     Shortness of breath & rash      Ciprofloxacin Unspecified     Knee & muscle pain      Metoclopramide Unspecified     Akathisia      Sulfa Drugs Nausea     GI upset    Tetanus Antitoxin Unspecified     Mild spasms and pain      Carisoprodol Unspecified     Slurred speech, stumbling    Nortriptyline Hcl Unspecified     detatchment and fatigue.    Tetanus Toxoid Myalgia       PMSFS Hx:  Past Medical History:   Diagnosis Date    Allergy, unspecified not elsewhere classified     Ankylosing spondylitis of lumbosacral region ([facility]) 7/6/2015    Anxiety 11/24/2015    GERD (gastroesophageal reflux disease)     Headache, classical migraine     Hiatal hernia     Intractable migraine without aura and without status migrainosus 11/24/2015    Lupus ([facility])     Morphea 1/9/2019    Oropharyngeal dysphagia     Other forms of systemic lupus erythematosus ([facility]) 10/20/2017    Overactive bladder     Peptic ulcer     Polyuria 7/17/2019    Prediabetes 12/8/2016    Vocal cord dysfunction      Past Surgical History:   Procedure Laterality Date    HYSTERECTOMY LAPAROSCOPY  2013    utrine and right ovary removed    HERNIA REPAIR   2007    umblical hernia    CHOLECYSTECTOMY  2007    SALPINGO-OOPHORECTOMY, UNILATERAL Right      Family History   Problem Relation Age of Onset    Arthritis Brother         psoriatic arthritis    Psoriasis Brother     Arthritis Maternal Grandmother     Psychiatric Illness Mother         Major depression    Other Mother         discoid lupus, brain anerysm    Psychiatric Illness Father         Bipolar disorder    Heart Disease Father         CHF    Hypertension Father     Diabetes Father     Other Father         Agent orange exposure 125% disabled    Diabetes Paternal Uncle     Stroke Maternal Grandfather     Diabetes Paternal Grandmother     Alcohol/Drug Paternal Grandfather     Cancer Paternal Aunt         gyn cancer    GI Disease Son         gerd    GI Disease Daughter         gerd    GI Disease Daughter         gerd    GI Disease Daughter         gerd    Diabetes Paternal Aunt 60        type 1 DM     Social History     Tobacco Use    Smoking status: Never    Smokeless tobacco: Never   Substance Use Topics    Alcohol use: No     Alcohol/week: 0.0 oz       Problems:   Patient Active Problem List   Diagnosis    Asthma in adult    Overactive bladder    Ankylosing spondylitis of lumbosacral region (HCC)    Anxiety    Intractable migraine without aura and without status migrainosus    Multiple allergies    CNS demyelinating disease (HCC)    Vocal cord dysfunction    Chronic constipation    Other forms of systemic lupus erythematosus (HCC)    SVT (supraventricular tachycardia) (HCC)    Vitamin D deficiency    Morphea    Other fatigue    Hypertension    Hypokalemia    Overweight    Hyperglycemia    Hypercholesterolemia    Prolonged Q-T interval on ECG    History of renal calculi    Vitamin B 12 deficiency    Gastroesophageal reflux disease without esophagitis    Elevated hemoglobin A1c    Benign cyst of left breast    Cough    Obesity (BMI 30-39.9)    Vaginal discharge    Vaccine for tetanus toxoid    Hormone replacement  "therapy       Medications:   Current Outpatient Medications on File Prior to Visit   Medication Sig Dispense Refill    Non Formulary Request hydrochlorothiazide triamtrine      DULoxetine HCl 40 MG Cap DR Particles Take 40 mg by mouth every day. 30 Capsule 2    rabeprazole (ACIPHEX) 20 MG tablet Take 20 mg by mouth every day.      TRELEGY ELLIPTA 200-62.5-25 MCG/INH AEROSOL POWDER, BREATH ACTIVATED Inhale 1 Puff every day.      pregabalin (LYRICA) 50 MG capsule Take 50 mg by mouth.      albuterol 108 (90 Base) MCG/ACT Aero Soln inhalation aerosol Inhale 2 Puffs by mouth every four hours as needed for Shortness of Breath. 3 Each 3    rizatriptan (MAXALT) 5 MG tablet Take 1 Tab by mouth Once PRN for Migraine for up to 1 dose. 10 Tab 0    HYDROcodone-acetaminophen 2.5-108 mg/5mL (HYCET) 7.5-325 MG/15ML solution Take 15 mL by mouth 4 times a day as needed for Severe Pain.      Thiamine HCl (VITAMIN B-1 PO) Take 1 Tab by mouth every morning.      lidocaine (LIDODERM) 5 % Patch Apply 1 Patch to skin as directed 1 time daily as needed (pain).      ENBREL SURECLICK 50 MG/ML Solution Auto-injector 1 Each by Injection route every 7 days.      cyclobenzaprine (FLEXERIL) 10 MG Tab Take 10 mg by mouth at bedtime as needed for Muscle Spasms.      coenzyme Q-10 30 MG capsule Take 30 mg by mouth every morning.      cyanocobalamin (VITAMIN B-12) 1000 MCG/ML Solution Inject 1,000 mcg as directed every 30 days.  2    hydroxychloroquine (PLAQUENIL) 200 MG Tab Take 200 mg by mouth every morning.      EPIPEN 2-NOLAN 0.3 MG/0.3ML Solution Auto-injector solution for injection 0.3 mg by Intramuscular route as needed.      hydroCHLOROthiazide (HYDRODIURIL) 25 MG Tab Take 1 Tablet by mouth every day. (Patient not taking: Reported on 8/29/2022) 90 Tablet 2     No current facility-administered medications on file prior to visit.          Objective:     /68   Pulse 72   Temp 36.5 °C (97.7 °F) (Temporal)   Resp 16   Ht 1.6 m (5' 3\")   Wt " 80.3 kg (177 lb)   LMP 03/29/2016   SpO2 99%   BMI 31.35 kg/m²     Physical Exam  Vitals and nursing note reviewed.   Constitutional:       General: She is not in acute distress.     Appearance: Normal appearance. She is well-developed. She is not ill-appearing or toxic-appearing.   HENT:      Head: Normocephalic.      Right Ear: Hearing, ear canal and external ear normal. No middle ear effusion. Tympanic membrane is injected. Tympanic membrane is not erythematous.      Left Ear: Hearing, ear canal and external ear normal.  No middle ear effusion. Tympanic membrane is injected. Tympanic membrane is not erythematous.      Nose: No mucosal edema or rhinorrhea.      Right Sinus: No maxillary sinus tenderness or frontal sinus tenderness.      Left Sinus: No maxillary sinus tenderness or frontal sinus tenderness.      Mouth/Throat:      Mouth: Mucous membranes are moist.      Pharynx: Uvula midline. No posterior oropharyngeal erythema.      Tonsils: No tonsillar abscesses.   Eyes:      Conjunctiva/sclera: Conjunctivae normal.   Neck:      Trachea: Trachea normal.   Cardiovascular:      Rate and Rhythm: Normal rate and regular rhythm.      Chest Wall: PMI is not displaced.      Pulses: Normal pulses.      Heart sounds: Normal heart sounds.   Pulmonary:      Effort: Pulmonary effort is normal. No respiratory distress.      Breath sounds: Normal breath sounds. No decreased breath sounds, wheezing, rhonchi or rales.   Musculoskeletal:         General: Normal range of motion.      Cervical back: Full passive range of motion without pain, normal range of motion and neck supple.   Lymphadenopathy:      Cervical: No cervical adenopathy.      Upper Body:      Right upper body: No supraclavicular adenopathy.      Left upper body: No supraclavicular adenopathy.   Skin:     General: Skin is warm and dry.      Capillary Refill: Capillary refill takes less than 2 seconds.   Neurological:      Mental Status: She is alert and  oriented to person, place, and time.      Gait: Gait normal.   Psychiatric:         Mood and Affect: Mood normal.         Speech: Speech normal.         Behavior: Behavior normal. Behavior is cooperative.         Thought Content: Thought content normal.         Judgment: Judgment normal.         Assessment /Associated Orders:      1. Acute nonintractable headache, unspecified headache type  POCT Influenza A/B      2. Body aches  POCT Influenza A/B      3. Acute cough  POCT Influenza A/B          Medical Decision Making:    Pt is clinically stable at today's acute urgent care visit.  No acute distress noted. Appropriate for outpatient care at this time.   Acute problem today .   Negative influenza  Continue to quarantine until covid results are available.   Keep well hydrated  Increase rest       Discussed Dx, management options (risks,benefits, and alternatives to planned treatment), natural progression and supportive care.  Expressed understanding and the treatment plan was agreed upon.   Questions were encouraged and answered   Return to urgent care prn if new or worsening sx or if there is no improvement in condition prn.          Time I spent evaluating Joycelyn Byers in urgent care today was 25  minutes. This time includes preparing for visit, reviewing any pertinent notes or test results, counseling/education, exam, obtaining HPI, interpretation of lab tests, medication management and documentation as indicated above.Time does not include separately billable procedures noted .       Please note that this dictation was created using voice recognition software. I have worked with consultants from the vendor as well as technical experts from [facility] to optimize the interface. I have made every reasonable attempt to correct obvious errors, but I expect that there are errors of grammar and possibly content that I did not discover before finalizing the note.  This note was electronically signed by  provider

## 2022-12-09 ENCOUNTER — HOSPITAL ENCOUNTER (OUTPATIENT)
Dept: RADIOLOGY | Facility: MEDICAL CENTER | Age: 47
End: 2022-12-09
Attending: NURSE PRACTITIONER
Payer: COMMERCIAL

## 2022-12-09 DIAGNOSIS — R92.8 ABNORMAL MAMMOGRAM: ICD-10-CM

## 2022-12-09 PROCEDURE — G0279 TOMOSYNTHESIS, MAMMO: HCPCS

## 2022-12-14 ENCOUNTER — PATIENT MESSAGE (OUTPATIENT)
Dept: HEALTH INFORMATION MANAGEMENT | Facility: OTHER | Age: 47
End: 2022-12-14

## 2023-02-21 ENCOUNTER — OFFICE VISIT (OUTPATIENT)
Dept: MEDICAL GROUP | Facility: PHYSICIAN GROUP | Age: 48
End: 2023-02-21
Payer: COMMERCIAL

## 2023-02-21 VITALS
SYSTOLIC BLOOD PRESSURE: 138 MMHG | WEIGHT: 185 LBS | OXYGEN SATURATION: 98 % | HEART RATE: 92 BPM | TEMPERATURE: 97.6 F | HEIGHT: 63 IN | BODY MASS INDEX: 32.78 KG/M2 | DIASTOLIC BLOOD PRESSURE: 88 MMHG

## 2023-02-21 DIAGNOSIS — J18.9 PNEUMONIA OF BOTH LUNGS DUE TO INFECTIOUS ORGANISM, UNSPECIFIED PART OF LUNG: ICD-10-CM

## 2023-02-21 DIAGNOSIS — F41.9 ANXIETY: ICD-10-CM

## 2023-02-21 DIAGNOSIS — R06.2 WHEEZING: ICD-10-CM

## 2023-02-21 DIAGNOSIS — I10 PRIMARY HYPERTENSION: ICD-10-CM

## 2023-02-21 PROCEDURE — 99214 OFFICE O/P EST MOD 30 MIN: CPT | Performed by: NURSE PRACTITIONER

## 2023-02-21 RX ORDER — ESTRADIOL 0.1 MG/D
FILM, EXTENDED RELEASE TRANSDERMAL
COMMUNITY
Start: 2022-12-22

## 2023-02-21 RX ORDER — AZITHROMYCIN 250 MG/1
TABLET, FILM COATED ORAL
COMMUNITY
Start: 2023-02-18 | End: 2023-03-06

## 2023-02-21 RX ORDER — DULOXETINE 40 MG/1
40 CAPSULE, DELAYED RELEASE ORAL DAILY
Qty: 90 CAPSULE | Refills: 3 | Status: SHIPPED | OUTPATIENT
Start: 2023-02-21

## 2023-02-21 RX ORDER — METHYLPREDNISOLONE 4 MG/1
TABLET ORAL
Qty: 21 TABLET | Refills: 0 | Status: SHIPPED | OUTPATIENT
Start: 2023-02-21 | End: 2023-03-06

## 2023-02-21 RX ORDER — PROGESTERONE 100 MG/1
100 CAPSULE ORAL
COMMUNITY
Start: 2022-12-15

## 2023-02-21 ASSESSMENT — ANXIETY QUESTIONNAIRES
3. WORRYING TOO MUCH ABOUT DIFFERENT THINGS: SEVERAL DAYS
7. FEELING AFRAID AS IF SOMETHING AWFUL MIGHT HAPPEN: NOT AT ALL
1. FEELING NERVOUS, ANXIOUS, OR ON EDGE: SEVERAL DAYS
6. BECOMING EASILY ANNOYED OR IRRITABLE: NOT AT ALL
GAD7 TOTAL SCORE: 3
5. BEING SO RESTLESS THAT IT IS HARD TO SIT STILL: NOT AT ALL
2. NOT BEING ABLE TO STOP OR CONTROL WORRYING: SEVERAL DAYS
IF YOU CHECKED OFF ANY PROBLEMS ON THIS QUESTIONNAIRE, HOW DIFFICULT HAVE THESE PROBLEMS MADE IT FOR YOU TO DO YOUR WORK, TAKE CARE OF THINGS AT HOME, OR GET ALONG WITH OTHER PEOPLE: NOT DIFFICULT AT ALL
4. TROUBLE RELAXING: NOT AT ALL

## 2023-02-21 ASSESSMENT — FIBROSIS 4 INDEX: FIB4 SCORE: 0.51

## 2023-02-21 ASSESSMENT — PATIENT HEALTH QUESTIONNAIRE - PHQ9: CLINICAL INTERPRETATION OF PHQ2 SCORE: 0

## 2023-02-21 NOTE — ASSESSMENT & PLAN NOTE
Seen in ER over the weekend at [facility].  She was started on Z-abelino and prescribed an additional antibiotic to take if the Z-Abelino did not work.  She has been using her albuterol inhaler twice a day, not currently using any nebulizer.  She is having a productive cough.  She has noticed wheezing and her throat feels sore.

## 2023-02-21 NOTE — ASSESSMENT & PLAN NOTE
Her initial blood pressure today is 132/92.  She is not taking any blood pressure medication. She is currently sick with pneumonia and states this can elevate her blood pressure. At home before getting sick her blood pressure was 110's/70's. She has estradiol 0.1 mg biweekly patch that has helped her blood pressure. Her HCTZ was stopped 3 months ago after starting the estradiol patch.  Her blood pressure with the estradiol patch was 90's/50-60's.

## 2023-02-21 NOTE — LETTER
[facility]  SREEDHAR LovingRFREDDIE  [address]  [place] [address]  Fax: [phone]   Authorization for Release/Disclosure of   Protected Health Information   Name: RM BENÍTEZ : 1975 SSN: [id number]   Address: [address] [place] [address]  [place] [address] Phone:    [phone] (home)    I authorize the entity listed below to release/disclose the PHI below to:   [facility]/TERA Loving and TERA Loving   Provider or Entity Name:  [facility]    Address   City, State, [facility]   Phone:      Fax:     Reason for request: continuity of care   Information to be released:    [  ] LAST COLONOSCOPY,  including any PATH REPORT and follow-up  [  ] LAST FIT/COLOGUARD RESULT [  ] LAST DEXA  [  ] LAST MAMMOGRAM  [  ] LAST PAP  [  ] LAST LABS [  ] RETINA EXAM REPORT  [  ] IMMUNIZATION RECORDS  [  ] Release all info      [  ] Check here and initial the line next to each item to release ALL health information INCLUDING  _____ Care and treatment for drug and / or alcohol abuse  _____ HIV testing, infection status, or AIDS  _____ Genetic Testing    DATES OF SERVICE OR TIME PERIOD TO BE DISCLOSED: _____________  I understand and acknowledge that:  * This Authorization may be revoked at any time by you in writing, except if your health information has already been used or disclosed.  * Your health information that will be used or disclosed as a result of you signing this authorization could be re-disclosed by the recipient. If this occurs, your re-disclosed health information may no longer be protected by State or Federal laws.  * You may refuse to sign this Authorization. Your refusal will not affect your ability to obtain treatment.  * This Authorization becomes effective upon signing and will  on (date) __________.      If no date is indicated, this Authorization will  one (1) year from the signature date.    Name: Rm Kwan  Modetsa    Signature:   Date:     2/21/2023       PLEASE FAX REQUESTED RECORDS BACK TO: [phone]

## 2023-02-21 NOTE — PROGRESS NOTES
Subjective:     CC: Anxiety     HPI:   Joycelyn presents today with the following:    Anxiety  She continues with duloxetine 40 mg daily. Her anxiety feels controlled on the decreased dose. Denies self harm or SI today. Her water retention has improved with the decreased dose.     Hypertension  Her initial blood pressure today is 132/92.  She is not taking any blood pressure medication. She is currently sick with pneumonia and states this can elevate her blood pressure. At home before getting sick her blood pressure was 110's/70's. She has estradiol 0.1 mg biweekly patch that has helped her blood pressure. Her HCTZ was stopped 3 months ago after starting the estradiol patch.  Her blood pressure with the estradiol patch was 90's/50-60's.     Pneumonia  Seen in ER over the weekend at [facility].  She was started on Z-abelino and prescribed an additional antibiotic to take if the Z-Abelino did not work.  She has been using her albuterol inhaler twice a day, not currently using any nebulizer.  She is having a productive cough.  She has noticed wheezing and her throat feels sore.    Past Medical History:   Diagnosis Date    Allergy, unspecified not elsewhere classified     Ankylosing spondylitis of lumbosacral region ([facility]) 7/6/2015    Anxiety 11/24/2015    GERD (gastroesophageal reflux disease)     Headache, classical migraine     Hiatal hernia     Intractable migraine without aura and without status migrainosus 11/24/2015    Lupus ([facility])     Morphea 1/9/2019    Oropharyngeal dysphagia     Other forms of systemic lupus erythematosus ([facility]) 10/20/2017    Overactive bladder     Peptic ulcer     Polyuria 7/17/2019    Prediabetes 12/8/2016    Vocal cord dysfunction        Social History     Tobacco Use    Smoking status: Never    Smokeless tobacco: Never   Vaping Use    Vaping Use: Never used   Substance Use Topics    Alcohol use: No     Alcohol/week: 0.0 oz    Drug use: No       Current Outpatient Medications  Ordered in [facility]   Medication Sig Dispense Refill    estradiol (VIVELLE DOT) 0.1 MG/24HR PATCH BIWEEKLY APPLY 1 PATCH TOPICALLY TO THE SKIN EVERY 3 AND 1/2 DAYS      progesterone (PROMETRIUM) 100 MG Cap Take 100 mg by mouth at bedtime.      azithromycin (ZITHROMAX) 250 MG Tab FOLLOW PACKAGE DIRECTIONS      DULoxetine HCl 40 MG Cap DR Particles Take 40 mg by mouth every day. 90 Capsule 3    methylPREDNISolone (MEDROL DOSEPAK) 4 MG Tablet Therapy Pack As directed on the packaging label. 21 Tablet 0    rabeprazole (ACIPHEX) 20 MG tablet Take 20 mg by mouth every day.      TRELEGY ELLIPTA 200-62.5-25 MCG/INH AEROSOL POWDER, BREATH ACTIVATED Inhale 1 Puff every day.      pregabalin (LYRICA) 50 MG capsule Take 50 mg by mouth.      albuterol 108 (90 Base) MCG/ACT Aero Soln inhalation aerosol Inhale 2 Puffs by mouth every four hours as needed for Shortness of Breath. 3 Each 3    rizatriptan (MAXALT) 5 MG tablet Take 1 Tab by mouth Once PRN for Migraine for up to 1 dose. 10 Tab 0    HYDROcodone-acetaminophen 2.5-108 mg/5mL (HYCET) 7.5-325 MG/15ML solution Take 15 mL by mouth 4 times a day as needed for Severe Pain.      Thiamine HCl (VITAMIN B-1 PO) Take 1 Tab by mouth every morning.      lidocaine (LIDODERM) 5 % Patch Apply 1 Patch to skin as directed 1 time daily as needed (pain).      ENBREL SURECLICK 50 MG/ML Solution Auto-injector 1 Each by Injection route every 7 days.      cyclobenzaprine (FLEXERIL) 10 MG Tab Take 10 mg by mouth at bedtime as needed for Muscle Spasms.      coenzyme Q-10 30 MG capsule Take 30 mg by mouth every morning.      cyanocobalamin (VITAMIN B-12) 1000 MCG/ML Solution Inject 1,000 mcg as directed every 30 days.  2    hydroxychloroquine (PLAQUENIL) 200 MG Tab Take 200 mg by mouth every morning.      EPIPEN 2-NOLAN 0.3 MG/0.3ML Solution Auto-injector solution for injection 0.3 mg by Intramuscular route as needed.      Non Formulary Request hydrochlorothiazide triamtrine (Patient not taking: Reported  "on 2/21/2023)       No current [facility]-ordered facility-administered medications on file.       Allergies:  Milnacipran, Omnicap, Tramadol, Ciprofloxacin, Metoclopramide, Sulfa drugs, Tetanus antitoxin, Carisoprodol, Nortriptyline hcl, and Tetanus toxoid    Health Maintenance: Reviewed.      Objective:     Vital signs reviewed  Exam:  /88 (BP Location: Right arm, Patient Position: Sitting)   Pulse 92   Temp 36.4 °C (97.6 °F) (Temporal)   Ht 1.6 m (5' 3\")   Wt 83.9 kg (185 lb)   LMP 03/29/2016   SpO2 98%   BMI 32.77 kg/m²  Body mass index is 32.77 kg/m².    Gen: Alert and oriented, No apparent distress.  Eyes:   Lids normal. Glasses in place.   Lungs: Normal effort, Diminished uppers bilaterally with fine crackles to RML, no wheezes, rhonchi, or rales. Dry nonproductive cough.   CV: Regular rate and rhythm. No murmurs, rubs, or gallops.      Assessment & Plan:     47 y.o. female with the following -     1. Anxiety  Chronic stable problem.  She will continue with duloxetine 40 mg daily.  Return in 6 months for follow-up.  - DULoxetine HCl 40 MG Cap DR Particles; Take 40 mg by mouth every day.  Dispense: 90 Capsule; Refill: 3    2. Primary hypertension  Chronic stable problem.  On repeat by me today her blood pressure is slightly elevated at 138/88 but still below goal.  She is able to monitor blood pressures at home and will continue to monitor.  Follow-up if blood pressure becomes greater than 140/90 at home.    3. Pneumonia of both lungs due to infectious organism, unspecified part of lung  4. Wheezing  Chronic exacerbated problem.  She will continue with her Z-Abelino.  We are requesting records from ER.  We will start on Medrol Dosepak which she has tolerated in the past.  Follow-up if symptoms worsen.  Vital signs are stable today.  - methylPREDNISolone (MEDROL DOSEPAK) 4 MG Tablet Therapy Pack; As directed on the packaging label.  Dispense: 21 Tablet; Refill: 0        Return in about 6 months (around " 8/21/2023) for anxiety, blood pressure.    Please note that this dictation was created using voice recognition software. I have made every reasonable attempt to correct obvious errors, but I expect that there are errors of grammar and possibly content that I did not discover before finalizing the note.

## 2023-02-21 NOTE — ASSESSMENT & PLAN NOTE
She continues with duloxetine 40 mg daily. Her anxiety feels controlled on the decreased dose. Denies self harm or SI today. Her water retention has improved with the decreased dose.

## 2023-03-06 ENCOUNTER — OFFICE VISIT (OUTPATIENT)
Dept: MEDICAL GROUP | Facility: PHYSICIAN GROUP | Age: 48
End: 2023-03-06
Payer: COMMERCIAL

## 2023-03-06 VITALS
HEIGHT: 63 IN | HEART RATE: 81 BPM | DIASTOLIC BLOOD PRESSURE: 80 MMHG | OXYGEN SATURATION: 98 % | SYSTOLIC BLOOD PRESSURE: 116 MMHG | WEIGHT: 184 LBS | TEMPERATURE: 98.1 F | BODY MASS INDEX: 32.6 KG/M2

## 2023-03-06 DIAGNOSIS — J18.9 PNEUMONIA OF BOTH LUNGS DUE TO INFECTIOUS ORGANISM, UNSPECIFIED PART OF LUNG: ICD-10-CM

## 2023-03-06 DIAGNOSIS — F41.9 ANXIETY: ICD-10-CM

## 2023-03-06 DIAGNOSIS — I10 PRIMARY HYPERTENSION: ICD-10-CM

## 2023-03-06 PROCEDURE — 99214 OFFICE O/P EST MOD 30 MIN: CPT | Performed by: NURSE PRACTITIONER

## 2023-03-06 RX ORDER — HYDROCHLOROTHIAZIDE 12.5 MG/1
12.5 TABLET ORAL DAILY
COMMUNITY
End: 2023-03-06

## 2023-03-06 RX ORDER — HYDROCHLOROTHIAZIDE 12.5 MG/1
12.5 TABLET ORAL DAILY
Qty: 90 TABLET | Refills: 3 | Status: SHIPPED | OUTPATIENT
Start: 2023-03-06 | End: 2023-08-21

## 2023-03-06 ASSESSMENT — FIBROSIS 4 INDEX: FIB4 SCORE: 0.51

## 2023-03-06 NOTE — ASSESSMENT & PLAN NOTE
She continues with duloxetine 40 mg daily.  The current dose helps her anxiety enough so that she can function. Higher doses of duloxetine caused fluid retention.

## 2023-03-06 NOTE — ASSESSMENT & PLAN NOTE
She completed her Medrol Dosepak which did help her.  She reports that her symptoms are improved.  Does have slight cough but this is improving.

## 2023-03-06 NOTE — ASSESSMENT & PLAN NOTE
She has resumed her hydrochlorothiazide 12.5 mg daily. Her blood pressure has improved and her bloating has improved.  Her blood pressure today is 116/80.

## 2023-03-06 NOTE — PROGRESS NOTES
Subjective:     CC: anxiety, hypertension follow-up and pneumonia follow-up from last 2/21/2023 visit    HPI:   Joycelyn presents today with the following:    Hypertension  She has resumed her hydrochlorothiazide 12.5 mg daily. Her blood pressure has improved and her bloating has improved.  Her blood pressure today is 116/80.    Anxiety  She continues with duloxetine 40 mg daily.  The current dose helps her anxiety enough so that she can function. Higher doses of duloxetine caused fluid retention.     Pneumonia  She completed her Medrol Dosepak which did help her.  She reports that her symptoms are improved.  Does have slight cough but this is improving.      Past Medical History:   Diagnosis Date    Allergy, unspecified not elsewhere classified     Ankylosing spondylitis of lumbosacral region ([facility]) 7/6/2015    Anxiety 11/24/2015    GERD (gastroesophageal reflux disease)     Headache, classical migraine     Hiatal hernia     Intractable migraine without aura and without status migrainosus 11/24/2015    Lupus ([facility])     Morphea 1/9/2019    Oropharyngeal dysphagia     Other forms of systemic lupus erythematosus ([facility]) 10/20/2017    Overactive bladder     Peptic ulcer     Polyuria 7/17/2019    Prediabetes 12/8/2016    Vocal cord dysfunction        Social History     Tobacco Use    Smoking status: Never    Smokeless tobacco: Never   Vaping Use    Vaping Use: Never used   Substance Use Topics    Alcohol use: No     Alcohol/week: 0.0 oz    Drug use: No       Current Outpatient Medications Ordered in Epic   Medication Sig Dispense Refill    hydroCHLOROthiazide (HYDRODIURIL) 12.5 MG tablet Take 1 Tablet by mouth every day. 90 Tablet 3    estradiol (VIVELLE DOT) 0.1 MG/24HR PATCH BIWEEKLY APPLY 1 PATCH TOPICALLY TO THE SKIN EVERY 3 AND 1/2 DAYS      progesterone (PROMETRIUM) 100 MG Cap Take 100 mg by mouth at bedtime.      DULoxetine HCl 40 MG Cap DR Particles Take 40 mg by mouth every day. 90 Capsule 3    rabeprazole  "(ACIPHEX) 20 MG tablet Take 20 mg by mouth every day.      TRELEGY ELLIPTA 200-62.5-25 MCG/INH AEROSOL POWDER, BREATH ACTIVATED Inhale 1 Puff every day.      pregabalin (LYRICA) 50 MG capsule Take 50 mg by mouth. 1 tab 3xwk      albuterol 108 (90 Base) MCG/ACT Aero Soln inhalation aerosol Inhale 2 Puffs by mouth every four hours as needed for Shortness of Breath. 3 Each 3    rizatriptan (MAXALT) 5 MG tablet Take 1 Tab by mouth Once PRN for Migraine for up to 1 dose. 10 Tab 0    HYDROcodone-acetaminophen 2.5-108 mg/5mL (HYCET) 7.5-325 MG/15ML solution Take 15 mL by mouth 4 times a day as needed for Severe Pain.      Thiamine HCl (VITAMIN B-1 PO) Take 1 Tab by mouth every morning.      lidocaine (LIDODERM) 5 % Patch Apply 1 Patch to skin as directed 1 time daily as needed (pain).      ENBREL SURECLICK 50 MG/ML Solution Auto-injector 1 Each by Injection route every 7 days.      cyclobenzaprine (FLEXERIL) 10 MG Tab Take 10 mg by mouth at bedtime as needed for Muscle Spasms.      coenzyme Q-10 30 MG capsule Take 30 mg by mouth every morning.      cyanocobalamin (VITAMIN B-12) 1000 MCG/ML Solution Inject 1,000 mcg as directed every 30 days.  2    hydroxychloroquine (PLAQUENIL) 200 MG Tab Take 200 mg by mouth every morning.      EPIPEN 2-NOLAN 0.3 MG/0.3ML Solution Auto-injector solution for injection 0.3 mg by Intramuscular route as needed.       No current Epic-ordered facility-administered medications on file.       Allergies:  Milnacipran, Tramadol, Cefdinir, Ciprofloxacin, Metoclopramide, Sulfa drugs, and Carisoprodol    Health Maintenance: Reviewed      Objective:     Vital signs reviewed  Exam:  /80 (BP Location: Right arm, Patient Position: Sitting, BP Cuff Size: Adult)   Pulse 81   Temp 36.7 °C (98.1 °F) (Temporal)   Ht 1.6 m (5' 3\")   Wt 83.5 kg (184 lb)   LMP 03/29/2016   SpO2 98%   BMI 32.59 kg/m²  Body mass index is 32.59 kg/m².    Gen: Alert and oriented, No apparent distress.  Neck: Neck is supple " without lymphadenopathy.  Lungs: Normal effort, CTA bilaterally, no wheezes, rhonchi, or rales  CV: Regular rate and rhythm. No murmurs, rubs, or gallops.  Ext: No clubbing, cyanosis, edema.      Assessment & Plan:     47 y.o. female with the following -     1. Primary hypertension  Chronic stable problem.  Blood pressure is at goal today.  Continue with hydrochlorothiazide 12.5 mg daily.  Continue home blood pressure monitoring.  Follow-up in 6 months around August 2023.    - hydroCHLOROthiazide (HYDRODIURIL) 12.5 MG tablet; Take 1 Tablet by mouth every day.  Dispense: 90 Tablet; Refill: 3    2. Anxiety  Chronic stable problem.  Continue duloxetine 40 mg daily.    3. Pneumonia of both lungs due to infectious organism, unspecified part of lung  Chronic stable problem.  This problem is resolved.  Lung exam is unremarkable and vital signs are stable.      Return if symptoms worsen or fail to improve.    Please note that this dictation was created using voice recognition software. I have made every reasonable attempt to correct obvious errors, but I expect that there are errors of grammar and possibly content that I did not discover before finalizing the note.

## 2023-03-28 ENCOUNTER — HOSPITAL ENCOUNTER (OUTPATIENT)
Dept: RADIOLOGY | Facility: MEDICAL CENTER | Age: 48
End: 2023-03-28
Attending: NURSE PRACTITIONER
Payer: COMMERCIAL

## 2023-03-28 DIAGNOSIS — M45.9 ANKYLOSING SPONDYLITIS, UNSPECIFIED SITE OF SPINE (HCC): ICD-10-CM

## 2023-03-28 DIAGNOSIS — M32.9 SYSTEMIC LUPUS ERYTHEMATOSUS, UNSPECIFIED SLE TYPE, UNSPECIFIED ORGAN INVOLVEMENT STATUS (HCC): ICD-10-CM

## 2023-03-28 PROCEDURE — 72100 X-RAY EXAM L-S SPINE 2/3 VWS: CPT

## 2023-07-30 NOTE — PROGRESS NOTES
Recent mammogram shows right breast asymmetry and recommended further imaging.  Diagnostic mammogram of right breast and ultrasound of right breast ordered.  
4 = No assist / stand by assistance

## 2023-08-21 ENCOUNTER — OFFICE VISIT (OUTPATIENT)
Dept: MEDICAL GROUP | Facility: PHYSICIAN GROUP | Age: 48
End: 2023-08-21
Payer: COMMERCIAL

## 2023-08-21 VITALS
DIASTOLIC BLOOD PRESSURE: 72 MMHG | HEIGHT: 63 IN | BODY MASS INDEX: 31.71 KG/M2 | OXYGEN SATURATION: 98 % | HEART RATE: 92 BPM | TEMPERATURE: 97.3 F | SYSTOLIC BLOOD PRESSURE: 128 MMHG | WEIGHT: 179 LBS

## 2023-08-21 DIAGNOSIS — K21.9 GASTROESOPHAGEAL REFLUX DISEASE WITHOUT ESOPHAGITIS: ICD-10-CM

## 2023-08-21 DIAGNOSIS — I10 PRIMARY HYPERTENSION: ICD-10-CM

## 2023-08-21 DIAGNOSIS — F41.9 ANXIETY: ICD-10-CM

## 2023-08-21 PROBLEM — J18.9 PNEUMONIA: Status: RESOLVED | Noted: 2023-02-21 | Resolved: 2023-08-21

## 2023-08-21 PROCEDURE — 3078F DIAST BP <80 MM HG: CPT | Performed by: NURSE PRACTITIONER

## 2023-08-21 PROCEDURE — 99214 OFFICE O/P EST MOD 30 MIN: CPT | Performed by: NURSE PRACTITIONER

## 2023-08-21 PROCEDURE — 3074F SYST BP LT 130 MM HG: CPT | Performed by: NURSE PRACTITIONER

## 2023-08-21 ASSESSMENT — ANXIETY QUESTIONNAIRES
4. TROUBLE RELAXING: SEVERAL DAYS
7. FEELING AFRAID AS IF SOMETHING AWFUL MIGHT HAPPEN: SEVERAL DAYS
5. BEING SO RESTLESS THAT IT IS HARD TO SIT STILL: SEVERAL DAYS
IF YOU CHECKED OFF ANY PROBLEMS ON THIS QUESTIONNAIRE, HOW DIFFICULT HAVE THESE PROBLEMS MADE IT FOR YOU TO DO YOUR WORK, TAKE CARE OF THINGS AT HOME, OR GET ALONG WITH OTHER PEOPLE: SOMEWHAT DIFFICULT
GAD7 TOTAL SCORE: 10
6. BECOMING EASILY ANNOYED OR IRRITABLE: SEVERAL DAYS
1. FEELING NERVOUS, ANXIOUS, OR ON EDGE: MORE THAN HALF THE DAYS
2. NOT BEING ABLE TO STOP OR CONTROL WORRYING: MORE THAN HALF THE DAYS
3. WORRYING TOO MUCH ABOUT DIFFERENT THINGS: MORE THAN HALF THE DAYS

## 2023-08-21 ASSESSMENT — FIBROSIS 4 INDEX: FIB4 SCORE: 0.52

## 2023-08-21 NOTE — ASSESSMENT & PLAN NOTE
She continues with duloxetine 40 mg daily.  Her ARIES score today is 10. She does not feel like the 40 mg is working. Higher doses of duloxetine caused fluid retention in the past. She is having sexual libido issues. She has a PPO and plans to see psychiatry. She is not having panic attacks.

## 2023-08-21 NOTE — ASSESSMENT & PLAN NOTE
Aciphex gives her complications and vitamin deficiencies. She will try apple cider vinegar, diet changes and lifestyle changes. OTC does not help. Plans for diet control.

## 2023-08-21 NOTE — PROGRESS NOTES
Subjective:     CC: Medication management    HPI:   Joycelyn presents today with the following:    Hypertension  Blood pressure today 128/72.  She has not needed her hydrochlorothiazide 12.5 mg dose in 3 months.  Her blood pressure improved with estradiol patch. She watches her salt intake. Blood pressures good at home. Her weight is down 5 pounds since her last visit.  No chest pain, shortness of breath or dizziness today.    Anxiety  She continues with duloxetine 40 mg daily.  Her ARIES score today is 10. She does not feel like the 40 mg is working. Higher doses of duloxetine caused fluid retention in the past. She is having sexual libido issues. She has a PPO and plans to see psychiatry. She is not having panic attacks.     Gastroesophageal reflux disease without esophagitis  Aciphex gives her complications and vitamin deficiencies. She will try apple cider vinegar, diet changes and lifestyle changes. OTC does not help. Plans for diet control.    Past Medical History:   Diagnosis Date    Allergy, unspecified not elsewhere classified     Ankylosing spondylitis of lumbosacral region ([facility]) 7/6/2015    Anxiety 11/24/2015    GERD (gastroesophageal reflux disease)     Headache, classical migraine     Hiatal hernia     Intractable migraine without aura and without status migrainosus 11/24/2015    Lupus ([facility])     Morphea 1/9/2019    Oropharyngeal dysphagia     Other forms of systemic lupus erythematosus ([facility]) 10/20/2017    Overactive bladder     Peptic ulcer     Polyuria 7/17/2019    Prediabetes 12/8/2016    Vocal cord dysfunction        Social History     Tobacco Use    Smoking status: Never    Smokeless tobacco: Never   Vaping Use    Vaping Use: Never used   Substance Use Topics    Alcohol use: No     Alcohol/week: 0.0 oz    Drug use: No       Current Outpatient Medications Ordered in Epic   Medication Sig Dispense Refill    estradiol (VIVELLE DOT) 0.1 MG/24HR PATCH BIWEEKLY APPLY 1 PATCH TOPICALLY TO THE SKIN EVERY 3  "AND 1/2 DAYS      progesterone (PROMETRIUM) 100 MG Cap Take 100 mg by mouth at bedtime.      DULoxetine HCl 40 MG Cap DR Particles Take 40 mg by mouth every day. 90 Capsule 3    TRELEGY ELLIPTA 200-62.5-25 MCG/INH AEROSOL POWDER, BREATH ACTIVATED Inhale 1 Puff every day.      pregabalin (LYRICA) 50 MG capsule Take 50 mg by mouth. 1 tab 3xwk      albuterol 108 (90 Base) MCG/ACT Aero Soln inhalation aerosol Inhale 2 Puffs by mouth every four hours as needed for Shortness of Breath. 3 Each 3    rizatriptan (MAXALT) 5 MG tablet Take 1 Tab by mouth Once PRN for Migraine for up to 1 dose. 10 Tab 0    HYDROcodone-acetaminophen 2.5-108 mg/5mL (HYCET) 7.5-325 MG/15ML solution Take 15 mL by mouth 4 times a day as needed for Severe Pain.      Thiamine HCl (VITAMIN B-1 PO) Take 1 Tab by mouth every morning.      lidocaine (LIDODERM) 5 % Patch Apply 1 Patch to skin as directed 1 time daily as needed (pain).      ENBREL SURECLICK 50 MG/ML Solution Auto-injector 1 Each by Injection route every 7 days.      cyclobenzaprine (FLEXERIL) 10 MG Tab Take 10 mg by mouth at bedtime as needed for Muscle Spasms.      coenzyme Q-10 30 MG capsule Take 30 mg by mouth every morning.      cyanocobalamin (VITAMIN B-12) 1000 MCG/ML Solution Inject 1,000 mcg as directed every 30 days.  2    hydroxychloroquine (PLAQUENIL) 200 MG Tab Take 200 mg by mouth every morning.      EPIPEN 2-NOLAN 0.3 MG/0.3ML Solution Auto-injector solution for injection 0.3 mg by Intramuscular route as needed.       No current Epic-ordered facility-administered medications on file.       Allergies:  Milnacipran, Tramadol, Cefdinir, Ciprofloxacin, Metoclopramide, Sulfa drugs, and Carisoprodol    Health Maintenance: Reviewed.      Objective:     Vital signs reviewed  Exam:  /72 (BP Location: Right arm, Patient Position: Sitting, BP Cuff Size: Adult)   Pulse 92   Temp 36.3 °C (97.3 °F) (Temporal)   Ht 1.588 m (5' 2.5\")   Wt 81.2 kg (179 lb)   LMP 03/29/2016   SpO2 98% "   BMI 32.22 kg/m²  Body mass index is 32.22 kg/m².    Gen: Alert and oriented, No apparent distress.  Eyes:   Lids normal. Glasses in place.   Lungs: Normal effort, CTA bilaterally, no wheezes, rhonchi, or rales  CV: Regular rate and rhythm. No murmurs, rubs, or gallops.  Ext: No clubbing, cyanosis, edema.      Assessment & Plan:     48 y.o. female with the following -     1. Primary hypertension  Chronic stable problem.  Blood pressure controlled today without medication.  Continue healthy diet and exercise.    2. Anxiety  Chronic exacerbated problem.  Duloxetine 40 mg daily not controlling her anxiety.  She will follow-up with psychiatry.  Continue duloxetine 40 mg daily until psychiatry follow-up.  Encouraged her to let us know what medication she changes to.    3. Gastroesophageal reflux disease without esophagitis  Chronic stable problem.  Continue with diet and lifestyle modifications.  Follow-up if symptoms worsen.        Return in about 6 months (around 2/21/2024) for Hypertension.    Please note that this dictation was created using voice recognition software. I have made every reasonable attempt to correct obvious errors, but I expect that there are errors of grammar and possibly content that I did not discover before finalizing the note.

## 2023-08-21 NOTE — ASSESSMENT & PLAN NOTE
Blood pressure today 128/72.  She has not needed her hydrochlorothiazide 12.5 mg dose in 3 months.  Her blood pressure improved with estradiol patch. She watches her salt intake. Blood pressures good at home. Her weight is down 5 pounds since her last visit.  No chest pain, shortness of breath or dizziness today.

## 2023-09-25 ENCOUNTER — HOSPITAL ENCOUNTER (OUTPATIENT)
Facility: MEDICAL CENTER | Age: 48
End: 2023-09-25
Attending: NURSE PRACTITIONER
Payer: COMMERCIAL

## 2023-09-25 ENCOUNTER — OFFICE VISIT (OUTPATIENT)
Dept: MEDICAL GROUP | Facility: PHYSICIAN GROUP | Age: 48
End: 2023-09-25
Payer: COMMERCIAL

## 2023-09-25 VITALS
SYSTOLIC BLOOD PRESSURE: 136 MMHG | DIASTOLIC BLOOD PRESSURE: 80 MMHG | TEMPERATURE: 98.5 F | HEIGHT: 63 IN | WEIGHT: 179 LBS | BODY MASS INDEX: 31.71 KG/M2 | HEART RATE: 97 BPM | OXYGEN SATURATION: 96 %

## 2023-09-25 DIAGNOSIS — R68.83 CHILLS: ICD-10-CM

## 2023-09-25 DIAGNOSIS — K12.0 APHTHOUS ULCER OF MOUTH: ICD-10-CM

## 2023-09-25 DIAGNOSIS — R35.0 URINARY FREQUENCY: ICD-10-CM

## 2023-09-25 DIAGNOSIS — R52 BODY ACHES: ICD-10-CM

## 2023-09-25 DIAGNOSIS — N89.8 VAGINAL ITCHING: ICD-10-CM

## 2023-09-25 DIAGNOSIS — J02.9 SORE THROAT: ICD-10-CM

## 2023-09-25 LAB
AMBIGUOUS DTTM AMBI4: NORMAL
AMBIGUOUS DTTM AMBI4: NORMAL
APPEARANCE UR: CLEAR
BILIRUB UR STRIP-MCNC: NEGATIVE MG/DL
COLOR UR AUTO: NORMAL
FLUAV RNA SPEC QL NAA+PROBE: NEGATIVE
FLUBV RNA SPEC QL NAA+PROBE: NEGATIVE
GLUCOSE UR STRIP.AUTO-MCNC: NEGATIVE MG/DL
KETONES UR STRIP.AUTO-MCNC: NEGATIVE MG/DL
LEUKOCYTE ESTERASE UR QL STRIP.AUTO: NEGATIVE
NITRITE UR QL STRIP.AUTO: NEGATIVE
PH UR STRIP.AUTO: 5.5 [PH] (ref 5–8)
PROT UR QL STRIP: NEGATIVE MG/DL
RBC UR QL AUTO: NEGATIVE
RSV RNA SPEC QL NAA+PROBE: NEGATIVE
S PYO DNA SPEC NAA+PROBE: NOT DETECTED
SARS-COV-2 RNA RESP QL NAA+PROBE: NEGATIVE
SP GR UR STRIP.AUTO: >=1.03
UROBILINOGEN UR STRIP-MCNC: 0.2 MG/DL

## 2023-09-25 PROCEDURE — 87510 GARDNER VAG DNA DIR PROBE: CPT

## 2023-09-25 PROCEDURE — 81002 URINALYSIS NONAUTO W/O SCOPE: CPT | Performed by: NURSE PRACTITIONER

## 2023-09-25 PROCEDURE — 3075F SYST BP GE 130 - 139MM HG: CPT | Performed by: NURSE PRACTITIONER

## 2023-09-25 PROCEDURE — 0241U POCT CEPHEID COV-2, FLU A/B, RSV - PCR: CPT | Performed by: NURSE PRACTITIONER

## 2023-09-25 PROCEDURE — 87086 URINE CULTURE/COLONY COUNT: CPT

## 2023-09-25 PROCEDURE — 99213 OFFICE O/P EST LOW 20 MIN: CPT | Performed by: NURSE PRACTITIONER

## 2023-09-25 PROCEDURE — 87480 CANDIDA DNA DIR PROBE: CPT

## 2023-09-25 PROCEDURE — 87660 TRICHOMONAS VAGIN DIR PROBE: CPT

## 2023-09-25 PROCEDURE — 87651 STREP A DNA AMP PROBE: CPT | Performed by: NURSE PRACTITIONER

## 2023-09-25 PROCEDURE — 3079F DIAST BP 80-89 MM HG: CPT | Performed by: NURSE PRACTITIONER

## 2023-09-25 RX ORDER — RABEPRAZOLE SODIUM 20 MG/1
TABLET, DELAYED RELEASE ORAL
COMMUNITY
Start: 2023-09-22

## 2023-09-25 ASSESSMENT — FIBROSIS 4 INDEX: FIB4 SCORE: 0.52

## 2023-09-25 NOTE — PROGRESS NOTES
Subjective:     CC: pharyngitis and vaginal itching    HPI:   Joycelyn presents today with the following:      Pharyngitis   The sore throat started 3 days ago. Associated body aches, chills, sores on tonsils, ear pain, and sinus pain. Aggravating factors include cold drinks and eating.   Alleviating factors include warm liquids and Tylenol. Interventions tried include soft foods and tylenol. Denies fever, cough, sick contacts, loss of taste, loss of smell, itchy eyes, watery eyes, chest pain or shortness of breath.    Vaginal itching   The vaginal itching started 2-3 weeks ago.  Associated urinary frequency and urinary urgency, vaginal discharge, some nausea and mild flank pain. Interventions tried include metronidazole x 4 doses that did help. Denies fever, hematuria, dysuria, vomiting.         Past Medical History:   Diagnosis Date    Allergy, unspecified not elsewhere classified     Ankylosing spondylitis of lumbosacral region ([facility]) 7/6/2015    Anxiety 11/24/2015    GERD (gastroesophageal reflux disease)     Headache, classical migraine     Hiatal hernia     Intractable migraine without aura and without status migrainosus 11/24/2015    Lupus ([facility])     Morphea 1/9/2019    Oropharyngeal dysphagia     Other forms of systemic lupus erythematosus ([facility]) 10/20/2017    Overactive bladder     Peptic ulcer     Polyuria 7/17/2019    Prediabetes 12/8/2016    Vocal cord dysfunction        Social History     Tobacco Use    Smoking status: Never    Smokeless tobacco: Never   Vaping Use    Vaping Use: Never used   Substance Use Topics    Alcohol use: No     Alcohol/week: 0.0 oz    Drug use: No       Current Outpatient Medications Ordered in Epic   Medication Sig Dispense Refill    estradiol (VIVELLE DOT) 0.1 MG/24HR PATCH BIWEEKLY APPLY 1 PATCH TOPICALLY TO THE SKIN EVERY 3 AND 1/2 DAYS      progesterone (PROMETRIUM) 100 MG Cap Take 100 mg by mouth at bedtime.      DULoxetine HCl 40 MG Cap DR Particles Take 40 mg by mouth  "every day. 90 Capsule 3    TRELEGY ELLIPTA 200-62.5-25 MCG/INH AEROSOL POWDER, BREATH ACTIVATED Inhale 1 Puff every day.      pregabalin (LYRICA) 50 MG capsule Take 50 mg by mouth. 1 tab 3xwk      albuterol 108 (90 Base) MCG/ACT Aero Soln inhalation aerosol Inhale 2 Puffs by mouth every four hours as needed for Shortness of Breath. 3 Each 3    rizatriptan (MAXALT) 5 MG tablet Take 1 Tab by mouth Once PRN for Migraine for up to 1 dose. 10 Tab 0    Thiamine HCl (VITAMIN B-1 PO) Take 1 Tab by mouth every morning.      lidocaine (LIDODERM) 5 % Patch Apply 1 Patch to skin as directed 1 time daily as needed (pain).      ENBREL SURECLICK 50 MG/ML Solution Auto-injector 1 Each by Injection route every 7 days.      cyclobenzaprine (FLEXERIL) 10 MG Tab Take 10 mg by mouth at bedtime as needed for Muscle Spasms.      coenzyme Q-10 30 MG capsule Take 30 mg by mouth every morning.      cyanocobalamin (VITAMIN B-12) 1000 MCG/ML Solution Inject 1,000 mcg as directed every 30 days.  2    hydroxychloroquine (PLAQUENIL) 200 MG Tab Take 200 mg by mouth every morning.      EPIPEN 2-NOLAN 0.3 MG/0.3ML Solution Auto-injector solution for injection 0.3 mg by Intramuscular route as needed.      rabeprazole (ACIPHEX) 20 MG tablet  (Patient not taking: Reported on 9/25/2023)      HYDROcodone-acetaminophen 2.5-108 mg/5mL (HYCET) 7.5-325 MG/15ML solution Take 15 mL by mouth 4 times a day as needed for Severe Pain. (Patient not taking: Reported on 9/25/2023)       No current [facility]-ordered facility-administered medications on file.       Allergies:  Milnacipran, Tramadol, Cefdinir, Ciprofloxacin, Metoclopramide, Sulfa drugs, and Carisoprodol    Health Maintenance: Reviewed      Objective:     Vital signs reviewed  Exam:  /80 (BP Location: Left arm, Patient Position: Sitting, BP Cuff Size: Adult)   Pulse 97   Temp 36.9 °C (98.5 °F) (Temporal)   Ht 1.6 m (5' 3\")   Wt 81.2 kg (179 lb)   LMP 03/29/2016   SpO2 96%   BMI 31.71 kg/m²  Body " mass index is 31.71 kg/m².    General: Normal appearing. No distress.  HENT: Normocephalic. Ears normal shape and contour, canals are clear bilaterally, right TM pearly gray, left TM with injection, nasal mucosa benign, oropharynx is without erythema, edema or exudates.  Right posterior oropharynx with large aphthous ulcer.  Eyes: Eyes conjunctiva clear lids without ptosis, pupils equal and reactive to light accommodation, lids normal. Wears glasses  Pulmonary: Clear to ausculation.  Normal effort. No rales, ronchi, or wheezing.  Cardiovascular: Regular rate and rhythm without murmur.   Lymph: No cervical or supraclavicular lymph nodes are palpable.  Skin: Warm and dry.  No obvious lesions.  Psych: Normal mood and affect. Alert and oriented x3. Judgment and insight is normal.      Labs:     Results to [facility]   Latest Reference Range & Units 09/25/23 14:46   POC Group A Strep, PCR Not Detected, Invalid  Not Detected     Results to [facility]   Latest Reference Range & Units 09/25/23 15:06   Influenza virus A RNA Negative, Invalid  Negative   Influenza virus B, PCR Negative, Invalid  Negative   RSV, PCR Negative, Invalid  Negative   SARS-CoV-2 by PCR Negative, Invalid  Negative        Latest Reference Range & Units 09/25/23 14:41   POC Color Negative  dark yellow   POC Appearance Negative  clear   POC Specific Gravity <1.005 - >1.030  >=1.030   POC Urine PH 5.0 - 8.0  5.5   POC Glucose Negative mg/dL negative   POC Ketones Negative mg/dL negative   POC Protein Negative mg/dL negative   POC Nitrites Negative  negative   POC Leukocyte Esterase Negative  negative   POC Blood Negative  negative   POC Bilirubin Negative mg/dL negative   POC Urobiligen Negative (0.2) mg/dL 0.2   Results discussed with patient.    Assessment & Plan:     48 y.o. female with the following -     1. Vaginal itching  Acute uncomplicated problem.  Patient self collected vaginal pathogen swab.  Await results.  Follow-up in her [facility].  - VAGINAL  PATHOGENS DNA PANEL; Future    2. Urinary frequency  Acute uncomplicated problem.  Urinalysis unremarkable today.  We will culture her urine.  Discussion today regarding starting antibiotic treatment awaiting for urine culture check to hold and wait until urine culture results are back.  - POCT Urinalysis  - URINE CULTURE(NEW); Future    3. Sore throat  Acute uncomplicated problem.  POCT negative for strep, influenza, COVID and RSV.  [facility]  The sore throat appears to be viral and may be related also to her aphthous ulcer.  She has lidocaine solution at home that she will use.  - POCT CEPHEID COV-2, FLU A/B, RSV - PCR  - POCT CEPHEID GROUP A STREP - PCR    4. Body aches  Acute uncomplicated problem.  POCT negative today for COVID, influenza and RSV.  Continue symptomatic care and over-the-counter analgesics as needed for pain.  - POCT CEPHEID COV-2, FLU A/B, RSV - PCR    5. Chills  Acute complicated problem.  See #4 above  - POCT CEPHEID COV-2, FLU A/B, RSV - PCR    6. Aphthous ulcer of mouth  Acute uncomplicated problem.  She does have home lidocaine viscus solution that she will use.  Follow-up if symptoms worsen.  Continue over-the-counter analgesics as needed for pain.      Return if symptoms worsen or fail to improve.    Please note that this dictation was created using voice recognition software. I have made every reasonable attempt to correct obvious errors, but I expect that there are errors of grammar and possibly content that I did not discover before finalizing the note.

## 2023-09-26 LAB
CANDIDA DNA VAG QL PROBE+SIG AMP: NEGATIVE
G VAGINALIS DNA VAG QL PROBE+SIG AMP: NEGATIVE
T VAGINALIS DNA VAG QL PROBE+SIG AMP: NEGATIVE

## 2023-09-28 LAB
BACTERIA UR CULT: NORMAL
SIGNIFICANT IND 70042: NORMAL
SITE SITE: NORMAL
SOURCE SOURCE: NORMAL

## 2023-12-04 ENCOUNTER — HOSPITAL ENCOUNTER (OUTPATIENT)
Dept: RADIOLOGY | Facility: MEDICAL CENTER | Age: 48
End: 2023-12-04
Attending: NURSE PRACTITIONER
Payer: COMMERCIAL

## 2023-12-04 DIAGNOSIS — Z12.31 VISIT FOR SCREENING MAMMOGRAM: ICD-10-CM

## 2023-12-04 PROCEDURE — 77063 BREAST TOMOSYNTHESIS BI: CPT

## 2024-01-16 ENCOUNTER — OFFICE VISIT (OUTPATIENT)
Dept: MEDICAL GROUP | Facility: PHYSICIAN GROUP | Age: 49
End: 2024-01-16
Payer: COMMERCIAL

## 2024-01-16 VITALS
SYSTOLIC BLOOD PRESSURE: 132 MMHG | BODY MASS INDEX: 33.31 KG/M2 | OXYGEN SATURATION: 96 % | TEMPERATURE: 97.5 F | HEART RATE: 88 BPM | HEIGHT: 62 IN | WEIGHT: 181 LBS | DIASTOLIC BLOOD PRESSURE: 74 MMHG

## 2024-01-16 DIAGNOSIS — R68.83 CHILLS: ICD-10-CM

## 2024-01-16 DIAGNOSIS — J45.40 MODERATE PERSISTENT ASTHMA IN ADULT WITHOUT COMPLICATION: ICD-10-CM

## 2024-01-16 DIAGNOSIS — R52 BODY ACHES: ICD-10-CM

## 2024-01-16 DIAGNOSIS — R53.81 MALAISE: ICD-10-CM

## 2024-01-16 DIAGNOSIS — J22 LOWER RESP. TRACT INFECTION: ICD-10-CM

## 2024-01-16 DIAGNOSIS — U07.1 COVID: ICD-10-CM

## 2024-01-16 DIAGNOSIS — R50.9 LOW GRADE FEVER: ICD-10-CM

## 2024-01-16 LAB
FLUAV RNA SPEC QL NAA+PROBE: NEGATIVE
FLUBV RNA SPEC QL NAA+PROBE: NEGATIVE
RSV RNA SPEC QL NAA+PROBE: NEGATIVE
SARS-COV-2 RNA RESP QL NAA+PROBE: POSITIVE

## 2024-01-16 PROCEDURE — 99213 OFFICE O/P EST LOW 20 MIN: CPT | Performed by: NURSE PRACTITIONER

## 2024-01-16 PROCEDURE — 0241U POCT CEPHEID COV-2, FLU A/B, RSV - PCR: CPT | Performed by: NURSE PRACTITIONER

## 2024-01-16 PROCEDURE — 3075F SYST BP GE 130 - 139MM HG: CPT | Performed by: NURSE PRACTITIONER

## 2024-01-16 PROCEDURE — 3078F DIAST BP <80 MM HG: CPT | Performed by: NURSE PRACTITIONER

## 2024-01-16 RX ORDER — AZITHROMYCIN 250 MG/1
TABLET, FILM COATED ORAL
Qty: 6 TABLET | Refills: 0 | Status: SHIPPED | OUTPATIENT
Start: 2024-01-16

## 2024-01-16 ASSESSMENT — FIBROSIS 4 INDEX: FIB4 SCORE: 0.52

## 2024-01-16 NOTE — PROGRESS NOTES
Subjective:     CC: Cough    HPI:   Joycelyn presents today with the following:      Cough  She was seen at [facility] 1/13/2024. We do not have records available today. She was prescribed prednisone 40 mg daily x 5 days. CXR was clear. Her nebulizer needed an additional supply that comes tomorrow so she has not been able to use her nebulizer. She would like a referral to pulmonary as she is having difficulty contacting [facility]. Reports productive cough with yellow/white phlegm, chills, low grade fever and tachycardia that started 3 months ago. In the last week she has had malaise, body aches, low grade fever and chills.   Denies sore throat, ear pain, sinus pain. Has had chest pain and shortness of breath. History of asthma and compliant with medications. She is immunocompromised, held dose of Enbrel this week. 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        Past Medical History:   Diagnosis Date    Allergy, unspecified not elsewhere classified     Ankylosing spondylitis of lumbosacral region ([facility]) 7/6/2015    Anxiety 11/24/2015    GERD (gastroesophageal reflux disease)     Headache, classical migraine     Hiatal hernia     Intractable migraine without aura and without status migrainosus 11/24/2015    Lupus ([facility])     Morphea 1/9/2019    Oropharyngeal dysphagia     Other forms of systemic lupus erythematosus ([facility]) 10/20/2017    Overactive bladder     Peptic ulcer     Polyuria 7/17/2019    Prediabetes 12/8/2016    Vocal cord dysfunction        Social History     Tobacco Use    Smoking status: Never    Smokeless tobacco: Never   Vaping Use    Vaping Use: Never used   Substance Use Topics    Alcohol use: No     Alcohol/week: 0.0 oz    Drug use: No       Current Outpatient Medications Ordered in Epic   Medication Sig Dispense Refill    albuterol (PROVENTIL) 2.5mg/0.5ml Nebu Soln Take 2.5 mg by nebulization every four hours  as needed for Shortness of Breath. Hasn't been able to use yet      azithromycin (ZITHROMAX) 250 MG Tab On Day 1 take two tablets (500 mg) by mouth. On Day 2-5 take one tablet by mouth (250 mg) daily. 6 Tablet 0    rabeprazole (ACIPHEX) 20 MG tablet       estradiol (VIVELLE DOT) 0.1 MG/24HR PATCH BIWEEKLY APPLY 1 PATCH TOPICALLY TO THE SKIN EVERY 3 AND 1/2 DAYS      progesterone (PROMETRIUM) 100 MG Cap Take 100 mg by mouth at bedtime.      DULoxetine HCl 40 MG Cap DR Particles Take 40 mg by mouth every day. 90 Capsule 3    TRELEGY ELLIPTA 200-62.5-25 MCG/INH AEROSOL POWDER, BREATH ACTIVATED Inhale 1 Puff every day.      pregabalin (LYRICA) 50 MG capsule Take 50 mg by mouth. 1 tab 3xwk      albuterol 108 (90 Base) MCG/ACT Aero Soln inhalation aerosol Inhale 2 Puffs by mouth every four hours as needed for Shortness of Breath. 3 Each 3    rizatriptan (MAXALT) 5 MG tablet Take 1 Tab by mouth Once PRN for Migraine for up to 1 dose. 10 Tab 0    HYDROcodone-acetaminophen 2.5-108 mg/5mL (HYCET) 7.5-325 MG/15ML solution Take 15 mL by mouth 4 times a day as needed for Severe Pain.      Thiamine HCl (VITAMIN B-1 PO) Take 1 Tab by mouth every morning.      lidocaine (LIDODERM) 5 % Patch Apply 1 Patch to skin as directed 1 time daily as needed (pain).      ENBREL SURECLICK 50 MG/ML Solution Auto-injector 1 Each by Injection route every 7 days.      cyclobenzaprine (FLEXERIL) 10 MG Tab Take 10 mg by mouth at bedtime as needed for Muscle Spasms.      coenzyme Q-10 30 MG capsule Take 30 mg by mouth every morning.      cyanocobalamin (VITAMIN B-12) 1000 MCG/ML Solution Inject 1,000 mcg as directed every 30 days.  2    hydroxychloroquine (PLAQUENIL) 200 MG Tab Take 200 mg by mouth every morning.      EPIPEN 2-NOLAN 0.3 MG/0.3ML Solution Auto-injector solution for injection 0.3 mg by Intramuscular route as needed.       No current Epic-ordered facility-administered medications on file.       Allergies:  Milnacipran, Tramadol, Cefdinir,  "Ciprofloxacin, Metoclopramide, Sulfa drugs, and Carisoprodol    Health Maintenance: Deferred      Objective:     Vital signs reviewed  Exam:  /74 (BP Location: Right arm, Patient Position: Sitting, BP Cuff Size: Adult)   Pulse 88   Temp 36.4 °C (97.5 °F) (Temporal)   Ht 1.575 m (5' 2\")   Wt 82.1 kg (181 lb)   LMP 03/29/2016   SpO2 96%   BMI 33.11 kg/m²  Body mass index is 33.11 kg/m².    General: Normal appearing. No distress.  HENT: Normocephalic. Ears normal shape and contour, canals are clear bilaterally, tympanic membranes are benign, oropharynx is without erythema, no edema or exudates.  No pain on palpation of frontal or maxillary sinuses.  Eyes: Eyes conjunctiva clear lids without ptosis, lids normal. Wears glasses.  Pulmonary: Clear to ausculation.  Normal effort. No rales, ronchi, or wheezing. +cough  Cardiovascular: Regular rate and rhythm without murmur.   Lymph: No cervical or supraclavicular lymph nodes are palpable.  Skin: Warm and dry.  No obvious lesions.  Psych: Normal mood and affect. Alert and oriented x3. Judgment and insight is normal.    Lab:     Latest Reference Range & Units 01/16/24 11:11   Influenza virus A RNA Negative, Invalid  Negative   Influenza virus B, PCR Negative, Invalid  Negative   RSV, PCR Negative, Invalid  Negative   SARS-CoV-2 by PCR Negative, Invalid  Positive !   !: Data is abnormal    Assessment & Plan:     48 y.o. female with the following -     1. Lower resp. tract infection  Acute uncomplicated problem.  With her prolonged cough and history we will start her on Z-Abelino.  Continue prednisone 5-day course.  - azithromycin (ZITHROMAX) 250 MG Tab; On Day 1 take two tablets (500 mg) by mouth. On Day 2-5 take one tablet by mouth (250 mg) daily.  Dispense: 6 Tablet; Refill: 0    2. COVID  Acute uncomplicated problem.  Results sent to patient's MyChart.  Patient is out of the window for Paxlovid.  Continue symptomatic care.  Continue isolation precautions.    3. " Malaise  4. Body aches  5. Low grade fever  6. Chills  Acute complicated problem.  POCT negative today for influenza and RSV.  Positive for COVID.  - POCT CEPHEID COV-2, FLU A/B, RSV - PCR    7. Moderate persistent asthma in adult without complication  Chronic stable problem.  New referral placed to pulmonary.  - Referral to Pulmonary and Sleep Medicine        Return if symptoms worsen or fail to improve.    Please note that this dictation was created using voice recognition software. I have made every reasonable attempt to correct obvious errors, but I expect that there are errors of grammar and possibly content that I did not discover before finalizing the note.

## 2024-03-26 ENCOUNTER — APPOINTMENT (OUTPATIENT)
Dept: MEDICAL GROUP | Facility: PHYSICIAN GROUP | Age: 49
End: 2024-03-26
Payer: COMMERCIAL

## 2024-04-15 ENCOUNTER — APPOINTMENT (OUTPATIENT)
Dept: SLEEP MEDICINE | Facility: MEDICAL CENTER | Age: 49
End: 2024-04-15
Attending: NURSE PRACTITIONER
Payer: COMMERCIAL

## 2024-05-01 ENCOUNTER — OFFICE VISIT (OUTPATIENT)
Dept: MEDICAL GROUP | Facility: PHYSICIAN GROUP | Age: 49
End: 2024-05-01
Payer: COMMERCIAL

## 2024-05-01 VITALS
TEMPERATURE: 98 F | SYSTOLIC BLOOD PRESSURE: 140 MMHG | OXYGEN SATURATION: 99 % | HEIGHT: 63 IN | HEART RATE: 72 BPM | DIASTOLIC BLOOD PRESSURE: 90 MMHG | BODY MASS INDEX: 32.07 KG/M2 | WEIGHT: 181 LBS

## 2024-05-01 DIAGNOSIS — I10 PRIMARY HYPERTENSION: ICD-10-CM

## 2024-05-01 PROCEDURE — 3080F DIAST BP >= 90 MM HG: CPT | Performed by: NURSE PRACTITIONER

## 2024-05-01 PROCEDURE — 99214 OFFICE O/P EST MOD 30 MIN: CPT | Performed by: NURSE PRACTITIONER

## 2024-05-01 PROCEDURE — 3077F SYST BP >= 140 MM HG: CPT | Performed by: NURSE PRACTITIONER

## 2024-05-01 RX ORDER — HYDROCHLOROTHIAZIDE 12.5 MG/1
12.5 TABLET ORAL DAILY
Qty: 30 TABLET | Refills: 2 | Status: SHIPPED | OUTPATIENT
Start: 2024-05-01 | End: 2024-05-15

## 2024-05-01 RX ORDER — MAGNESIUM AMINO ACID CHELATE 100 MG
TABLET ORAL
COMMUNITY

## 2024-05-01 RX ORDER — PROMETHAZINE HYDROCHLORIDE 6.25 MG/5ML
SYRUP ORAL
COMMUNITY

## 2024-05-01 ASSESSMENT — FIBROSIS 4 INDEX: FIB4 SCORE: 0.52

## 2024-05-01 ASSESSMENT — PATIENT HEALTH QUESTIONNAIRE - PHQ9: CLINICAL INTERPRETATION OF PHQ2 SCORE: 0

## 2024-05-01 NOTE — ASSESSMENT & PLAN NOTE
Blood pressure yesterday at work 177/110 and at home 166/105. Today initial blood pressure 154/92. She has alan having dizziness for 2 weeks, thought it was related to allergies and started Afrin and Sudafed. She is having headache and fatigue. No chest pain but chest tightness that wraps around her torso.  Previously on hydrochlorothiazide but this was stopped as her blood pressure was controlled without medication.  Plan to restart hydrochlorothiazide.

## 2024-05-01 NOTE — PROGRESS NOTES
Subjective:     CC: hypertension     HPI:   Joycelyn presents today with the following:      Hypertension  Blood pressure yesterday at work 177/110 and at home 166/105. Today initial blood pressure 154/92. She has alan having dizziness for 2 weeks, thought it was related to allergies and started Afrin and Sudafed. She is having headache and fatigue. No chest pain but chest tightness that wraps around her torso.  Previously on hydrochlorothiazide but this was stopped as her blood pressure was controlled without medication.  Plan to restart hydrochlorothiazide.      Past Medical History:   Diagnosis Date    Allergy, unspecified not elsewhere classified     Ankylosing spondylitis of lumbosacral region ([facility]) 7/6/2015    Anxiety 11/24/2015    GERD (gastroesophageal reflux disease)     Headache, classical migraine     Hiatal hernia     Intractable migraine without aura and without status migrainosus 11/24/2015    Lupus ([facility])     Morphea 1/9/2019    Oropharyngeal dysphagia     Other forms of systemic lupus erythematosus ([facility]) 10/20/2017    Overactive bladder     Peptic ulcer     Polyuria 7/17/2019    Prediabetes 12/8/2016    Vocal cord dysfunction        Social History     Tobacco Use    Smoking status: Never    Smokeless tobacco: Never   Vaping Use    Vaping Use: Never used   Substance Use Topics    Alcohol use: No     Alcohol/week: 0.0 oz    Drug use: No       Current Outpatient Medications Ordered in Epic   Medication Sig Dispense Refill    Papaya 100 MG Tab as directed Orally      promethazine (PHENERGAN) 6.25 MG/5ML solution Promethazine HCl      hydroCHLOROthiazide 12.5 MG tablet Take 1 Tablet by mouth every day. 30 Tablet 2    albuterol (PROVENTIL) 2.5mg/0.5ml Nebu Soln Take 2.5 mg by nebulization every four hours as needed for Shortness of Breath. Hasn't been able to use yet      rabeprazole (ACIPHEX) 20 MG tablet       estradiol (VIVELLE DOT) 0.1 MG/24HR PATCH BIWEEKLY APPLY 1 PATCH TOPICALLY TO THE SKIN EVERY 3  "AND 1/2 DAYS      progesterone (PROMETRIUM) 100 MG Cap Take 100 mg by mouth at bedtime.      DULoxetine HCl 40 MG Cap DR Particles Take 40 mg by mouth every day. 90 Capsule 3    TRELEGY ELLIPTA 200-62.5-25 MCG/INH AEROSOL POWDER, BREATH ACTIVATED Inhale 1 Puff every day.      pregabalin (LYRICA) 50 MG capsule Take 50 mg by mouth. 1 tab 3xwk      albuterol 108 (90 Base) MCG/ACT Aero Soln inhalation aerosol Inhale 2 Puffs by mouth every four hours as needed for Shortness of Breath. 3 Each 3    rizatriptan (MAXALT) 5 MG tablet Take 1 Tab by mouth Once PRN for Migraine for up to 1 dose. 10 Tab 0    HYDROcodone-acetaminophen 2.5-108 mg/5mL (HYCET) 7.5-325 MG/15ML solution Take 15 mL by mouth 4 times a day as needed for Severe Pain.      Thiamine HCl (VITAMIN B-1 PO) Take 1 Tab by mouth every morning.      lidocaine (LIDODERM) 5 % Patch Apply 1 Patch to skin as directed 1 time daily as needed (pain).      ENBREL SURECLICK 50 MG/ML Solution Auto-injector 1 Each by Injection route every 7 days.      cyclobenzaprine (FLEXERIL) 10 MG Tab Take 10 mg by mouth at bedtime as needed for Muscle Spasms.      coenzyme Q-10 30 MG capsule Take 30 mg by mouth every morning.      hydroxychloroquine (PLAQUENIL) 200 MG Tab Take 200 mg by mouth every morning.      EPIPEN 2-NOLAN 0.3 MG/0.3ML Solution Auto-injector solution for injection 0.3 mg by Intramuscular route as needed.       No current Epic-ordered facility-administered medications on file.       Allergies:  Milnacipran, Tramadol, Cefdinir, Ciprofloxacin, Metoclopramide, Sulfa drugs, and Carisoprodol    Health Maintenance: Reviewed      Objective:     Vital signs reviewed  Exam:  BP (!) 140/90 (BP Location: Right arm, Patient Position: Sitting)   Pulse 72   Temp 36.7 °C (98 °F) (Temporal)   Ht 1.6 m (5' 3\")   Wt 82.1 kg (181 lb)   LMP 03/29/2016   SpO2 99%   BMI 32.06 kg/m²  Body mass index is 32.06 kg/m².    Gen: Alert and oriented, No apparent distress.  Eyes:   Lids normal. " Glasses in place.   Lungs: Normal effort, CTA bilaterally, no wheezes, rhonchi, or rales  CV: Regular rate and rhythm. No murmurs, rubs, or gallops.      Assessment & Plan:     48 y.o. female with the following -     1. Primary hypertension  Chronic exacerbated problem.  Repeat blood pressures to both arms greater than 140/90.  With her recent home blood pressure elevations discussed to restart her hydrochlorothiazide 12.5 mg daily dosing.  Continue home blood pressure monitoring.  Return in 1 month for follow-up.  - hydroCHLOROthiazide 12.5 MG tablet; Take 1 Tablet by mouth every day.  Dispense: 30 Tablet; Refill: 2      Return in about 4 weeks (around 5/29/2024) for Hypertension.    Please note that this dictation was created using voice recognition software. I have made every reasonable attempt to correct obvious errors, but I expect that there are errors of grammar and possibly content that I did not discover before finalizing the note.

## 2024-05-15 ENCOUNTER — PATIENT MESSAGE (OUTPATIENT)
Dept: MEDICAL GROUP | Facility: PHYSICIAN GROUP | Age: 49
End: 2024-05-15

## 2024-05-15 ENCOUNTER — TELEPHONE (OUTPATIENT)
Dept: MEDICAL GROUP | Facility: PHYSICIAN GROUP | Age: 49
End: 2024-05-15

## 2024-05-15 ENCOUNTER — OFFICE VISIT (OUTPATIENT)
Dept: MEDICAL GROUP | Facility: PHYSICIAN GROUP | Age: 49
End: 2024-05-15
Payer: COMMERCIAL

## 2024-05-15 VITALS
HEIGHT: 63 IN | WEIGHT: 183 LBS | HEART RATE: 94 BPM | BODY MASS INDEX: 32.43 KG/M2 | OXYGEN SATURATION: 97 % | SYSTOLIC BLOOD PRESSURE: 126 MMHG | DIASTOLIC BLOOD PRESSURE: 86 MMHG | TEMPERATURE: 98.7 F

## 2024-05-15 DIAGNOSIS — R73.03 PREDIABETES: ICD-10-CM

## 2024-05-15 DIAGNOSIS — L08.9 ABRASION OF NOSE WITH INFECTION, INITIAL ENCOUNTER: ICD-10-CM

## 2024-05-15 DIAGNOSIS — I10 PRIMARY HYPERTENSION: ICD-10-CM

## 2024-05-15 DIAGNOSIS — Z09 HOSPITAL DISCHARGE FOLLOW-UP: ICD-10-CM

## 2024-05-15 DIAGNOSIS — S00.31XA ABRASION OF NOSE WITH INFECTION, INITIAL ENCOUNTER: ICD-10-CM

## 2024-05-15 DIAGNOSIS — E66.9 OBESITY (BMI 30-39.9): ICD-10-CM

## 2024-05-15 LAB
HBA1C MFR BLD: 5.7 % (ref ?–5.8)
POCT INT CON NEG: NEGATIVE
POCT INT CON POS: POSITIVE

## 2024-05-15 PROCEDURE — 3074F SYST BP LT 130 MM HG: CPT | Performed by: NURSE PRACTITIONER

## 2024-05-15 PROCEDURE — 3079F DIAST BP 80-89 MM HG: CPT | Performed by: NURSE PRACTITIONER

## 2024-05-15 PROCEDURE — 83036 HEMOGLOBIN GLYCOSYLATED A1C: CPT | Performed by: NURSE PRACTITIONER

## 2024-05-15 PROCEDURE — 99214 OFFICE O/P EST MOD 30 MIN: CPT | Performed by: NURSE PRACTITIONER

## 2024-05-15 RX ORDER — TIRZEPATIDE 2.5 MG/.5ML
2.5 INJECTION, SOLUTION SUBCUTANEOUS
Qty: 2 ML | Refills: 0 | Status: SHIPPED | OUTPATIENT
Start: 2024-05-15

## 2024-05-15 RX ORDER — LISINOPRIL 20 MG/1
20 TABLET ORAL DAILY
COMMUNITY
Start: 2024-05-02 | End: 2024-05-16 | Stop reason: SDUPTHER

## 2024-05-15 ASSESSMENT — FIBROSIS 4 INDEX: FIB4 SCORE: 0.53

## 2024-05-15 NOTE — ASSESSMENT & PLAN NOTE
She was seen at [facility] ER for hypertension.  Her blood pressure was 177/117 with a headache which prompted her to go to ER.  In ER she reported her blood pressure was 166/102 with dizziness and nausea.  She was seen day before ER and restarted on hydrochlorothiazide 12.5 mg daily as her office blood pressure was 140/90.  ER completed chest x-ray was clear and EKG was clear. Serial troponin negative. She was discharged home on lisinopril 20 mg daily in the evening. Blood pressure today 126/86. She is tolerating the lisinopril. She has not been able to check her blood pressure at home as current machine does not work. Has chronic cough that causes reproducible chest wall pain. No further headaches or dizziness since lisinopril.

## 2024-05-15 NOTE — PROGRESS NOTES
Subjective:     CC: ED follow-up     HPI:   Joycelyn presents today with the following:    Hospital discharge follow-up  She was seen at [facility] ER for hypertension.  Her blood pressure was 177/117 with a headache which prompted her to go to ER.  In ER she reported her blood pressure was 166/102 with dizziness and nausea.  She was seen day before ER and restarted on hydrochlorothiazide 12.5 mg daily as her office blood pressure was 140/90.  ER completed chest x-ray was clear and EKG was clear. Serial troponin negative. She was discharged home on lisinopril 20 mg daily in the evening. Blood pressure today 126/86. She is tolerating the lisinopril. She has not been able to check her blood pressure at home as current machine does not work. Has chronic cough that causes reproducible chest wall pain. No further headaches or dizziness since lisinopril.     Past Medical History:   Diagnosis Date    Allergy, unspecified not elsewhere classified     Ankylosing spondylitis of lumbosacral region ([facility]) 7/6/2015    Anxiety 11/24/2015    GERD (gastroesophageal reflux disease)     Headache, classical migraine     Hiatal hernia     Intractable migraine without aura and without status migrainosus 11/24/2015    Lupus ([facility])     Morphea 1/9/2019    Oropharyngeal dysphagia     Other forms of systemic lupus erythematosus ([facility]) 10/20/2017    Overactive bladder     Peptic ulcer     Polyuria 7/17/2019    Prediabetes 12/8/2016    Vocal cord dysfunction        Social History     Tobacco Use    Smoking status: Never    Smokeless tobacco: Never   Vaping Use    Vaping status: Never Used   Substance Use Topics    Alcohol use: No     Alcohol/week: 0.0 oz    Drug use: No       Current Outpatient Medications Ordered in Epic   Medication Sig Dispense Refill    lisinopril (PRINIVIL) 20 MG Tab Take 20 mg by mouth every day.      Tirzepatide (MOUNJARO) 2.5 MG/0.5ML Solution Pen-injector Inject 2.5 mg under the skin every 7 days. 2  mL 0    mupirocin (BACTROBAN) 2 % Ointment Apply 1 Application topically 2 times a day. 22 g 0    Papaya 100 MG Tab as directed Orally      promethazine (PHENERGAN) 6.25 MG/5ML solution Promethazine HCl      albuterol (PROVENTIL) 2.5mg/0.5ml Nebu Soln Take 2.5 mg by nebulization every four hours as needed for Shortness of Breath. Hasn't been able to use yet      rabeprazole (ACIPHEX) 20 MG tablet       estradiol (VIVELLE DOT) 0.1 MG/24HR PATCH BIWEEKLY APPLY 1 PATCH TOPICALLY TO THE SKIN EVERY 3 AND 1/2 DAYS      progesterone (PROMETRIUM) 100 MG Cap Take 100 mg by mouth at bedtime.      DULoxetine HCl 40 MG Cap DR Particles Take 40 mg by mouth every day. 90 Capsule 3    TRELEGY ELLIPTA 200-62.5-25 MCG/INH AEROSOL POWDER, BREATH ACTIVATED Inhale 1 Puff every day.      pregabalin (LYRICA) 50 MG capsule Take 50 mg by mouth. 1 tab 3xwk      albuterol 108 (90 Base) MCG/ACT Aero Soln inhalation aerosol Inhale 2 Puffs by mouth every four hours as needed for Shortness of Breath. 3 Each 3    rizatriptan (MAXALT) 5 MG tablet Take 1 Tab by mouth Once PRN for Migraine for up to 1 dose. 10 Tab 0    HYDROcodone-acetaminophen 2.5-108 mg/5mL (HYCET) 7.5-325 MG/15ML solution Take 15 mL by mouth 4 times a day as needed for Severe Pain.      Thiamine HCl (VITAMIN B-1 PO) Take 1 Tab by mouth every morning.      lidocaine (LIDODERM) 5 % Patch Apply 1 Patch to skin as directed 1 time daily as needed (pain).      ENBREL SURECLICK 50 MG/ML Solution Auto-injector 1 Each by Injection route every 7 days.      cyclobenzaprine (FLEXERIL) 10 MG Tab Take 10 mg by mouth at bedtime as needed for Muscle Spasms.      coenzyme Q-10 30 MG capsule Take 30 mg by mouth every morning.      hydroxychloroquine (PLAQUENIL) 200 MG Tab Take 200 mg by mouth every morning.      EPIPEN 2-NOLAN 0.3 MG/0.3ML Solution Auto-injector solution for injection 0.3 mg by Intramuscular route as needed.       No current Epic-ordered facility-administered medications on file.  "      Allergies:  Milnacipran, Tramadol, Cefdinir, Ciprofloxacin, Metoclopramide, Sulfa drugs, and Carisoprodol    Health Maintenance: Reviewed.      Objective:     Vital signs reviewed  Exam:  /86 (BP Location: Left arm, Patient Position: Sitting, BP Cuff Size: Adult)   Pulse 94   Temp 37.1 °C (98.7 °F) (Temporal)   Ht 1.6 m (5' 3\")   Wt 83 kg (183 lb)   LMP 03/29/2016   SpO2 97%   BMI 32.42 kg/m²  Body mass index is 32.42 kg/m².    Gen: Alert and oriented, No apparent distress.  Nose with small abrasion with redness at the end of nose.   Eyes:   Lids normal. Glasses in place.   Lungs: Normal effort, CTA bilaterally, no wheezes, rhonchi, or rales  CV: Regular rate and rhythm. No murmurs, rubs, or gallops.  Ext: No clubbing, cyanosis, edema.      Lab:      Latest Reference Range & Units 05/15/24 08:27   Glycohemoglobin 5.8 % 5.7       Assessment & Plan:     49 y.o. female with the following -       1. Hospital discharge follow-up  Acute uncomplicated problem.  Hospital discharge follow-up completed today.  I was able to review her hospital discharge summary.  She will continue with her lisinopril.    2. Primary hypertension  Chronic stable problem.  Continue lisinopril 20 mg daily.    3. Prediabetes  Chronic stable problem.  A1c stable at 5.7%.  - POCT  A1C    4. Obesity (BMI 30-39.9)  Chronic exacerbated problem.  She is interested in starting medication for weight loss and would be interested in starting Mounjaro.  Plan to start Mounjaro 2.5 mg every 7 days and have her return in 1 month for follow-up.  We did discuss that upon sending the prescription that it did flag for PA which we will start today.  - Tirzepatide (MOUNJARO) 2.5 MG/0.5ML Solution Pen-injector; Inject 2.5 mg under the skin every 7 days.  Dispense: 2 mL; Refill: 0  - Patient identified as having weight management issue.  Appropriate orders and counseling given.    5. Abrasion of nose with infection, initial encounter  Acute " uncomplicated problem.  She has been using Neosporin that has not been helping.  Has had relief in the past with mupirocin.  Start mupirocin twice daily for the next 7 days.  - mupirocin (BACTROBAN) 2 % Ointment; Apply 1 Application topically 2 times a day.  Dispense: 22 g; Refill: 0        Return in about 4 weeks (around 6/12/2024) for medication management.    Please note that this dictation was created using voice recognition software. I have made every reasonable attempt to correct obvious errors, but I expect that there are errors of grammar and possibly content that I did not discover before finalizing the note.

## 2024-05-16 RX ORDER — LISINOPRIL 20 MG/1
20 TABLET ORAL DAILY
Qty: 90 TABLET | Refills: 1 | Status: SHIPPED | OUTPATIENT
Start: 2024-05-16

## 2024-05-16 NOTE — PROGRESS NOTES
Requested Prescriptions     Signed Prescriptions Disp Refills    lisinopril (PRINIVIL) 20 MG Tab 90 Tablet 1     Sig: Take 1 Tablet by mouth every day.     Authorizing Provider: BERNADETTE TINEO A.P.R.N.

## 2024-05-16 NOTE — TELEPHONE ENCOUNTER
DOCUMENTATION OF PAR STATUS:    1. Name of Medication & Dose: Mounjaro      2. Name of Prescription Coverage Company & phone #: [email or web address]     3. Date Prior Auth Submitted: 5/15/24    4. What information was given to obtain insurance decision? [email or web address]    5. Prior Auth Status? Pending    6. Patient Notified: yes

## 2024-05-22 ENCOUNTER — TELEPHONE (OUTPATIENT)
Dept: MEDICAL GROUP | Facility: PHYSICIAN GROUP | Age: 49
End: 2024-05-22
Payer: COMMERCIAL

## 2024-05-22 NOTE — TELEPHONE ENCOUNTER
FINAL PRIOR AUTHORIZATION STATUS:    1.  Name of Medication & Dose: Mounjaro     2. Prior Auth Status: Denied.  Reason: only approved if used for Type 2 Diabetes     3. Action Taken: Pharmacy Notified: yes Patient Notified: yes

## 2024-06-03 DIAGNOSIS — F41.9 ANXIETY: ICD-10-CM

## 2024-06-03 RX ORDER — DULOXETINE 40 MG/1
40 CAPSULE, DELAYED RELEASE ORAL DAILY
Qty: 90 CAPSULE | Refills: 3 | Status: SHIPPED | OUTPATIENT
Start: 2024-06-03

## 2024-06-03 NOTE — TELEPHONE ENCOUNTER
Received request via: Pharmacy    Was the patient seen in the last year in this department? Yes    Does the patient have an active prescription (recently filled or refills available) for medication(s) requested? No    [facility]    Does the patient have retirement Plus and need 100 day supply (blood pressure, diabetes and cholesterol meds only)? Patient does not have SCP

## 2024-06-03 NOTE — TELEPHONE ENCOUNTER
Requested Prescriptions     Signed Prescriptions Disp Refills    DULoxetine HCl 40 MG Cap DR Particles 90 Capsule 3     Sig: Take 40 mg by mouth every day.     Authorizing Provider: BERNADETTE TINEO A.P.R.N.

## 2024-06-04 ENCOUNTER — TELEPHONE (OUTPATIENT)
Dept: MEDICAL GROUP | Facility: PHYSICIAN GROUP | Age: 49
End: 2024-06-04
Payer: COMMERCIAL

## 2024-06-05 NOTE — TELEPHONE ENCOUNTER
FINAL PRIOR AUTHORIZATION STATUS:    1.  Name of Medication & Dose: Duloxetine      2. Prior Auth Status: Approved through CaseId:[id number];Status:Approved;Review Type:Prior Auth;Coverage Start Date:05/05/2024;Coverage End Date:06/04/2025;     3. Action Taken: Pharmacy Notified: yes Patient Notified: yes

## 2024-06-05 NOTE — TELEPHONE ENCOUNTER
DOCUMENTATION OF PAR STATUS:    1. Name of Medication & Dose: Duloxetine 40mg     2. Name of Prescription Coverage Company & phone #: [email or web address]    3. Date Prior Auth Submitted: 6/4/24    4. What information was given to obtain insurance decision? Chart notes    5. Prior Auth Status? Pending    6. Patient Notified: N\A

## 2024-06-12 ENCOUNTER — APPOINTMENT (OUTPATIENT)
Dept: MEDICAL GROUP | Facility: PHYSICIAN GROUP | Age: 49
End: 2024-06-12
Payer: COMMERCIAL

## 2024-06-12 VITALS
TEMPERATURE: 98.5 F | HEIGHT: 63 IN | WEIGHT: 180 LBS | HEART RATE: 88 BPM | DIASTOLIC BLOOD PRESSURE: 80 MMHG | SYSTOLIC BLOOD PRESSURE: 132 MMHG | BODY MASS INDEX: 31.89 KG/M2 | OXYGEN SATURATION: 96 %

## 2024-06-12 DIAGNOSIS — Z11.4 SCREENING FOR HIV (HUMAN IMMUNODEFICIENCY VIRUS): ICD-10-CM

## 2024-06-12 DIAGNOSIS — R74.8 ELEVATED ALKALINE PHOSPHATASE LEVEL: ICD-10-CM

## 2024-06-12 DIAGNOSIS — I10 PRIMARY HYPERTENSION: ICD-10-CM

## 2024-06-12 PROCEDURE — 3079F DIAST BP 80-89 MM HG: CPT | Performed by: NURSE PRACTITIONER

## 2024-06-12 PROCEDURE — 3075F SYST BP GE 130 - 139MM HG: CPT | Performed by: NURSE PRACTITIONER

## 2024-06-12 PROCEDURE — 99214 OFFICE O/P EST MOD 30 MIN: CPT | Performed by: NURSE PRACTITIONER

## 2024-06-12 RX ORDER — OXYCODONE HYDROCHLORIDE AND ACETAMINOPHEN 5; 325 MG/1; MG/1
TABLET ORAL
COMMUNITY
Start: 2024-06-06

## 2024-06-12 RX ORDER — ALPRAZOLAM 0.5 MG/1
TABLET ORAL
COMMUNITY
Start: 2024-06-06

## 2024-06-12 ASSESSMENT — FIBROSIS 4 INDEX: FIB4 SCORE: 0.53

## 2024-06-12 NOTE — ASSESSMENT & PLAN NOTE
Blood pressure today 132/80. Continues lisinopril 20 mg every evening. She has not been able to monitor blood pressure at home. No chest pain, shortness of breath or dizziness. Has had 2 occipital headaches that resolved quickly. Continue current dose.

## 2024-06-18 NOTE — ASSESSMENT & PLAN NOTE
This is a chronic health problem that is uncontrolled with current medications and lifestyle measures. Patient was able to be seen by the MS at [facility] Dr. Layton. She pointed out that she would like her to get a new MRI with a more sensitive machine that may be able to  some subtle lesions that are causing her problems. She continues to have neuropathic pain in her calf on the left leg left lateral thigh and left elbow area. She states it feels as if somebody is pinching her twisting and then it gets a burning sensation at the end. She states that when it happens in her elbow she can actually see muscle fasciculations. She has several autoimmune diseases along with this neurologic component. It's difficult to know exactly what to do for her. The MS specialist recommended that she go off Humira because that actually can make MS worse.    Patient does add that she is the first born child of a [place] vet who was excessively exposed to agent orange. He is 125% disabled according to the [facility] because of his amount of exposure with agent orange. She was born within a year of his return from [place]. Her MS specialist is also going to run multiple other tests because of now new evidence that children of agent orange parents can have autoimmune diseases as well as muscle still low skeletal and neurologic diseases.   Patient expressed no known problems or needs

## 2024-06-23 ENCOUNTER — PATIENT MESSAGE (OUTPATIENT)
Dept: MEDICAL GROUP | Facility: PHYSICIAN GROUP | Age: 49
End: 2024-06-23
Payer: COMMERCIAL

## 2024-06-23 DIAGNOSIS — I10 PRIMARY HYPERTENSION: ICD-10-CM

## 2024-06-24 RX ORDER — LISINOPRIL 30 MG/1
30 TABLET ORAL DAILY
Qty: 30 TABLET | Refills: 2 | Status: SHIPPED | OUTPATIENT
Start: 2024-06-24

## 2024-06-30 ENCOUNTER — OFFICE VISIT (OUTPATIENT)
Dept: URGENT CARE | Facility: PHYSICIAN GROUP | Age: 49
End: 2024-06-30
Payer: COMMERCIAL

## 2024-06-30 ENCOUNTER — HOSPITAL ENCOUNTER (OUTPATIENT)
Dept: RADIOLOGY | Facility: MEDICAL CENTER | Age: 49
End: 2024-06-30
Attending: FAMILY MEDICINE
Payer: COMMERCIAL

## 2024-06-30 ENCOUNTER — HOSPITAL ENCOUNTER (OUTPATIENT)
Facility: MEDICAL CENTER | Age: 49
End: 2024-06-30
Attending: FAMILY MEDICINE
Payer: COMMERCIAL

## 2024-06-30 VITALS
WEIGHT: 180 LBS | DIASTOLIC BLOOD PRESSURE: 84 MMHG | TEMPERATURE: 97.7 F | RESPIRATION RATE: 16 BRPM | HEART RATE: 78 BPM | HEIGHT: 63 IN | SYSTOLIC BLOOD PRESSURE: 154 MMHG | OXYGEN SATURATION: 98 % | BODY MASS INDEX: 31.89 KG/M2

## 2024-06-30 DIAGNOSIS — R05.3 CHRONIC COUGH: ICD-10-CM

## 2024-06-30 DIAGNOSIS — I51.7 CARDIOMEGALY: ICD-10-CM

## 2024-06-30 DIAGNOSIS — R06.01 ORTHOPNEA: ICD-10-CM

## 2024-06-30 DIAGNOSIS — R06.02 SHORTNESS OF BREATH: ICD-10-CM

## 2024-06-30 LAB
ALBUMIN SERPL BCP-MCNC: 4.1 G/DL (ref 3.2–4.9)
ALBUMIN/GLOB SERPL: 1.3 G/DL
ALP SERPL-CCNC: 65 U/L (ref 30–99)
ALT SERPL-CCNC: 22 U/L (ref 2–50)
ANION GAP SERPL CALC-SCNC: 11 MMOL/L (ref 7–16)
AST SERPL-CCNC: 23 U/L (ref 12–45)
BASOPHILS # BLD AUTO: 1.2 % (ref 0–1.8)
BASOPHILS # BLD: 0.09 K/UL (ref 0–0.12)
BILIRUB SERPL-MCNC: 0.3 MG/DL (ref 0.1–1.5)
BUN SERPL-MCNC: 10 MG/DL (ref 8–22)
CALCIUM ALBUM COR SERPL-MCNC: 9.5 MG/DL (ref 8.5–10.5)
CALCIUM SERPL-MCNC: 9.6 MG/DL (ref 8.5–10.5)
CHLORIDE SERPL-SCNC: 102 MMOL/L (ref 96–112)
CO2 SERPL-SCNC: 24 MMOL/L (ref 20–33)
CREAT SERPL-MCNC: 0.69 MG/DL (ref 0.5–1.4)
D DIMER PPP IA.FEU-MCNC: <0.27 UG/ML (FEU) (ref 0–0.5)
EOSINOPHIL # BLD AUTO: 0.19 K/UL (ref 0–0.51)
EOSINOPHIL NFR BLD: 2.6 % (ref 0–6.9)
ERYTHROCYTE [DISTWIDTH] IN BLOOD BY AUTOMATED COUNT: 41.1 FL (ref 35.9–50)
GFR SERPLBLD CREATININE-BSD FMLA CKD-EPI: 107 ML/MIN/1.73 M 2
GLOBULIN SER CALC-MCNC: 3.2 G/DL (ref 1.9–3.5)
GLUCOSE SERPL-MCNC: 81 MG/DL (ref 65–99)
HCT VFR BLD AUTO: 44 % (ref 37–47)
HGB BLD-MCNC: 14.4 G/DL (ref 12–16)
IMM GRANULOCYTES # BLD AUTO: 0.01 K/UL (ref 0–0.11)
IMM GRANULOCYTES NFR BLD AUTO: 0.1 % (ref 0–0.9)
LYMPHOCYTES # BLD AUTO: 2.4 K/UL (ref 1–4.8)
LYMPHOCYTES NFR BLD: 32.3 % (ref 22–41)
MCH RBC QN AUTO: 26.7 PG (ref 27–33)
MCHC RBC AUTO-ENTMCNC: 32.7 G/DL (ref 32.2–35.5)
MCV RBC AUTO: 81.5 FL (ref 81.4–97.8)
MONOCYTES # BLD AUTO: 0.73 K/UL (ref 0–0.85)
MONOCYTES NFR BLD AUTO: 9.8 % (ref 0–13.4)
NEUTROPHILS # BLD AUTO: 4.01 K/UL (ref 1.82–7.42)
NEUTROPHILS NFR BLD: 54 % (ref 44–72)
NRBC # BLD AUTO: 0 K/UL
NRBC BLD-RTO: 0 /100 WBC (ref 0–0.2)
NT-PROBNP SERPL IA-MCNC: 98 PG/ML (ref 0–125)
PLATELET # BLD AUTO: 329 K/UL (ref 164–446)
PMV BLD AUTO: 10.4 FL (ref 9–12.9)
POTASSIUM SERPL-SCNC: 3.9 MMOL/L (ref 3.6–5.5)
PROT SERPL-MCNC: 7.3 G/DL (ref 6–8.2)
RBC # BLD AUTO: 5.4 M/UL (ref 4.2–5.4)
SODIUM SERPL-SCNC: 137 MMOL/L (ref 135–145)
WBC # BLD AUTO: 7.4 K/UL (ref 4.8–10.8)

## 2024-06-30 PROCEDURE — 85379 FIBRIN DEGRADATION QUANT: CPT

## 2024-06-30 PROCEDURE — 71046 X-RAY EXAM CHEST 2 VIEWS: CPT

## 2024-06-30 PROCEDURE — 83880 ASSAY OF NATRIURETIC PEPTIDE: CPT

## 2024-06-30 PROCEDURE — 85025 COMPLETE CBC W/AUTO DIFF WBC: CPT

## 2024-06-30 PROCEDURE — 80053 COMPREHEN METABOLIC PANEL: CPT

## 2024-06-30 ASSESSMENT — ENCOUNTER SYMPTOMS
WHEEZING: 0
COUGH: 1
SHORTNESS OF BREATH: 1
ORTHOPNEA: 1
FEVER: 0

## 2024-06-30 ASSESSMENT — FIBROSIS 4 INDEX: FIB4 SCORE: 0.53

## 2024-06-30 NOTE — Clinical Note
Sounds like her chronic shortness of breath is worsening.  She has some cardiomegaly seen on x-ray which appears to be new.  She has follow-up with pulmonology in 1.5 weeks, and I am getting some labs to further evaluate.  Wanted to keep you in the loop.  If her cardiology referral takes too long, it may be better for her to follow-up with you to talk about doing an echocardiogram instead of waiting.  Thanks!  -Doc Willis

## 2024-06-30 NOTE — PROGRESS NOTES
"Subjective:     Joycelyn Byers is a 49 y.o. female who presents for Cough (Has treated with 2 rounds of antibiotic and 2 of steroids, coughing causes gagging/vomiting, deep cough feels like pneumonia  since November)    HPI  Pt presents for evaluation of a chronic problem  Patient with chronic cough \"since November\" of 2023  Seen at [facility] urgent care 1/13/2024, given 40 mg prednisone x 5 days, chest x-ray clear at that time  PCP office 1/16/2024-tested positive for COVID-19, treated with azithromycin at that time and also referred to pulmonology  Never made pulmonology appointment  Had follow-ups with PCP on 5/1/2024, 5/15/2024, and 6/12/2024 and did not complain about cough at those visits    Has been feeling fatigued   Has productive cough   Feels short of breath when laying down, particularly when laying on her left side  Has some good days and some bad days with regards to shortness of breath  Takes lisinopril, but states that the cough started before she started lisinopril    Over the past 6 months:   Has been treated with 2 courses of steroids which each helped a little   Had course of azithromycin and also amoxicillin     History of lupus and asthma    Review of Systems   Constitutional:  Negative for fever.   Respiratory:  Positive for cough and shortness of breath. Negative for wheezing.    Cardiovascular:  Positive for orthopnea. Negative for chest pain and leg swelling.       PMH:  has a past medical history of Allergy, unspecified not elsewhere classified, Ankylosing spondylitis of lumbosacral region (HCC) (7/6/2015), Anxiety (11/24/2015), GERD (gastroesophageal reflux disease), Headache, classical migraine, Hiatal hernia, Intractable migraine without aura and without status migrainosus (11/24/2015), Lupus ([facility]), Morphea (1/9/2019), Oropharyngeal dysphagia, Other forms of systemic lupus erythematosus ([facility]) (10/20/2017), Overactive bladder, Peptic ulcer, Polyuria (7/17/2019), " Prediabetes (12/8/2016), and Vocal cord dysfunction.  MEDS:   Current Outpatient Medications:     lisinopril (PRINIVIL) 30 MG tablet, Take 1 Tablet by mouth every day., Disp: 30 Tablet, Rfl: 2    ALPRAZolam (XANAX) 0.5 MG Tab, ARRIVE 45 MINUTES PRIOR TO PROCEDURE . BRING WITH YOU ON DAY OF PROCEDURE AND TAKE WHEN INSTRUCTED TO BY STAFF. DO NOT TAKE UNTIL INSTRUCTED, Disp: , Rfl:     oxyCODONE-acetaminophen (PERCOCET) 5-325 MG Tab, ARRIVE 45 MINUTES PRIOR TO PROCEDURE. BRING TABLET WITH YOU AND TAKE WHEN INSTRUCTED BY STAFF. DO NOT TAKE UNTIL INSTRUCTED, Disp: , Rfl:     DULoxetine HCl 40 MG Cap DR Particles, Take 40 mg by mouth every day., Disp: 90 Capsule, Rfl: 3    albuterol (PROVENTIL) 2.5mg/0.5ml Nebu Soln, Take 2.5 mg by nebulization every four hours as needed for Shortness of Breath. Hasn't been able to use yet, Disp: , Rfl:     rabeprazole (ACIPHEX) 20 MG tablet, , Disp: , Rfl:     estradiol (VIVELLE DOT) 0.1 MG/24HR PATCH BIWEEKLY, APPLY 1 PATCH TOPICALLY TO THE SKIN EVERY 3 AND 1/2 DAYS, Disp: , Rfl:     progesterone (PROMETRIUM) 100 MG Cap, Take 100 mg by mouth at bedtime., Disp: , Rfl:     TRELEGY ELLIPTA 200-62.5-25 MCG/INH AEROSOL POWDER, BREATH ACTIVATED, Inhale 1 Puff every day., Disp: , Rfl:     pregabalin (LYRICA) 50 MG capsule, Take 50 mg by mouth. 1 tab 3xwk, Disp: , Rfl:     albuterol 108 (90 Base) MCG/ACT Aero Soln inhalation aerosol, Inhale 2 Puffs by mouth every four hours as needed for Shortness of Breath., Disp: 3 Each, Rfl: 3    rizatriptan (MAXALT) 5 MG tablet, Take 1 Tab by mouth Once PRN for Migraine for up to 1 dose., Disp: 10 Tab, Rfl: 0    HYDROcodone-acetaminophen 2.5-108 mg/5mL (HYCET) 7.5-325 MG/15ML solution, Take 15 mL by mouth 4 times a day as needed for Severe Pain., Disp: , Rfl:     Thiamine HCl (VITAMIN B-1 PO), Take 1 Tab by mouth every morning., Disp: , Rfl:     lidocaine (LIDODERM) 5 % Patch, Apply 1 Patch to skin as directed 1 time daily as needed (pain)., Disp: , Rfl:     " ENBREL SURECLICK 50 MG/ML Solution Auto-injector, 1 Each by Injection route every 7 days., Disp: , Rfl:     cyclobenzaprine (FLEXERIL) 10 MG Tab, Take 10 mg by mouth at bedtime as needed for Muscle Spasms., Disp: , Rfl:     coenzyme Q-10 30 MG capsule, Take 30 mg by mouth every morning., Disp: , Rfl:     hydroxychloroquine (PLAQUENIL) 200 MG Tab, Take 200 mg by mouth every morning., Disp: , Rfl:     EPIPEN 2-NOLAN 0.3 MG/0.3ML Solution Auto-injector solution for injection, 0.3 mg by Intramuscular route as needed., Disp: , Rfl:   ALLERGIES:   Allergies   Allergen Reactions    Milnacipran Myalgia    Tramadol Shortness of Breath and Rash     Shortness of breath & rash      Cefdinir Diarrhea and Rash     Rash and diarrhea, no respiratory complaints    Ciprofloxacin Unspecified     Knee & muscle pain      Metoclopramide Unspecified     Akathisia      Sulfa Drugs Nausea     GI upset    Carisoprodol Unspecified     Slurred speech, stumbling     SURGHX:   Past Surgical History:   Procedure Laterality Date    HYSTERECTOMY LAPAROSCOPY  2013    utrine and right ovary removed    HERNIA REPAIR  2007    umblical hernia    CHOLECYSTECTOMY  2007    SALPINGO-OOPHORECTOMY, UNILATERAL Right      SOCHX:  reports that she has never smoked. She has never used smokeless tobacco. She reports that she does not drink alcohol and does not use drugs.     Objective:   BP (!) 154/84 (BP Location: Right arm, Patient Position: Sitting, BP Cuff Size: Adult)   Pulse 78   Temp 36.5 °C (97.7 °F) (Temporal)   Resp 16   Ht 1.6 m (5' 3\")   Wt 81.6 kg (180 lb)   LMP 03/29/2016   SpO2 98%   BMI 31.89 kg/m²     Physical Exam  Constitutional:       General: She is not in acute distress.     Appearance: She is well-developed. She is not diaphoretic.   HENT:      Head: Normocephalic and atraumatic.   Neck:      Trachea: No tracheal deviation.   Cardiovascular:      Rate and Rhythm: Normal rate and regular rhythm.   Pulmonary:      Effort: Pulmonary " effort is normal. No respiratory distress.      Breath sounds: Normal breath sounds. No wheezing or rales.   Skin:     General: Skin is warm and dry.      Findings: No rash.   Neurological:      Mental Status: She is alert.         Assessment/Plan:   Assessment    1. Chronic cough  - DX-CHEST-2 VIEWS; Future  - CBC WITH DIFFERENTIAL; Future  - Comp Metabolic Panel; Future  - D-DIMER; Future  - proBrain Natriuretic Peptide, NT; Future    2. Orthopnea  - DX-CHEST-2 VIEWS; Future  - CBC WITH DIFFERENTIAL; Future  - Comp Metabolic Panel; Future  - D-DIMER; Future  - proBrain Natriuretic Peptide, NT; Future    3. Cardiomegaly  - CBC WITH DIFFERENTIAL; Future  - Comp Metabolic Panel; Future  - D-DIMER; Future  - proBrain Natriuretic Peptide, NT; Future    Patient here for evaluation of chronic cough, shortness of breath, and orthopnea.  Chest x-ray does not show any infiltrates, but does show new cardiomegaly.  Patient had echocardiogram 3 years ago and did not have cardiomegaly at the time.  Will evaluate with CBC to evaluate white count, CMP to evaluate renal and liver function, as well as the sodium and potassium.  There is a small chance she could have pulmonary embolus chronically and will do screening D-dimer.  Cardiomegaly could also be secondary to new onset heart failure and will obtain BNP.  Patient already has follow-up with pulmonology in 2 weeks.  Did recommend repeat echocardiogram and follow-up with cardiology as well.  Will follow-up lab results.

## 2024-07-03 ENCOUNTER — APPOINTMENT (OUTPATIENT)
Dept: MEDICAL GROUP | Facility: PHYSICIAN GROUP | Age: 49
End: 2024-07-03
Payer: COMMERCIAL

## 2024-07-03 VITALS
SYSTOLIC BLOOD PRESSURE: 134 MMHG | TEMPERATURE: 98.4 F | HEART RATE: 86 BPM | DIASTOLIC BLOOD PRESSURE: 80 MMHG | BODY MASS INDEX: 31.54 KG/M2 | OXYGEN SATURATION: 98 % | HEIGHT: 63 IN | WEIGHT: 178 LBS

## 2024-07-03 DIAGNOSIS — I51.7 CARDIOMEGALY: ICD-10-CM

## 2024-07-03 DIAGNOSIS — R05.3 CHRONIC COUGH: ICD-10-CM

## 2024-07-03 DIAGNOSIS — I10 PRIMARY HYPERTENSION: ICD-10-CM

## 2024-07-03 PROCEDURE — 3075F SYST BP GE 130 - 139MM HG: CPT | Performed by: NURSE PRACTITIONER

## 2024-07-03 PROCEDURE — 99214 OFFICE O/P EST MOD 30 MIN: CPT | Performed by: NURSE PRACTITIONER

## 2024-07-03 PROCEDURE — 3079F DIAST BP 80-89 MM HG: CPT | Performed by: NURSE PRACTITIONER

## 2024-07-03 RX ORDER — LOSARTAN POTASSIUM 50 MG/1
50 TABLET ORAL DAILY
Qty: 30 TABLET | Refills: 2 | Status: SHIPPED | OUTPATIENT
Start: 2024-07-03

## 2024-07-03 RX ORDER — CODEINE PHOSPHATE AND GUAIFENESIN 10; 100 MG/5ML; MG/5ML
5 SOLUTION ORAL
Qty: 50 ML | Refills: 0 | Status: SHIPPED | OUTPATIENT
Start: 2024-07-03 | End: 2024-07-13

## 2024-07-03 ASSESSMENT — FIBROSIS 4 INDEX: FIB4 SCORE: 0.72

## 2024-07-07 ASSESSMENT — ENCOUNTER SYMPTOMS
CHEST TIGHTNESS: 0
DYSPNEA AT REST: 0
WHEEZING: 0
HEMOPTYSIS: 0
RESPIRATORY SYMPTOMS COMMENTS: YES
SHORTNESS OF BREATH: 1

## 2024-07-10 ENCOUNTER — OFFICE VISIT (OUTPATIENT)
Dept: SLEEP MEDICINE | Facility: MEDICAL CENTER | Age: 49
End: 2024-07-10
Attending: NURSE PRACTITIONER
Payer: COMMERCIAL

## 2024-07-10 VITALS
OXYGEN SATURATION: 95 % | HEART RATE: 90 BPM | SYSTOLIC BLOOD PRESSURE: 124 MMHG | DIASTOLIC BLOOD PRESSURE: 72 MMHG | WEIGHT: 177 LBS | HEIGHT: 63 IN | BODY MASS INDEX: 31.36 KG/M2

## 2024-07-10 DIAGNOSIS — M35.9 CONNECTIVE TISSUE DISEASE (HCC): ICD-10-CM

## 2024-07-10 DIAGNOSIS — J45.40 MODERATE PERSISTENT ASTHMA WITHOUT COMPLICATION: ICD-10-CM

## 2024-07-10 DIAGNOSIS — R05.3 CHRONIC COUGH: ICD-10-CM

## 2024-07-10 PROCEDURE — 3078F DIAST BP <80 MM HG: CPT | Performed by: INTERNAL MEDICINE

## 2024-07-10 PROCEDURE — 99204 OFFICE O/P NEW MOD 45 MIN: CPT | Performed by: INTERNAL MEDICINE

## 2024-07-10 PROCEDURE — 99213 OFFICE O/P EST LOW 20 MIN: CPT | Performed by: INTERNAL MEDICINE

## 2024-07-10 PROCEDURE — 3074F SYST BP LT 130 MM HG: CPT | Performed by: INTERNAL MEDICINE

## 2024-07-10 RX ORDER — FLUTICASONE FUROATE, UMECLIDINIUM BROMIDE AND VILANTEROL TRIFENATATE 100; 62.5; 25 UG/1; UG/1; UG/1
1 POWDER RESPIRATORY (INHALATION) DAILY
Qty: 3 EACH | Refills: 3 | Status: SHIPPED | OUTPATIENT
Start: 2024-07-10

## 2024-07-10 ASSESSMENT — ENCOUNTER SYMPTOMS
SINUS PAIN: 0
WHEEZING: 0
SPUTUM PRODUCTION: 0
FOCAL WEAKNESS: 0
COUGH: 1
NAUSEA: 0
SHORTNESS OF BREATH: 1
EYE DISCHARGE: 0
SPEECH CHANGE: 0
PHOTOPHOBIA: 0
NECK PAIN: 0
DOUBLE VISION: 0
DIZZINESS: 0
BACK PAIN: 0
CHILLS: 0
DEPRESSION: 0
DIAPHORESIS: 0
VOMITING: 0
TREMORS: 0
ORTHOPNEA: 0
CONSTIPATION: 0
PALPITATIONS: 0
WEAKNESS: 0
MYALGIAS: 0
STRIDOR: 0
DIARRHEA: 0
EYE REDNESS: 0
PND: 0
FEVER: 0
CLAUDICATION: 0
BLURRED VISION: 0
HEARTBURN: 0
HEADACHES: 0
WEIGHT LOSS: 0
SORE THROAT: 0
EYE PAIN: 0
FALLS: 0
ABDOMINAL PAIN: 0
HEMOPTYSIS: 0

## 2024-07-10 ASSESSMENT — FIBROSIS 4 INDEX: FIB4 SCORE: 0.72

## 2024-07-12 ENCOUNTER — TELEPHONE (OUTPATIENT)
Dept: HEALTH INFORMATION MANAGEMENT | Facility: OTHER | Age: 49
End: 2024-07-12
Payer: COMMERCIAL

## 2024-07-17 ENCOUNTER — OFFICE VISIT (OUTPATIENT)
Dept: MEDICAL GROUP | Facility: PHYSICIAN GROUP | Age: 49
End: 2024-07-17
Payer: COMMERCIAL

## 2024-07-17 VITALS
SYSTOLIC BLOOD PRESSURE: 128 MMHG | WEIGHT: 178 LBS | HEART RATE: 91 BPM | TEMPERATURE: 98.4 F | DIASTOLIC BLOOD PRESSURE: 72 MMHG | BODY MASS INDEX: 31.54 KG/M2 | OXYGEN SATURATION: 98 % | HEIGHT: 63 IN

## 2024-07-17 DIAGNOSIS — J01.40 ACUTE NON-RECURRENT PANSINUSITIS: ICD-10-CM

## 2024-07-17 DIAGNOSIS — I10 PRIMARY HYPERTENSION: ICD-10-CM

## 2024-07-17 PROCEDURE — 3078F DIAST BP <80 MM HG: CPT | Performed by: NURSE PRACTITIONER

## 2024-07-17 PROCEDURE — 3074F SYST BP LT 130 MM HG: CPT | Performed by: NURSE PRACTITIONER

## 2024-07-17 PROCEDURE — 99213 OFFICE O/P EST LOW 20 MIN: CPT | Performed by: NURSE PRACTITIONER

## 2024-07-17 RX ORDER — AMOXICILLIN AND CLAVULANATE POTASSIUM 875; 125 MG/1; MG/1
1 TABLET, FILM COATED ORAL 2 TIMES DAILY
Qty: 14 TABLET | Refills: 0 | Status: SHIPPED | OUTPATIENT
Start: 2024-07-17 | End: 2024-07-24

## 2024-07-17 ASSESSMENT — FIBROSIS 4 INDEX: FIB4 SCORE: 0.72

## 2024-07-22 ENCOUNTER — ANCILLARY PROCEDURE (OUTPATIENT)
Dept: CARDIOLOGY | Facility: MEDICAL CENTER | Age: 49
End: 2024-07-22
Attending: NURSE PRACTITIONER
Payer: COMMERCIAL

## 2024-07-22 DIAGNOSIS — I51.7 CARDIOMEGALY: ICD-10-CM

## 2024-07-22 LAB
LV EJECT FRACT  99904: 70
LV EJECT FRACT MOD 2C 99903: 71.03
LV EJECT FRACT MOD 4C 99902: 71.03
LV EJECT FRACT MOD BP 99901: 71.43

## 2024-07-22 PROCEDURE — 93306 TTE W/DOPPLER COMPLETE: CPT

## 2024-07-22 PROCEDURE — 93306 TTE W/DOPPLER COMPLETE: CPT | Mod: 26 | Performed by: INTERNAL MEDICINE

## 2024-07-24 ENCOUNTER — HOSPITAL ENCOUNTER (OUTPATIENT)
Dept: RADIOLOGY | Facility: MEDICAL CENTER | Age: 49
End: 2024-07-24
Attending: INTERNAL MEDICINE
Payer: COMMERCIAL

## 2024-07-24 DIAGNOSIS — R05.3 CHRONIC COUGH: ICD-10-CM

## 2024-07-24 PROCEDURE — 71250 CT THORAX DX C-: CPT

## 2024-08-07 ENCOUNTER — OFFICE VISIT (OUTPATIENT)
Dept: MEDICAL GROUP | Facility: PHYSICIAN GROUP | Age: 49
End: 2024-08-07
Payer: COMMERCIAL

## 2024-08-07 VITALS
TEMPERATURE: 98.8 F | DIASTOLIC BLOOD PRESSURE: 72 MMHG | BODY MASS INDEX: 31.36 KG/M2 | WEIGHT: 177 LBS | HEART RATE: 92 BPM | OXYGEN SATURATION: 98 % | HEIGHT: 63 IN | SYSTOLIC BLOOD PRESSURE: 122 MMHG

## 2024-08-07 DIAGNOSIS — I10 PRIMARY HYPERTENSION: ICD-10-CM

## 2024-08-07 PROCEDURE — 3074F SYST BP LT 130 MM HG: CPT | Performed by: NURSE PRACTITIONER

## 2024-08-07 PROCEDURE — 99213 OFFICE O/P EST LOW 20 MIN: CPT | Performed by: NURSE PRACTITIONER

## 2024-08-07 PROCEDURE — 3078F DIAST BP <80 MM HG: CPT | Performed by: NURSE PRACTITIONER

## 2024-08-07 RX ORDER — CYCLOBENZAPRINE HCL 5 MG
5 TABLET ORAL
COMMUNITY
Start: 2024-07-23 | End: 2024-08-07

## 2024-08-07 ASSESSMENT — FIBROSIS 4 INDEX: FIB4 SCORE: 0.72

## 2024-08-07 NOTE — ASSESSMENT & PLAN NOTE
Blood pressure today 122/72.  Continues losartan 50 mg daily.  She has been monitoring her blood pressures and her blood pressures have been similar to today's but can have intermittent elevations in diastolic. Denies chest pain, dizziness or headaches. Cough is better. Her Trelegy Ellipta has been decreased that has helped.  She has had a recent sinus infection and would like to confirm that the sinus infection is cleared.

## 2024-08-07 NOTE — PROGRESS NOTES
Subjective:     CC: Hypertension follow-up, check sinus infection    HPI:   Joycelyn presents today with the following:    Primary hypertension  Blood pressure today 122/72.  Continues losartan 50 mg daily.  She has been monitoring her blood pressures and her blood pressures have been similar to today's but can have intermittent elevations in diastolic. Denies chest pain, dizziness or headaches. Cough is better. Her Trelegy Ellipta has been decreased that has helped.  She has had a recent sinus infection and would like to confirm that the sinus infection is cleared.      Past Medical History:   Diagnosis Date    Allergy, unspecified not elsewhere classified     Ankylosing spondylitis of lumbosacral region ([facility]) 7/6/2015    Anxiety 11/24/2015    GERD (gastroesophageal reflux disease)     Headache, classical migraine     Hiatal hernia     Intractable migraine without aura and without status migrainosus 11/24/2015    Lupus ([facility])     Morphea 1/9/2019    Oropharyngeal dysphagia     Other forms of systemic lupus erythematosus ([facility]) 10/20/2017    Overactive bladder     Peptic ulcer     Polyuria 7/17/2019    Prediabetes 12/8/2016    Vocal cord dysfunction        Social History     Tobacco Use    Smoking status: Never    Smokeless tobacco: Never   Vaping Use    Vaping status: Never Used   Substance Use Topics    Alcohol use: No     Alcohol/week: 0.0 oz    Drug use: No       Current Outpatient Medications Ordered in Epic   Medication Sig Dispense Refill    fluticasone-umeclidinium-vilanterol (TRELEGY ELLIPTA) 100-62.5-25 mcg/act inhaler Inhale 1 Puff every day. 3 Each 3    losartan (COZAAR) 50 MG Tab Take 1 Tablet by mouth every day. 30 Tablet 2    DULoxetine HCl 40 MG Cap DR Particles Take 40 mg by mouth every day. 90 Capsule 3    albuterol (PROVENTIL) 2.5mg/0.5ml Nebu Soln Take 2.5 mg by nebulization every four hours as needed for Shortness of Breath. Hasn't been able to use yet      rabeprazole (ACIPHEX) 20 MG tablet     "   estradiol (VIVELLE DOT) 0.1 MG/24HR PATCH BIWEEKLY APPLY 1 PATCH TOPICALLY TO THE SKIN EVERY 3 AND 1/2 DAYS      progesterone (PROMETRIUM) 100 MG Cap Take 100 mg by mouth at bedtime.      pregabalin (LYRICA) 50 MG capsule Take 50 mg by mouth. 1 tab 3xwk      albuterol 108 (90 Base) MCG/ACT Aero Soln inhalation aerosol Inhale 2 Puffs by mouth every four hours as needed for Shortness of Breath. 3 Each 3    rizatriptan (MAXALT) 5 MG tablet Take 1 Tab by mouth Once PRN for Migraine for up to 1 dose. 10 Tab 0    HYDROcodone-acetaminophen 2.5-108 mg/5mL (HYCET) 7.5-325 MG/15ML solution Take 15 mL by mouth 4 times a day as needed for Severe Pain.      Thiamine HCl (VITAMIN B-1 PO) Take 1 Tab by mouth every morning.      lidocaine (LIDODERM) 5 % Patch Apply 1 Patch to skin as directed 1 time daily as needed (pain).      ENBREL SURECLICK 50 MG/ML Solution Auto-injector 1 Each by Injection route every 7 days.      cyclobenzaprine (FLEXERIL) 10 MG Tab Take 10 mg by mouth at bedtime as needed for Muscle Spasms.      coenzyme Q-10 30 MG capsule Take 30 mg by mouth every morning.      hydroxychloroquine (PLAQUENIL) 200 MG Tab Take 200 mg by mouth every morning.      EPIPEN 2-NOLAN 0.3 MG/0.3ML Solution Auto-injector solution for injection 0.3 mg by Intramuscular route as needed.       No current Epic-ordered facility-administered medications on file.       Allergies:  Milnacipran, Tramadol, Cefdinir, Ciprofloxacin, Metoclopramide, Sulfa drugs, and Carisoprodol    Health Maintenance: Reviewed      Objective:     Vital signs reviewed  Exam:  /72 (BP Location: Right arm, Patient Position: Sitting, BP Cuff Size: Adult)   Pulse 92   Temp 37.1 °C (98.8 °F) (Temporal)   Ht 1.588 m (5' 2.5\")   Wt 80.3 kg (177 lb)   LMP 03/29/2016   SpO2 98%   BMI 31.86 kg/m²  Body mass index is 31.86 kg/m².    General: Normal appearing. No distress.  HENT: Normocephalic. Ears normal shape and contour, canals are clear bilaterally, tympanic " membranes are benign, nasal mucosa with erythema, oropharynx is without erythema, edema or exudates. No pain to sinuses.   Eyes: Eyes conjunctiva clear lids without ptosis, lids normal. Wears rx glasses.   Pulmonary: Clear to ausculation.  Normal effort. No rales, ronchi, or wheezing.  Cardiovascular: Regular rate and rhythm without murmur.   Lymph: No cervical or supraclavicular lymph nodes are palpable.  Skin: Warm and dry.  No obvious lesions.  Psych: Normal mood and affect. Alert and oriented x3. Judgment and insight is normal.      Assessment & Plan:     49 y.o. female with the following -     1. Primary hypertension  Chronic stable problem.  Blood pressure is at goal.  Continue losartan 50 mg daily.  Exam reveals sinus infection is cleared.  Keep upcoming December appointment.  Continue follow-up with pulmonary and speech.      Return if symptoms worsen or fail to improve.    Please note that this dictation was created using voice recognition software. I have made every reasonable attempt to correct obvious errors, but I expect that there are errors of grammar and possibly content that I did not discover before finalizing the note.

## 2024-08-13 ENCOUNTER — NON-PROVIDER VISIT (OUTPATIENT)
Dept: SLEEP MEDICINE | Facility: MEDICAL CENTER | Age: 49
End: 2024-08-13
Attending: INTERNAL MEDICINE
Payer: COMMERCIAL

## 2024-08-13 VITALS — BODY MASS INDEX: 31.89 KG/M2 | WEIGHT: 180 LBS | HEIGHT: 63 IN

## 2024-08-13 DIAGNOSIS — R05.3 CHRONIC COUGH: ICD-10-CM

## 2024-08-13 DIAGNOSIS — J98.11 CHRONIC ATELECTASIS: ICD-10-CM

## 2024-08-13 PROCEDURE — 94726 PLETHYSMOGRAPHY LUNG VOLUMES: CPT | Mod: 26 | Performed by: INTERNAL MEDICINE

## 2024-08-13 PROCEDURE — 94729 DIFFUSING CAPACITY: CPT | Mod: 26 | Performed by: INTERNAL MEDICINE

## 2024-08-13 PROCEDURE — 94060 EVALUATION OF WHEEZING: CPT | Mod: 26 | Performed by: INTERNAL MEDICINE

## 2024-08-13 PROCEDURE — 94726 PLETHYSMOGRAPHY LUNG VOLUMES: CPT | Performed by: INTERNAL MEDICINE

## 2024-08-13 PROCEDURE — 94729 DIFFUSING CAPACITY: CPT | Performed by: INTERNAL MEDICINE

## 2024-08-13 PROCEDURE — 94060 EVALUATION OF WHEEZING: CPT | Performed by: INTERNAL MEDICINE

## 2024-08-13 ASSESSMENT — FIBROSIS 4 INDEX: FIB4 SCORE: 0.72

## 2024-08-13 NOTE — PROCEDURES
Technician: MARIA DE JESUS Ring    Technician Comment:  Good patient effort & cooperation.  The results of this test meet the ATS/ERS standards for acceptability & reproducibility.  Test was performed on the [employer] Body Plethysmograph-Elite DX system.  Predicted values were GLI-2012 for spirometry, GLI-2020 for DLCO, ITS for Lung Volumes.  The DLCO was uncorrected for Hgb.  A bronchodilator of Albuterol HFA -2puffs via spacer administered.  DLCO performed during dilation period.    Interpretation:  Basic spirometry is normal with a negative bronchodilator sponsor.  Flow volume loops are unremarkable.  Full lung volumes demonstrate an isolated reduction in ERV likely secondary to BMI of 32.4.  The DLCO is elevated.    Summary:  Reduction in ERV and elevated DLCO can be seen with chronic atelectasis secondary to obesity.  Correlate clinically.    Additionally, increased DLCO can be seen with asthma, polycythemia, left-to-right shunts. Correlate clinically and with other studies, and consider repeating DLCO testing if more clarification is needed.     I, Abiodun Casey DO, am the author of this note.     Abiodun Casey DO  Staff Pulmonologist and Intensivist  [facility]     Please note that this dictation was created using voice recognition software. The accuracy of the dictation is limited to the abilities of the software. I have made every reasonable attempt to correct obvious errors, but I expect that there are errors of grammar and possibly content that I did not discover before finalizing the note.

## 2024-08-15 PROBLEM — J98.11 CHRONIC ATELECTASIS: Status: ACTIVE | Noted: 2024-08-15

## 2024-08-27 ENCOUNTER — OFFICE VISIT (OUTPATIENT)
Dept: URGENT CARE | Facility: PHYSICIAN GROUP | Age: 49
End: 2024-08-27
Payer: COMMERCIAL

## 2024-08-27 ENCOUNTER — HOSPITAL ENCOUNTER (OUTPATIENT)
Facility: MEDICAL CENTER | Age: 49
End: 2024-08-27
Attending: PHYSICIAN ASSISTANT
Payer: COMMERCIAL

## 2024-08-27 VITALS
TEMPERATURE: 98.5 F | RESPIRATION RATE: 16 BRPM | SYSTOLIC BLOOD PRESSURE: 156 MMHG | BODY MASS INDEX: 31.8 KG/M2 | WEIGHT: 179.5 LBS | HEART RATE: 103 BPM | HEIGHT: 63 IN | DIASTOLIC BLOOD PRESSURE: 98 MMHG | OXYGEN SATURATION: 97 %

## 2024-08-27 DIAGNOSIS — R53.81 MALAISE AND FATIGUE: ICD-10-CM

## 2024-08-27 DIAGNOSIS — R53.83 MALAISE AND FATIGUE: ICD-10-CM

## 2024-08-27 DIAGNOSIS — R39.15 URINARY URGENCY: ICD-10-CM

## 2024-08-27 DIAGNOSIS — I16.0 HYPERTENSIVE URGENCY: ICD-10-CM

## 2024-08-27 LAB
APPEARANCE UR: CLEAR
BILIRUB UR STRIP-MCNC: NORMAL MG/DL
COLOR UR AUTO: YELLOW
FLUAV RNA SPEC QL NAA+PROBE: NEGATIVE
FLUBV RNA SPEC QL NAA+PROBE: NEGATIVE
GLUCOSE UR STRIP.AUTO-MCNC: NORMAL MG/DL
KETONES UR STRIP.AUTO-MCNC: NORMAL MG/DL
LEUKOCYTE ESTERASE UR QL STRIP.AUTO: NORMAL
NITRITE UR QL STRIP.AUTO: NORMAL
PH UR STRIP.AUTO: 5.5 [PH] (ref 5–8)
PROT UR QL STRIP: NORMAL MG/DL
RBC UR QL AUTO: NORMAL
RSV RNA SPEC QL NAA+PROBE: NEGATIVE
SARS-COV-2 RNA RESP QL NAA+PROBE: NEGATIVE
SP GR UR STRIP.AUTO: >=1.03
UROBILINOGEN UR STRIP-MCNC: 0.2 MG/DL

## 2024-08-27 PROCEDURE — 87086 URINE CULTURE/COLONY COUNT: CPT

## 2024-08-27 ASSESSMENT — FIBROSIS 4 INDEX: FIB4 SCORE: 0.72

## 2024-08-27 ASSESSMENT — ENCOUNTER SYMPTOMS
VOMITING: 0
HEADACHES: 1
NAUSEA: 1
PALPITATIONS: 1
DIZZINESS: 1

## 2024-08-27 NOTE — PROGRESS NOTES
Subjective:   Joycelyn Byers is a 49 y.o. female who presents for Nausea (Headache, dizzy, high bp, rapid heart rate x 1 day)        1.  Patient presents with concerns of malaise, fatigue, headaches, mild nausea and mild congestion that developed over the last 24 hours.  Headache is bilateral, at the back of her head.  No vomiting.  She endorses tactile fevers and chills.  Patient also noticed that her blood pressure has been consistently elevated and her heart rate has been increased as well.  She recently traveled to [place] and was around [employer].  She believes that she could have been exposed to COVID.  She has history of hypertension and takes losartan 50 mg daily.  Historically this has controlled her hypertension well.  She has had nausea and headaches associated with her hypertension in the past.  She is not having any chest pain, shortness of breath, difficulty breathing, lower extremity edema.    2.  Patient states that she has had some mild urinary frequency, but denies dysuria.  Would like to check for UTI.  No flank pain.      Review of Systems   Constitutional:  Positive for malaise/fatigue.   Cardiovascular:  Positive for palpitations (increased heart rate). Negative for chest pain.   Gastrointestinal:  Positive for nausea. Negative for vomiting.   Neurological:  Positive for dizziness and headaches.       PMH:  has a past medical history of Allergy, unspecified not elsewhere classified, Ankylosing spondylitis of lumbosacral region (HCC) (07/06/2015), Anxiety (11/24/2015), Chronic atelectasis (08/15/2024), GERD (gastroesophageal reflux disease), Headache, classical migraine, Hiatal hernia, Intractable migraine without aura and without status migrainosus (11/24/2015), Lupus ([facility]), Morphea (01/09/2019), Oropharyngeal dysphagia, Other forms of systemic lupus erythematosus (HCC) (10/20/2017), Overactive bladder, Peptic ulcer, Polyuria (07/17/2019), Prediabetes (12/08/2016), and Vocal cord  dysfunction.  MEDS:   Current Outpatient Medications:     fluticasone-umeclidinium-vilanterol (TRELEGY ELLIPTA) 100-62.5-25 mcg/act inhaler, Inhale 1 Puff every day., Disp: 3 Each, Rfl: 3    losartan (COZAAR) 50 MG Tab, Take 1 Tablet by mouth every day., Disp: 30 Tablet, Rfl: 2    DULoxetine HCl 40 MG Cap DR Particles, Take 40 mg by mouth every day., Disp: 90 Capsule, Rfl: 3    albuterol (PROVENTIL) 2.5mg/0.5ml Nebu Soln, Take 2.5 mg by nebulization every four hours as needed for Shortness of Breath. Hasn't been able to use yet, Disp: , Rfl:     estradiol (VIVELLE DOT) 0.1 MG/24HR PATCH BIWEEKLY, APPLY 1 PATCH TOPICALLY TO THE SKIN EVERY 3 AND 1/2 DAYS, Disp: , Rfl:     progesterone (PROMETRIUM) 100 MG Cap, Take 100 mg by mouth at bedtime., Disp: , Rfl:     pregabalin (LYRICA) 50 MG capsule, Take 50 mg by mouth. 1 tab 3xwk, Disp: , Rfl:     albuterol 108 (90 Base) MCG/ACT Aero Soln inhalation aerosol, Inhale 2 Puffs by mouth every four hours as needed for Shortness of Breath., Disp: 3 Each, Rfl: 3    rizatriptan (MAXALT) 5 MG tablet, Take 1 Tab by mouth Once PRN for Migraine for up to 1 dose., Disp: 10 Tab, Rfl: 0    HYDROcodone-acetaminophen 2.5-108 mg/5mL (HYCET) 7.5-325 MG/15ML solution, Take 15 mL by mouth 4 times a day as needed for Severe Pain., Disp: , Rfl:     Thiamine HCl (VITAMIN B-1 PO), Take 1 Tab by mouth every morning., Disp: , Rfl:     ENBREL SURECLICK 50 MG/ML Solution Auto-injector, 1 Each by Injection route every 7 days., Disp: , Rfl:     cyclobenzaprine (FLEXERIL) 10 MG Tab, Take 10 mg by mouth at bedtime as needed for Muscle Spasms., Disp: , Rfl:     coenzyme Q-10 30 MG capsule, Take 30 mg by mouth every morning., Disp: , Rfl:     hydroxychloroquine (PLAQUENIL) 200 MG Tab, Take 200 mg by mouth every morning., Disp: , Rfl:     EPIPEN 2-NOLAN 0.3 MG/0.3ML Solution Auto-injector solution for injection, 0.3 mg by Intramuscular route as needed., Disp: , Rfl:     rabeprazole (ACIPHEX) 20 MG tablet, ,  "Disp: , Rfl:     lidocaine (LIDODERM) 5 % Patch, Apply 1 Patch to skin as directed 1 time daily as needed (pain). (Patient not taking: Reported on 8/27/2024), Disp: , Rfl:   ALLERGIES:   Allergies   Allergen Reactions    Milnacipran Myalgia    Tramadol Shortness of Breath and Rash     Shortness of breath & rash      Cefdinir Diarrhea and Rash     Rash and diarrhea, no respiratory complaints    Ciprofloxacin Unspecified     Knee & muscle pain      Metoclopramide Unspecified     Akathisia      Sulfa Drugs Nausea     GI upset    Carisoprodol Unspecified     Slurred speech, stumbling     SURGHX:   Past Surgical History:   Procedure Laterality Date    HYSTERECTOMY LAPAROSCOPY  2013    utrine and right ovary removed    HERNIA REPAIR  2007    umblical hernia    CHOLECYSTECTOMY  2007    SALPINGO-OOPHORECTOMY, UNILATERAL Right      SOCHX:  reports that she has never smoked. She has never used smokeless tobacco. She reports that she does not drink alcohol and does not use drugs.  FH: Family history was reviewed, no pertinent findings to report   Objective:   BP (!) 156/98   Pulse (!) 103   Temp 36.9 °C (98.5 °F) (Temporal)   Resp 16   Ht 1.6 m (5' 3\")   Wt 81.4 kg (179 lb 8 oz)   LMP 03/29/2016   SpO2 97%   BMI 31.80 kg/m²   Physical Exam  Vitals reviewed.   Constitutional:       General: She is not in acute distress.     Appearance: Normal appearance. She is well-developed. She is not toxic-appearing.   HENT:      Head: Normocephalic and atraumatic.      Right Ear: External ear normal.      Left Ear: External ear normal.      Nose: Rhinorrhea present. No congestion.      Mouth/Throat:      Lips: Pink.      Mouth: Mucous membranes are moist.      Pharynx: Oropharynx is clear. Uvula midline.   Cardiovascular:      Rate and Rhythm: Regular rhythm. Tachycardia present.      Heart sounds: Normal heart sounds, S1 normal and S2 normal.   Pulmonary:      Effort: Pulmonary effort is normal. No respiratory distress.      " Breath sounds: Normal breath sounds. No stridor. No decreased breath sounds, wheezing, rhonchi or rales.   Skin:     General: Skin is dry.   Neurological:      Comments: Alert and oriented.    Psychiatric:         Speech: Speech normal.         Behavior: Behavior normal.                 Assessment/Plan:   1. Hypertensive urgency    2. Urinary urgency  - POCT Urinalysis  - URINE CULTURE(NEW); Future    3. Malaise and fatigue  - POCT CoV-2, Flu A/B, RSV by PCR    Trace RBCs on UA.  No leuks or nitrates.  Low clinical suspicion for UTI at this time.  Viral panel also negative.  Patient's blood pressure remains elevated with diastolic around 100 and per patient it has been consistently elevated throughout the day today.  I am concerned that patient's symptoms may be related to the elevation in her blood pressure.  Discussed watchful waiting versus additional workup in the emergency department.  Patient would like to go to [facility] ED for reevaluation and further management.  Safe for POV transfer.

## 2024-08-30 LAB
BACTERIA UR CULT: NORMAL
SIGNIFICANT IND 70042: NORMAL
SITE SITE: NORMAL
SOURCE SOURCE: NORMAL

## 2024-09-03 ENCOUNTER — OFFICE VISIT (OUTPATIENT)
Dept: MEDICAL GROUP | Facility: PHYSICIAN GROUP | Age: 49
End: 2024-09-03
Payer: COMMERCIAL

## 2024-09-03 VITALS
SYSTOLIC BLOOD PRESSURE: 142 MMHG | HEIGHT: 62 IN | TEMPERATURE: 97.6 F | WEIGHT: 181 LBS | HEART RATE: 81 BPM | DIASTOLIC BLOOD PRESSURE: 80 MMHG | BODY MASS INDEX: 33.31 KG/M2 | OXYGEN SATURATION: 97 %

## 2024-09-03 DIAGNOSIS — I10 PRIMARY HYPERTENSION: ICD-10-CM

## 2024-09-03 DIAGNOSIS — Z09 HOSPITAL DISCHARGE FOLLOW-UP: ICD-10-CM

## 2024-09-03 DIAGNOSIS — J18.9 COMMUNITY ACQUIRED PNEUMONIA OF RIGHT LOWER LOBE OF LUNG: ICD-10-CM

## 2024-09-03 PROCEDURE — 99214 OFFICE O/P EST MOD 30 MIN: CPT | Performed by: NURSE PRACTITIONER

## 2024-09-03 PROCEDURE — 3077F SYST BP >= 140 MM HG: CPT | Performed by: NURSE PRACTITIONER

## 2024-09-03 PROCEDURE — 3079F DIAST BP 80-89 MM HG: CPT | Performed by: NURSE PRACTITIONER

## 2024-09-03 RX ORDER — LOSARTAN POTASSIUM 50 MG/1
50 TABLET ORAL DAILY
Qty: 90 TABLET | Refills: 3 | Status: SHIPPED | OUTPATIENT
Start: 2024-09-03

## 2024-09-03 RX ORDER — PREDNISONE 20 MG/1
TABLET ORAL
COMMUNITY
Start: 2024-08-30

## 2024-09-03 RX ORDER — DOXYCYCLINE HYCLATE 100 MG
100 TABLET ORAL 2 TIMES DAILY
Qty: 10 TABLET | Refills: 0 | Status: SHIPPED | OUTPATIENT
Start: 2024-09-03 | End: 2024-09-08

## 2024-09-03 RX ORDER — AZITHROMYCIN 250 MG/1
TABLET, FILM COATED ORAL
COMMUNITY
Start: 2024-08-30 | End: 2024-09-03

## 2024-09-03 ASSESSMENT — FIBROSIS 4 INDEX: FIB4 SCORE: 0.72

## 2024-09-03 NOTE — PROGRESS NOTES
Subjective:     CC: hospital follow-up    HPI:   Joycelyn presents today with the following:      Hospital follow-up  She presented to [facility] on 8/29/2024. Presented for cough, fatigue, hypertension and tachycardia.  CXR showed RLL pneumonia. She was given azithromycin and prednisone. She completed azithromycin yesterday and completing prednisone tomorrow. Continues to have cough. She is having low grade fever, chills, wheezing and shortness of breath. Night sweats last week. Blood pressure today 142/80. Continues losartan 50 mg daily. Home BP have been 160/98. She went to Urgent Care who recommended ER. Before illness BP controlled.         Past Medical History:   Diagnosis Date    Allergy, unspecified not elsewhere classified     Ankylosing spondylitis of lumbosacral region (HCC) 07/06/2015    Anxiety 11/24/2015    Chronic atelectasis 08/15/2024    2/2 obesity. Reduced ERV on 2024 PFTs.       GERD (gastroesophageal reflux disease)     Headache, classical migraine     Hiatal hernia     Intractable migraine without aura and without status migrainosus 11/24/2015    Lupus (Formerly [facility])     Morphea 01/09/2019    Oropharyngeal dysphagia     Other forms of systemic lupus erythematosus (Formerly [facility]) 10/20/2017    Overactive bladder     Peptic ulcer     Polyuria 07/17/2019    Prediabetes 12/08/2016    Vocal cord dysfunction        Social History     Tobacco Use    Smoking status: Never    Smokeless tobacco: Never   Vaping Use    Vaping status: Never Used   Substance Use Topics    Alcohol use: No     Alcohol/week: 0.0 oz    Drug use: No       Current Outpatient Medications Ordered in Epic   Medication Sig Dispense Refill    predniSONE (DELTASONE) 20 MG Tab TAKE 2 TABLETS BY MOUTH DAILY FOR 5 DAYS      losartan (COZAAR) 50 MG Tab Take 1 Tablet by mouth every day. 90 Tablet 3    doxycycline (VIBRAMYCIN) 100 MG Tab Take 1 Tablet by mouth 2 times a day for 5 days. 10 Tablet 0    fluticasone-umeclidinium-vilanterol (TRELEGY ELLIPTA) 100-62.5-25  mcg/act inhaler Inhale 1 Puff every day. 3 Each 3    DULoxetine HCl 40 MG Cap DR Particles Take 40 mg by mouth every day. 90 Capsule 3    albuterol (PROVENTIL) 2.5mg/0.5ml Nebu Soln Take 2.5 mg by nebulization every four hours as needed for Shortness of Breath. Hasn't been able to use yet      rabeprazole (ACIPHEX) 20 MG tablet       estradiol (VIVELLE DOT) 0.1 MG/24HR PATCH BIWEEKLY APPLY 1 PATCH TOPICALLY TO THE SKIN EVERY 3 AND 1/2 DAYS      progesterone (PROMETRIUM) 100 MG Cap Take 100 mg by mouth at bedtime.      pregabalin (LYRICA) 50 MG capsule Take 50 mg by mouth. 1 tab 3xwk      albuterol 108 (90 Base) MCG/ACT Aero Soln inhalation aerosol Inhale 2 Puffs by mouth every four hours as needed for Shortness of Breath. 3 Each 3    rizatriptan (MAXALT) 5 MG tablet Take 1 Tab by mouth Once PRN for Migraine for up to 1 dose. 10 Tab 0    HYDROcodone-acetaminophen 2.5-108 mg/5mL (HYCET) 7.5-325 MG/15ML solution Take 15 mL by mouth 4 times a day as needed for Severe Pain.      Thiamine HCl (VITAMIN B-1 PO) Take 1 Tab by mouth every morning.      ENBREL SURECLICK 50 MG/ML Solution Auto-injector 1 Each by Injection route every 7 days.      cyclobenzaprine (FLEXERIL) 10 MG Tab Take 10 mg by mouth at bedtime as needed for Muscle Spasms.      coenzyme Q-10 30 MG capsule Take 30 mg by mouth every morning.      hydroxychloroquine (PLAQUENIL) 200 MG Tab Take 200 mg by mouth every morning.      EPIPEN 2-NOLAN 0.3 MG/0.3ML Solution Auto-injector solution for injection 0.3 mg by Intramuscular route as needed.      lidocaine (LIDODERM) 5 % Patch Apply 1 Patch to skin as directed 1 time daily as needed (pain). (Patient not taking: Reported on 8/27/2024)       No current Epic-ordered facility-administered medications on file.       Allergies:  Milnacipran, Tramadol, Cefdinir, Ciprofloxacin, Metoclopramide, Sulfa drugs, and Carisoprodol    Health Maintenance: Reviewed       Objective:     Vital signs reviewed  Exam:  BP (!) 142/80 (BP  "Location: Right arm, Patient Position: Sitting, BP Cuff Size: Adult)   Pulse 81   Temp 36.4 °C (97.6 °F) (Temporal)   Ht 1.575 m (5' 2\")   Wt 82.1 kg (181 lb)   LMP 03/29/2016   SpO2 97%   BMI 33.11 kg/m²  Body mass index is 33.11 kg/m².    General: Normal appearing. No distress.  HENT: Normocephalic. Ears normal shape and contour, canals are clear bilaterally, tympanic membranes are benign, nasal mucosa benign, oropharynx is without erythema, edema or exudates. No pain to sinuses.   Eyes: Eyes conjunctiva clear lids without ptosis, lids normal. Prescription glasses in place.   Pulmonary: slight diminished RLL, clear throughout.  Normal effort. No rales, ronchi, or wheezing.  Cardiovascular: Regular rate and rhythm without murmur.   Lymph: No cervical or supraclavicular lymph nodes are palpable.  Skin: Warm and dry.  No obvious lesions.  Psych: Normal mood and affect. Alert and oriented x3. Judgment and insight is normal.      Assessment & Plan:     49 y.o. female with the following -     1. Hospital discharge follow-up  Acute uncomplicated problem.  Hospital discharge notes from [facility] reviewed today.  Imaging reviewed.  See #2 below.    2. Community acquired pneumonia of right lower lobe of lung  Acute uncomplicated problem.  Vital signs are stable today.  Patient stable for outpatient management.  She completed azithromycin course and continues to have symptoms.  We will start doxycycline twice daily for 5-day course.  Continue with albuterol inhaler as needed.  - doxycycline (VIBRAMYCIN) 100 MG Tab; Take 1 Tablet by mouth 2 times a day for 5 days.  Dispense: 10 Tablet; Refill: 0    3. Primary hypertension  Chronic exacerbated problem.  She needs a medication refill today.  Reports that with illness blood pressure becomes elevated.  Discussed today that if home BP stays elevated she can take half a pill to equal 75 mg daily dosing.  She verbalized understanding.  For now continue losartan 50 mg " daily.  - losartan (COZAAR) 50 MG Tab; Take 1 Tablet by mouth every day.  Dispense: 90 Tablet; Refill: 3      Return if symptoms worsen or fail to improve.    Please note that this dictation was created using voice recognition software. I have made every reasonable attempt to correct obvious errors, but I expect that there are errors of grammar and possibly content that I did not discover before finalizing the note.

## 2024-10-11 ENCOUNTER — OFFICE VISIT (OUTPATIENT)
Dept: SLEEP MEDICINE | Facility: MEDICAL CENTER | Age: 49
End: 2024-10-11
Payer: COMMERCIAL

## 2024-10-11 VITALS
OXYGEN SATURATION: 98 % | BODY MASS INDEX: 32.07 KG/M2 | WEIGHT: 181 LBS | SYSTOLIC BLOOD PRESSURE: 116 MMHG | HEIGHT: 63 IN | HEART RATE: 89 BPM | DIASTOLIC BLOOD PRESSURE: 70 MMHG

## 2024-10-11 DIAGNOSIS — J38.3 VOCAL CORD DYSFUNCTION: ICD-10-CM

## 2024-10-11 DIAGNOSIS — J45.40 MODERATE PERSISTENT ASTHMA WITHOUT COMPLICATION: ICD-10-CM

## 2024-10-11 DIAGNOSIS — Z23 NEED FOR VACCINATION: ICD-10-CM

## 2024-10-11 DIAGNOSIS — K21.9 GASTROESOPHAGEAL REFLUX DISEASE WITHOUT ESOPHAGITIS: ICD-10-CM

## 2024-10-11 PROCEDURE — 99213 OFFICE O/P EST LOW 20 MIN: CPT

## 2024-10-11 PROCEDURE — 3078F DIAST BP <80 MM HG: CPT

## 2024-10-11 PROCEDURE — 99214 OFFICE O/P EST MOD 30 MIN: CPT

## 2024-10-11 PROCEDURE — 90471 IMMUNIZATION ADMIN: CPT

## 2024-10-11 PROCEDURE — 3074F SYST BP LT 130 MM HG: CPT

## 2024-10-11 RX ORDER — PREDNISONE 10 MG/1
TABLET ORAL
Qty: 18 TABLET | Refills: 2 | Status: SHIPPED | OUTPATIENT
Start: 2024-10-11

## 2024-10-11 ASSESSMENT — ENCOUNTER SYMPTOMS
MYALGIAS: 0
WEAKNESS: 0
DIARRHEA: 0
SINUS PAIN: 0
COUGH: 1
DIZZINESS: 0
PALPITATIONS: 0
SHORTNESS OF BREATH: 1
WHEEZING: 0
HEARTBURN: 0
SPUTUM PRODUCTION: 0
VOMITING: 0
HEMOPTYSIS: 0
FALLS: 0
HEADACHES: 0
NAUSEA: 0
FEVER: 0
DIAPHORESIS: 0
CHILLS: 0

## 2024-10-11 ASSESSMENT — FIBROSIS 4 INDEX: FIB4 SCORE: 0.72

## 2024-10-14 ENCOUNTER — HOSPITAL ENCOUNTER (OUTPATIENT)
Dept: LAB | Facility: MEDICAL CENTER | Age: 49
End: 2024-10-14
Attending: SPECIALIST
Payer: COMMERCIAL

## 2024-10-14 ENCOUNTER — HOSPITAL ENCOUNTER (OUTPATIENT)
Dept: LAB | Facility: MEDICAL CENTER | Age: 49
End: 2024-10-14
Attending: NURSE PRACTITIONER
Payer: COMMERCIAL

## 2024-10-14 DIAGNOSIS — Z11.4 SCREENING FOR HIV (HUMAN IMMUNODEFICIENCY VIRUS): ICD-10-CM

## 2024-10-14 DIAGNOSIS — R74.8 ELEVATED ALKALINE PHOSPHATASE LEVEL: ICD-10-CM

## 2024-10-14 DIAGNOSIS — I10 PRIMARY HYPERTENSION: ICD-10-CM

## 2024-10-14 LAB
ALBUMIN SERPL BCP-MCNC: 3.9 G/DL (ref 3.2–4.9)
ALBUMIN/GLOB SERPL: 1.3 G/DL
ALP SERPL-CCNC: 58 U/L (ref 30–99)
ALT SERPL-CCNC: 28 U/L (ref 2–50)
ANION GAP SERPL CALC-SCNC: 12 MMOL/L (ref 7–16)
AST SERPL-CCNC: 22 U/L (ref 12–45)
BILIRUB SERPL-MCNC: 0.3 MG/DL (ref 0.1–1.5)
BUN SERPL-MCNC: 15 MG/DL (ref 8–22)
CALCIUM ALBUM COR SERPL-MCNC: 9.1 MG/DL (ref 8.5–10.5)
CALCIUM SERPL-MCNC: 9 MG/DL (ref 8.5–10.5)
CHLORIDE SERPL-SCNC: 105 MMOL/L (ref 96–112)
CO2 SERPL-SCNC: 21 MMOL/L (ref 20–33)
CREAT SERPL-MCNC: 0.71 MG/DL (ref 0.5–1.4)
GFR SERPLBLD CREATININE-BSD FMLA CKD-EPI: 105 ML/MIN/1.73 M 2
GLOBULIN SER CALC-MCNC: 3.1 G/DL (ref 1.9–3.5)
GLUCOSE SERPL-MCNC: 102 MG/DL (ref 65–99)
HAV IGM SERPL QL IA: NORMAL
HBV CORE IGM SER QL: NORMAL
HBV SURFACE AG SER QL: NORMAL
HCV AB SER QL: NORMAL
HIV 1+2 AB+HIV1 P24 AG SERPL QL IA: NORMAL
POTASSIUM SERPL-SCNC: 4.2 MMOL/L (ref 3.6–5.5)
PROT SERPL-MCNC: 7 G/DL (ref 6–8.2)
SODIUM SERPL-SCNC: 138 MMOL/L (ref 135–145)

## 2024-10-14 PROCEDURE — 80074 ACUTE HEPATITIS PANEL: CPT

## 2024-10-14 PROCEDURE — 87389 HIV-1 AG W/HIV-1&-2 AB AG IA: CPT

## 2024-10-14 PROCEDURE — 36415 COLL VENOUS BLD VENIPUNCTURE: CPT

## 2024-10-14 PROCEDURE — 80053 COMPREHEN METABOLIC PANEL: CPT

## 2024-10-15 ENCOUNTER — HOSPITAL ENCOUNTER (OUTPATIENT)
Facility: MEDICAL CENTER | Age: 49
End: 2024-10-15
Attending: NURSE PRACTITIONER
Payer: COMMERCIAL

## 2024-10-15 ENCOUNTER — OFFICE VISIT (OUTPATIENT)
Dept: MEDICAL GROUP | Facility: PHYSICIAN GROUP | Age: 49
End: 2024-10-15
Payer: COMMERCIAL

## 2024-10-15 ENCOUNTER — HOSPITAL ENCOUNTER (OUTPATIENT)
Dept: LAB | Facility: MEDICAL CENTER | Age: 49
End: 2024-10-15
Attending: SPECIALIST
Payer: COMMERCIAL

## 2024-10-15 ENCOUNTER — HOSPITAL ENCOUNTER (OUTPATIENT)
Dept: RADIOLOGY | Facility: MEDICAL CENTER | Age: 49
End: 2024-10-15
Payer: COMMERCIAL

## 2024-10-15 VITALS
HEIGHT: 63 IN | WEIGHT: 181.2 LBS | OXYGEN SATURATION: 97 % | HEART RATE: 88 BPM | BODY MASS INDEX: 32.11 KG/M2 | TEMPERATURE: 98.9 F | DIASTOLIC BLOOD PRESSURE: 78 MMHG | SYSTOLIC BLOOD PRESSURE: 130 MMHG

## 2024-10-15 DIAGNOSIS — N89.8 VAGINAL ITCHING: ICD-10-CM

## 2024-10-15 DIAGNOSIS — R73.03 PREDIABETES: ICD-10-CM

## 2024-10-15 DIAGNOSIS — Z71.2 ENCOUNTER TO DISCUSS TEST RESULTS: ICD-10-CM

## 2024-10-15 DIAGNOSIS — I10 PRIMARY HYPERTENSION: ICD-10-CM

## 2024-10-15 DIAGNOSIS — J45.40 MODERATE PERSISTENT ASTHMA IN ADULT WITHOUT COMPLICATION: ICD-10-CM

## 2024-10-15 DIAGNOSIS — J45.40 MODERATE PERSISTENT ASTHMA WITHOUT COMPLICATION: ICD-10-CM

## 2024-10-15 PROBLEM — Z09 HOSPITAL DISCHARGE FOLLOW-UP: Status: RESOLVED | Noted: 2024-05-15 | Resolved: 2024-10-15

## 2024-10-15 LAB
ERYTHROCYTE [SEDIMENTATION RATE] IN BLOOD BY WESTERGREN METHOD: 6 MM/HOUR (ref 0–25)
HBA1C MFR BLD: 5.8 % (ref ?–5.8)
POCT INT CON NEG: NEGATIVE
POCT INT CON POS: POSITIVE

## 2024-10-15 PROCEDURE — 87480 CANDIDA DNA DIR PROBE: CPT

## 2024-10-15 PROCEDURE — 3075F SYST BP GE 130 - 139MM HG: CPT | Performed by: NURSE PRACTITIONER

## 2024-10-15 PROCEDURE — 87660 TRICHOMONAS VAGIN DIR PROBE: CPT

## 2024-10-15 PROCEDURE — 3078F DIAST BP <80 MM HG: CPT | Performed by: NURSE PRACTITIONER

## 2024-10-15 PROCEDURE — 99214 OFFICE O/P EST MOD 30 MIN: CPT | Performed by: NURSE PRACTITIONER

## 2024-10-15 PROCEDURE — 85652 RBC SED RATE AUTOMATED: CPT

## 2024-10-15 PROCEDURE — 36415 COLL VENOUS BLD VENIPUNCTURE: CPT

## 2024-10-15 PROCEDURE — 71046 X-RAY EXAM CHEST 2 VIEWS: CPT

## 2024-10-15 PROCEDURE — 87510 GARDNER VAG DNA DIR PROBE: CPT

## 2024-10-15 PROCEDURE — 83036 HEMOGLOBIN GLYCOSYLATED A1C: CPT | Performed by: NURSE PRACTITIONER

## 2024-10-15 PROCEDURE — 86140 C-REACTIVE PROTEIN: CPT

## 2024-10-15 RX ORDER — LOSARTAN POTASSIUM 50 MG/1
50 TABLET ORAL DAILY
Qty: 90 TABLET | Refills: 3 | Status: SHIPPED | OUTPATIENT
Start: 2024-10-15

## 2024-10-15 RX ORDER — ALBUTEROL SULFATE 5 MG/ML
2.5 SOLUTION RESPIRATORY (INHALATION) EVERY 4 HOURS PRN
Qty: 20 ML | Refills: 3 | Status: SHIPPED | OUTPATIENT
Start: 2024-10-15

## 2024-10-15 RX ORDER — ALBUTEROL SULFATE 90 UG/1
2 INHALANT RESPIRATORY (INHALATION) EVERY 4 HOURS PRN
Qty: 3 EACH | Refills: 3 | Status: SHIPPED | OUTPATIENT
Start: 2024-10-15

## 2024-10-15 ASSESSMENT — FIBROSIS 4 INDEX: FIB4 SCORE: 0.61

## 2024-10-16 LAB
CANDIDA DNA VAG QL PROBE+SIG AMP: NEGATIVE
CRP SERPL HS-MCNC: <0.3 MG/DL (ref 0–0.75)
G VAGINALIS DNA VAG QL PROBE+SIG AMP: NEGATIVE
T VAGINALIS DNA VAG QL PROBE+SIG AMP: NEGATIVE

## 2024-10-21 NOTE — PROGRESS NOTES
Mom called to schedule, informed that 's schedule was frozen but I could send a message and she stated she understood. Mom is stating that the PT's right knee and ankle is swollen, warm and in constant pain. Mom requesting call back    The patient, who is an RN, comes in complaining of epigastric pain and a worry that her past history of gastric ulcer has returned. She just started a stressful nursing job and was taking ibuprofen regularly for the first couple weeks of the job. She's had no melena. She has had epigastric pain which is relieved by meals. She has some nausea. She has not vomited any blood.  [facility]  She is on pantoprazole 40 mg once a day.  She felt dizzy and faint for one day but now feels better.  She does not drink alcohol.    I reviewed the following    Past Medical History:   Diagnosis Date   • Prediabetes 12/8/2016   • Anxiety 11/24/2015   • Intractable migraine without aura and without status migrainosus 11/24/2015   • Ankylosing spondylitis of lumbosacral region (CMS-HCC) 7/6/2015   • Allergy, unspecified not elsewhere classified    • GERD (gastroesophageal reflux disease)    • Headache, classical migraine    • Hiatal hernia    • Lupus (CMS-[facility])    • Oropharyngeal dysphagia    • Overactive bladder    • Peptic ulcer    • Vocal cord dysfunction         Past Surgical History:   Procedure Laterality Date   • HYSTERECTOMY LAPAROSCOPY  2013    utrine and right ovary removed   • HERNIA REPAIR  2007    umblical hernia   • CHOLECYSTECTOMY  2007   • SALPINGO-OOPHORECTOMY, UNILATERAL Right        Allergies   Allergen Reactions   • Savella [Kdc:Milnacipran+Ci Pigment Blue 63] Myalgia   • Ciprofloxacin    • Reglan [Kdc:Yellow Dye+Ci Pigment Blue 63+Metoclopramide]    • Sulfa Drugs    • Tetanus Antitoxin      Mild spasms and pain     • Tramadol        Current Outpatient Prescriptions   Medication Sig Dispense Refill   • pantoprazole (PROTONIX) 40 MG Tablet Delayed Response Take 1 Tab by mouth 2 times a day. 60 Tab 3   • lubiprostone (AMITIZA) 24 MCG capsule Take 24 mcg by mouth 2 times a day, with meals.     • hydroxychloroquine (PLAQUENIL) 200 MG Tab      • diclofenac (SOLARAZE) 3 % gel Apply  to affected area(s) 2 times  a day.     • ergocalciferol (DRISDOL) 73626 UNIT capsule Take 1 Cap by mouth every 7 days. (Patient not taking: Reported on 8/30/2017) 12 Cap 3   • Blood Glucose Monitoring Suppl SUPPLIES Misc Test strips for One TOUCH Ultra blue test strips Mini Meter Sig: Use 3x a day 300 Each 3   • Lancets Misc Lancets order: Lancets for One Touch UltaMini Sig: Use 3x a day 300 Each 3   • beclomethasone (QVAR) 80 MCG/ACT inhaler Inhale 1 Puff by mouth 2 times a day.     • fluticasone (FLONASE) 50 MCG/ACT nasal spray Spray 1 Spray in nose every day.     • fexofenadine (ALLEGRA ALLERGY) 180 MG tablet Take 180 mg by mouth every day.     • Multiple Vitamins-Minerals (CENTRUM SILVER ULTRA WOMENS PO) Take  by mouth.     • nabumetone (RELAFEN) 500 MG Tab Take 500 mg by mouth 2 times a day.     • Multiple Vitamins-Minerals (AIRBORNE) Chew Tab Take  by mouth.     • Digestive Enzymes (PAPAYA ENZYME PO) Take  by mouth.     • EPIPEN 2-NOLAN 0.3 MG/0.3ML Solution Auto-injector solution for injection      • rizatriptan (MAXALT) 10 MG tablet Take 1 Tab by mouth Once PRN. Indications: Migraine Headache 10 Tab 1   • ondansetron (ZOFRAN ODT) 4 MG TABLET DISPERSIBLE Take 1 Tab by mouth every 8 hours as needed for Nausea/Vomiting. 15 Tab 0   • oxybutynin SR (DITROPAN-XL) 10 MG CR tablet Take 1 Tab by mouth every day. 90 Tab 3   • adalimumab (HUMIRA) 40 MG/0.8ML injection Inject  as instructed.     • albuterol (VENTOLIN HFA) 108 (90 BASE) MCG/ACT AERS inhalation aerosol Inhale 2 Puffs by mouth every 6 hours as needed for Shortness of Breath. 8.5 g 3     No current facility-administered medications for this visit.         Family History   Problem Relation Age of Onset   • Arthritis Brother    • Arthritis Maternal Grandmother    • Psychiatry Mother    • Psychiatry Father    • Heart Disease Father    • Hypertension Father    • Diabetes Father    • Diabetes Paternal Uncle    • Stroke Maternal Grandfather    • Diabetes Paternal Grandmother    • Alcohol/Drug  Paternal Grandfather    • Cancer Paternal Aunt      gyn cancer   • GI Son    • GI Daughter    • GI Daughter    • GI Daughter        Social History     Social History   • Marital status:      Spouse name: N/A   • Number of children: N/A   • Years of education: N/A     Occupational History   • Not on file.     Social History Main Topics   • Smoking status: Never Smoker   • Smokeless tobacco: Never Used   • Alcohol use No   • Drug use: No   • Sexual activity: Yes     Partners: Male     Birth control/ protection: Surgical     Other Topics Concern   • Not on file     Social History Narrative    Patient is currently disabled, 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 She is  with four children.         The patient is well-developed well-nourished and in no acute distress--color is good    Pupils equally round and reactive to light    Ears normal    Nose normal    Throat clear    Neck supple no cervical adenopathy no thyromegaly    Chest clear to auscultation    Heart regular rhythm no murmur S3 or S4 appreciated    Back no CVA pain or spasm    Abdomen 1+ tender in the epigastrium. Flat soft good bowel sounds no mass No hepatosplenomegaly no rebound    Skin no rashes or suspicious lesions    1. Epigastric pain  pantoprazole (PROTONIX) 40 MG Tablet Delayed Response--Increase dose to one by mouth twice a day     REFERRAL TO GASTROENTEROLOGY--Urgent referral to [facility]     CBC WITH DIFFERENTIAL    LIPASE   2. History of gastric ulcer  REFERRAL TO GASTROENTEROLOGY    CBC WITH DIFFERENTIAL    LIPASE   3. Anxiety        Recheck when necessary above results    Please note that this dictation was created using voice recognition software. I have worked with consultants from the vendor as well as technical experts from [facility] [employer] to optimize the interface. I have made every reasonable attempt to correct obvious errors, but I expect that there are errors of grammar and possibly content that I did not discover  before finalizing the note.

## 2024-11-26 ENCOUNTER — OFFICE VISIT (OUTPATIENT)
Dept: SLEEP MEDICINE | Facility: MEDICAL CENTER | Age: 49
End: 2024-11-26
Attending: STUDENT IN AN ORGANIZED HEALTH CARE EDUCATION/TRAINING PROGRAM
Payer: COMMERCIAL

## 2024-11-26 VITALS
HEART RATE: 94 BPM | HEIGHT: 63 IN | DIASTOLIC BLOOD PRESSURE: 62 MMHG | WEIGHT: 185 LBS | OXYGEN SATURATION: 96 % | BODY MASS INDEX: 32.78 KG/M2 | SYSTOLIC BLOOD PRESSURE: 126 MMHG

## 2024-11-26 DIAGNOSIS — J38.3 VOCAL CORD DYSFUNCTION: ICD-10-CM

## 2024-11-26 DIAGNOSIS — J45.40 MODERATE PERSISTENT ASTHMA WITHOUT COMPLICATION: ICD-10-CM

## 2024-11-26 PROCEDURE — 3078F DIAST BP <80 MM HG: CPT | Performed by: STUDENT IN AN ORGANIZED HEALTH CARE EDUCATION/TRAINING PROGRAM

## 2024-11-26 PROCEDURE — 99213 OFFICE O/P EST LOW 20 MIN: CPT | Performed by: STUDENT IN AN ORGANIZED HEALTH CARE EDUCATION/TRAINING PROGRAM

## 2024-11-26 PROCEDURE — 99212 OFFICE O/P EST SF 10 MIN: CPT | Performed by: STUDENT IN AN ORGANIZED HEALTH CARE EDUCATION/TRAINING PROGRAM

## 2024-11-26 PROCEDURE — 3074F SYST BP LT 130 MM HG: CPT | Performed by: STUDENT IN AN ORGANIZED HEALTH CARE EDUCATION/TRAINING PROGRAM

## 2024-11-26 ASSESSMENT — FIBROSIS 4 INDEX: FIB4 SCORE: 0.61

## 2024-11-26 NOTE — PROGRESS NOTES
"Pulmonary Clinic Note    Chief Complaint:  Asthma    HPI:   \"Joycelyn Byers is a very pleasant 49 y.o. year old female never smoker, with a PMHx of asthma, undifferentiated connective tissue disease, environmental allergies, GERD, chronic sinusitis who presented to the Pulmonary Clinic for a regular follow up. Last seen in the office on 10/2024 with Tereza KINSEY.     Patient is followed by the pulmonary office for asthma.  Patient has a history of undifferentiated connective tissue disease on plaquenil and Enbrel under the care of Dr. Lynch with no know pulmonary involvement.  PFTs in August 2024 shows an FEV1 of 2.47 L or 92%, FEV1/FVC 85%, ERV 50%, RV 80, TLC 86, DLCO 130% predicted, without positive bronchodilator response.  CT chest from July of 2024 was unremarkable.  Echocardiogram in July 2024 was unremarkable and showed an RVSP of 25-30 mmHg.  Patient does not have peripheral eosinophilia.  Patient does have a remote history of vocal cord dysfunction, but has never had any treatment.  Patient was referred to speech therapy for concern of vocal cord dysfunction and possible silent aspiration.  Speech therapy found that the patient has insufficient glottal closure due to asymmetric movement of the right and left hemilarynx.  Patient is currently on Trelegy 100 and albuterol as needed.  Patient was previously on Trelegy 200, but was downgraded to Trelegy 100 due to concern of powder irritating the vocal cords.  Patient also has a history of GERD, but has been well-controlled.\" - Karrie progress note     Interval events:  9/3/2024 -  Patient was treated at [facility] for a right lower lobe pneumonia with azithromycin and prednisone.  Her primary care did give her a prescription for doxycycline, but unfortunately was unable to receive this from her pharmacy.     10/10/2024 -  Patient reports that she is still not back to her baseline after the pneumonia in September.  She reports that this is " an ongoing issue for her.  She states that she is short of breath with mMRC of 2 and will have a cough without any sputum production or wheezing.  She denies any fevers or chills.  Patient reports that she still feels that there is a late pneumonia.  Patient does work as a psychiatric nurse practitioner and has several potential workplace exposures for viruses.  She states that she continues to have left collarbone pain.  At her last appointment, she was decreased from Trelegy 200-100 and states that her cough has improved.  She will occasionally use albuterol.  She continues with speech therapy for vocal cord dysfunction.  She also reports her reflux is well-controlled with lifestyle changes and a PPI.    11/26/24 - she is very much improved from her pneumonia in September - shortness of breath, malaise, low grade fever improved. Not wheezing, coughing is improved. She feels that the Trelegy powder may be making her cough. Also reports that ENT reported thrush on vocal cords during laryngoscopy (Before Trelegy dose was decreased from 200 to 100). Overall asthma seems to be doing well on Trelegy 100 except for the cough it may be causing.     Current medication regimen: Trelegy 100, albuterol     Past Medical History:   Diagnosis Date    Allergy, unspecified not elsewhere classified     Ankylosing spondylitis of lumbosacral region (HCC) 07/06/2015    Anxiety 11/24/2015    Chronic atelectasis 08/15/2024    2/2 obesity. Reduced ERV on 2024 PFTs.       Cough     GERD (gastroesophageal reflux disease)     Headache, classical migraine     Hiatal hernia     Intractable migraine without aura and without status migrainosus 11/24/2015    Lupus     Morphea 01/09/2019    Oropharyngeal dysphagia     Other forms of systemic lupus erythematosus (HCC) 10/20/2017    Overactive bladder     Peptic ulcer     Polyuria 07/17/2019    Prediabetes 12/08/2016    Shortness of breath     Vocal cord dysfunction        Past Surgical History:    Procedure Laterality Date    HYSTERECTOMY LAPAROSCOPY  2013    utrine and right ovary removed    HERNIA REPAIR  2007    umblical hernia    CHOLECYSTECTOMY  2007    SALPINGO-OOPHORECTOMY, UNILATERAL Right        Social History     Socioeconomic History    Marital status:      Spouse name: Not on file    Number of children: Not on file    Years of education: Not on file    Highest education level: Not on file   Occupational History    Not on file   Tobacco Use    Smoking status: Never    Smokeless tobacco: Never   Vaping Use    Vaping status: Never Used   Substance and Sexual Activity    Alcohol use: No     Alcohol/week: 0.0 oz    Drug use: No    Sexual activity: Yes     Partners: Male     Birth control/protection: Surgical     Comment: ., APRN at [employer]    Other Topics Concern    Not on file   Social History Narrative    Patient is currently disabled, 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 She is  with four children.      Social Drivers of Health     Financial Resource Strain: Not on file   Food Insecurity: Not on file   Transportation Needs: Not on file   Physical Activity: Not on file   Stress: Not on file   Social Connections: Not on file   Intimate Partner Violence: Not on file   Housing Stability: Not on file          Family History   Problem Relation Age of Onset    Arthritis Brother         psoriatic arthritis    Psoriasis Brother     Arthritis Maternal Grandmother     Psychiatric Illness Mother         Major depression    Other Mother         discoid lupus, brain anerysm    Psychiatric Illness Father         Bipolar disorder    Heart Disease Father         CHF    Hypertension Father     Diabetes Father     Other Father         Agent orange exposure 125% disabled    Diabetes Paternal Uncle     Stroke Maternal Grandfather     Diabetes Paternal Grandmother     Alcohol/Drug Paternal Grandfather     Cancer Paternal Aunt         gyn cancer    GI Disease Son         gerd    GI  Disease Daughter         gerd    GI Disease Daughter         gerd    GI Disease Daughter         gerd    Diabetes Paternal Aunt 60        type 1 DM       Current Outpatient Medications on File Prior to Visit   Medication Sig Dispense Refill    albuterol 108 (90 Base) MCG/ACT Aero Soln inhalation aerosol Inhale 2 Puffs every four hours as needed for Shortness of Breath. 3 Each 3    albuterol (PROVENTIL) 2.5mg/0.5ml Nebu Soln Take 0.5 mL by nebulization every four hours as needed for Shortness of Breath. 20 mL 3    losartan (COZAAR) 50 MG Tab Take 1 Tablet by mouth every day. 90 Tablet 3    DULoxetine HCl 40 MG Cap DR Particles Take 40 mg by mouth every day. 90 Capsule 3    rabeprazole (ACIPHEX) 20 MG tablet       estradiol (VIVELLE DOT) 0.1 MG/24HR PATCH BIWEEKLY APPLY 1 PATCH TOPICALLY TO THE SKIN EVERY 3 AND 1/2 DAYS      progesterone (PROMETRIUM) 100 MG Cap Take 100 mg by mouth at bedtime.      pregabalin (LYRICA) 50 MG capsule Take 50 mg by mouth. 1 tab 3xwk      rizatriptan (MAXALT) 5 MG tablet Take 1 Tab by mouth Once PRN for Migraine for up to 1 dose. 10 Tab 0    HYDROcodone-acetaminophen 2.5-108 mg/5mL (HYCET) 7.5-325 MG/15ML solution Take 15 mL by mouth 4 times a day as needed for Severe Pain.      Thiamine HCl (VITAMIN B-1 PO) Take 1 Tab by mouth every morning.      ENBREL SURECLICK 50 MG/ML Solution Auto-injector 1 Each by Injection route every 7 days.      cyclobenzaprine (FLEXERIL) 10 MG Tab Take 10 mg by mouth at bedtime as needed for Muscle Spasms.      coenzyme Q-10 30 MG capsule Take 30 mg by mouth every morning.      hydroxychloroquine (PLAQUENIL) 200 MG Tab Take 200 mg by mouth every morning.      EPIPEN 2-NOLAN 0.3 MG/0.3ML Solution Auto-injector solution for injection 0.3 mg by Intramuscular route as needed.      predniSONE (DELTASONE) 10 MG Tab Take 30mg x 3 days, then take 20mg x 3 days, then take 10mg x 3 days, with food, then discontinue. 18 Tablet 2     No current facility-administered  "medications on file prior to visit.       Allergies: Milnacipran, Tramadol, Cefdinir, Ciprofloxacin, Metoclopramide, Sulfa drugs, and Carisoprodol      Vitals:  /62 (BP Location: Left arm, Patient Position: Sitting, BP Cuff Size: Adult)   Pulse 94   Ht 1.6 m (5' 3\")   Wt 83.9 kg (185 lb)   SpO2 96%     Physical Exam:  Physical Exam  General - alert, oriented x4  HEENT - normocephalic, trachea midline  Cardiovascular - no JVD, RRR, s1, s2, RRR  Pulmonary - not in respiratory distress, CTAB, no wheezes, rhonchi or rales  Abdominal - soft, nondistended  Skin - hands appear normal, no rashes  Extremities - no lower extremity edema bilaterally  Psych - affect and mood appropriate    Laboratory Data:  As per HPI    Assessment/Plan:    1. Moderate persistent asthma without complication  mometasone-formoterol (DULERA) 100-5 MCG/ACT Aerosol      2. Vocal cord dysfunction          Overall asthma seems to be doing well however she notes that the Trelegy dry powder inhaler may be making her cough (cough is worse on days she takes Trelegy than on days she doesn't take Trelegy). We will trial an HFA inhaler instead - Dulera 100/5 2 puffs BID through spacer. Continue albuterol rescue inhaler.   She is planning to re-establish with SLP after she gets over this particularly stressful period in her life (grad school). SLP therapy really helped her VCD previously, she notes.    Return in about 6 months (around 5/26/2025).     This note was generated using voice recognition software which has a chance of producing errors of grammar and possibly content.  I have made every reasonable attempt to find and correct any obvious errors, but it should be expected that some may not be found prior to finalization of this note.    Time spent in record review prior to patient arrival, reviewing results, and in face-to-face encounter totaled 25 min, excluding any procedures if performed.  __________  Taurus Torres MD  Pulmonary and " Critical Care Medicine  [facility]

## 2024-12-12 ENCOUNTER — OFFICE VISIT (OUTPATIENT)
Dept: MEDICAL GROUP | Facility: PHYSICIAN GROUP | Age: 49
End: 2024-12-12
Payer: COMMERCIAL

## 2024-12-12 VITALS
BODY MASS INDEX: 33.68 KG/M2 | WEIGHT: 183 LBS | TEMPERATURE: 98.3 F | HEIGHT: 62 IN | HEART RATE: 109 BPM | SYSTOLIC BLOOD PRESSURE: 122 MMHG | OXYGEN SATURATION: 98 % | DIASTOLIC BLOOD PRESSURE: 84 MMHG

## 2024-12-12 DIAGNOSIS — I10 PRIMARY HYPERTENSION: ICD-10-CM

## 2024-12-12 PROCEDURE — 3074F SYST BP LT 130 MM HG: CPT | Performed by: NURSE PRACTITIONER

## 2024-12-12 PROCEDURE — 99213 OFFICE O/P EST LOW 20 MIN: CPT | Performed by: NURSE PRACTITIONER

## 2024-12-12 PROCEDURE — 3079F DIAST BP 80-89 MM HG: CPT | Performed by: NURSE PRACTITIONER

## 2024-12-12 ASSESSMENT — FIBROSIS 4 INDEX: FIB4 SCORE: 0.61

## 2024-12-12 NOTE — ASSESSMENT & PLAN NOTE
BP today 142/90. She has been out of her estradiol patch for 2-3 weeks and does notice that without estradiol patch her BP can elevate and has tachycardia. Continues losartan 50 mg daily. Home /80 when she has both losartan and estradiol patch. Without patch BP elevates. Monday had occipital headache with elevated BP. She took extra dose of losartan which helped her BP lower. After menopause she has noticed her BP elevate.  She anticipates her estradiol patch today.  She does have old amlodipine at home that she can use if needs for elevated BP.  For now continue losartan 50 mg daily.

## 2024-12-12 NOTE — PROGRESS NOTES
Subjective:     CC: Hypertension follow-up    HPI:   Joycelyn presents today with the following:      Primary hypertension  BP today 142/90. She has been out of her estradiol patch for 2-3 weeks and does notice that without estradiol patch her BP can elevate and has tachycardia. Continues losartan 50 mg daily. Home /80 when she has both losartan and estradiol patch. Without patch BP elevates. Monday had occipital headache with elevated BP. She took extra dose of losartan which helped her BP lower. After menopause she has noticed her BP elevate.  She anticipates her estradiol patch today.  She does have old amlodipine at home that she can use if needs for elevated BP.  For now continue losartan 50 mg daily.      Past Medical History:   Diagnosis Date    Allergy, unspecified not elsewhere classified     Ankylosing spondylitis of lumbosacral region ([facility]) 07/06/2015    Anxiety 11/24/2015    Chronic atelectasis 08/15/2024    2/2 obesity. Reduced ERV on 2024 PFTs.       Cough     GERD (gastroesophageal reflux disease)     Headache, classical migraine     Hiatal hernia     Intractable migraine without aura and without status migrainosus 11/24/2015    Lupus     Morphea 01/09/2019    Oropharyngeal dysphagia     Other forms of systemic lupus erythematosus ([facility]) 10/20/2017    Overactive bladder     Peptic ulcer     Polyuria 07/17/2019    Prediabetes 12/08/2016    Shortness of breath     Vocal cord dysfunction        Social History     Tobacco Use    Smoking status: Never    Smokeless tobacco: Never   Vaping Use    Vaping status: Never Used   Substance Use Topics    Alcohol use: No     Alcohol/week: 0.0 oz    Drug use: No       Current Outpatient Medications Ordered in Epic   Medication Sig Dispense Refill    mometasone-formoterol (DULERA) 100-5 MCG/ACT Aerosol Inhale 2 Puffs 2 times a day. Use spacer. Rinse mouth after use. 13 g 11    albuterol 108 (90 Base) MCG/ACT Aero Soln inhalation aerosol Inhale 2 Puffs every four  "hours as needed for Shortness of Breath. 3 Each 3    albuterol (PROVENTIL) 2.5mg/0.5ml Nebu Soln Take 0.5 mL by nebulization every four hours as needed for Shortness of Breath. 20 mL 3    losartan (COZAAR) 50 MG Tab Take 1 Tablet by mouth every day. 90 Tablet 3    DULoxetine HCl 40 MG Cap DR Particles Take 40 mg by mouth every day. 90 Capsule 3    rabeprazole (ACIPHEX) 20 MG tablet       estradiol (VIVELLE DOT) 0.1 MG/24HR PATCH BIWEEKLY APPLY 1 PATCH TOPICALLY TO THE SKIN EVERY 3 AND 1/2 DAYS      progesterone (PROMETRIUM) 100 MG Cap Take 100 mg by mouth at bedtime.      pregabalin (LYRICA) 50 MG capsule Take 50 mg by mouth. 1 tab 3xwk      rizatriptan (MAXALT) 5 MG tablet Take 1 Tab by mouth Once PRN for Migraine for up to 1 dose. 10 Tab 0    HYDROcodone-acetaminophen 2.5-108 mg/5mL (HYCET) 7.5-325 MG/15ML solution Take 15 mL by mouth 4 times a day as needed for Severe Pain.      Thiamine HCl (VITAMIN B-1 PO) Take 1 Tab by mouth every morning.      ENBREL SURECLICK 50 MG/ML Solution Auto-injector 1 Each by Injection route every 7 days.      cyclobenzaprine (FLEXERIL) 10 MG Tab Take 10 mg by mouth at bedtime as needed for Muscle Spasms.      coenzyme Q-10 30 MG capsule Take 30 mg by mouth every morning.      hydroxychloroquine (PLAQUENIL) 200 MG Tab Take 200 mg by mouth every morning.      EPIPEN 2-NOLAN 0.3 MG/0.3ML Solution Auto-injector solution for injection 0.3 mg by Intramuscular route as needed.       No current Epic-ordered facility-administered medications on file.       Allergies:  Milnacipran, Tramadol, Cefdinir, Ciprofloxacin, Metoclopramide, Sulfa drugs, and Carisoprodol    Health Maintenance: Encouraged COVID booster at the pharmacy      Objective:     Vital signs reviewed  Exam:  /84 (BP Location: Right arm, Patient Position: Sitting)   Pulse (!) 109   Temp 36.8 °C (98.3 °F) (Temporal)   Ht 1.575 m (5' 2\")   Wt 83 kg (183 lb)   LMP 03/29/2016   SpO2 98%   BMI 33.47 kg/m²  Body mass index " is 33.47 kg/m².    Gen: Alert and oriented, No apparent distress.  Lungs: Normal effort, CTA bilaterally, no wheezes, rhonchi, or rales  CV: Regular rate and rhythm. No murmurs, rubs, or gallops.        Assessment & Plan:     49 y.o. female with the following -     1. Primary hypertension  Chronic stable problem.  Repeat /84.  She will continue home BP monitoring.  Continue losartan 50 mg daily.  BP is controlled when she has both losartan and estradiol patch and anticipates her estradiol patch in the mail today.  We discussed that if she does not get her estradiol patch soon and her BP becomes elevated again she can take her old amlodipine 2.5 mg dose so that she does not run out of losartan 50 mg tablets.  States that her pharmacy does not allow her 30-day supply at this time.      Return for annual.    Please note that this dictation was created using voice recognition software. I have made every reasonable attempt to correct obvious errors, but I expect that there are errors of grammar and possibly content that I did not discover before finalizing the note.

## 2025-02-24 ENCOUNTER — TELEPHONE (OUTPATIENT)
Dept: CARDIOLOGY | Facility: MEDICAL CENTER | Age: 50
End: 2025-02-24
Payer: COMMERCIAL

## 2025-02-24 NOTE — TELEPHONE ENCOUNTER
Called patient in regards to records for NP appointment with Dr. WATERS. To review most recent lab results, ekg, any cardiac testing or ,if patient has been treated by a cardiologist. No answer, left voicemail to call back

## 2025-02-25 ENCOUNTER — OFFICE VISIT (OUTPATIENT)
Dept: CARDIOLOGY | Facility: MEDICAL CENTER | Age: 50
End: 2025-02-25
Attending: FAMILY MEDICINE
Payer: COMMERCIAL

## 2025-02-25 VITALS
OXYGEN SATURATION: 98 % | HEART RATE: 89 BPM | RESPIRATION RATE: 16 BRPM | BODY MASS INDEX: 33.86 KG/M2 | SYSTOLIC BLOOD PRESSURE: 110 MMHG | HEIGHT: 62 IN | DIASTOLIC BLOOD PRESSURE: 78 MMHG | WEIGHT: 184 LBS

## 2025-02-25 DIAGNOSIS — I47.10 SVT (SUPRAVENTRICULAR TACHYCARDIA) (HCC): ICD-10-CM

## 2025-02-25 DIAGNOSIS — R00.0 TACHYCARDIA: ICD-10-CM

## 2025-02-25 DIAGNOSIS — R00.2 PALPITATIONS: ICD-10-CM

## 2025-02-25 LAB — EKG IMPRESSION: NORMAL

## 2025-02-25 PROCEDURE — 99213 OFFICE O/P EST LOW 20 MIN: CPT | Performed by: INTERNAL MEDICINE

## 2025-02-25 PROCEDURE — 93005 ELECTROCARDIOGRAM TRACING: CPT | Mod: TC | Performed by: INTERNAL MEDICINE

## 2025-02-25 ASSESSMENT — ENCOUNTER SYMPTOMS
PND: 0
DIZZINESS: 0
COUGH: 0
SHORTNESS OF BREATH: 1
LOSS OF CONSCIOUSNESS: 0
PALPITATIONS: 1
ORTHOPNEA: 0
MYALGIAS: 0

## 2025-02-25 ASSESSMENT — FIBROSIS 4 INDEX: FIB4 SCORE: 0.61

## 2025-02-25 NOTE — PROGRESS NOTES
Cardiology Initial Consultation Note    Date of note:    2/25/2025    Primary Care Provider: CURTIS Loving.  Referring Provider: Blu Hernandez M*    Patient Name: Joycelyn Byers   YOB: 1975  MRN:              [id number]    Chief Complaint   Patient presents with    Supraventricular Tachycardia (SVT)     NP Dx: SVT (supraventricular tachycardia) (HCC)    Hypertension       History of Present Illness: Ms. Joycelyn Byers is a 49-year-old woman with past medical history significant for asthma, lupus, ?mixed connective tissue disease, hypertension, GERD who presents to the cardiology office for evaluation of tachycardia, palpitations.     She was seen by her PCP in 2024 and had complaints of orthopnea and dyspnea. Due to concern for cardiomegaly, she had an echocardiogram performed which was normal. An echocardiogram performed on 7/22/2024 which showed normal LV size and normal ejection fraction of 71%.  No evidence of dilated cardiomyopathy or LV dysfunction.    She presents today due to for evaluation of palpitations and rapid heart rate. She has noticed that with minimal activities, her HR would increase to 130-150 bpm. Would experience palpitations with her increase in HR. These symptoms have been ongoing for 4 years.  Aside from this, she denies having episodes of syncope.  No symptoms of exertional chest pain.  No lower extremity edema.      Cardiovascular Risk Factors:  1. Smoking status: Denies  2. Type II Diabetes Mellitus: Denies   Lab Results   Component Value Date/Time    HBA1C 5.8 10/15/2024 11:34 AM    HBA1C 5.7 05/15/2024 08:27 AM     3. Hypertension: Yes  4. Dyslipidemia:  Denies  Cholesterol,Tot   Date Value Ref Range Status   07/16/2021 216 (H) 100 - 199 mg/dL Final     LDL   Date Value Ref Range Status   07/16/2021 122 (H) <100 mg/dL Final     HDL   Date Value Ref Range Status   07/16/2021 73 >=40 mg/dL Final     Triglycerides   Date Value Ref  Range Status   07/16/2021 105 0 - 149 mg/dL Final     5. Family history of early Coronary Artery Disease in a first degree relative (Male less than 55 years of age; Female less than 65 years of age): Denies      Review of Systems:  Review of Systems   Respiratory:  Positive for shortness of breath. Negative for cough.    Cardiovascular:  Positive for palpitations. Negative for chest pain, orthopnea, leg swelling and PND.   Musculoskeletal:  Negative for joint pain and myalgias.   Neurological:  Negative for dizziness and loss of consciousness.       Past Medical History:   Diagnosis Date    Allergy, unspecified not elsewhere classified     Ankylosing spondylitis of lumbosacral region (HCC) 07/06/2015    Anxiety 11/24/2015    Chronic atelectasis 08/15/2024    2/2 obesity. Reduced ERV on 2024 PFTs.       Cough     GERD (gastroesophageal reflux disease)     Headache, classical migraine     Hiatal hernia     Intractable migraine without aura and without status migrainosus 11/24/2015    Lupus     Morphea 01/09/2019    Oropharyngeal dysphagia     Other forms of systemic lupus erythematosus (HCC) 10/20/2017    Overactive bladder     Peptic ulcer     Polyuria 07/17/2019    Prediabetes 12/08/2016    Shortness of breath     Vocal cord dysfunction          Past Surgical History:   Procedure Laterality Date    HYSTERECTOMY LAPAROSCOPY  2013    utrine and right ovary removed    HERNIA REPAIR  2007    umblical hernia    CHOLECYSTECTOMY  2007    SALPINGO-OOPHORECTOMY, UNILATERAL Right          Current Outpatient Medications   Medication Sig Dispense Refill    mometasone-formoterol (DULERA) 100-5 MCG/ACT Aerosol Inhale 2 Puffs 2 times a day. Use spacer. Rinse mouth after use. 13 g 11    albuterol 108 (90 Base) MCG/ACT Aero Soln inhalation aerosol Inhale 2 Puffs every four hours as needed for Shortness of Breath. 3 Each 3    albuterol (PROVENTIL) 2.5mg/0.5ml Nebu Soln Take 0.5 mL by nebulization every four hours as needed for  Shortness of Breath. 20 mL 3    losartan (COZAAR) 50 MG Tab Take 1 Tablet by mouth every day. 90 Tablet 3    rabeprazole (ACIPHEX) 20 MG tablet       estradiol (VIVELLE DOT) 0.1 MG/24HR PATCH BIWEEKLY APPLY 1 PATCH TOPICALLY TO THE SKIN EVERY 3 AND 1/2 DAYS      progesterone (PROMETRIUM) 100 MG Cap Take 100 mg by mouth at bedtime.      pregabalin (LYRICA) 50 MG capsule Take 50 mg by mouth. 1 tab 3xwk      rizatriptan (MAXALT) 5 MG tablet Take 1 Tab by mouth Once PRN for Migraine for up to 1 dose. 10 Tab 0    HYDROcodone-acetaminophen 2.5-108 mg/5mL (HYCET) 7.5-325 MG/15ML solution Take 15 mL by mouth 4 times a day as needed for Severe Pain.      ENBREL SURECLICK 50 MG/ML Solution Auto-injector 1 Each by Injection route every 7 days.      cyclobenzaprine (FLEXERIL) 10 MG Tab Take 10 mg by mouth at bedtime as needed for Muscle Spasms.      coenzyme Q-10 30 MG capsule Take 30 mg by mouth every morning.      hydroxychloroquine (PLAQUENIL) 200 MG Tab Take 200 mg by mouth every morning.      EPIPEN 2-NOLAN 0.3 MG/0.3ML Solution Auto-injector solution for injection 0.3 mg by Intramuscular route as needed.       No current facility-administered medications for this visit.         Allergies   Allergen Reactions    Milnacipran Myalgia    Tramadol Shortness of Breath and Rash     Shortness of breath & rash      Cefdinir Diarrhea and Rash     Rash and diarrhea, no respiratory complaints    Ciprofloxacin Unspecified     Knee & muscle pain      Metoclopramide Unspecified     Akathisia      Sulfa Drugs Nausea     GI upset    Carisoprodol Unspecified     Slurred speech, stumbling         Family History   Problem Relation Age of Onset    Arthritis Brother         psoriatic arthritis    Psoriasis Brother     Arthritis Maternal Grandmother     Psychiatric Illness Mother         Major depression    Other Mother         discoid lupus, brain anerysm    Psychiatric Illness Father         Bipolar disorder    Heart Disease Father         CHF  "   Hypertension Father     Diabetes Father     Other Father         Agent orange exposure 125% disabled    Diabetes Paternal Uncle     Stroke Maternal Grandfather     Diabetes Paternal Grandmother     Alcohol/Drug Paternal Grandfather     Cancer Paternal Aunt         gyn cancer    GI Disease Son         gerd    GI Disease Daughter         gerd    GI Disease Daughter         gerd    GI Disease Daughter         gerd    Diabetes Paternal Aunt 60        type 1 DM         Social History     Socioeconomic History    Marital status:      Spouse name: Not on file    Number of children: Not on file    Years of education: Not on file    Highest education level: Not on file   Occupational History    Not on file   Tobacco Use    Smoking status: Never     Passive exposure: Never    Smokeless tobacco: Never   Vaping Use    Vaping status: Never Used   Substance and Sexual Activity    Alcohol use: No     Alcohol/week: 0.0 oz    Drug use: No    Sexual activity: Yes     Partners: Male     Birth control/protection: Surgical     Comment: ., APRN at [employer]    Other Topics Concern    Not on file   Social History Narrative    Patient is currently disabled, 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 She is  with four children.      Social Drivers of Health     Financial Resource Strain: Not on file   Food Insecurity: Not on file   Transportation Needs: Not on file   Physical Activity: Not on file   Stress: Not on file   Social Connections: Not on file   Intimate Partner Violence: Not on file   Housing Stability: Not on file         Physical Exam:  Ambulatory Vitals  /78 (BP Location: Left arm, Patient Position: Sitting, BP Cuff Size: Adult)   Pulse 89   Resp 16   Ht 1.575 m (5' 2\")   Wt 83.5 kg (184 lb)   SpO2 98%    Oxygen Therapy:  Pulse Oximetry: 98 %  BP Readings from Last 4 Encounters:   02/25/25 110/78   12/12/24 122/84   11/26/24 126/62   10/15/24 130/78       Weight/BMI: Body mass index is " 33.65 kg/m².  Wt Readings from Last 4 Encounters:   02/25/25 83.5 kg (184 lb)   12/12/24 83 kg (183 lb)   11/26/24 83.9 kg (185 lb)   10/15/24 82.2 kg (181 lb 3.2 oz)         General: Not in acute distress  Neck:  No carotid bruits, No JVD appreciated  CVS:  RRR, Normal S1, S2. No murmurs, rubs or gallops  Resp: Normal respiratory effort, lungs CTA bilaterally. No rales or rhonchi  Abdomen: Soft, non-distended, non-tender to palpation  Neurological: Alert and oriented x3, moves all extremities, no focal neurologic deficits  Psychiatric: Appropriate affect  Extremities:   Extremities warm, 2+ bilateral radial pulses.  2+ bilateral dp pulses, no lower extremity edema bilaterally      Lab Data Review:  Lab Results   Component Value Date/Time    CHOLSTRLTOT 216 (H) 07/16/2021 07:39 AM     (H) 07/16/2021 07:39 AM    HDL 73 07/16/2021 07:39 AM    TRIGLYCERIDE 105 07/16/2021 07:39 AM       Lab Results   Component Value Date/Time    SODIUM 138 10/14/2024 07:21 AM    POTASSIUM 4.2 10/14/2024 07:21 AM    CHLORIDE 105 10/14/2024 07:21 AM    CO2 21 10/14/2024 07:21 AM    GLUCOSE 102 (H) 10/14/2024 07:21 AM    BUN 15 10/14/2024 07:21 AM    CREATININE 0.71 10/14/2024 07:21 AM     Lab Results   Component Value Date/Time    ALKPHOSPHAT 58 10/14/2024 07:21 AM    ASTSGOT 22 10/14/2024 07:21 AM    ALTSGPT 28 10/14/2024 07:21 AM    TBILIRUBIN 0.3 10/14/2024 07:21 AM      Lab Results   Component Value Date/Time    WBC 7.4 06/30/2024 01:04 PM    HEMOGLOBIN 14.4 06/30/2024 01:04 PM     Lab Results   Component Value Date/Time    HBA1C 5.8 10/15/2024 11:34 AM    HBA1C 5.7 05/15/2024 08:27 AM         Cardiac Imaging and Procedures Review:    EKG dated 02/25/2025:   My personal interpretation is sinus rhythm    Echo dated 7/22/2024:   I personally reviewed the echocardiogram which shows normal left trickle systolic function.  Ejection fraction estimated to be 70%.  No significant valvular abnormalities.  RVSP within normal  limits.    Assessment & Plan     1. Tachycardia  Cardiac Event Monitor      2. Palpitations  Cardiac Event Monitor      3. SVT (supraventricular tachycardia) (HCC)  EKG    Cardiac Event Monitor            Medical Decision Making:  Ms. Joycelyn Byers is a 49-year-old woman with past medical history significant for asthma, lupus, ?mixed connective tissue disease, hypertension, GERD who presents to the cardiology office for evaluation of tachycardia, palpitations.     1. Tachycardia  2. Palpitations  3. SVT (supraventricular tachycardia) (HCC)    Patient presents to the office for further evaluation of palpitations.  This has been going on for quite some time.  She does have known diagnosis of SVT dating back to [id number].  Not currently on AV alexx blockers.  Her echocardiogram has been reviewed which is essentially normal with normal EF and no major valvular/structural abnormalities.  Given concern for mixed connective tissue disease and positivity for SCL 70, she is certainly at risk for developing pulmonary hypertension.  However review of echocardiograms dating back to 2021 and 2024 shows normal PA pressures which is reassuring.    At this time, I recommend proceeding with cardiac monitoring with a Zio patch x 2-week to rule out tachyarrhythmias that may be contributing to her symptoms.  Currently if patient were found to have significant arrhythmias, she would benefit from AV alexx blockers.  Recommend diltiazem 120mg if needed based on cardiac monitor results.      It was a pleasure seeing Ms. Joycelyn Byers in the office today. Return if symptoms worsen or fail to improve. Patient is aware to call the cardiology clinic with any questions or concerns.      Jan Weathers MD, [facility]  Cardiologist, [facility], [address]  [address] [address]  Phone: [phone]  Fax: [phone]    Please note that this dictation was  created using voice recognition software. I have made every reasonable attempt to correct obvious errors, but it is possible there are errors of grammar and possibly content that I did not discover before finalizing the note.

## 2025-02-27 ENCOUNTER — NON-PROVIDER VISIT (OUTPATIENT)
Dept: CARDIOLOGY | Facility: MEDICAL CENTER | Age: 50
End: 2025-02-27
Attending: INTERNAL MEDICINE
Payer: COMMERCIAL

## 2025-02-27 DIAGNOSIS — R00.0 TACHYCARDIA: ICD-10-CM

## 2025-02-27 DIAGNOSIS — I47.10 SVT (SUPRAVENTRICULAR TACHYCARDIA) (HCC): ICD-10-CM

## 2025-02-27 DIAGNOSIS — R00.2 PALPITATIONS: ICD-10-CM

## 2025-02-27 NOTE — PROGRESS NOTES
Patient enrolled in the 14 day [facility] Holter monitoring program per Jan Weathers MD.  >Monitor shipped to patient by [email or web address].  >Currently pending EOS.

## 2025-03-10 ENCOUNTER — OFFICE VISIT (OUTPATIENT)
Dept: URGENT CARE | Facility: PHYSICIAN GROUP | Age: 50
End: 2025-03-10
Payer: COMMERCIAL

## 2025-03-10 VITALS
SYSTOLIC BLOOD PRESSURE: 124 MMHG | HEART RATE: 88 BPM | TEMPERATURE: 98.3 F | DIASTOLIC BLOOD PRESSURE: 64 MMHG | HEIGHT: 62 IN | OXYGEN SATURATION: 98 % | RESPIRATION RATE: 18 BRPM | WEIGHT: 187 LBS | BODY MASS INDEX: 34.41 KG/M2

## 2025-03-10 DIAGNOSIS — J06.9 VIRAL URI: ICD-10-CM

## 2025-03-10 DIAGNOSIS — J01.40 ACUTE NON-RECURRENT PANSINUSITIS: ICD-10-CM

## 2025-03-10 LAB
FLUAV RNA SPEC QL NAA+PROBE: NEGATIVE
FLUBV RNA SPEC QL NAA+PROBE: NEGATIVE
RSV RNA SPEC QL NAA+PROBE: NEGATIVE
SARS-COV-2 RNA RESP QL NAA+PROBE: NEGATIVE

## 2025-03-10 PROCEDURE — 99213 OFFICE O/P EST LOW 20 MIN: CPT | Performed by: NURSE PRACTITIONER

## 2025-03-10 PROCEDURE — 3078F DIAST BP <80 MM HG: CPT | Performed by: NURSE PRACTITIONER

## 2025-03-10 PROCEDURE — 0241U POCT CEPHEID COV-2, FLU A/B, RSV - PCR: CPT | Performed by: NURSE PRACTITIONER

## 2025-03-10 PROCEDURE — 3074F SYST BP LT 130 MM HG: CPT | Performed by: NURSE PRACTITIONER

## 2025-03-10 ASSESSMENT — FIBROSIS 4 INDEX: FIB4 SCORE: 0.61

## 2025-03-10 NOTE — PROGRESS NOTES
Chief Complaint   Patient presents with    Sinus Problem    GI Problem     X 3 days          History of Present Illness  The patient is a 49-year-old female who presents for evaluation of sinus congestion.    She began experiencing symptoms indicative of influenza approximately 2 weeks ago, which were less severe due to her vaccination status. She was compelled to take a sick leave on Monday due to persistent nasal congestion and intermittent pressure that has been ongoing for the past 2 to 3 weeks. On Friday, she developed chills, an unusual symptom for her, and an inability to maintain warmth. 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 Her current symptoms include myalgia, malaise, chills, and mild gastrointestinal upset without diarrhea or vomiting. She does not have a fever, a common occurrence in her case due to her autoimmune disease. She reports no cough but suspects a potential secondary infection. She also reports ear fullness but finds antihistamines ineffective. She uses saline daily. She is not allergic to penicillins and tolerates amoxicillin and Z-Abelino well. However, she developed a rash and diarrhea after a 7 to 10-day course of cefdinir. She tolerates Augmentin well.    SOCIAL HISTORY  She is a nurse practitioner.    ALLERGIES  The patient has had an allergic reaction to CEFDINIR, resulting in a rash and diarrhea.    IMMUNIZATIONS  She has received the influenza vaccine.         ROS:    No severe shortness of breath   No Cardiac like chest pain, as discussed   As otherwise stated in HPI    Medical/SX/ Social History:  Reviewed per chart    Pertinent Medications:    Current Outpatient Medications on File Prior to Visit   Medication Sig Dispense Refill    mometasone-formoterol (DULERA) 100-5 MCG/ACT Aerosol Inhale 2 Puffs 2 times a day. Use spacer. Rinse mouth after use. 13 g 11    albuterol 108 (90 Base) MCG/ACT Aero Soln inhalation aerosol Inhale 2 Puffs every four hours as  needed for Shortness of Breath. 3 Each 3    albuterol (PROVENTIL) 2.5mg/0.5ml Nebu Soln Take 0.5 mL by nebulization every four hours as needed for Shortness of Breath. 20 mL 3    losartan (COZAAR) 50 MG Tab Take 1 Tablet by mouth every day. 90 Tablet 3    rabeprazole (ACIPHEX) 20 MG tablet       estradiol (VIVELLE DOT) 0.1 MG/24HR PATCH BIWEEKLY APPLY 1 PATCH TOPICALLY TO THE SKIN EVERY 3 AND 1/2 DAYS      progesterone (PROMETRIUM) 100 MG Cap Take 100 mg by mouth at bedtime.      pregabalin (LYRICA) 50 MG capsule Take 50 mg by mouth. 1 tab 3xwk      rizatriptan (MAXALT) 5 MG tablet Take 1 Tab by mouth Once PRN for Migraine for up to 1 dose. 10 Tab 0    HYDROcodone-acetaminophen 2.5-108 mg/5mL (HYCET) 7.5-325 MG/15ML solution Take 15 mL by mouth 4 times a day as needed for Severe Pain.      ENBREL SURECLICK 50 MG/ML Solution Auto-injector 1 Each by Injection route every 7 days.      cyclobenzaprine (FLEXERIL) 10 MG Tab Take 10 mg by mouth at bedtime as needed for Muscle Spasms.      coenzyme Q-10 30 MG capsule Take 30 mg by mouth every morning.      hydroxychloroquine (PLAQUENIL) 200 MG Tab Take 200 mg by mouth every morning.      EPIPEN 2-NOLAN 0.3 MG/0.3ML Solution Auto-injector solution for injection 0.3 mg by Intramuscular route as needed.       No current facility-administered medications on file prior to visit.        Allergies:    Milnacipran, Tramadol, Cefdinir, Ciprofloxacin, Metoclopramide, Sulfa drugs, and Carisoprodol     Problem list, medications, and allergies reviewed by myself today in Epic     Physical Exam:    Vitals:    03/10/25 1507   BP: 124/64   Pulse: 88   Resp: 18   Temp: 36.8 °C (98.3 °F)   SpO2: 98%             Physical Exam  Vitals reviewed.   Constitutional:       General: She is not in acute distress.     Appearance: Normal appearance. She is well-developed. She is not toxic-appearing.   HENT:      Head: Normocephalic and atraumatic.      Right Ear: Tympanic membrane, ear canal and external  ear normal.      Left Ear: Tympanic membrane, ear canal and external ear normal.      Nose: Mucosal edema, congestion and rhinorrhea present.      Right Turbinates: Swollen.      Left Turbinates: Swollen.      Right Sinus: Maxillary sinus tenderness and frontal sinus tenderness present.      Left Sinus: Maxillary sinus tenderness and frontal sinus tenderness present.      Mouth/Throat:      Lips: Pink.      Mouth: Mucous membranes are moist.      Pharynx: Posterior oropharyngeal erythema present.      Tonsils: No tonsillar exudate.   Eyes:      General: Lids are normal. Gaze aligned appropriately. No allergic shiner or scleral icterus.     Extraocular Movements: Extraocular movements intact.      Conjunctiva/sclera: Conjunctivae normal.      Pupils: Pupils are equal, round, and reactive to light.   Cardiovascular:      Rate and Rhythm: Normal rate and regular rhythm.      Pulses:           Radial pulses are 2+ on the right side and 2+ on the left side.      Heart sounds: Normal heart sounds.   Pulmonary:      Effort: Pulmonary effort is normal.      Breath sounds: Normal breath sounds. No wheezing.   Musculoskeletal:      Right lower leg: No edema.      Left lower leg: No edema.   Lymphadenopathy:      Cervical: No cervical adenopathy.   Skin:     General: Skin is warm.      Capillary Refill: Capillary refill takes less than 2 seconds.      Coloration: Skin is not cyanotic or pale.      Findings: No rash.   Neurological:      Mental Status: She is alert.      Gait: Gait is intact.   Psychiatric:         Behavior: Behavior normal. Behavior is cooperative.            Diagnostics:    Results for orders placed or performed in visit on 03/10/25   POCT CoV-2, Flu A/B, RSV by PCR    Collection Time: 03/10/25  4:37 PM   Result Value Ref Range    SARS-CoV-2 by PCR Negative Negative, Invalid    Influenza virus A RNA Negative Negative, Invalid    Influenza virus B, PCR Negative Negative, Invalid    RSV, PCR Negative Negative,  Invalid     *Note: Due to a large number of results and/or encounters for the requested time period, some results have not been displayed. A complete set of results can be found in Results Review.       Medical Decision making and plan :  I personally reviewed prior external notes and test results pertinent to today's visit. Pt is clinically stable at today's acute urgent care visit.  Patient appears nontoxic with no acute distress noted. Appropriate for outpatient care at this time.    Pleasant 49 y.o. female presented clinic with:     Assessment & Plan  1. Sinusitis.  Given the persistence of her symptoms over the past few weeks, it is plausible that she may be suffering from a viral infection. However, the possibility of a concurrent bacterial infection can not be ruled out. A prescription for Augmentin has been issued to manage her sinusitis. She is advised to consume the medication with food and continue her current rinsing regimen. A swab test will be conducted to confirm the diagnosis. If the test results are positive for COVID-19, Paxlovid will be considered as a treatment option.  Fortunately viral testing is negative      Shared decision-making was utilized with patient for treatment plan. Medication discussed included indication for use and the potential benefits and side effects. Education was provided regarding the aforementioned assessments.  Differential Diagnosis, natural history, and supportive care discussed. All of the patient's questions were answered to their satisfaction at the time of discharge. Patient was encouraged to monitor symptoms closely. Those signs and symptoms which would warrant concern and mandate seeking a higher level of service through the emergency department discussed at length.  Patient stated agreement and understanding of this plan of care.    Disposition:  Home in stable condition     Voice Recognition Disclaimer:  Portions of this document were created using voice  recognition software and AMANDA AI technology provided by [facility].  Patient was informed of [email or web address] technology being used.  The software does have a chance of producing errors of grammar and possibly content. I have made every reasonable attempt to correct obvious errors, but there could be errors of grammar and possibly content that I did not discover before finalizing the documentation.    SG [email or web address].[employer].

## 2025-03-25 ENCOUNTER — APPOINTMENT (OUTPATIENT)
Dept: RADIOLOGY | Facility: MEDICAL CENTER | Age: 50
End: 2025-03-25
Attending: OBSTETRICS & GYNECOLOGY
Payer: COMMERCIAL

## 2025-03-26 ENCOUNTER — TELEPHONE (OUTPATIENT)
Dept: CARDIOLOGY | Facility: MEDICAL CENTER | Age: 50
End: 2025-03-26
Payer: COMMERCIAL

## 2025-03-26 ENCOUNTER — RESULTS FOLLOW-UP (OUTPATIENT)
Dept: CARDIOLOGY | Facility: MEDICAL CENTER | Age: 50
End: 2025-03-26
Payer: COMMERCIAL

## 2025-03-26 NOTE — TELEPHONE ENCOUNTER
JOEL EOS to 's nurse, Elsa, on 3/26/2025    Preliminary findings:    Sinus Rhythm  with an avg rate of 85 bpm    Rare Supraventricular ectopics    Rare isolated Ventricular ectopics    2 patient events:  -118      To Patient- These findings are preliminary and HAVE NOT yet been reviewed by your provider. Please allow our team time to review these findings and someone from our office will contact you if any follow up is necessary.

## 2025-04-21 ENCOUNTER — HOSPITAL ENCOUNTER (OUTPATIENT)
Dept: RADIOLOGY | Facility: MEDICAL CENTER | Age: 50
End: 2025-04-21
Attending: NURSE PRACTITIONER
Payer: COMMERCIAL

## 2025-04-21 DIAGNOSIS — M47.812 CERVICAL SPONDYLOSIS WITHOUT MYELOPATHY: ICD-10-CM

## 2025-04-21 DIAGNOSIS — M45.9 POKER SPINE (HCC): ICD-10-CM

## 2025-04-21 PROCEDURE — 72050 X-RAY EXAM NECK SPINE 4/5VWS: CPT

## 2025-05-20 ENCOUNTER — TELEPHONE (OUTPATIENT)
Dept: CARDIOLOGY | Facility: MEDICAL CENTER | Age: 50
End: 2025-05-20
Payer: COMMERCIAL

## 2025-05-20 ENCOUNTER — RESULTS FOLLOW-UP (OUTPATIENT)
Dept: CARDIOLOGY | Facility: MEDICAL CENTER | Age: 50
End: 2025-05-20
Payer: COMMERCIAL

## 2025-07-10 ENCOUNTER — HOSPITAL ENCOUNTER (OUTPATIENT)
Dept: RADIOLOGY | Facility: MEDICAL CENTER | Age: 50
End: 2025-07-10
Attending: OBSTETRICS & GYNECOLOGY
Payer: COMMERCIAL

## 2025-07-10 ENCOUNTER — APPOINTMENT (OUTPATIENT)
Dept: MEDICAL GROUP | Facility: PHYSICIAN GROUP | Age: 50
End: 2025-07-10
Payer: COMMERCIAL

## 2025-07-10 VITALS
DIASTOLIC BLOOD PRESSURE: 58 MMHG | BODY MASS INDEX: 32.96 KG/M2 | OXYGEN SATURATION: 97 % | HEART RATE: 108 BPM | TEMPERATURE: 98.6 F | WEIGHT: 186 LBS | SYSTOLIC BLOOD PRESSURE: 94 MMHG | HEIGHT: 63 IN

## 2025-07-10 DIAGNOSIS — Z13.0 SCREENING FOR ENDOCRINE, METABOLIC AND IMMUNITY DISORDER: ICD-10-CM

## 2025-07-10 DIAGNOSIS — I10 PRIMARY HYPERTENSION: Primary | ICD-10-CM

## 2025-07-10 DIAGNOSIS — Z13.29 SCREENING FOR ENDOCRINE, METABOLIC AND IMMUNITY DISORDER: ICD-10-CM

## 2025-07-10 DIAGNOSIS — R92.323 SCATTERED FIBROGLANDULAR TISSUE DENSITY OF BOTH BREASTS ON MAMMOGRAPHY: ICD-10-CM

## 2025-07-10 DIAGNOSIS — R06.83 SNORING: ICD-10-CM

## 2025-07-10 DIAGNOSIS — Z12.31 VISIT FOR SCREENING MAMMOGRAM: ICD-10-CM

## 2025-07-10 DIAGNOSIS — Z13.228 SCREENING FOR ENDOCRINE, METABOLIC AND IMMUNITY DISORDER: ICD-10-CM

## 2025-07-10 DIAGNOSIS — Z00.00 PREVENTATIVE HEALTH CARE: ICD-10-CM

## 2025-07-10 PROBLEM — M50.30 DEGENERATION OF CERVICAL INTERVERTEBRAL DISC: Status: ACTIVE | Noted: 2025-07-10

## 2025-07-10 PROBLEM — E87.6 HYPOKALEMIA: Status: RESOLVED | Noted: 2020-01-06 | Resolved: 2025-07-10

## 2025-07-10 PROBLEM — R05.9 COUGH: Status: RESOLVED | Noted: 2021-11-15 | Resolved: 2025-07-10

## 2025-07-10 PROBLEM — R73.9 HYPERGLYCEMIA: Status: RESOLVED | Noted: 2020-03-03 | Resolved: 2025-07-10

## 2025-07-10 PROBLEM — M35.9 DISORDER OF CONNECTIVE TISSUE (HCC): Status: ACTIVE | Noted: 2025-07-10

## 2025-07-10 PROCEDURE — 99214 OFFICE O/P EST MOD 30 MIN: CPT | Performed by: NURSE PRACTITIONER

## 2025-07-10 PROCEDURE — 3074F SYST BP LT 130 MM HG: CPT | Performed by: NURSE PRACTITIONER

## 2025-07-10 PROCEDURE — 3078F DIAST BP <80 MM HG: CPT | Performed by: NURSE PRACTITIONER

## 2025-07-10 PROCEDURE — 76641 ULTRASOUND BREAST COMPLETE: CPT

## 2025-07-10 PROCEDURE — 77063 BREAST TOMOSYNTHESIS BI: CPT

## 2025-07-10 RX ORDER — AMLODIPINE BESYLATE 2.5 MG/1
2.5 TABLET ORAL DAILY
Qty: 100 TABLET | Refills: 3 | Status: SHIPPED | OUTPATIENT
Start: 2025-07-10 | End: 2026-08-14

## 2025-07-10 RX ORDER — LOSARTAN POTASSIUM 50 MG/1
50 TABLET ORAL DAILY
Qty: 90 TABLET | Refills: 3 | Status: SHIPPED | OUTPATIENT
Start: 2025-07-10

## 2025-07-10 RX ORDER — AMLODIPINE BESYLATE 2.5 MG/1
2.5 TABLET ORAL DAILY
COMMUNITY
End: 2025-07-10

## 2025-07-10 ASSESSMENT — PATIENT HEALTH QUESTIONNAIRE - PHQ9: CLINICAL INTERPRETATION OF PHQ2 SCORE: 0

## 2025-07-10 ASSESSMENT — FIBROSIS 4 INDEX: FIB4 SCORE: 0.62

## 2025-07-10 NOTE — PROGRESS NOTES
"Subjective:     CC: Hypertension follow-up    HPI:   Joycelyn presents today with the following:    Primary hypertension  She has resumed past prescription of amlodipine 2.5 mg daily 2-3 months ago after experiencing occipital headaches. Her BP at the time was 160-170/110. The occipital headache would wake her from sleep. She tried old prescription of propranolol that did not help. She continued losartan 50 mg daily. Home BP now 120's/80's. BP today 94/58.  She had walked her dog this morning and did use her albuterol inhaler before today's appointment.  Heart rate 108. She recently returned from vacation and does feel fatigued. No chest pain, shortness of breath or dizziness. She has finished NP school.    Snoring  She continues to snore.  She has not completed her sleep study.  STOP-BANG score today is 4.      Past Medical History[1]    Social History[2]    Current Medications and Prescriptions Ordered in Epic[3]    Allergies:  Milnacipran, Tramadol, Cefdinir, Ciprofloxacin, Metoclopramide, Sulfa drugs, and Carisoprodol    Health Maintenance: She will get her COVID booster at the pharmacy      Objective:     Vital signs reviewed  Exam:  BP 94/58 (BP Location: Left arm, Patient Position: Sitting, BP Cuff Size: Adult)   Pulse (!) 108 Comment: just took her albuterol hfa  Temp 37 °C (98.6 °F) (Temporal)   Ht 1.588 m (5' 2.5\")   Wt 84.4 kg (186 lb)   LMP 03/29/2016   SpO2 97%   BMI 33.48 kg/m²  Body mass index is 33.48 kg/m².    Gen: Alert and oriented, No apparent distress.  Lungs: Normal effort, CTA bilaterally, no wheezes, rhonchi, or rales  CV: Regular rate and rhythm. No murmurs, rubs, or gallops.  Ext: No clubbing, cyanosis, edema.      Assessment & Plan:     50 y.o. female with the following -     1. Primary hypertension (Primary)  Chronic exacerbated problem.  Continue losartan 50 mg daily and continue amlodipine 2.5 mg daily.  - amLODIPine (NORVASC) 2.5 MG Tab; Take 1 Tablet by mouth every day.  " Dispense: 100 Tablet; Refill: 3  - losartan (COZAAR) 50 MG Tab; Take 1 Tablet by mouth every day.  Dispense: 90 Tablet; Refill: 3    2. Snoring  Chronic exacerbated problem.  STOP-BANG score today is 4.  Overnight Home sleep study ordered.  - Overnight Home Sleep Study; Future    3. Screening for endocrine, metabolic and immunity disorder  Acute uncomplicated problem.  Screening labs ordered.  - HEMOGLOBIN A1C; Future  - TSH WITH REFLEX TO FT4; Future  - VITAMIN D,25 HYDROXY (DEFICIENCY); Future  - VITAMIN B12; Future    4. Preventative health care  Acute uncomplicated problem.  Annual labs ordered.  - CBC WITH DIFFERENTIAL; Future  - Comp Metabolic Panel; Future  - Lipid Profile; Future      Return if symptoms worsen or fail to improve.    Please note that this dictation was created using voice recognition software. I have made every reasonable attempt to correct obvious errors, but I expect that there are errors of grammar and possibly content that I did not discover before finalizing the note.             [1]   Past Medical History:  Diagnosis Date    Allergy, unspecified not elsewhere classified     Ankylosing spondylitis of lumbosacral region (HCC) 07/06/2015    Anxiety 11/24/2015    Chronic atelectasis 08/15/2024    2/2 obesity. Reduced ERV on 2024 PFTs.       Cough     GERD (gastroesophageal reflux disease)     Headache, classical migraine     Hiatal hernia     Intractable migraine without aura and without status migrainosus 11/24/2015    Lupus     Morphea 01/09/2019    Oropharyngeal dysphagia     Other forms of systemic lupus erythematosus (HCC) 10/20/2017    Overactive bladder     Peptic ulcer     Polyuria 07/17/2019    Prediabetes 12/08/2016    Shortness of breath     Vocal cord dysfunction    [2]   Social History  Tobacco Use    Smoking status: Never     Passive exposure: Never    Smokeless tobacco: Never   Vaping Use    Vaping status: Never Used   Substance Use Topics    Alcohol use: No     Alcohol/week:  0.0 oz    Drug use: No   [3]   Current Outpatient Medications Ordered in Epic   Medication Sig Dispense Refill    amLODIPine (NORVASC) 2.5 MG Tab Take 1 Tablet by mouth every day. 100 Tablet 3    losartan (COZAAR) 50 MG Tab Take 1 Tablet by mouth every day. 90 Tablet 3    albuterol 108 (90 Base) MCG/ACT Aero Soln inhalation aerosol Inhale 2 Puffs every four hours as needed for Shortness of Breath. 3 Each 3    albuterol (PROVENTIL) 2.5mg/0.5ml Nebu Soln Take 0.5 mL by nebulization every four hours as needed for Shortness of Breath. 20 mL 3    estradiol (VIVELLE DOT) 0.1 MG/24HR PATCH BIWEEKLY APPLY 1 PATCH TOPICALLY TO THE SKIN EVERY 3 AND 1/2 DAYS      progesterone (PROMETRIUM) 100 MG Cap Take 100 mg by mouth at bedtime.      pregabalin (LYRICA) 50 MG capsule Take 50 mg by mouth. 1 tab 3xwk (Patient taking differently: Take 50 mg by mouth. Prn)      rizatriptan (MAXALT) 5 MG tablet Take 1 Tab by mouth Once PRN for Migraine for up to 1 dose. 10 Tab 0    HYDROcodone-acetaminophen 2.5-108 mg/5mL (HYCET) 7.5-325 MG/15ML solution Take 15 mL by mouth 4 times a day as needed for Severe Pain.      ENBREL SURECLICK 50 MG/ML Solution Auto-injector 1 Each by Injection route every 7 days.      cyclobenzaprine (FLEXERIL) 10 MG Tab Take 10 mg by mouth at bedtime as needed for Muscle Spasms.      coenzyme Q-10 30 MG capsule Take 30 mg by mouth every morning.      hydroxychloroquine (PLAQUENIL) 200 MG Tab Take 200 mg by mouth every morning.      EPIPEN 2-NOLAN 0.3 MG/0.3ML Solution Auto-injector solution for injection 0.3 mg by Intramuscular route as needed.       No current Epic-ordered facility-administered medications on file.      [email or web address]

## 2025-07-10 NOTE — ASSESSMENT & PLAN NOTE
She has resumed past prescription of amlodipine 2.5 mg daily 2-3 months ago after experiencing occipital headaches. Her BP at the time was 160-170/110. The occipital headache would wake her from sleep. She tried old prescription of propranolol that did not help. She continued losartan 50 mg daily. Home BP now 120's/80's. BP today 94/58.  She had walked her dog this morning and did use her albuterol inhaler before today's appointment.  Heart rate 108. She recently returned from vacation and does feel fatigued. No chest pain, shortness of breath or dizziness. She has finished NP school.

## 2025-07-11 ENCOUNTER — HOSPITAL ENCOUNTER (OUTPATIENT)
Dept: LAB | Facility: MEDICAL CENTER | Age: 50
End: 2025-07-11
Attending: NURSE PRACTITIONER
Payer: COMMERCIAL

## 2025-07-11 DIAGNOSIS — Z00.00 PREVENTATIVE HEALTH CARE: ICD-10-CM

## 2025-07-11 DIAGNOSIS — Z13.228 SCREENING FOR ENDOCRINE, METABOLIC AND IMMUNITY DISORDER: ICD-10-CM

## 2025-07-11 DIAGNOSIS — Z13.29 SCREENING FOR ENDOCRINE, METABOLIC AND IMMUNITY DISORDER: ICD-10-CM

## 2025-07-11 DIAGNOSIS — Z13.0 SCREENING FOR ENDOCRINE, METABOLIC AND IMMUNITY DISORDER: ICD-10-CM

## 2025-07-11 LAB
25(OH)D3 SERPL-MCNC: 34 NG/ML (ref 30–100)
ALBUMIN SERPL BCP-MCNC: 4.1 G/DL (ref 3.2–4.9)
ALBUMIN/GLOB SERPL: 1.2 G/DL
ALP SERPL-CCNC: 54 U/L (ref 30–99)
ALT SERPL-CCNC: 25 U/L (ref 2–50)
ANION GAP SERPL CALC-SCNC: 12 MMOL/L (ref 7–16)
AST SERPL-CCNC: 27 U/L (ref 12–45)
BASOPHILS # BLD AUTO: 1.8 % (ref 0–1.8)
BASOPHILS # BLD: 0.1 K/UL (ref 0–0.12)
BILIRUB SERPL-MCNC: 0.4 MG/DL (ref 0.1–1.5)
BUN SERPL-MCNC: 16 MG/DL (ref 8–22)
CALCIUM ALBUM COR SERPL-MCNC: 9.1 MG/DL (ref 8.5–10.5)
CALCIUM SERPL-MCNC: 9.2 MG/DL (ref 8.5–10.5)
CHLORIDE SERPL-SCNC: 104 MMOL/L (ref 96–112)
CHOLEST SERPL-MCNC: 185 MG/DL (ref 100–199)
CO2 SERPL-SCNC: 22 MMOL/L (ref 20–33)
CREAT SERPL-MCNC: 0.69 MG/DL (ref 0.5–1.4)
EOSINOPHIL # BLD AUTO: 0.18 K/UL (ref 0–0.51)
EOSINOPHIL NFR BLD: 3.3 % (ref 0–6.9)
ERYTHROCYTE [DISTWIDTH] IN BLOOD BY AUTOMATED COUNT: 42.9 FL (ref 35.9–50)
EST. AVERAGE GLUCOSE BLD GHB EST-MCNC: 123 MG/DL
GFR SERPLBLD CREATININE-BSD FMLA CKD-EPI: 106 ML/MIN/1.73 M 2
GLOBULIN SER CALC-MCNC: 3.3 G/DL (ref 1.9–3.5)
GLUCOSE SERPL-MCNC: 93 MG/DL (ref 65–99)
HBA1C MFR BLD: 5.9 % (ref 4–5.6)
HCT VFR BLD AUTO: 43.8 % (ref 37–47)
HDLC SERPL-MCNC: 51 MG/DL
HGB BLD-MCNC: 13.7 G/DL (ref 12–16)
IMM GRANULOCYTES # BLD AUTO: 0.02 K/UL (ref 0–0.11)
IMM GRANULOCYTES NFR BLD AUTO: 0.4 % (ref 0–0.9)
LDLC SERPL CALC-MCNC: 102 MG/DL
LYMPHOCYTES # BLD AUTO: 1.52 K/UL (ref 1–4.8)
LYMPHOCYTES NFR BLD: 27.7 % (ref 22–41)
MCH RBC QN AUTO: 25.9 PG (ref 27–33)
MCHC RBC AUTO-ENTMCNC: 31.3 G/DL (ref 32.2–35.5)
MCV RBC AUTO: 82.8 FL (ref 81.4–97.8)
MONOCYTES # BLD AUTO: 0.55 K/UL (ref 0–0.85)
MONOCYTES NFR BLD AUTO: 10 % (ref 0–13.4)
NEUTROPHILS # BLD AUTO: 3.11 K/UL (ref 1.82–7.42)
NEUTROPHILS NFR BLD: 56.8 % (ref 44–72)
NRBC # BLD AUTO: 0 K/UL
NRBC BLD-RTO: 0 /100 WBC (ref 0–0.2)
PLATELET # BLD AUTO: 337 K/UL (ref 164–446)
PMV BLD AUTO: 10.9 FL (ref 9–12.9)
POTASSIUM SERPL-SCNC: 4.3 MMOL/L (ref 3.6–5.5)
PROT SERPL-MCNC: 7.4 G/DL (ref 6–8.2)
RBC # BLD AUTO: 5.29 M/UL (ref 4.2–5.4)
SODIUM SERPL-SCNC: 138 MMOL/L (ref 135–145)
TRIGL SERPL-MCNC: 161 MG/DL (ref 0–149)
TSH SERPL DL<=0.005 MIU/L-ACNC: 1.58 UIU/ML (ref 0.38–5.33)
VIT B12 SERPL-MCNC: 998 PG/ML (ref 211–911)
WBC # BLD AUTO: 5.5 K/UL (ref 4.8–10.8)

## 2025-07-11 PROCEDURE — 85025 COMPLETE CBC W/AUTO DIFF WBC: CPT

## 2025-07-11 PROCEDURE — 80061 LIPID PANEL: CPT

## 2025-07-11 PROCEDURE — 80053 COMPREHEN METABOLIC PANEL: CPT

## 2025-07-11 PROCEDURE — 82306 VITAMIN D 25 HYDROXY: CPT

## 2025-07-11 PROCEDURE — 82607 VITAMIN B-12: CPT

## 2025-07-11 PROCEDURE — 84443 ASSAY THYROID STIM HORMONE: CPT

## 2025-07-11 PROCEDURE — 36415 COLL VENOUS BLD VENIPUNCTURE: CPT

## 2025-07-11 PROCEDURE — 83036 HEMOGLOBIN GLYCOSYLATED A1C: CPT

## 2025-07-11 NOTE — Clinical Note
REFERRAL APPROVAL NOTICE         Sent on July 11, 2025                   Joycelyn Kwan Modesta  [address]  [place] [address]                   Dear Ms. Beyrs,    After a careful review of the medical information and benefit coverage, [facility] has processed your referral. See below for additional details.    If applicable, you must be actively enrolled with your insurance for coverage of the authorized service. If you have any questions regarding your coverage, please contact your insurance directly.    REFERRAL INFORMATION   Referral #:  [id number]  Referred-To Department    Referred-By Provider:  Pulmonary and Sleep Medicine    [facility]      [address]  [place] [address]  [phone] [address]  [place] [id number] [phone]    Referral Start Date:  07/10/2025  Referral End Date:   07/10/2026             SCHEDULING  If you do not already have an appointment, please call [phone] to make an appointment.     MORE INFORMATION  If you do not already have a [employer] account, sign up at: [email or web address]  You can access your medical information, make appointments, see lab results, billing information, and more.  If you have questions regarding this referral, please contact  the [facility] Referrals department at:             [phone]. Monday - Friday 8:00AM - 5:00PM.     Sincerely,    [facility]

## 2025-07-14 ENCOUNTER — PATIENT MESSAGE (OUTPATIENT)
Dept: MEDICAL GROUP | Facility: PHYSICIAN GROUP | Age: 50
End: 2025-07-14
Payer: COMMERCIAL

## 2025-07-14 DIAGNOSIS — R79.89 ABNORMAL CBC: Primary | ICD-10-CM

## 2025-07-14 DIAGNOSIS — D64.9 ANEMIA, UNSPECIFIED TYPE: ICD-10-CM

## 2025-08-14 ENCOUNTER — TELEPHONE (OUTPATIENT)
Dept: HEALTH INFORMATION MANAGEMENT | Facility: OTHER | Age: 50
End: 2025-08-14
Payer: COMMERCIAL

## 2025-09-20 ENCOUNTER — APPOINTMENT (OUTPATIENT)
Dept: SLEEP MEDICINE | Facility: MEDICAL CENTER | Age: 50
End: 2025-09-20
Attending: NURSE PRACTITIONER
Payer: COMMERCIAL